# Patient Record
Sex: FEMALE | Race: WHITE | NOT HISPANIC OR LATINO | Employment: UNEMPLOYED | URBAN - METROPOLITAN AREA
[De-identification: names, ages, dates, MRNs, and addresses within clinical notes are randomized per-mention and may not be internally consistent; named-entity substitution may affect disease eponyms.]

---

## 2017-01-19 ENCOUNTER — PATIENT MESSAGE (OUTPATIENT)
Dept: OBSTETRICS AND GYNECOLOGY | Facility: CLINIC | Age: 37
End: 2017-01-19

## 2017-01-19 ENCOUNTER — TELEPHONE (OUTPATIENT)
Dept: OBSTETRICS AND GYNECOLOGY | Facility: CLINIC | Age: 37
End: 2017-01-19

## 2017-01-19 ENCOUNTER — OFFICE VISIT (OUTPATIENT)
Dept: OBSTETRICS AND GYNECOLOGY | Facility: CLINIC | Age: 37
End: 2017-01-19
Payer: COMMERCIAL

## 2017-01-19 ENCOUNTER — LAB VISIT (OUTPATIENT)
Dept: LAB | Facility: HOSPITAL | Age: 37
End: 2017-01-19
Attending: OBSTETRICS & GYNECOLOGY
Payer: COMMERCIAL

## 2017-01-19 VITALS
HEART RATE: 85 BPM | WEIGHT: 187.06 LBS | BODY MASS INDEX: 31.94 KG/M2 | SYSTOLIC BLOOD PRESSURE: 130 MMHG | DIASTOLIC BLOOD PRESSURE: 76 MMHG | HEIGHT: 64 IN

## 2017-01-19 DIAGNOSIS — R53.83 FATIGUE, UNSPECIFIED TYPE: ICD-10-CM

## 2017-01-19 DIAGNOSIS — R53.83 FATIGUE, UNSPECIFIED TYPE: Primary | ICD-10-CM

## 2017-01-19 DIAGNOSIS — L29.9 PRURITUS OF SKIN: ICD-10-CM

## 2017-01-19 DIAGNOSIS — L65.9 HAIR LOSS: ICD-10-CM

## 2017-01-19 LAB
BASOPHILS # BLD AUTO: 0 K/UL
BASOPHILS NFR BLD: 0.4 %
DIFFERENTIAL METHOD: ABNORMAL
EOSINOPHIL # BLD AUTO: 0.1 K/UL
EOSINOPHIL NFR BLD: 1.2 %
ERYTHROCYTE [DISTWIDTH] IN BLOOD BY AUTOMATED COUNT: 19.1 %
FERRITIN SERPL-MCNC: 8 NG/ML
HCT VFR BLD AUTO: 36.4 %
HGB BLD-MCNC: 11.7 G/DL
IRON SERPL-MCNC: 30 UG/DL
LYMPHOCYTES # BLD AUTO: 2 K/UL
LYMPHOCYTES NFR BLD: 28.1 %
MCH RBC QN AUTO: 23.8 PG
MCHC RBC AUTO-ENTMCNC: 32.2 %
MCV RBC AUTO: 74 FL
MONOCYTES # BLD AUTO: 0.4 K/UL
MONOCYTES NFR BLD: 6.1 %
NEUTROPHILS # BLD AUTO: 4.5 K/UL
NEUTROPHILS NFR BLD: 64.2 %
PLATELET # BLD AUTO: 306 K/UL
PMV BLD AUTO: 8.3 FL
RBC # BLD AUTO: 4.93 M/UL
SATURATED IRON: 6 %
TOTAL IRON BINDING CAPACITY: 466 UG/DL
TRANSFERRIN SERPL-MCNC: 315 MG/DL
TSH SERPL DL<=0.005 MIU/L-ACNC: 0.6 UIU/ML
WBC # BLD AUTO: 7 K/UL

## 2017-01-19 PROCEDURE — 99213 OFFICE O/P EST LOW 20 MIN: CPT | Mod: S$GLB,,, | Performed by: OBSTETRICS & GYNECOLOGY

## 2017-01-19 PROCEDURE — 99999 PR PBB SHADOW E&M-EST. PATIENT-LVL II: CPT | Mod: PBBFAC,,, | Performed by: OBSTETRICS & GYNECOLOGY

## 2017-01-19 PROCEDURE — 83540 ASSAY OF IRON: CPT

## 2017-01-19 PROCEDURE — 84443 ASSAY THYROID STIM HORMONE: CPT

## 2017-01-19 PROCEDURE — 82728 ASSAY OF FERRITIN: CPT

## 2017-01-19 PROCEDURE — 1159F MED LIST DOCD IN RCRD: CPT | Mod: S$GLB,,, | Performed by: OBSTETRICS & GYNECOLOGY

## 2017-01-19 PROCEDURE — 85025 COMPLETE CBC W/AUTO DIFF WBC: CPT

## 2017-01-19 PROCEDURE — 36415 COLL VENOUS BLD VENIPUNCTURE: CPT

## 2017-01-19 NOTE — TELEPHONE ENCOUNTER
----- Message from Bianca Alegre sent at 2017  7:21 AM CST -----  Contact: self  Patient [phone] is calling to speak with Nurse today/patient had twins in 2016 and feels she is still having some complication from the /had a wound infection following the /please call patient

## 2017-01-19 NOTE — PROGRESS NOTES
Subjective:       Patient ID: Sheridan Todd is a 36 y.o. female.    Chief Complaint:  Wound Check (incision not feeling right, odor, itching )      History of Present Illness  HPI  Pt complains of reddening of the right side of incision and itching.  She also complains of hair falling out and extreme fatigue    GYN & OB History  Patient's last menstrual period was 2017 (approximate).   Date of Last Pap: 2016    OB History    Para Term  AB SAB TAB Ectopic Multiple Living   4 3 0 3 1 1 0 0 1 4      # Outcome Date GA Lbr Baljinder/2nd Weight Sex Delivery Anes PTL Lv   4A   33w0d    CS-LTranv  Y Y   4B   33w0d    CS-LTranv  Y Y   3   36w0d  3.317 kg (7 lb 5 oz)  Vag-Spont  Y Y      Complications: Pre-eclampsia,Gestational diabetes   2   34w0d  2.24 kg (4 lb 15 oz) M Vag-Spont   Y      Complications: Premature rupture of membranes   1 SAB  5w0d             Birth Comments: SAB          Review of Systems  Review of Systems   Constitutional: Negative.    Respiratory: Negative.    Cardiovascular: Negative.    Gastrointestinal: Negative.    Genitourinary: Negative.    Musculoskeletal: Negative.    Skin:  Negative.   Neurological: Negative.    Psychiatric/Behavioral: Negative.            Objective:    Physical Exam:   Constitutional: She is oriented to person, place, and time. She appears well-developed and well-nourished.    HENT:   Head: Normocephalic and atraumatic.    Eyes: EOM are normal.    Neck: Normal range of motion.    Cardiovascular: Normal rate.     Pulmonary/Chest: Effort normal.        Abdominal: She exhibits abdominal incision (thinning of the right side and pink but no induration, granulation tissue removed).             Musculoskeletal: Normal range of motion and moves all extremeties.       Neurological: She is alert and oriented to person, place, and time.    Skin: Skin is warm and dry.    Psychiatric: She has a normal mood and affect.  Her behavior is normal. Judgment and thought content normal.          Assessment:        1. Fatigue, unspecified type    2. Hair loss    3. Pruritus of skin                Plan:      Labs ordered  Routine cleaning of incision

## 2017-01-20 ENCOUNTER — PATIENT MESSAGE (OUTPATIENT)
Dept: OBSTETRICS AND GYNECOLOGY | Facility: CLINIC | Age: 37
End: 2017-01-20

## 2017-01-24 ENCOUNTER — TELEPHONE (OUTPATIENT)
Dept: OBSTETRICS AND GYNECOLOGY | Facility: CLINIC | Age: 37
End: 2017-01-24

## 2019-04-29 ENCOUNTER — OFFICE VISIT (OUTPATIENT)
Dept: SURGERY | Facility: CLINIC | Age: 39
End: 2019-04-29
Payer: MEDICAID

## 2019-04-29 VITALS
BODY MASS INDEX: 29.02 KG/M2 | HEART RATE: 65 BPM | SYSTOLIC BLOOD PRESSURE: 161 MMHG | WEIGHT: 170 LBS | HEIGHT: 64 IN | TEMPERATURE: 99 F | DIASTOLIC BLOOD PRESSURE: 93 MMHG

## 2019-04-29 DIAGNOSIS — N63.0 BREAST MASS IN FEMALE: Primary | ICD-10-CM

## 2019-04-29 PROCEDURE — 99204 PR OFFICE/OUTPT VISIT, NEW, LEVL IV, 45-59 MIN: ICD-10-PCS | Mod: ,,, | Performed by: SURGERY

## 2019-04-29 PROCEDURE — 99204 OFFICE O/P NEW MOD 45 MIN: CPT | Mod: ,,, | Performed by: SURGERY

## 2019-04-29 NOTE — PROGRESS NOTES
Subjective:       Patient ID: Sheridan Todd is a 38 y.o. female.    Chief Complaint: Consult ([facility] referred bilateral breast bx )      HPI:  Patient presents for evaluation of left breast mass. Initial presentation is that of left breast pain which accelerated over last 4 months. Patient developed inverted nipple. She is not quite sure she can feel a mass. She gives history of a blocked no duct in that breast about 2 years ago when she was nursing. Patient feels that her breasts has given her minor problems ever since. No complaints on the other side.    Patient had workup including mammography, ultrasound, MRI. A 2.5 cm left breast mass was diagnosed with changes consistent with multifocal disease on the left. Minor findings on the right side suggestive of 2 inflammatory lymph nodes.    No previous breast pathology. No family history of breast cancer. Patient is not currently on hormones. Patient is premenopausal.    Past Medical History:   Diagnosis Date    Abnormal Pap smear of cervix     Fibromyalgia     Gestational diabetes     Hypertension     AFTER PREGNANCY- NO MED NOW    Overweight and obesity(278.0)     Sarcoidosis      Past Surgical History:   Procedure Laterality Date     SECTION  08/15/2016    Twins     CRYOTHERAPY      CYSTOSCOPY WITH BIOPSY OF BLADDER      x 2    CYSTOSCOPY WITH HYDRODISTENSION N/A 2014    Performed by [facility] Ferr[facility] at [facility] OR    PAROTID TUMOR      LEFT    VAGINAL DELIVERY       x2 PROM. Preclampsia/ gestational diabetes    WISDOM TOOTH EXTRACTION       Review of patient's allergies indicates:   Allergen Reactions    Codeine      Other reaction(s): Nausea    Shellfish containing products      Other reaction(s): Vomiting  Only crabmeat     Medication List with Changes/Refills   Discontinued Medications    NORETHINDRONE (MICRONOR) 0.35 MG TABLET        NORGESTREL-ETHINYL ESTRADIOL (LO/OVRAL) 0.3-30 MG-MCG PER TABLET    Take 1 tablet by mouth  once daily.     Family History   Problem Relation Age of Onset    Hypertension Mother     Hyperlipidemia Mother     Cancer Father         liver    Cancer Maternal Grandfather         throat    Kidney disease Maternal Grandfather     Ovarian cancer Other     Lung cancer Paternal Aunt     Breast cancer Neg Hx      Social History     Socioeconomic History    Marital status:      Spouse name: Not on file    Number of children: Not on file    Years of education: Not on file    Highest education level: Not on file   Occupational History     Employer: [employer]   Social Needs    Financial resource strain: Not on file    Food insecurity:     Worry: Not on file     Inability: Not on file    Transportation needs:     Medical: Not on file     Non-medical: Not on file   Tobacco Use    Smoking status: Former Smoker     Packs/day: 1.00     Years: 6.50     Pack years: 6.50     Types: Cigarettes     Start date: 5/12/2012    Smokeless tobacco: Never Used    Tobacco comment: quit 2 years ago   Substance and Sexual Activity    Alcohol use: Yes     Comment: rare    Drug use: No    Sexual activity: Yes     Partners: Male   Lifestyle    Physical activity:     Days per week: Not on file     Minutes per session: Not on file    Stress: Not on file   Relationships    Social connections:     Talks on phone: Not on file     Gets together: Not on file     Attends [employer] service: Not on file     Active member of club or organization: Not on file     Attends meetings of clubs or organizations: Not on file     Relationship status: Not on file   Other Topics Concern    Not on file   Social History Narrative    Not on file         Review of Systems   Constitutional: Negative for appetite change, chills, fever and unexpected weight change.   HENT: Negative for hearing loss, rhinorrhea, sore throat and voice change.    Eyes: Negative for photophobia and visual disturbance.   Respiratory: Negative for cough, choking  and shortness of breath.    Cardiovascular: Negative for chest pain, palpitations and leg swelling.   Gastrointestinal: Negative for abdominal pain, blood in stool, constipation, diarrhea, nausea and vomiting.   Endocrine: Negative for cold intolerance, heat intolerance, polydipsia and polyuria.   Musculoskeletal: Negative for arthralgias, back pain, joint swelling and neck stiffness.   Skin: Negative for color change, pallor and rash.   Neurological: Negative for dizziness, seizures, syncope and headaches.   Hematological: Negative for adenopathy. Does not bruise/bleed easily.   Psychiatric/Behavioral: Negative for agitation, behavioral problems and confusion.       Objective:      Physical Exam   Constitutional: She appears well-developed and well-nourished.  Non-toxic appearance. No distress.   HENT:   Head: Normocephalic and atraumatic. Head is without abrasion and without laceration.   Right Ear: External ear normal.   Left Ear: External ear normal.   Nose: Nose normal.   Mouth/Throat: Oropharynx is clear and moist.   Eyes: Pupils are equal, round, and reactive to light. EOM are normal.   Neck: Trachea normal. No tracheal deviation and normal range of motion present. No thyroid mass and no thyromegaly present.   Cardiovascular: Normal rate and regular rhythm.   Pulmonary/Chest: Effort normal. No accessory muscle usage. No tachypnea. No respiratory distress. Right breast exhibits no inverted nipple, no mass and no skin change. Left breast exhibits inverted nipple, mass, skin change and tenderness. Left breast exhibits no nipple discharge. No breast tenderness or discharge. Breasts are asymmetrical.   Vague retroareolar mass on the left. Most of breast parenchyma feels thickened. Left more so than right. Mild skin changes on the left as shown. Inverted nipple on the left. No palpable adenopathy on either side.       Abdominal: Soft. Normal appearance and bowel sounds are normal. She exhibits no distension and no  mass. There is no hepatosplenomegaly. There is no tenderness. There is no tenderness at McBurney's point and negative Oh's sign. No hernia.   Lymphadenopathy:     She has no cervical adenopathy.     She has no axillary adenopathy.        Right: No inguinal adenopathy present.        Left: No inguinal adenopathy present.   Neurological: She is alert. Coordination and gait normal.   Skin: Skin is warm and intact.   Psychiatric: She has a normal mood and affect. Her speech is normal and behavior is normal.       Assessment/Plan:   Breast mass in female      Left side very suspicious for progressive multifocal cancer. Right side not very suspicious for cancer. Patient will have bilateral ultrasound guided core biopsies thereafter tomorrow. Follow-up here next week to discuss results.

## 2019-04-29 NOTE — LETTER
April 29, 2019      Aly Contreras MD  [address]  [place] [address]           [facility] - General Surgery  [address]  [place] [address]  Phone: [phone]  Fax: [phone]          Patient: Sheridan Todd   MR Number: [id number]   YOB: 1980   Date of Visit: 4/29/2019       Dear Dr. Aly Contreras:    Thank you for referring Sheridan Todd to me for evaluation. Attached you will find relevant portions of my assessment and plan of care.    If you have questions, please do not hesitate to call me. I look forward to following Sheridan Todd along with you.    Sincerely,    Salvador HUIZAR MD    Enclosure  CC:  No Recipients    If you would like to receive this communication electronically, please contact [email or web address] or [phone] to request more information on [employer] Link access.    For providers and/or their staff who would like to refer a patient to [facility], please contact us through our one-stop-shop provider referral line, [facility], at [phone].    If you feel you have received this communication in error or would no longer like to receive these types of communications, please e-mail [email or web address]

## 2019-05-03 ENCOUNTER — TELEPHONE (OUTPATIENT)
Dept: SURGERY | Facility: CLINIC | Age: 39
End: 2019-05-03

## 2019-05-07 ENCOUNTER — OFFICE VISIT (OUTPATIENT)
Dept: SURGERY | Facility: CLINIC | Age: 39
End: 2019-05-07
Payer: MEDICAID

## 2019-05-07 VITALS
HEIGHT: 64 IN | SYSTOLIC BLOOD PRESSURE: 161 MMHG | BODY MASS INDEX: 29.02 KG/M2 | WEIGHT: 170 LBS | DIASTOLIC BLOOD PRESSURE: 93 MMHG

## 2019-05-07 DIAGNOSIS — C50.812 MALIGNANT NEOPLASM OF OVERLAPPING SITES OF LEFT FEMALE BREAST, UNSPECIFIED ESTROGEN RECEPTOR STATUS: Primary | ICD-10-CM

## 2019-05-07 DIAGNOSIS — N63.0 BREAST MASS IN FEMALE: ICD-10-CM

## 2019-05-07 PROCEDURE — 99214 OFFICE O/P EST MOD 30 MIN: CPT | Mod: ,,, | Performed by: SURGERY

## 2019-05-07 PROCEDURE — 99214 PR OFFICE/OUTPT VISIT, EST, LEVL IV, 30-39 MIN: ICD-10-PCS | Mod: ,,, | Performed by: SURGERY

## 2019-05-07 NOTE — LETTER
May 7, 2019      Aly Contreras MD  [address]  [place] [address]           [facility] - General Surgery  [address]  [place] [address]  Phone: [phone]  Fax: [phone]          Patient: Sheridan Todd   MR Number: [id number]   YOB: 1980   Date of Visit: 5/7/2019       Dear Dr. Aly Contreras:    Thank you for referring Sheridan Todd to me for evaluation. Attached you will find relevant portions of my assessment and plan of care.    If you have questions, please do not hesitate to call me. I look forward to following Sheridan Todd along with you.    Sincerely,    Salvador HUIZAR MD    Enclosure  CC:  No Recipients    If you would like to receive this communication electronically, please contact [email or web address] or [phone] to request more information on [employer] Link access.    For providers and/or their staff who would like to refer a patient to [facility], please contact us through our one-stop-shop provider referral line, [facility], at [phone].    If you feel you have received this communication in error or would no longer like to receive these types of communications, please e-mail [email or web address]

## 2019-05-07 NOTE — PROGRESS NOTES
Patient comes in to discuss results of her ultrasound-guided core biopsy. Biopsy on the left sided mass came back positive for breast cancer. Biopsy on the right sided mass came back positive for fibroadenoma.    Patient's prebiopsy workup is highly suggestive of multifocal disease in the left breast accompanied by nipple retraction.    Patient's diagnosis along with treatment and staging options have been discussed with patient. I do not feel that breast conservation on the left side would be appropriate treatment due to multifocality of the disease and nipple involvement. Patient agrees. After discussion with patient and her mother with tentatively decided to proceed with bilateral mastectomy and plans for delayed reconstruction. We will obtain a preoperative oncology consultation to rule out need for neoadjuvant therapy and also give patient another perspective on the subject.    Patient will return in 2 weeks for further discussion and possible preop.

## 2019-05-13 ENCOUNTER — TELEPHONE (OUTPATIENT)
Dept: HEMATOLOGY/ONCOLOGY | Facility: CLINIC | Age: 39
End: 2019-05-13

## 2019-05-13 NOTE — TELEPHONE ENCOUNTER
Sent to anu     ----- Message from Emmy Godinez sent at 5/13/2019  8:51 AM CDT -----  The patient was returning Jannie's call about a new patient appointment. Please call her back at [phone].

## 2019-05-15 NOTE — PROGRESS NOTES
[facility] Hematolgy/Oncology  History & Physical    Subjective:      Patient ID:   NAME: Sheridan Todd : 1980     38 y.o. female    Referring Doc: Krissy  Other Physicians: Aly Contreras (GYN); Jose (former PCP)        Chief Complaint: left breast cancer      HPI:  38 y.o. female with diagnosis of left breast cancer who has been referred by Dr Salvador Rey with Gen Surgery for evaluation by medical oncology. She had presented with left breast pain which became progressive over a 4 month period. Radiology studies found a 2.5cm mass on the left breast along with two inflamed LN's on the right. She had left breast biopsy on 2019 which showed invasive ductal carcinoma grade 3. The right breast biopsy was negative for cancer. She has always had fibrous breasts. She had twin pregnancy and delivery about 3 years ago and was nursing and thought she had a bout blocked mammary duct which seemed to resolve after hot compresses etc. She nursed them for about 5 1/2 months and discontinued. She has left nipple retraction for about 4-6 weeks. She first noticed the pain and hardness this past January. She is ER and MI positive. She is also Her2Nu +3. We discussed the latest data on Her2Nu positive breast cancers and the recommendation for neoadjuvant chemotherapy with a herceptin and perjeta. Possible breast nodule in paternal grandmother but not sure if it was cancer. She denies any current CP, SOB, HA's or N/V. She is here by herself. She sees Dr Rey again in near future. She will need PET, Echo, and portacath.               ROS:   GEN: normal without any fever, night sweats or weight loss  HEENT: normal with no HA's, sore throat, stiff neck, changes in vision  CV: normal with no CP, SOB, PND, MENDIETA or orthopnea  PULM: normal with no SOB, cough, hemoptysis, sputum or pleuritic pain  GI: normal with no abdominal pain, nausea, vomiting, constipation, diarrhea, melanotic stools, BRBPR, or hematemesis  : normal with  no hematuria, dysuria  BREAST:  left breast with large fibrous component at about the 1 o'clock position about 2.5 to 3 cm in size; biopsy site on left lower outer area; no appreciable LAD in axilla; nipple inversion on left without any appreciable skin changes visually  SKIN: normal with no rash, erythema, bruising, or swelling       Past Medical/Surgical History:  Past Medical History:   Diagnosis Date    Abnormal Pap smear of cervix     Fibromyalgia     Gestational diabetes     HER2-positive carcinoma of left breast 2019    Hypertension     AFTER PREGNANCY- NO MED NOW    Malignant neoplasm of overlapping sites of left breast in female, estrogen receptor positive 2019    Malignant neoplasm of overlapping sites of left female breast 2019    Overweight and obesity(278.0)     Sarcoidosis      Past Surgical History:   Procedure Laterality Date     SECTION  08/15/2016    Twins     CRYOTHERAPY      CYSTOSCOPY WITH BIOPSY OF BLADDER      x 2    CYSTOSCOPY WITH HYDRODISTENSION N/A 2014    Performed by [facility]    PAROTID TUMOR      LEFT    VAGINAL DELIVERY       x2 PROM. Preclampsia/ gestational diabetes    WISDOM TOOTH EXTRACTION           Allergies:  Review of patient's allergies indicates:   Allergen Reactions    Codeine      Other reaction(s): Nausea    Shellfish containing products      Other reaction(s): Vomiting  Only crabmeat       Social/Family History:  Social History     Socioeconomic History    Marital status:      Spouse name: Not on file    Number of children: Not on file    Years of education: Not on file    Highest education level: Not on file   Occupational History     Employer: [employer]   Social Needs    Financial resource strain: Not on file    Food insecurity:     Worry: Not on file     Inability: Not on file    Transportation needs:     Medical: Not on file     Non-medical: Not on file   Tobacco Use    Smoking  "status: Former Smoker     Packs/day: 1.00     Years: 6.50     Pack years: 6.50     Types: Cigarettes     Start date: 5/12/2012    Smokeless tobacco: Never Used    Tobacco comment: quit 2 years ago   Substance and Sexual Activity    Alcohol use: Yes     Comment: rare    Drug use: No    Sexual activity: Yes     Partners: Male   Lifestyle    Physical activity:     Days per week: Not on file     Minutes per session: Not on file    Stress: Not on file   Relationships    Social connections:     Talks on phone: Not on file     Gets together: Not on file     Attends Advent service: Not on file     Active member of club or organization: Not on file     Attends meetings of clubs or organizations: Not on file     Relationship status: Not on file   Other Topics Concern    Not on file   Social History Narrative    Not on file     Family History   Problem Relation Age of Onset    Hypertension Mother     Hyperlipidemia Mother     Cancer Father         liver    Cancer Maternal Grandfather         throat    Kidney disease Maternal Grandfather     Ovarian cancer Other     Lung cancer Paternal Aunt     Breast cancer Neg Hx          Medications:  No current outpatient medications on file.      Pathology:  Cancer Staging      Breast biopsies: 5/1/2019:  Left breast: with invasive ductal carcinoma grade 3; ER positive at 95% and MS positive at 90%; Her2Nu was positive at +3; Her2?CEP 17 ratio was >10.6  - Ki67 was unfavorable at 69%  Right breast: negative for cancer      Objective:   Vitals:  Blood pressure 130/80, pulse 61, temperature 99.4 °F (37.4 °C), temperature source Oral, resp. rate 20, height 5' 3" (1.6 m), weight 79 kg (174 lb 3.2 oz), currently breastfeeding.    Physical Examination:   GEN: no apparent distress, comfortable; AAOx3  HEAD: atraumatic and normocephalic  EYES: no pallor, no icterus, PERRLA  ENT: OMM, no pharyngeal erythema, external ears WNL; no nasal discharge; no thrush  NECK: no masses, " thyroid normal, trachea midline, no LAD/LN's, supple  CV: RRR with no murmur; normal pulse; normal S1 and S2; no pedal edema  CHEST: Normal respiratory effort; CTAB; normal breath sounds; no wheeze or crackles  ABDOM: nontender and nondistended; soft; normal bowel sounds; no rebound/guarding  MUSC/Skeletal: ROM normal; no crepitus; joints normal; no deformities or arthropathy  EXTREM: no clubbing, cyanosis, inflammation or swelling  SKIN: no rashes, lesions, ulcers, petechiae or subcutaneous nodules  : no lopez  NEURO: grossly intact; motor/sensory WNL; AAOx3; no tremors  PSYCH: normal mood, affect and behavior  LYMPH: normal cervical, supraclavicular, axillary and groin LN's  Breast: left breast with large fibrous component at about the 1 o'clock position about 2.5 to 3 cm in size; biopsy site on left lower outer area; no appreciable LAD in axilla; nipple inversion on left without any appreciable skin changes visually    Labs:       Radiology/Diagnostic Studies:      mammo  4/23/2019  - mass left breast 1 o'clock potiion 10cm from nipple - about 2.8 cm  - multiple foci in the left breast radiographically    All lab results and imaging results have been reviewed and discussed with the patient    Assessment:   (1) 38 y.o. female with diagnosis of left breast cancer who has been referred by Dr Salvador Rey with Gen Surgery for evaluation by medical oncology.     - She had presented with left breast pain which became progressive over a 4 month period.   - Radiology studies found a 2.5cm mass on the left breast along with two inflamed LN's on the right.   - She had left breast biopsy on 5/1/2019 which showed invasive ductal carcinoma grade 3. The right breast biopsy was negative for cancer.   - She is ER and LA positive. She is also Her2Nu +3.   - We discussed the latest data on Her2Nu positive breast cancers and the recommendation for neoadjuvant chemotherapy with a herceptin and perjeta.  - she will need echo,  portacath, and PET scan  - will plan for herceptin, perjeta, carboplatin and taxotere regimen neoadjuvantly with 6 cycles  - set up chemotherapy school; discuss side-effect profile, provide literature on the regimen; obtain consents    (2) Left parotid tumor - removed in 2011    (3) Sarcoidosis    (4) Interstitial cystitis    (5) Diet controlled gestational DM    (6) Fibromyalgia    (7) Hx/of cervical dysplasia as teenager s/p cryoablation      VISIT DIAGNOSES:              Malignant neoplasm of overlapping sites of left breast in female, estrogen receptor positive  -     Transthoracic Echo (TTE) Complete 2D; Future  -     NM PET CT Routine Skull to Mid Thigh  -     Ambulatory referral to Chemo School    HER2-positive carcinoma of left breast            Plan:     PLAN:  1. Set up echo, PET and will need protacath  2. Check up to date labs  3. Set up chemotherapy school for neoadjuvant therapy with herceptin, perjeta, carboplatin and taxotere; discuss side-effect profile and provide literature  4. Provide literature on the drugs  5. F/u with PCP, Gen Surg, Gyn  RTC in  1-2 weeks   Fax note to Ben Rey    Pathology Discussion:    I reviewed and discussed the pathology report(s) and radiograph reports (if available) in as simple to understand and/or laymen's terms to the best of my ability. I had an indepth conversation with the patient and went over the patient's individual diagnosis based on the information that was currently available. I discussed the TNM staging process with regard to the patient's particular cancer type, and the calculated stage based on the currently available TNM data and literature. I discussed the available prognostic data with regard to the current staging information and how it relates to the prognosis of their particular neoplastic process.      NCCN Guidelines:    I discussed the available treatment option(s) in accordance with the latest literature from the NCCN Clinical Practice  "Guidelines for the patient's particular type of cancer disorder. The NCCN Guidelines provide a "document evidence-based (and) consensus-driven management" of the care of oncology patients. The treatment recommendations were made not only in accordance to the NCCN guidelines, but also factored in to account the patient's overall age, condition, performance status and their medical co-morbidities. I went over the risks and benefits of the the treatment options (if any could be made) with regard to their particular cancer type, their cancer stage, their age, and their co-morbidities.       Chemotherapy Discussion:      I discussed the available treatment option(s) in accordance with the latest NCCN Guidelines, their overall age and their co-morbidities. I went over the risks and benefits of the chemotherapy with regard to their particular cancer type, their cancer stage, their age, and their co-morbidities. I provided literature on the chemotherapy regimen and discussed the chemotherapy side-effect profiles of the drug(s). I discussed the importance of compliance with obtaining and monitoring weekly lab work, and went over the potential hematopathology issues and risks with anemia, leucopenia and thrombocytopenia that can occur with chemotherapy. I discussed the potential risks of liver and kidney damage, which could be permanent and could necessitate dialysis long-term if kidney failure developed. I discussed the emetic and/or diarrheal potential of the regimen and the potential need for use of antiemetic and anti-diarrheal medications. I discussed the risk for development of anaphylactic shock, bronchospasm, dysrhythmia, and respiratory/cardiovascular arrest and/or failure. I discussed the potential risks for development of alopecia, cold sensory issues, ringing in ears, vertigo and neuropathy, all of which could end up being chronic and life-long. I discussed the risks of hand-foot syndrome and rashes, and " development of other autoimmune mediated processes such as pneumonitis and colitis which could be life threatening. I discussed the risks of the potential development of leukemia and/or lymphoma from use of certain chemotherapy agents. I discussed the need for neutropenic precautions, basic hygiene/sanitation behaviors and dietary restrictions.    The patient's consent has been obtained to proceed with the chemotherapy.The patient will be referred to Chemotherapy School [facility] for training and education on chemotherapy, use of antiemetics and/or anti-diarrheals, use of NSAID's, potential chemotherapy side-effects, and any specific recommendations and precautions with the particular chemotherapy agents.      I answered all of the patient's (and family's, if applicable) questions to the best of my ability and to their complete satisfaction. The patient acknowledged full understanding of the risks, recommendations and plan(s).       I have explained and the patient understands all of  the current recommendation(s). I have answered all of their questions to the best of my ability and to their complete satisfaction.             Thank you for allowing me to participate in this patient's care. Please call with any questions or concerns.    Electronically signed Abel Chambers MD

## 2019-05-16 ENCOUNTER — OFFICE VISIT (OUTPATIENT)
Dept: HEMATOLOGY/ONCOLOGY | Facility: CLINIC | Age: 39
End: 2019-05-16
Payer: MEDICAID

## 2019-05-16 ENCOUNTER — TELEPHONE (OUTPATIENT)
Dept: HEMATOLOGY/ONCOLOGY | Facility: CLINIC | Age: 39
End: 2019-05-16

## 2019-05-16 VITALS
TEMPERATURE: 99 F | RESPIRATION RATE: 20 BRPM | WEIGHT: 174.19 LBS | DIASTOLIC BLOOD PRESSURE: 80 MMHG | HEIGHT: 63 IN | BODY MASS INDEX: 30.86 KG/M2 | SYSTOLIC BLOOD PRESSURE: 130 MMHG | HEART RATE: 61 BPM

## 2019-05-16 DIAGNOSIS — Z17.0 MALIGNANT NEOPLASM OF OVERLAPPING SITES OF LEFT BREAST IN FEMALE, ESTROGEN RECEPTOR POSITIVE: ICD-10-CM

## 2019-05-16 DIAGNOSIS — C50.812 MALIGNANT NEOPLASM OF OVERLAPPING SITES OF LEFT BREAST IN FEMALE, ESTROGEN RECEPTOR POSITIVE: ICD-10-CM

## 2019-05-16 DIAGNOSIS — C50.912 HER2-POSITIVE CARCINOMA OF LEFT BREAST: ICD-10-CM

## 2019-05-16 PROCEDURE — 99204 PR OFFICE/OUTPT VISIT, NEW, LEVL IV, 45-59 MIN: ICD-10-PCS | Mod: ,,, | Performed by: INTERNAL MEDICINE

## 2019-05-16 PROCEDURE — 99204 OFFICE O/P NEW MOD 45 MIN: CPT | Mod: ,,, | Performed by: INTERNAL MEDICINE

## 2019-05-16 NOTE — PATIENT INSTRUCTIONS
What Is Breast Cancer?  Having breast cancer means that some cells in your breast have changed and are growing out of control. Learning about the different types and stages of breast cancer can help you take an active role in your treatment.  Changes in your breast  Your entire body is made of living tissue. This tissue is made up of tiny cells. You can't see these cells with the naked eye. Normal cells reproduce (divide) in a controlled way. They grow when your body needs them, and die when your body does not need them any longer. When you have cancer, some cells become abnormal. These cells may divide quickly, don't die when they should, and spread into other parts of the body.      Normal breast tissue is made of healthy cells. They reproduce new cells that look and work the same. Noninvasive breast cancer(carcinoma in situ) happens when cancer cells are only in the ducts.       Invasive breast cancer happens when cancer cells move out of the ducts or lobules into the surrounding breast tissue. Metastasis happens when cancer cells move into the lymph nodes or bloodstream and travel to another part of the body.      Stages of breast cancer  Several tests are used to measure the size of a tumor and learn how far it has spread. This is called staging. The stage of your cancer will help determine your treatment. Based on National Cancer [place] guidelines, the stages of breast cancer are:  · Stage 0. The cancer is noninvasive. Cancer cells are found only in the ducts (ductal carcinoma in situ).  · Stage I. The tumor is 2 cm (about 3/4 of an inch) or less in diameter. It has invaded the surrounding breast tissue, and tiny amounts of cancer cells may be found in the underarm lymph nodes.  · Stage II. The tumor is larger than 2 cm and has not spread to lymph nodes, or the cancer is less than 5 cm across and has spread to the lymph nodes under the arm.  · Stage III. The tumor is less than 5 cm (2 inches) across, and  there's a lot of cancer in your underarm lymph nodes, or it has spread to other lymph nodes. Or the tumor is larger than 5 cm and has spread to lymph nodes. Or the tumor is any size and has spread to the skin, chest wall, and maybe to nearby lymph nodes.  · Stage IV. The tumor has spread beyond the breast to the bones, lungs, liver, brain, or lymph nodes far away from the breast.  Recurrent breast cancer. When the cancer returns after treatment.   Date Last Reviewed: 9/21/2015 © 2000-2017 [employer]. [address] [place] [address]. All rights reserved. This information is not intended as a substitute for professional medical care. Always follow your healthcare professional's instructions.

## 2019-05-16 NOTE — LETTER
May 16, 2019      Salvador HUIZAR MD  [address]  [address]  [place] [address]           [facility] - Hematology Oncology  [address]  [address]  [place] [address]  Phone: [phone]  Fax: [phone]          Patient: Sheridan Todd   MR Number: [id number]   YOB: 1980   Date of Visit: 5/16/2019       Dear Dr. Salvador Rey V:    Thank you for referring Sheridan Todd to me for evaluation. Attached you will find relevant portions of my assessment and plan of care.    If you have questions, please do not hesitate to call me. I look forward to following Sheridan Todd along with you.    Sincerely,    Abel Chambers MD    Enclosure  CC:  No Recipients    If you would like to receive this communication electronically, please contact [email or web address] or [phone] to request more information on [facility] access.    For providers and/or their staff who would like to refer a patient to [facility], please contact us through our one-stop-shop provider referral line, [facility], at [phone].    If you feel you have received this communication in error or would no longer like to receive these types of communications, please e-mail [email or web address]

## 2019-05-17 ENCOUNTER — CLINICAL SUPPORT (OUTPATIENT)
Dept: HEMATOLOGY/ONCOLOGY | Facility: CLINIC | Age: 39
End: 2019-05-17
Payer: MEDICAID

## 2019-05-17 DIAGNOSIS — T45.1X5A CHEMOTHERAPY-INDUCED NAUSEA: Primary | ICD-10-CM

## 2019-05-17 DIAGNOSIS — R11.0 CHEMOTHERAPY-INDUCED NAUSEA: Primary | ICD-10-CM

## 2019-05-17 RX ORDER — PROMETHAZINE HYDROCHLORIDE 25 MG/1
25 TABLET ORAL EVERY 6 HOURS PRN
Qty: 30 TABLET | Refills: 2 | Status: ON HOLD | OUTPATIENT
Start: 2019-05-17 | End: 2020-03-05 | Stop reason: HOSPADM

## 2019-05-17 RX ORDER — ONDANSETRON HYDROCHLORIDE 8 MG/1
8 TABLET, FILM COATED ORAL EVERY 8 HOURS PRN
Qty: 30 TABLET | Refills: 2 | Status: ON HOLD | OUTPATIENT
Start: 2019-05-17 | End: 2020-03-05 | Stop reason: HOSPADM

## 2019-05-17 NOTE — PROGRESS NOTES
Sheridan Todd  [id number]    [facility]   Cancer Center    TITLE: PLAN OF CARE FOR THE CHEMOTHERAPY PATIENT / TEACHING PROTOCOL    PURPOSE: To involve the patient / significant other in the plan of care and to provide teaching to the significant other & patient receiving chemotherapy.    LEVEL: Independent.    CONTENT: The Plan of Care for the chemotherapy patient is individualized and appropriate to the patients needs, strengths, limitations, & goals.  Education includes information regarding chemotherapy side effects, the treatment itself, and self-care  Activities.    GOAL / OUTCOME STANDARDS    PHYSIOLOGIC: The client will remain free or experience minimal side effects or toxicities throughout the chemotherapy treatment period.     PSYCHOLOGIC: The client/significant others will demonstrate positive coping mechanisms in relation to chemotherapy and its side effects.      COGINITIVE: The client/significant others will verbalize understanding of self-care measure to avoid/minimize side effects of the chemotherapy regime.    EVALUATION / COMMENT KEY:    V = Audiovisual/Video  S = Successfully meets outcome  N = Needs further instruction  NA = Not applicable to the patient  P = Previous knowledge  U = Unable to comprehend  * = See progress notes          PLAN OF CARE  INFORMATION TO BE DELIVERED / NURSING INTERVENTIONS DATE EVALUATION   1. Assessment of client/caregiver,         knowledge of cancer diagnosis,         and chemotherapy as a treatment. 1a. Evaluate patient/caregiver learning ability    b. Plan teaching sessions with patient/caregiver according to needs and present anxiety level/ability to learn.    c. Provide Chemotherapy Education Packet,        Mouth Care Protocol,         Specific Patient Education Sheets. 05/17/2019 S   2. Individual chemotherapy treatment         plan. 2a. Review of Chemotherapy Education handout from [employer]            05/17/2019   S   3. Knowledge  Deficit & Self-Management of general side effects common to all chemotherapy:  a. Nausea/Vomiting  b.   Diarrhea  c. Mouth Care  d. Dental care  e. Constipation  f. Hair Loss  g. Potential for infection  h. Fatigue   3a. Reinforce that the majority of side effects from chemotherapy are reversible and are  controlled both in the hospital and at home        (blood counts recover, hair grows back).   b.  Refer to the following for reinforcement of         information post-treatment:  1. Mouth Care Protocol.  2. Bowel Protocol for constipation or diarrhea.  3.  Drug Specific Chemotherapy Information Sheets for each medication patient receiving.    05/17/2019     S     PLAN OF CARE  INFORMATION TO BE DELIVERED / NURSING INTERVENTIONS DATE EVALUATION   h. Potential for bleeding         i. Potential anemia/fatigue         j. Potential sunburn         k. Birth control measures  l. Safety measures post treatment 4.  Chemotherapy Home Care Instruction  and Safety Information Sheet.  A. patient/caregivers to thoroughly cook shellfish (shrimp, crab, etc) to decrease the chance of infection.    B.  Use sunscreen and protective clothing while in the sun.   05/17/2019      4. Knowledge deficit & Self Management of Drug Specific  Side Effects.    a. BLADDER EFFECTS        (Hemorrhagic Cystitis)                  Preventable with adequate hydration; occurs 2-3 days or more post treatment.   1.  Instruct patient to:  a.   Void at least every 2 hours; increase intake.  b.   DO NOT hold urine; go when urge is felt.  c.    Empty bladder at bedtime and on         awakening.  d.   Observe for color changes (red to tea           colored), amount and frequency changes.  e.   Notify oncologist of any abnormalities           in urine or voiding or if you cannot               drink adequate fluids.   05/17/2019   S   b.   CHANGES IN URINE        COLOR:      1.   Instruct patient:  a.   Most evident in first 2-3 voidings after            administration.  b. Lasts less than 24 hours.  c. If urine is discolored 2 or more days post- treatment, notify oncologist.      05/17/2019 S   c.    KIDNEY EFFECTS           (Nephrotoxicity)   1.  Instruct patient to:  a.   Drink 8-16 glasses of fluid/day the day   pre-treatment and 3-4 days post-treatment to maintain hydration; the best way to minimize kidney problems.  b.   Notify oncologist immediately if unable to drink fluids or if changes are noted in urinary elimination.     05/17/2019   S   a. PULMONARY TOXICITY    1. Instruct patient to report symptoms such as shortness of breath, chest pain, shallow breathing, or chest wall discomfort to physician.  2. Reinforce preventative measures used by the health care team.  a. Baseline and periodic PFT and chest x-ray.   05/17/2019   S     PLAN OF CARE INFORMATION TO BE DELIVERED / NURSING INTERVENTIONS DATE EVALUATION   b. NERVE & MUSCLE EFFECTS (neurotoxocity; neuropathy, possible visual/hearing changes)        3. Instruct patient to:    a. Report numbness or tingling of the hands/feet, loss of fine motor movement (buttoning shirt, tying shoelaces), or gait changes to your oncologist.  b. If numbness/tingling are present:  1. protect feet with shoes at all times.  2. Use gloves for washing dishes/gardening & potholders in kitchen.       05/17/2019   S   c. CARDIOTOXICITY  Decreased effectiveness of             cardiac function. Effective are                  cumulative and irreversible.                                    CARDIAC ARRYTHMIAS              4   Instruct:  a. Heart function may be tested before treatment and perdiocally during treatment.  b. Notify oncologist of irregular pulse, palpitations, shortness of breath, or swelling in lower extremities/feet.          Taxol and Taxotere can cause arrhythmias on infusion that resolve once infusion discontinued. Instruct nurse if any irregularity felt.    05/17/2019   S   d. EXTRAVASATION  Occurs when vesicants  leak outside of vein and cause damage to the skin and underlying tissues.   1. Reinforce preventive measures used to avoid complications.  a. Fresh IV site or central line monitored continuously with vesicant IVP.  b. Continuous infusion via central line site and blood return monitored periodically around the clock.  2. Instruct to:  a. Notify nurse of any discomfort, burning, stinging, etc. at IV site during chemotherapy administration.  b. Notify oncologist of any redness, pain, or swelling at IV site after discharge from hospital.   05/17/2019   S   e. HYPERSENSITIVITY can happen with any medication.   1. Instruct patient:  a. Nurse is with them during the initial part of treatment and will be close by to monitor.  b. Pre-medication ordered by the oncologist must be taken on time. If doses are missed, treatment will need to be re-scheduled.  c. Skin redness, itching, or hives appearing after discharge should be reported to oncologist. 05/17/2019   S       PLAN OF CARE INFORMATION TO BE DELIVERED / NURSING INTERVENTIONS DATE EVALUATION   f. FLU-LIKE SYNDROME      1. Instruct patient symptoms are hard to prevent and may include fever, shaking chills, muscle and body aches.  a. Taking prescribed medications from physician if needed.  b. Adequate fluids are important.    2. Reinforce the need to call if temperature is         elevated to 100.4 or more  05/17/2019   S   g. HAND-FOOT SYNDROME  causes painful, symmetric swelling and redness of palms and soles                  5. Instruct patient to report any numbness or tingling in the hands or feet.  6. Explain prevention techniques, such as     a. Use heavy moisturizers to lessen skin dryness and itching, but to avoid if skin is cracked or broken  b. Bathe in tepid water, use non-perfumed soap, and wash gently. Baths with oatmeal or diluted baking soda may be soothing.  c. Avoid tight fitting shoes and repetitive actions, such as rubbing hands or applying pressure to  hands/feet.  7. Review measures to take should syndrome occur:  a. Cold compresses and elevation for          edema  b. Pain medications and other measures as ordered by oncologist.   4.   Syndrome resolves few weeks after therapy. 05/17/2019   S   5. DISCHARGE PLANNING /        EDUCATION 1.    Explain importance of compliance with follow- up  tests (CBC, CMP).  2.    Verify patient/caregiver know:  a.    Oncologists office phone number.  b.    Dates of follow-up appointments.  c.    Prescriptions given for nausea  3.   Review side effects to monitor and notify          oncologist about.  4.   Reinforce the need for patient and caregivers to:  a.    Review information given.  b.    Call oncologists office with questions          or symptoms  5.   Provide [facility] [place] [facility]ure make referrals if needed for financial or .   05/17/2019   S     PROGRESS NOTES: I met with the patient today for chemotherapy education. she will be starting treatment with Carboplatin / Taxotere / Herceptin / Perjeta. We discussed the mechanism of action, potential side effects of this treatment as well as ways she can manage them at home. Some of these side effects include but or not limited to fever, nausea, vomiting, decreased appetite, fatigue, weakness, cytopenias, myalgia/arthralgia, constipation, diarrhea, bleeding, headache, shortness of breath, nail changes, taste change, hair thinning/loss, peripheral neuropathy, or edema. We spoke about the risk of permanent hair loss. We also spoke about using protection during intimacy to protect against pregnancy, the possibility of menopause with chemotherapy, and the risk of infertility or fertility issues. We discussed dietary modifications she should make although this will be discussed in more detail with the dietician. she was provided with anti-emetic medication, a copy of all of the information we discussed today as well as our contact information. she will be  provided a schedule on his first day of treatment. We will obtain labs on a weekly basis and the patient will follow-up with the physician for toxicity monitoring throughout treatment. All questions were answered and an informed consent was obtained. A copy of the [facility] document was given and explained to the pt. She verbalized understanding. she was reminded to certainly contact us sooner if needed.

## 2019-05-17 NOTE — PROGRESS NOTES
I met with patient to complete new patient orientation and to complete her distress screening; she indicated a distress rating of 1.  She was provided a chemo beanie.  She is unsure at this time if she will want a wig at this time, she will let us know if she wants to get one.  She denied needing any psychosocial support at this time.  She has my contact information in the event she needs assistance in the future.

## 2019-05-20 ENCOUNTER — OFFICE VISIT (OUTPATIENT)
Dept: SURGERY | Facility: CLINIC | Age: 39
End: 2019-05-20
Payer: MEDICAID

## 2019-05-20 VITALS
SYSTOLIC BLOOD PRESSURE: 122 MMHG | BODY MASS INDEX: 30.83 KG/M2 | TEMPERATURE: 99 F | DIASTOLIC BLOOD PRESSURE: 77 MMHG | WEIGHT: 174 LBS | HEIGHT: 63 IN

## 2019-05-20 DIAGNOSIS — C50.812 MALIGNANT NEOPLASM OF OVERLAPPING SITES OF LEFT FEMALE BREAST, UNSPECIFIED ESTROGEN RECEPTOR STATUS: Primary | ICD-10-CM

## 2019-05-20 LAB
B-HCG UR QL: NEGATIVE
BASOPHILS NFR BLD: 0.1 K/UL (ref 0–0.2)
BASOPHILS NFR BLD: 0.7 %
BUN SERPL-MCNC: 13 MG/DL (ref 8–20)
CALCIUM SERPL-MCNC: 8.7 MG/DL (ref 7.7–10.4)
CHLORIDE: 106 MMOL/L (ref 98–110)
CO2 SERPL-SCNC: 26.9 MMOL/L (ref 22.8–31.6)
CREATININE: 0.73 MG/DL (ref 0.6–1.4)
EOSINOPHIL NFR BLD: 0.1 K/UL (ref 0–0.7)
EOSINOPHIL NFR BLD: 1.1 %
ERYTHROCYTE [DISTWIDTH] IN BLOOD BY AUTOMATED COUNT: 13.1 % (ref 11.7–14.9)
GLUCOSE: 85 MG/DL (ref 70–99)
GRAN #: 4.4 K/UL (ref 1.4–6.5)
GRAN%: 62 %
HCT VFR BLD AUTO: 41.2 % (ref 36–48)
HGB BLD-MCNC: 13.4 G/DL (ref 12–15)
IMMATURE GRANS (ABS): 0 K/UL (ref 0–1)
IMMATURE GRANULOCYTES: 0.3 %
LYMPH #: 2.1 K/UL (ref 1.2–3.4)
LYMPH%: 29 %
MCH RBC QN AUTO: 29.6 PG (ref 25–35)
MCHC RBC AUTO-ENTMCNC: 32.5 G/DL (ref 31–36)
MCV RBC AUTO: 90.9 FL (ref 79–98)
MONO #: 0.5 K/UL (ref 0.1–0.6)
MONO%: 6.9 %
MRSA SCREEN BY PCR: NORMAL
NUCLEATED RBCS: 0 %
PLATELET # BLD AUTO: 236 K/UL (ref 140–440)
PMV BLD AUTO: 10.4 FL (ref 8.8–12.7)
POTASSIUM SERPL-SCNC: 4 MMOL/L (ref 3.5–5)
RBC # BLD AUTO: 4.53 M/UL (ref 3.5–5.5)
SODIUM: 139 MMOL/L (ref 134–144)
WBC # BLD AUTO: 7.1 K/UL (ref 5–10)

## 2019-05-20 PROCEDURE — 99213 OFFICE O/P EST LOW 20 MIN: CPT | Mod: ,,, | Performed by: SURGERY

## 2019-05-20 PROCEDURE — 99213 PR OFFICE/OUTPT VISIT, EST, LEVL III, 20-29 MIN: ICD-10-PCS | Mod: ,,, | Performed by: SURGERY

## 2019-05-20 NOTE — LETTER
May 20, 2019      Aly Contreras MD  [address]  [place] [address]           [facility] - General Surgery  [address]  [place] [address]  Phone: [phone]  Fax: [phone]          Patient: Sheridan Todd   MR Number: [id number]   YOB: 1980   Date of Visit: 5/20/2019       Dear Dr. Aly Contreras:    Thank you for referring Sheridan Todd to me for evaluation. Attached you will find relevant portions of my assessment and plan of care.    If you have questions, please do not hesitate to call me. I look forward to following Sheridan Todd along with you.    Sincerely,    Salvador HUIZAR MD    Enclosure  CC:  No Recipients    If you would like to receive this communication electronically, please contact [email or web address] or [phone] to request more information on [employer] access.    For providers and/or their staff who would like to refer a patient to [facility], please contact us through our one-stop-shop provider referral line, [facility], at [phone].    If you feel you have received this communication in error or would no longer like to receive these types of communications, please e-mail [email or web address]

## 2019-05-20 NOTE — PROGRESS NOTES
Subjective:       Patient ID: Sheridan Todd is a 38 y.o. female.    Chief Complaint: Other (FU BR CA Discussion)      HPI:  Patient with biopsy-proven left-sided breast cancer is requiring neoadjuvant chemotherapy. She now presents for port placement for that purpose.    Past Medical History:   Diagnosis Date    Abnormal Pap smear of cervix     Fibromyalgia     Gestational diabetes     HER2-positive carcinoma of left breast 2019    Hypertension     AFTER PREGNANCY- NO MED NOW    Malignant neoplasm of overlapping sites of left breast in female, estrogen receptor positive 2019    Malignant neoplasm of overlapping sites of left female breast 2019    Overweight and obesity(278.0)     Sarcoidosis      Past Surgical History:   Procedure Laterality Date     SECTION  08/15/2016    Twins     CRYOTHERAPY      CYSTOSCOPY WITH BIOPSY OF BLADDER      x 2    CYSTOSCOPY WITH HYDRODISTENSION N/A 2014    Performed by [facility] OR    PAROTID TUMOR      LEFT    VAGINAL DELIVERY       x2 PROM. Preclampsia/ gestational diabetes    WISDOM TOOTH EXTRACTION       Review of patient's allergies indicates:   Allergen Reactions    Codeine      Other reaction(s): Nausea    Shellfish containing products      Other reaction(s): Vomiting  Only crabmeat     Medication List with Changes/Refills   Current Medications    ONDANSETRON (ZOFRAN) 8 MG TABLET    Take 1 tablet (8 mg total) by mouth every 8 (eight) hours as needed for Nausea.    PROMETHAZINE (PHENERGAN) 25 MG TABLET    Take 1 tablet (25 mg total) by mouth every 6 (six) hours as needed for Nausea.     Family History   Problem Relation Age of Onset    Hypertension Mother     Hyperlipidemia Mother     Cancer Father         liver    Cancer Maternal Grandfather         throat    Kidney disease Maternal Grandfather     Ovarian cancer Other     Lung cancer Paternal Aunt     Breast cancer Neg Hx      Social History      Socioeconomic History    Marital status:      Spouse name: Not on file    Number of children: Not on file    Years of education: Not on file    Highest education level: Not on file   Occupational History     Employer: [employer]   Social Needs    Financial resource strain: Not on file    Food insecurity:     Worry: Not on file     Inability: Not on file    Transportation needs:     Medical: Not on file     Non-medical: Not on file   Tobacco Use    Smoking status: Former Smoker     Packs/day: 1.00     Years: 6.50     Pack years: 6.50     Types: Cigarettes     Start date: 5/12/2012    Smokeless tobacco: Never Used    Tobacco comment: quit 2 years ago   Substance and Sexual Activity    Alcohol use: Yes     Comment: rare    Drug use: No    Sexual activity: Yes     Partners: Male   Lifestyle    Physical activity:     Days per week: Not on file     Minutes per session: Not on file    Stress: Not on file   Relationships    Social connections:     Talks on phone: Not on file     Gets together: Not on file     Attends Jainism service: Not on file     Active member of club or organization: Not on file     Attends meetings of clubs or organizations: Not on file     Relationship status: Not on file   Other Topics Concern    Not on file   Social History Narrative    Not on file         Review of Systems    Objective:      Physical Exam   Constitutional: She appears well-developed and well-nourished.  Non-toxic appearance. No distress.   HENT:   Head: Normocephalic and atraumatic. Head is without abrasion and without laceration.   Right Ear: External ear normal.   Left Ear: External ear normal.   Nose: Nose normal.   Mouth/Throat: Oropharynx is clear and moist.   Eyes: Pupils are equal, round, and reactive to light. EOM are normal.   Neck: Trachea normal. No tracheal deviation and normal range of motion present. No thyroid mass and no thyromegaly present.   Cardiovascular: Normal rate and regular  rhythm.   Pulmonary/Chest: Effort normal. No accessory muscle usage. No tachypnea. No respiratory distress. Right breast exhibits no inverted nipple, no mass and no skin change. Left breast exhibits inverted nipple, mass, skin change and tenderness. Left breast exhibits no nipple discharge. No breast tenderness or discharge. Breasts are asymmetrical.   Vague retroareolar mass on the left. Most of breast parenchyma feels thickened. Left more so than right. Mild skin changes on the left as shown. Inverted nipple on the left. No palpable adenopathy on either side.       Abdominal: Soft. Normal appearance and bowel sounds are normal. She exhibits no distension and no mass. There is no hepatosplenomegaly. There is no tenderness. There is no tenderness at McBurney's point and negative Oh's sign. No hernia.   Genitourinary: No breast tenderness or discharge.   Lymphadenopathy:     She has no cervical adenopathy.     She has no axillary adenopathy.        Right: No inguinal adenopathy present.        Left: No inguinal adenopathy present.   Neurological: She is alert. Coordination and gait normal.   Skin: Skin is warm and intact.   Psychiatric: She has a normal mood and affect. Her speech is normal and behavior is normal.       Assessment/Plan:   Malignant neoplasm of overlapping sites of left female breast, unspecified estrogen receptor status  -     Ambulatory Referral to External Surgery  -     EKG 12-lead; Future        Planned procedure: Port placement on the right    Ancef 2 gm IV on call to OR unless allergic, if allergic use Vancomycin per pharmacy dosing    NPO past midnight    Amauri cloth scrub per protocol    SCDs Bilateral Lower Extremities    I discussed the proposed procedures the the patient including risks, benefits, indications, alternatives and special concerns.  The patient appears to understand and agrees to go ahead with surgery.  I have made no promises, warranties or verbal agreements beyond what  was discussed above.    No follow-ups on file.

## 2019-05-21 PROCEDURE — 77001 CHG FLUOROGUIDE CNTRL VEN ACCESS,PLACE,REPLACE,REMOVE: ICD-10-PCS | Mod: 26,,, | Performed by: SURGERY

## 2019-05-21 PROCEDURE — 36561 INSERT TUNNELED CV CATH: CPT | Mod: RT,,, | Performed by: SURGERY

## 2019-05-21 PROCEDURE — 77001 FLUOROGUIDE FOR VEIN DEVICE: CPT | Mod: 26,,, | Performed by: SURGERY

## 2019-05-21 PROCEDURE — 36561 PR INSERT TUNNELED CV CATH WITH PORT: ICD-10-PCS | Mod: RT,,, | Performed by: SURGERY

## 2019-05-22 ENCOUNTER — OFFICE VISIT (OUTPATIENT)
Dept: HEMATOLOGY/ONCOLOGY | Facility: CLINIC | Age: 39
End: 2019-05-22
Payer: MEDICAID

## 2019-05-22 VITALS
HEART RATE: 62 BPM | TEMPERATURE: 99 F | SYSTOLIC BLOOD PRESSURE: 115 MMHG | BODY MASS INDEX: 31.16 KG/M2 | DIASTOLIC BLOOD PRESSURE: 75 MMHG | WEIGHT: 175.88 LBS | RESPIRATION RATE: 20 BRPM

## 2019-05-22 DIAGNOSIS — Z17.0 MALIGNANT NEOPLASM OF OVERLAPPING SITES OF LEFT BREAST IN FEMALE, ESTROGEN RECEPTOR POSITIVE: Primary | ICD-10-CM

## 2019-05-22 DIAGNOSIS — C50.812 MALIGNANT NEOPLASM OF OVERLAPPING SITES OF LEFT BREAST IN FEMALE, ESTROGEN RECEPTOR POSITIVE: Primary | ICD-10-CM

## 2019-05-22 DIAGNOSIS — C50.912 HER2-POSITIVE CARCINOMA OF LEFT BREAST: ICD-10-CM

## 2019-05-22 PROCEDURE — 99215 OFFICE O/P EST HI 40 MIN: CPT | Mod: ,,, | Performed by: INTERNAL MEDICINE

## 2019-05-22 PROCEDURE — 99215 PR OFFICE/OUTPT VISIT, EST, LEVL V, 40-54 MIN: ICD-10-PCS | Mod: ,,, | Performed by: INTERNAL MEDICINE

## 2019-05-22 RX ORDER — HYDROCODONE BITARTRATE AND ACETAMINOPHEN 7.5; 325 MG/1; MG/1
TABLET ORAL
Refills: 0 | COMMUNITY
Start: 2019-05-21 | End: 2019-07-09

## 2019-05-22 NOTE — PROGRESS NOTES
[facility] Hematology/Oncology  PROGRESS NOTE - 2nd Follow-up Visit      Subjective:       Patient ID:   NAME: Sheridan Todd : 1980     38 y.o. female    Referring Doc: Krissy  Other Physicians: Jose Rodriguez    Chief Complaint: left breast ca f/u     History of Present Illness:     Patient returns today for a 2nd regularly scheduled follow-up visit.  The patient is here today to go over the results of the recently ordered labs, tests and studies. She had PET scan on 2019. She is to get neoadjuvant chemotherapy with herceptin and perjeta based regimen next week. She had her portacath placed yesterday. She is doing ok with no CP, SOB, HA's or N/V.             ROS:   GEN: normal without any fever, night sweats or weight loss  HEENT: normal with no HA's, sore throat, stiff neck, changes in vision  CV: normal with no CP, SOB, PND, MENDIETA or orthopnea  PULM: normal with no SOB, cough, hemoptysis, sputum or pleuritic pain  GI: normal with no abdominal pain, nausea, vomiting, constipation, diarrhea, melanotic stools, BRBPR, or hematemesis  : normal with no hematuria, dysuria  BREAST: normal with no mass, discharge, pain  SKIN: normal with no rash, erythema, bruising, or swelling    Allergies:  Review of patient's allergies indicates:   Allergen Reactions    Codeine      Other reaction(s): Nausea    Shellfish containing products      Other reaction(s): Vomiting  Only crabmeat       Medications:    Current Outpatient Medications:     HYDROcodone-acetaminophen (NORCO) 7.5-325 mg per tablet, TK 1 T PO Q 4 H PRN, Disp: , Rfl: 0    ondansetron (ZOFRAN) 8 MG tablet, Take 1 tablet (8 mg total) by mouth every 8 (eight) hours as needed for Nausea., Disp: 30 tablet, Rfl: 2    promethazine (PHENERGAN) 25 MG tablet, Take 1 tablet (25 mg total) by mouth every 6 (six) hours as needed for Nausea., Disp: 30 tablet, Rfl: 2    PMHx/PSHx Updates:  See patient's last visit with me on 2019.  See H&P on  5/16/2019        Pathology:  Cancer Staging      Breast biopsies: 5/1/2019:  Left breast: with invasive ductal carcinoma grade 3; ER positive at 95% and MD positive at 90%; Her2Nu was positive at +3; Her2/CEP 17 ratio was >10.6  - Ki67 was unfavorable at 69%  Right breast: negative for cancer       Objective:     Vitals:  Blood pressure 115/75, pulse 62, temperature 99.3 °F (37.4 °C), temperature source Oral, resp. rate 20, weight 79.8 kg (175 lb 14.4 oz), not currently breastfeeding. (not breast feeding)    Physical Examination:   GEN: no apparent distress, comfortable; AAOx3  HEAD: atraumatic and normocephalic  EYES: no pallor, no icterus, PERRLA  ENT: OMM, no pharyngeal erythema, external ears WNL; no nasal discharge; no thrush  NECK: no masses, thyroid normal, trachea midline, no LAD/LN's, supple  CV: RRR with no murmur; normal pulse; normal S1 and S2; no pedal edema  CHEST: Normal respiratory effort; CTAB; normal breath sounds; no wheeze or crackles; portacath on right chest wall  ABDOM: nontender and nondistended; soft; normal bowel sounds; no rebound/guarding  MUSC/Skeletal: ROM normal; no crepitus; joints normal; no deformities or arthropathy  EXTREM: no clubbing, cyanosis, inflammation or swelling  SKIN: no rashes, lesions, ulcers, petechiae or subcutaneous nodules  : no lopez  NEURO: grossly intact; motor/sensory WNL; AAOx3; no tremors  PSYCH: normal mood, affect and behavior  LYMPH: normal cervical, supraclavicular, axillary and groin LN's  Breast: left breast with large fibrous component at about the 1 o'clock position about 2.5 to 3 cm in size; biopsy site on left lower outer area; no appreciable LAD in axilla; nipple inversion on left without any appreciable skin changes visually          Labs:   5/20/2019  Lab Results   Component Value Date    WBC 7.1 05/20/2019    HGB 13.4 05/20/2019    HCT 41.2 05/20/2019    MCV 90.9 05/20/2019     05/20/2019     BMP  Lab Results   Component Value Date      05/20/2019    K 4.0 05/20/2019     05/20/2019    CO2 26.9 05/20/2019    BUN 13 05/20/2019    CREATININE 0.73 05/20/2019    CALCIUM 8.7 05/20/2019    ANIONGAP 10 01/29/2016    ESTGFRAFRICA >60 01/29/2016    EGFRNONAA >60 01/29/2016             Radiology/Diagnostic Studies:    Nm Pet Ct Routine Skull To Mid Thigh    Result Date: 5/21/2019  INTEGRATED PET CT WITH IMAGE FUSION HISTORY:  Left breast cancer initial treatment evaluation TECHNIQUE: Following the injection of 12.7 mCi of F-18 labeled FDG into a right antecubital vein, PET CT was performed from the vertex of the skull through the proximal thighs with an integrated full ring PET CT scanner with image fusion. The patient's serum glucose at the time of the exam was 82 mg/dL. FINDINGS: There is diffuse FDG activity in the subareolar left breast with skin thickening and multiple hypermetabolic masses. There is a 19 mm spiculated hypermetabolic mass in the inferior lateral left breast with a max SUV of 10.4. This was previously biopsied. There is a 2 cm hypermetabolic area in the lateral superior left breast with max SUV of 9.9. There is an 11 mm nodule within the inner right breast which was biopsied and shown to represent fibroadenoma. This shows no FDG activity. There is no axillary, mediastinal or hilar adenopathy. There are no pulmonary nodules, infiltrates or pleural effusions. CT of the head and neck show no intra-axial lesions or cervical adenopathy. CT of the abdomen and pelvis demonstrates physiologic activity in the GI tract and urinary system. The liver and adrenal glands are normal. There are mildly prominent lymph nodes within the right lower quadrant the largest measures 13 mm with a max SUV of 3.1. Findings are suspicious for mesenteric adenitis. There is no retroperitoneal adenopathy. There are no lytic or blastic lesions.     IMPRESSION: Diffuse FDG activity within the subareolar left breast with skin thickening and multiple  hypermetabolic left breast masses consistent with patient's history of left breast cancer. No evidence of metastatic disease 11 mm nodule in the medial right breast which was shown to represent fibroadenoma Mildly prominent lymph nodes in the right lower quadrant with mild FDG activity suggestive of mesenteric adenitis. Follow-up CT scan in 3-6 months is recommended.     Read and electronically signed by: Adriane Jacome MD on 5/21/2019 1:13 PM CDT ADRIANE JACOME MD    [facility] Unknown Rad Eap    Result Date: 5/21/2019  Chest single view CLINICAL DATA: Port-A-Cath placement FINDINGS: AP view shows the heart to be within normal size limits. The mediastinum is unremarkable. Right subclavian Port-A-Cath tip is at superior vena cava. No pneumothorax or other placement related complication is identified. No infiltrates or effusions are demonstrated. No acute osseous abnormalities are demonstrated. IMPRESSION: 1. Right sided Port-A-Cath appears appropriately positioned, with no pneumothorax or other placement related complication. No acute radiographic abnormalities. Read and electronically signed by: Teofilo Patterson MD on 5/21/2019 2:52 PM CDT TEOFILO PATTERSON MD      I have reviewed all available lab results and radiology reports.    Assessment/Plan:   (1) 38 y.o. female  with diagnosis of left breast cancer who has been referred by Dr Salvador Rey with [facility] for evaluation by medical oncology.      - She had presented with left breast pain which became progressive over a 4 month period.   - Radiology studies found a 2.5cm mass on the left breast along with two inflamed LN's on the right.   - She had left breast biopsy on 5/1/2019 which showed invasive ductal carcinoma grade 3. The right breast biopsy was negative for cancer.   - She is ER and VT positive. She is also Her2Nu +3.   - We discussed the latest data on Her2Nu positive breast cancers and the recommendation for neoadjuvant chemotherapy with a  herceptin and perjeta.  - she will need echo, portacath, and PET scan  - will plan for herceptin, perjeta, carboplatin and taxotere regimen neoadjuvantly with 6 cycles  -  S/p set up chemotherapy school; discussed side-effect profile, provided literature on the regimen; obtained consents  - she is set up for May 27th to start  - PET scan and echo are on chart  - portacath placed on 5/21     (2) Left parotid tumor - removed in 2011     (3) Sarcoidosis     (4) Interstitial cystitis     (5) Diet controlled gestational DM     (6) Fibromyalgia     (7) Hx/of cervical dysplasia as teenager s/p cryoablation         VISIT DIAGNOSES:      Malignant neoplasm of overlapping sites of left breast in female, estrogen receptor positive    HER2-positive carcinoma of left breast          PLAN:  1. Proceed with chemotherapy neoadjuvant regimen on 5/27/2019; she had chemotherapy school  2. Echo and PET on chart  3. Check MRI of breast from 5/3  4. RTC in 2 weeks  Fax note to [facility] Rey    Discussion:       I spent over 25 mins of time with the patient. Reviewed results of the recently ordered labs, tests and studies; made directives with regards to the results. Over half of this time was spent couseling and coordinating care.    I have explained all of the above in detail and the patient understands all of the current recommendation(s). I have answered all of their questions to the best of my ability and to their complete satisfaction.   The patient is to continue with the current management plan.            Electronically signed by Abel Chambers MD

## 2019-05-24 DIAGNOSIS — T45.1X5A CHEMOTHERAPY INDUCED NEUTROPENIA: Primary | ICD-10-CM

## 2019-05-24 DIAGNOSIS — D70.1 CHEMOTHERAPY INDUCED NEUTROPENIA: Primary | ICD-10-CM

## 2019-05-24 RX ORDER — SODIUM CHLORIDE 0.9 % (FLUSH) 0.9 %
10 SYRINGE (ML) INJECTION
Status: CANCELLED | OUTPATIENT
Start: 2019-05-27

## 2019-05-24 RX ORDER — HEPARIN 100 UNIT/ML
500 SYRINGE INTRAVENOUS
Status: CANCELLED | OUTPATIENT
Start: 2019-05-27

## 2019-05-27 ENCOUNTER — TELEPHONE (OUTPATIENT)
Dept: HEMATOLOGY/ONCOLOGY | Facility: CLINIC | Age: 39
End: 2019-05-27

## 2019-05-27 ENCOUNTER — DOCUMENTATION ONLY (OUTPATIENT)
Dept: RADIATION ONCOLOGY | Facility: CLINIC | Age: 39
End: 2019-05-27

## 2019-05-27 NOTE — TELEPHONE ENCOUNTER
Patient is scheduled     ----- Message from Preston Pritchett sent at 5/24/2019 12:10 PM CDT -----  Authorization received and has been approved. The patient is ready to be scheduled. Chemo

## 2019-05-27 NOTE — PROGRESS NOTES
Sheridan is a 38 year old female with breast cancer getting Carbo/Taxotere Perjeta and Herceptin.  She is eating well and denies having any eating issues.  Weight: 174#    Plan: Discussed importance for nutrition and hydration.  Advised she aim for 85 ounces of fluid daily.  2. Educated on the diarrhea diet and gave eating tips for diarrhea.  3. Educated on the fiber One and Senokot-S protocol for diarrhea.  4. Gave eating tips for nausea and taste changes.  5. Discouraged use of vitamin and herbal supplements and discussed food safety.  6. RD contact information given.

## 2019-05-30 ENCOUNTER — TELEPHONE (OUTPATIENT)
Dept: HEMATOLOGY/ONCOLOGY | Facility: CLINIC | Age: 39
End: 2019-05-30

## 2019-05-30 ENCOUNTER — OFFICE VISIT (OUTPATIENT)
Dept: HEMATOLOGY/ONCOLOGY | Facility: CLINIC | Age: 39
End: 2019-05-30
Payer: MEDICAID

## 2019-05-30 VITALS
DIASTOLIC BLOOD PRESSURE: 83 MMHG | WEIGHT: 174.81 LBS | HEART RATE: 79 BPM | BODY MASS INDEX: 30.96 KG/M2 | SYSTOLIC BLOOD PRESSURE: 118 MMHG | RESPIRATION RATE: 20 BRPM | TEMPERATURE: 99 F

## 2019-05-30 DIAGNOSIS — G43.109 MIGRAINE EQUIVALENT: Primary | ICD-10-CM

## 2019-05-30 DIAGNOSIS — Z17.0 MALIGNANT NEOPLASM OF OVERLAPPING SITES OF LEFT BREAST IN FEMALE, ESTROGEN RECEPTOR POSITIVE: ICD-10-CM

## 2019-05-30 DIAGNOSIS — C50.812 MALIGNANT NEOPLASM OF OVERLAPPING SITES OF LEFT BREAST IN FEMALE, ESTROGEN RECEPTOR POSITIVE: Primary | ICD-10-CM

## 2019-05-30 DIAGNOSIS — D86.9 SARCOIDOSIS: ICD-10-CM

## 2019-05-30 DIAGNOSIS — Z17.0 MALIGNANT NEOPLASM OF OVERLAPPING SITES OF LEFT BREAST IN FEMALE, ESTROGEN RECEPTOR POSITIVE: Primary | ICD-10-CM

## 2019-05-30 DIAGNOSIS — C50.812 MALIGNANT NEOPLASM OF OVERLAPPING SITES OF LEFT BREAST IN FEMALE, ESTROGEN RECEPTOR POSITIVE: ICD-10-CM

## 2019-05-30 LAB
ALBUMIN SERPL-MCNC: 3.6 G/DL (ref 3.1–4.7)
ALP SERPL-CCNC: 69 IU/L (ref 40–104)
ALT (SGPT): 21 IU/L (ref 3–33)
AST SERPL-CCNC: 24 IU/L (ref 10–40)
BASOPHILS NFR BLD: 0 K/UL (ref 0–0.2)
BASOPHILS NFR BLD: 0.1 %
BILIRUB SERPL-MCNC: 1 MG/DL (ref 0.3–1)
BUN SERPL-MCNC: 14 MG/DL (ref 8–20)
CALCIUM SERPL-MCNC: 8.8 MG/DL (ref 7.7–10.4)
CHLORIDE: 105 MMOL/L (ref 98–110)
CO2 SERPL-SCNC: 28.4 MMOL/L (ref 22.8–31.6)
CREATININE: 0.95 MG/DL (ref 0.6–1.4)
EOSINOPHIL NFR BLD: 0.1 K/UL (ref 0–0.7)
EOSINOPHIL NFR BLD: 0.3 %
ERYTHROCYTE [DISTWIDTH] IN BLOOD BY AUTOMATED COUNT: 13.2 % (ref 12.5–14.5)
GLUCOSE: 98 MG/DL (ref 70–99)
GRAN #: 13.3 K/UL (ref 1.4–6.5)
GRAN%: 75.9 %
HCT VFR BLD AUTO: 40.9 % (ref 36–48)
HGB BLD-MCNC: 13.5 G/DL (ref 12–15)
IMMATURE GRANS (ABS): 2.8 K/UL (ref 0–1)
IMMATURE GRANULOCYTES: 15.9 %
LYMPH #: 1.3 K/UL (ref 1.2–3.4)
LYMPH%: 7.2 %
MCH RBC QN AUTO: 29.9 PG (ref 25–35)
MCHC RBC AUTO-ENTMCNC: 33 G/DL (ref 31–36)
MCV RBC AUTO: 90.5 FL (ref 79–98)
MONO #: 0.1 K/UL (ref 0.1–0.6)
MONO%: 0.6 %
NUCLEATED RBCS: 0 %
NUCLEATED RED BLOOD CELLS: 0 /100 WBC
PERFORMED BY:: ABNORMAL
PLATELET # BLD AUTO: 176 K/UL (ref 140–440)
PMV BLD AUTO: 10.2 FL (ref 8.8–12.7)
POTASSIUM SERPL-SCNC: 4.5 MMOL/L (ref 3.5–5)
PROT SERPL-MCNC: 6.8 G/DL (ref 6–8.2)
RBC # BLD AUTO: 4.52 M/UL (ref 3.5–5.5)
SODIUM: 138 MMOL/L (ref 134–144)
WBC # BLD: 17.6 K/UL (ref 5–10)

## 2019-05-30 PROCEDURE — 99213 PR OFFICE/OUTPT VISIT, EST, LEVL III, 20-29 MIN: ICD-10-PCS | Mod: ,,, | Performed by: NURSE PRACTITIONER

## 2019-05-30 PROCEDURE — 99213 OFFICE O/P EST LOW 20 MIN: CPT | Mod: ,,, | Performed by: NURSE PRACTITIONER

## 2019-05-30 NOTE — PROGRESS NOTES
Subjective:       Patient ID: Sheridan Todd is a 38 y.o. female.    Chief Complaint: Migraine    HPI    Patient presents today for migraine which she states started today and she has not gotten any relief for. Reports associated photophobia and minimal sore throat. Does report her children are congested with rhinorrhea at home. Denies fever or neck pain. Reports history of migraines in the past. Reports trying Tylenol at home with no relief. She did not try her pain medication at home because she was worried she was going to get nauseated and has not been having nausea or vomiting since her chemotherapy administration 3 days ago on 5/27/19. She has been taking Zofran during the day to help reduce nausea but reports usually she is nauseated and vomiting with her migraines. She also reports she has not drank enough fluids today but reports eating well. When asked what has helped in the past with her migraines, she reports Toradol shots work well for her. When asked if she has tried oral medications in the past for her migraines, she has trouble remembering which ones worked well for her. She got Neulasta on 5/28/19.  Otherwise denies any diarrhea, constipation, CP, SOB, or N/V.     All medications and past medical and surgical history have been reviewed.    Review of patient's allergies indicates:   Allergen Reactions    Codeine      Other reaction(s): Nausea    Shellfish containing products      Other reaction(s): Vomiting  Only crabmeat       Previous FAMHX and SOCHX information reviewed and remains unchanged    Review of Systems   Constitutional: Negative for activity change, appetite change, chills, fatigue, fever and unexpected weight change.   HENT: Negative for congestion, ear pain, facial swelling, mouth sores, nosebleeds, postnasal drip, rhinorrhea, sinus pressure, sinus pain, sore throat and trouble swallowing.         Headache   Eyes: Positive for photophobia. Negative for pain and visual  disturbance.   Respiratory: Negative for apnea, cough, chest tightness, shortness of breath, wheezing and stridor.    Cardiovascular: Negative for chest pain, palpitations and leg swelling.   Gastrointestinal: Negative for abdominal pain, blood in stool, constipation, diarrhea, nausea and vomiting.   Genitourinary: Negative for dysuria, flank pain, frequency and hematuria.   Musculoskeletal: Negative for arthralgias, myalgias, neck pain and neck stiffness.   Skin: Negative for rash.   Neurological: Negative for dizziness, tremors, seizures, syncope, facial asymmetry, light-headedness and headaches.   Psychiatric/Behavioral: Negative for confusion.        Objective:      /83   Pulse 79   Temp 98.8 °F (37.1 °C) (Oral)   Resp 20   Wt 79.3 kg (174 lb 12.8 oz)   BMI 30.96 kg/m²     Physical Exam  GEN: apparently in pain and photophobic, needing lights out in exam room; AAOx3  HEAD: atraumatic and normocephalic  EYES: no pallor, no icterus, PERRLA  ENT: minimal pharyngeal erythema, OMM, external ears WNL; no nasal discharge; no thrush  NECK: no masses, thyroid normal, trachea midline, no LAD/LN's, supple; no nuchal rigidity  CV: RRR with no murmur; normal pulse; normal S1 and S2; no pedal edema  CHEST: Normal respiratory effort; CTAB; normal breath sounds; no wheeze or crackles  ABDOM: nontender and nondistended; soft; normal bowel sounds; no rebound/guarding  MUSC/Skeletal: ROM normal; no crepitus; joints normal; no deformities or arthropathy  EXTREM: no clubbing, cyanosis, inflammation or swelling  SKIN: no rashes, lesions, ulcers, petechiae or subcutaneous nodules  : no lopez  NEURO: grossly intact; motor/sensory WNL; AAOx3; no tremors  PSYCH: normal mood, affect and behavior  LYMPH: normal cervical, supraclavicular, axillary and groin LN's    Recent Results (from the past 336 hour(s))   CBC auto differential    Collection Time: 05/30/19  2:48 PM   Result Value Ref Range    WBC 17.6 (H) 5.0 - 10.0 K/ul     Hemoglobin 13.5 12.0 - 15.0 g/dl    Hematocrit 40.9 36.0 - 48.0 %    Platelets 176 140 - 440 K/ul   CBC auto differential    Collection Time: 05/20/19  2:00 PM   Result Value Ref Range    WBC 7.1 5.0 - 10.0 K/uL    Hemoglobin 13.4 12.0 - 15.0 g/dL    Hematocrit 41.2 36.0 - 48.0 %    Platelets 236 140 - 440 K/uL     CMP  Sodium   Date Value Ref Range Status   05/20/2019 139 134 - 144 mmol/L      Potassium   Date Value Ref Range Status   05/20/2019 4.0 3.5 - 5.0 mmol/L      Chloride   Date Value Ref Range Status   05/20/2019 106 98 - 110 mmol/L      CO2   Date Value Ref Range Status   05/20/2019 26.9 22.8 - 31.6 mmol/L      Glucose   Date Value Ref Range Status   05/20/2019 85 70 - 99 mg/dL      BUN, Bld   Date Value Ref Range Status   05/20/2019 13 8 - 20 mg/dL      Creatinine   Date Value Ref Range Status   05/20/2019 0.73 0.60 - 1.40 mg/dL      Calcium   Date Value Ref Range Status   05/20/2019 8.7 7.7 - 10.4 mg/dL      Total Protein   Date Value Ref Range Status   05/08/2015 7.1 6.0 - 8.4 g/dL Final     Albumin   Date Value Ref Range Status   05/08/2015 3.0 (L) 3.5 - 5.2 g/dL Final     Total Bilirubin   Date Value Ref Range Status   05/08/2015 0.4 0.1 - 1.0 mg/dL Final     Comment:     For infants and newborns, interpretation of results should be based  on gestational age, weight and in agreement with clinical  observations.  Premature Infant recommended reference ranges:  Up to 24 hours.............<8.0 mg/dL  Up to 48 hours............<12.0 mg/dL  3-5 days..................<15.0 mg/dL  6-29 days.................<15.0 mg/dL       Alkaline Phosphatase   Date Value Ref Range Status   05/08/2015 61 55 - 135 U/L Final     AST   Date Value Ref Range Status   05/08/2015 18 10 - 40 U/L Final     ALT   Date Value Ref Range Status   05/08/2015 17 10 - 44 U/L Final     Anion Gap   Date Value Ref Range Status   01/29/2016 10 8 - 16 mmol/L Final     eGFR if    Date Value Ref Range Status   01/29/2016 >60 >60  mL/min/1.73 m^2 Final     eGFR if non    Date Value Ref Range Status   01/29/2016 >60 >60 mL/min/1.73 m^2 Final     Comment:     Calculation used to obtain the estimated glomerular filtration  rate (eGFR) is the CKD-EPI equation. Since race is unknown   in our information system, the eGFR values for   -American and Non--American patients are given   for each creatinine result.         Assessment:       1. Migraine equivalent    2. Malignant neoplasm of overlapping sites of left breast in female, estrogen receptor positive          Plan:       Migraine equivalent  The headache appears to be migraine in nature. Educated her to continue to increase her fluid intake as dehydration can be a precipitant for headaches. She is obviously pain and in need of relief. IM and IV medications for migraines are unavailable in this office, so requested she go to the ER for pain relief and also scans to ensure no abnormalities due to breast cancer diagnosis and recent chemotherapy administration. Reports she will go to [facility] ER and the charge nurse was called to inform her of this.     Malignant neoplasm of overlapping sites of left breast in female, estrogen receptor positive  Her next appointment with Dr. Chambers is on 6/5/19 and instructed her to keep this appointment as scheduled.      Follow up in 6 days (on 6/5/2019) for follow up with Dr. Chambers as scheduled.    The plan was discussed with the patient and all questions/concerns have been answered to the patient's satisfaction.

## 2019-06-04 NOTE — PROGRESS NOTES
[facility] Hematology/Oncology  PROGRESS NOTE -  Follow-up Visit      Subjective:       Patient ID:   NAME: Sheridan Todd : 1980     38 y.o. female    Referring Doc: Krissy  Other Physicians: Jose Rodriguez    Chief Complaint: left breast ca f/u     History of Present Illness:     Patient returns today for a 2nd regularly scheduled follow-up visit.  The patient is here today to go over the results of the recently ordered labs, tests and studies. She had PET scan on 2019. She has started neoadjuvant chemotherapy with herceptin and perjeta based regimen .  She is doing ok with no CP, SOB, HA's or N/V. She has a mild facial rash around bridge of nose and some mucosal ulcerations in nares. She had a migraine after the chemo and went to ER but it has since resolved; breast lesion is softer and she no longer has breast pain              ROS:   GEN: normal without any fever, night sweats or weight loss  HEENT: normal with no HA's, sore throat, stiff neck, changes in vision  CV: normal with no CP, SOB, PND, MENDIETA or orthopnea  PULM: normal with no SOB, cough, hemoptysis, sputum or pleuritic pain  GI: normal with no abdominal pain, nausea, vomiting, constipation, diarrhea, melanotic stools, BRBPR, or hematemesis  : normal with no hematuria, dysuria  BREAST: normal with no mass, discharge, pain  SKIN: normal with no rash, erythema, bruising, or swelling    Allergies:  Review of patient's allergies indicates:   Allergen Reactions    Codeine      Other reaction(s): Nausea    Shellfish containing products      Other reaction(s): Vomiting  Only crabmeat       Medications:    Current Outpatient Medications:     HYDROcodone-acetaminophen (NORCO) 7.5-325 mg per tablet, TK 1 T PO Q 4 H PRN, Disp: , Rfl: 0    ondansetron (ZOFRAN) 8 MG tablet, Take 1 tablet (8 mg total) by mouth every 8 (eight) hours as needed for Nausea., Disp: 30 tablet, Rfl: 2    promethazine (PHENERGAN) 25 MG tablet, Take 1 tablet (25 mg  total) by mouth every 6 (six) hours as needed for Nausea., Disp: 30 tablet, Rfl: 2    PMHx/PSHx Updates:  See patient's last visit with me on 5/22/2019.  See H&P on 5/16/2019        Pathology:  Cancer Staging      Breast biopsies: 5/1/2019:  Left breast: with invasive ductal carcinoma grade 3; ER positive at 95% and GA positive at 90%; Her2Nu was positive at +3; Her2/CEP 17 ratio was >10.6  - Ki67 was unfavorable at 69%  Right breast: negative for cancer       Objective:     Vitals:  Blood pressure 125/87, pulse 72, temperature 98.4 °F (36.9 °C), resp. rate 20, weight 78 kg (172 lb).     Physical Examination:   GEN: no apparent distress, comfortable; AAOx3  HEAD: atraumatic and normocephalic  EYES: no pallor, no icterus, PERRLA  ENT: OMM, no pharyngeal erythema, external ears WNL; no nasal discharge; no thrush  NECK: no masses, thyroid normal, trachea midline, no LAD/LN's, supple  CV: RRR with no murmur; normal pulse; normal S1 and S2; no pedal edema  CHEST: Normal respiratory effort; CTAB; normal breath sounds; no wheeze or crackles; portacath on right chest wall  ABDOM: nontender and nondistended; soft; normal bowel sounds; no rebound/guarding  MUSC/Skeletal: ROM normal; no crepitus; joints normal; no deformities or arthropathy  EXTREM: no clubbing, cyanosis, inflammation or swelling  SKIN: no rashes, lesions, ulcers, petechiae or subcutaneous nodules  : no lopez  NEURO: grossly intact; motor/sensory WNL; AAOx3; no tremors  PSYCH: normal mood, affect and behavior  LYMPH: normal cervical, supraclavicular, axillary and groin LN's  Breast: left breast with large fibrous component at about the 1 o'clock position about 2.5 to 3 cm in size; biopsy site on left lower outer area; no appreciable LAD in axilla; nipple inversion on left without any appreciable skin changes visually - lesion is softer and she no longer has breast pain          Labs:   5/30/2019  Lab Results   Component Value Date    WBC 17.6 (H) 05/30/2019     HGB 13.5 05/30/2019    HCT 40.9 05/30/2019    MCV 90.9 05/20/2019     05/30/2019     BMP  Lab Results   Component Value Date     05/30/2019    K 4.5 05/30/2019     05/30/2019    CO2 28.4 05/30/2019    BUN 14 05/30/2019    CREATININE 0.95 05/30/2019    CALCIUM 8.8 05/30/2019    ANIONGAP 10 01/29/2016    ESTGFRAFRICA >60 01/29/2016    EGFRNONAA >60 01/29/2016             Radiology/Diagnostic Studies:    Nm Pet Ct Routine Skull To Mid Thigh    Result Date: 5/21/2019  INTEGRATED PET CT WITH IMAGE FUSION HISTORY:  Left breast cancer initial treatment evaluation TECHNIQUE: Following the injection of 12.7 mCi of F-18 labeled FDG into a right antecubital vein, PET CT was performed from the vertex of the skull through the proximal thighs with an integrated full ring PET CT scanner with image fusion. The patient's serum glucose at the time of the exam was 82 mg/dL. FINDINGS: There is diffuse FDG activity in the subareolar left breast with skin thickening and multiple hypermetabolic masses. There is a 19 mm spiculated hypermetabolic mass in the inferior lateral left breast with a max SUV of 10.4. This was previously biopsied. There is a 2 cm hypermetabolic area in the lateral superior left breast with max SUV of 9.9. There is an 11 mm nodule within the inner right breast which was biopsied and shown to represent fibroadenoma. This shows no FDG activity. There is no axillary, mediastinal or hilar adenopathy. There are no pulmonary nodules, infiltrates or pleural effusions. CT of the head and neck show no intra-axial lesions or cervical adenopathy. CT of the abdomen and pelvis demonstrates physiologic activity in the GI tract and urinary system. The liver and adrenal glands are normal. There are mildly prominent lymph nodes within the right lower quadrant the largest measures 13 mm with a max SUV of 3.1. Findings are suspicious for mesenteric adenitis. There is no retroperitoneal adenopathy. There are no  lytic or blastic lesions.     IMPRESSION: Diffuse FDG activity within the subareolar left breast with skin thickening and multiple hypermetabolic left breast masses consistent with patient's history of left breast cancer. No evidence of metastatic disease 11 mm nodule in the medial right breast which was shown to represent fibroadenoma Mildly prominent lymph nodes in the right lower quadrant with mild FDG activity suggestive of mesenteric adenitis. Follow-up CT scan in 3-6 months is recommended.     Read and electronically signed by: Adriane Jacome MD on 5/21/2019 1:13 PM CDT ADRIANE JACOME MD    [facility] Unknown Rad Eap    Result Date: 5/21/2019  Chest single view CLINICAL DATA: Port-A-Cath placement FINDINGS: AP view shows the heart to be within normal size limits. The mediastinum is unremarkable. Right subclavian Port-A-Cath tip is at superior vena cava. No pneumothorax or other placement related complication is identified. No infiltrates or effusions are demonstrated. No acute osseous abnormalities are demonstrated. IMPRESSION: 1. Right sided Port-A-Cath appears appropriately positioned, with no pneumothorax or other placement related complication. No acute radiographic abnormalities. Read and electronically signed by: Teofilo Patterson MD on 5/21/2019 2:52 PM CDT TEOFILO PATTERSON MD      I have reviewed all available lab results and radiology reports.    Assessment/Plan:   (1) 38 y.o. female  with diagnosis of left breast cancer who has been referred by Dr Salvador Rey with Gen Surgery for evaluation by medical oncology.      - She had presented with left breast pain which became progressive over a 4 month period.   - Radiology studies found a 2.5cm mass on the left breast along with two inflamed LN's on the right.   - She had left breast biopsy on 5/1/2019 which showed invasive ductal carcinoma grade 3. The right breast biopsy was negative for cancer.   - She is ER and IN positive. She is also Her2Nu  +3.   - We discussed the latest data on Her2Nu positive breast cancers and the recommendation for neoadjuvant chemotherapy with a herceptin and perjeta.  - she will need echo, portacath, and PET scan  - will plan for herceptin, perjeta, carboplatin and taxotere regimen neoadjuvantly with 6 cycles  -  S/p set up chemotherapy school; discussed side-effect profile, provided literature on the regimen; obtained consents  - she is set up for May 27th to start  - PET scan and echo are on chart  - portacath placed on 5/21  - started chemotherapy with 1st cycle on 5/27/2019     (2) Left parotid tumor - removed in 2011     (3) Sarcoidosis     (4) Interstitial cystitis     (5) Diet controlled gestational DM     (6) Fibromyalgia     (7) Hx/of cervical dysplasia as teenager s/p cryoablation    (8) Migraine - now on meds prn         VISIT DIAGNOSES:      Malignant neoplasm of overlapping sites of left breast in female, estrogen receptor positive    HER2-positive carcinoma of left breast    Chemotherapy induced neutropenia          PLAN:  1. continue with the chemotherapy neoadjuvant regimen    2. Echo and PET on chart  3. Check labs weekly  4. RTC in 2-3 weeks  Fax note to Ben Rey    Discussion:       I spent over 25 mins of time with the patient. Reviewed results of the recently ordered labs, tests and studies; made directives with regards to the results. Over half of this time was spent couseling and coordinating care.    I have explained all of the above in detail and the patient understands all of the current recommendation(s). I have answered all of their questions to the best of my ability and to their complete satisfaction.   The patient is to continue with the current management plan.            Electronically signed by Abel Chambers MD

## 2019-06-05 ENCOUNTER — TELEPHONE (OUTPATIENT)
Dept: HEMATOLOGY/ONCOLOGY | Facility: CLINIC | Age: 39
End: 2019-06-05

## 2019-06-05 ENCOUNTER — OFFICE VISIT (OUTPATIENT)
Dept: HEMATOLOGY/ONCOLOGY | Facility: CLINIC | Age: 39
End: 2019-06-05
Payer: MEDICAID

## 2019-06-05 VITALS
SYSTOLIC BLOOD PRESSURE: 125 MMHG | TEMPERATURE: 98 F | BODY MASS INDEX: 30.47 KG/M2 | WEIGHT: 172 LBS | DIASTOLIC BLOOD PRESSURE: 87 MMHG | RESPIRATION RATE: 20 BRPM | HEART RATE: 72 BPM

## 2019-06-05 DIAGNOSIS — Z17.0 MALIGNANT NEOPLASM OF OVERLAPPING SITES OF LEFT BREAST IN FEMALE, ESTROGEN RECEPTOR POSITIVE: Primary | ICD-10-CM

## 2019-06-05 DIAGNOSIS — T45.1X5A CHEMOTHERAPY INDUCED NEUTROPENIA: ICD-10-CM

## 2019-06-05 DIAGNOSIS — C50.812 MALIGNANT NEOPLASM OF OVERLAPPING SITES OF LEFT BREAST IN FEMALE, ESTROGEN RECEPTOR POSITIVE: Primary | ICD-10-CM

## 2019-06-05 DIAGNOSIS — C50.912 HER2-POSITIVE CARCINOMA OF LEFT BREAST: ICD-10-CM

## 2019-06-05 DIAGNOSIS — D70.1 CHEMOTHERAPY INDUCED NEUTROPENIA: ICD-10-CM

## 2019-06-05 LAB
ALBUMIN SERPL-MCNC: 3.7 G/DL (ref 3.1–4.7)
ALP SERPL-CCNC: 98 IU/L (ref 40–104)
ALT (SGPT): 35 IU/L (ref 3–33)
AST SERPL-CCNC: 28 IU/L (ref 10–40)
BASOPHILS NFR BLD: 0.1 K/UL (ref 0–0.2)
BASOPHILS NFR BLD: 0.5 %
BILIRUB SERPL-MCNC: 0.7 MG/DL (ref 0.3–1)
BUN SERPL-MCNC: 18 MG/DL (ref 8–20)
CALCIUM SERPL-MCNC: 8.8 MG/DL (ref 7.7–10.4)
CHLORIDE: 105 MMOL/L (ref 98–110)
CO2 SERPL-SCNC: 27.7 MMOL/L (ref 22.8–31.6)
CREATININE: 0.91 MG/DL (ref 0.6–1.4)
EOSINOPHIL NFR BLD: 0 %
EOSINOPHIL NFR BLD: 0 K/UL (ref 0–0.7)
ERYTHROCYTE [DISTWIDTH] IN BLOOD BY AUTOMATED COUNT: 12.8 % (ref 12.5–14.5)
GLUCOSE: 87 MG/DL (ref 70–99)
GRAN #: 16 K/UL (ref 1.4–6.5)
GRAN%: 69.1 %
HCT VFR BLD AUTO: 39.1 % (ref 36–48)
HGB BLD-MCNC: 12.9 G/DL (ref 12–15)
IMMATURE GRANS (ABS): 3.2 K/UL (ref 0–1)
IMMATURE GRANULOCYTES: 13.7 %
LYMPH #: 2.5 K/UL (ref 1.2–3.4)
LYMPH%: 10.8 %
MCH RBC QN AUTO: 29.7 PG (ref 25–35)
MCHC RBC AUTO-ENTMCNC: 33 G/DL (ref 31–36)
MCV RBC AUTO: 89.9 FL (ref 79–98)
MONO #: 1.4 K/UL (ref 0.1–0.6)
MONO%: 5.9 %
NUCLEATED RBCS: 0 %
NUCLEATED RED BLOOD CELLS: 0 /100 WBC
PERFORMED BY:: ABNORMAL
PLATELET # BLD AUTO: 173 K/UL (ref 140–440)
PMV BLD AUTO: 10.2 FL (ref 8.8–12.7)
POTASSIUM SERPL-SCNC: 4 MMOL/L (ref 3.5–5)
PROT SERPL-MCNC: 7.2 G/DL (ref 6–8.2)
RBC # BLD AUTO: 4.35 M/UL (ref 3.5–5.5)
SODIUM: 139 MMOL/L (ref 134–144)
WBC # BLD: 23.2 K/UL (ref 5–10)

## 2019-06-05 PROCEDURE — 99214 OFFICE O/P EST MOD 30 MIN: CPT | Mod: ,,, | Performed by: INTERNAL MEDICINE

## 2019-06-05 PROCEDURE — 99214 PR OFFICE/OUTPT VISIT, EST, LEVL IV, 30-39 MIN: ICD-10-PCS | Mod: ,,, | Performed by: INTERNAL MEDICINE

## 2019-06-12 RX ORDER — HEPARIN 100 UNIT/ML
500 SYRINGE INTRAVENOUS
Status: CANCELLED | OUTPATIENT
Start: 2019-06-17

## 2019-06-12 RX ORDER — SODIUM CHLORIDE 0.9 % (FLUSH) 0.9 %
10 SYRINGE (ML) INJECTION
Status: CANCELLED | OUTPATIENT
Start: 2019-06-17

## 2019-06-17 ENCOUNTER — OFFICE VISIT (OUTPATIENT)
Dept: HEMATOLOGY/ONCOLOGY | Facility: CLINIC | Age: 39
End: 2019-06-17
Payer: MEDICAID

## 2019-06-17 ENCOUNTER — TELEPHONE (OUTPATIENT)
Dept: HEMATOLOGY/ONCOLOGY | Facility: CLINIC | Age: 39
End: 2019-06-17

## 2019-06-17 VITALS
WEIGHT: 175.25 LBS | RESPIRATION RATE: 20 BRPM | TEMPERATURE: 98 F | SYSTOLIC BLOOD PRESSURE: 129 MMHG | BODY MASS INDEX: 31.05 KG/M2 | DIASTOLIC BLOOD PRESSURE: 84 MMHG | HEART RATE: 73 BPM

## 2019-06-17 DIAGNOSIS — C50.812 MALIGNANT NEOPLASM OF OVERLAPPING SITES OF LEFT BREAST IN FEMALE, ESTROGEN RECEPTOR POSITIVE: Primary | ICD-10-CM

## 2019-06-17 DIAGNOSIS — D86.9 SARCOIDOSIS: ICD-10-CM

## 2019-06-17 DIAGNOSIS — Z17.0 MALIGNANT NEOPLASM OF OVERLAPPING SITES OF LEFT BREAST IN FEMALE, ESTROGEN RECEPTOR POSITIVE: Primary | ICD-10-CM

## 2019-06-17 DIAGNOSIS — D70.1 CHEMOTHERAPY INDUCED NEUTROPENIA: ICD-10-CM

## 2019-06-17 DIAGNOSIS — T45.1X5A CHEMOTHERAPY INDUCED NEUTROPENIA: ICD-10-CM

## 2019-06-17 DIAGNOSIS — C50.912 HER2-POSITIVE CARCINOMA OF LEFT BREAST: ICD-10-CM

## 2019-06-17 PROCEDURE — 99214 PR OFFICE/OUTPT VISIT, EST, LEVL IV, 30-39 MIN: ICD-10-PCS | Mod: ,,, | Performed by: INTERNAL MEDICINE

## 2019-06-17 PROCEDURE — 99214 OFFICE O/P EST MOD 30 MIN: CPT | Mod: ,,, | Performed by: INTERNAL MEDICINE

## 2019-06-17 NOTE — PROGRESS NOTES
[facility] Hematology/Oncology  PROGRESS NOTE -  Follow-up Visit      Subjective:       Patient ID:   NAME: Sheridan Todd : 1980     38 y.o. female    Referring Doc: Krissy  Other Physicians: Jose Rodriguez    Chief Complaint: left breast ca f/u     History of Present Illness:     Patient returns today for a regularly scheduled follow-up visit.  The patient is here today to go over the results of the recently ordered labs, tests and studies.  She has started the neoadjuvant chemotherapy with herceptin and perjeta based regimen .  She is doing ok with no CP, SOB, HA's or N/V. She is getting cycle #2 today. The breast lesion is softer and she no longer has breast pain              ROS:   GEN: normal without any fever, night sweats or weight loss  HEENT: normal with no HA's, sore throat, stiff neck, changes in vision  CV: normal with no CP, SOB, PND, MENDIETA or orthopnea  PULM: normal with no SOB, cough, hemoptysis, sputum or pleuritic pain  GI: normal with no abdominal pain, nausea, vomiting, constipation, diarrhea, melanotic stools, BRBPR, or hematemesis  : normal with no hematuria, dysuria  BREAST: mass is softer and no pain  SKIN: normal with no rash, erythema, bruising, or swelling    Allergies:  Review of patient's allergies indicates:   Allergen Reactions    Codeine      Other reaction(s): Nausea    Shellfish containing products      Other reaction(s): Vomiting  Only crabmeat       Medications:    Current Outpatient Medications:     HYDROcodone-acetaminophen (NORCO) 7.5-325 mg per tablet, TK 1 T PO Q 4 H PRN, Disp: , Rfl: 0    ondansetron (ZOFRAN) 8 MG tablet, Take 1 tablet (8 mg total) by mouth every 8 (eight) hours as needed for Nausea., Disp: 30 tablet, Rfl: 2    promethazine (PHENERGAN) 25 MG tablet, Take 1 tablet (25 mg total) by mouth every 6 (six) hours as needed for Nausea., Disp: 30 tablet, Rfl: 2    PMHx/PSHx Updates:  See patient's last visit with me on 2019.  See H&P on  5/16/2019        Pathology:  Cancer Staging      Breast biopsies: 5/1/2019:  Left breast: with invasive ductal carcinoma grade 3; ER positive at 95% and OK positive at 90%; Her2Nu was positive at +3; Her2/CEP 17 ratio was >10.6  - Ki67 was unfavorable at 69%  Right breast: negative for cancer       Objective:     Vitals:  Blood pressure 129/84, pulse 73, temperature 98.3 °F (36.8 °C), resp. rate 20, weight 79.5 kg (175 lb 4.3 oz).     Physical Examination:   GEN: no apparent distress, comfortable; AAOx3  HEAD: atraumatic and normocephalic  EYES: no pallor, no icterus, PERRLA  ENT: OMM, no pharyngeal erythema, external ears WNL; no nasal discharge; no thrush  NECK: no masses, thyroid normal, trachea midline, no LAD/LN's, supple  CV: RRR with no murmur; normal pulse; normal S1 and S2; no pedal edema  CHEST: Normal respiratory effort; CTAB; normal breath sounds; no wheeze or crackles; portacath on right chest wall with a residual stitch  ABDOM: nontender and nondistended; soft; normal bowel sounds; no rebound/guarding  MUSC/Skeletal: ROM normal; no crepitus; joints normal; no deformities or arthropathy  EXTREM: no clubbing, cyanosis, inflammation or swelling  SKIN: no rashes, lesions, ulcers, petechiae or subcutaneous nodules  : no lopez  NEURO: grossly intact; motor/sensory WNL; AAOx3; no tremors  PSYCH: normal mood, affect and behavior  LYMPH: normal cervical, supraclavicular, axillary and groin LN's  Breast: left breast with large fibrous component at about the 1 o'clock position about 2.5 to 3 cm in size; biopsy site on left lower outer area; no appreciable LAD in axilla; nipple inversion on left without any appreciable skin changes visually - lesion is softer and she no longer has breast pain          Labs:   6/13/2019  Lab Results   Component Value Date    WBC 7.4 06/13/2019    HGB 12.1 06/13/2019    HCT 37.1 06/13/2019    MCV 90.3 06/13/2019     (L) 06/13/2019     BMP  Lab Results   Component Value Date      06/13/2019    K 3.9 06/13/2019     06/13/2019    CO2 28.7 06/13/2019    BUN 17 06/13/2019    CREATININE 0.81 06/13/2019    CALCIUM 9.0 06/13/2019    ANIONGAP 10 01/29/2016    ESTGFRAFRICA >60 01/29/2016    EGFRNONAA >60 01/29/2016             Radiology/Diagnostic Studies:    Nm Pet Ct Routine Skull To Mid Thigh    Result Date: 5/21/2019  INTEGRATED PET CT WITH IMAGE FUSION HISTORY:  Left breast cancer initial treatment evaluation TECHNIQUE: Following the injection of 12.7 mCi of F-18 labeled FDG into a right antecubital vein, PET CT was performed from the vertex of the skull through the proximal thighs with an integrated full ring PET CT scanner with image fusion. The patient's serum glucose at the time of the exam was 82 mg/dL. FINDINGS: There is diffuse FDG activity in the subareolar left breast with skin thickening and multiple hypermetabolic masses. There is a 19 mm spiculated hypermetabolic mass in the inferior lateral left breast with a max SUV of 10.4. This was previously biopsied. There is a 2 cm hypermetabolic area in the lateral superior left breast with max SUV of 9.9. There is an 11 mm nodule within the inner right breast which was biopsied and shown to represent fibroadenoma. This shows no FDG activity. There is no axillary, mediastinal or hilar adenopathy. There are no pulmonary nodules, infiltrates or pleural effusions. CT of the head and neck show no intra-axial lesions or cervical adenopathy. CT of the abdomen and pelvis demonstrates physiologic activity in the GI tract and urinary system. The liver and adrenal glands are normal. There are mildly prominent lymph nodes within the right lower quadrant the largest measures 13 mm with a max SUV of 3.1. Findings are suspicious for mesenteric adenitis. There is no retroperitoneal adenopathy. There are no lytic or blastic lesions.     IMPRESSION: Diffuse FDG activity within the subareolar left breast with skin thickening and multiple  hypermetabolic left breast masses consistent with patient's history of left breast cancer. No evidence of metastatic disease 11 mm nodule in the medial right breast which was shown to represent fibroadenoma Mildly prominent lymph nodes in the right lower quadrant with mild FDG activity suggestive of mesenteric adenitis. Follow-up CT scan in 3-6 months is recommended.     Read and electronically signed by: Adriane Jacome MD on 5/21/2019 1:13 PM CDT ADRIANE JACOME MD    [facility] Unknown Rad Eap    Result Date: 5/21/2019  Chest single view CLINICAL DATA: Port-A-Cath placement FINDINGS: AP view shows the heart to be within normal size limits. The mediastinum is unremarkable. Right subclavian Port-A-Cath tip is at superior vena cava. No pneumothorax or other placement related complication is identified. No infiltrates or effusions are demonstrated. No acute osseous abnormalities are demonstrated. IMPRESSION: 1. Right sided Port-A-Cath appears appropriately positioned, with no pneumothorax or other placement related complication. No acute radiographic abnormalities. Read and electronically signed by: Teofilo Patterson MD on 5/21/2019 2:52 PM CDT TEOFILO PATTERSON MD      I have reviewed all available lab results and radiology reports.    Assessment/Plan:   (1) 38 y.o. female  with diagnosis of left breast cancer who has been referred by Dr Salvador Rey with Gen Surgery for evaluation by medical oncology.      - She had presented with left breast pain which became progressive over a 4 month period.   - Radiology studies found a 2.5cm mass on the left breast along with two inflamed LN's on the right.   - She had left breast biopsy on 5/1/2019 which showed invasive ductal carcinoma grade 3. The right breast biopsy was negative for cancer.   - She is ER and [place] positive. She is also Her2Nu +3.   - We discussed the latest data on Her2Nu positive breast cancers and the recommendation for neoadjuvant chemotherapy with a  herceptin and perjeta.  - she will need echo, portacath, and PET scan  - will plan for herceptin, perjeta, carboplatin and taxotere regimen neoadjuvantly with 6 cycles  -  S/p set up chemotherapy school; discussed side-effect profile, provided literature on the regimen; obtained consents  - she is set up for May 27th to start  - PET scan and echo are on chart  - portacath placed on 5/21  - started chemotherapy with 1st cycle on 5/27/2019 and getting 2nd cycle today on 6/17/2019     (2) Left parotid tumor - removed in 2011     (3) Sarcoidosis     (4) Interstitial cystitis     (5) Diet controlled gestational DM     (6) Fibromyalgia     (7) Hx/of cervical dysplasia as teenager s/p cryoablation    (8) Migraine - now on meds prn         VISIT DIAGNOSES:      Malignant neoplasm of overlapping sites of left breast in female, estrogen receptor positive    Sarcoidosis    HER2-positive carcinoma of left breast    Chemotherapy induced neutropenia          PLAN:  1. continue with the chemotherapy neoadjuvant regimen    2. Echo and PET on chart  3. Check labs weekly  4. RTC in 2-3 weeks  5. Refill pyridium and eventually will need to f/u with   Fax note to Ben Rey Baez    Discussion:       I spent over 25 mins of time with the patient. Reviewed results of the recently ordered labs, tests and studies; made directives with regards to the results. Over half of this time was spent couseling and coordinating care.    I have explained all of the above in detail and the patient understands all of the current recommendation(s). I have answered all of their questions to the best of my ability and to their complete satisfaction.   The patient is to continue with the current management plan.            Electronically signed by Abel Chambers MD

## 2019-06-25 ENCOUNTER — TELEPHONE (OUTPATIENT)
Dept: HEMATOLOGY/ONCOLOGY | Facility: CLINIC | Age: 39
End: 2019-06-25

## 2019-06-25 NOTE — TELEPHONE ENCOUNTER
Called patient asked her to come by the office to do a BRCA test she is going to come by Thursday or Friday

## 2019-06-26 RX ORDER — SODIUM CHLORIDE 0.9 % (FLUSH) 0.9 %
10 SYRINGE (ML) INJECTION
Status: CANCELLED | OUTPATIENT
Start: 2019-07-08

## 2019-06-26 RX ORDER — HEPARIN 100 UNIT/ML
500 SYRINGE INTRAVENOUS
Status: CANCELLED | OUTPATIENT
Start: 2019-07-08

## 2019-06-27 LAB
ALBUMIN SERPL-MCNC: 3.7 G/DL (ref 3.1–4.7)
ALP SERPL-CCNC: 113 IU/L (ref 40–104)
ALT (SGPT): 33 IU/L (ref 3–33)
AST SERPL-CCNC: 24 IU/L (ref 10–40)
BASOPHILS NFR BLD: 0.1 K/UL (ref 0–0.2)
BASOPHILS NFR BLD: 0.3 %
BILIRUB SERPL-MCNC: 0.5 MG/DL (ref 0.3–1)
BUN SERPL-MCNC: 12 MG/DL (ref 8–20)
CALCIUM SERPL-MCNC: 8.9 MG/DL (ref 7.7–10.4)
CHLORIDE: 107 MMOL/L (ref 98–110)
CO2 SERPL-SCNC: 30.6 MMOL/L (ref 22.8–31.6)
CREATININE: 0.92 MG/DL (ref 0.6–1.4)
EOSINOPHIL NFR BLD: 0 %
EOSINOPHIL NFR BLD: 0 K/UL (ref 0–0.7)
ERYTHROCYTE [DISTWIDTH] IN BLOOD BY AUTOMATED COUNT: 14.3 % (ref 12.5–14.5)
GLUCOSE: 95 MG/DL (ref 70–99)
GRAN #: 13.5 K/UL (ref 1.4–6.5)
GRAN%: 75.7 %
HCT VFR BLD AUTO: 34.2 % (ref 36–48)
HGB BLD-MCNC: 11.3 G/DL (ref 12–15)
IMMATURE GRANS (ABS): 1.3 K/UL (ref 0–1)
IMMATURE GRANULOCYTES: 7.5 %
LYMPH #: 2.1 K/UL (ref 1.2–3.4)
LYMPH%: 11.7 %
MCH RBC QN AUTO: 30.4 PG (ref 25–35)
MCHC RBC AUTO-ENTMCNC: 33 G/DL (ref 31–36)
MCV RBC AUTO: 91.9 FL (ref 79–98)
MONO #: 0.9 K/UL (ref 0.1–0.6)
MONO%: 4.8 %
NUCLEATED RBCS: 0 %
NUCLEATED RED BLOOD CELLS: 0 /100 WBC
PERFORMED BY:: ABNORMAL
PLATELET # BLD AUTO: 133 K/UL (ref 140–440)
PMV BLD AUTO: 10.1 FL (ref 8.8–12.7)
POTASSIUM SERPL-SCNC: 3.8 MMOL/L (ref 3.5–5)
PROT SERPL-MCNC: 6.8 G/DL (ref 6–8.2)
RBC # BLD AUTO: 3.72 M/UL (ref 3.5–5.5)
SODIUM: 144 MMOL/L (ref 134–144)
WBC # BLD: 17.8 K/UL (ref 5–10)

## 2019-07-03 ENCOUNTER — TELEPHONE (OUTPATIENT)
Dept: SURGERY | Facility: CLINIC | Age: 39
End: 2019-07-03

## 2019-07-03 NOTE — TELEPHONE ENCOUNTER
Pt called regarding pain @ port site.. She stopped by the office last week..  was available to examine pt.. Pt states  told her to have the port flushed.. If she experienced discomfort, to call us & we could order a Port Study..      not avail to sign order today..  signed paper order..  Will sched pt for fri 7/5 @1

## 2019-07-05 DIAGNOSIS — E86.0 DEHYDRATION: ICD-10-CM

## 2019-07-08 ENCOUNTER — DOCUMENTATION ONLY (OUTPATIENT)
Dept: HEMATOLOGY/ONCOLOGY | Facility: CLINIC | Age: 39
End: 2019-07-08

## 2019-07-08 ENCOUNTER — OFFICE VISIT (OUTPATIENT)
Dept: HEMATOLOGY/ONCOLOGY | Facility: CLINIC | Age: 39
End: 2019-07-08
Payer: MEDICAID

## 2019-07-08 ENCOUNTER — TELEPHONE (OUTPATIENT)
Dept: HEMATOLOGY/ONCOLOGY | Facility: CLINIC | Age: 39
End: 2019-07-08

## 2019-07-08 VITALS
DIASTOLIC BLOOD PRESSURE: 87 MMHG | WEIGHT: 175.94 LBS | HEART RATE: 78 BPM | SYSTOLIC BLOOD PRESSURE: 133 MMHG | BODY MASS INDEX: 31.16 KG/M2 | TEMPERATURE: 98 F | RESPIRATION RATE: 18 BRPM

## 2019-07-08 DIAGNOSIS — G43.109 MIGRAINE EQUIVALENT: ICD-10-CM

## 2019-07-08 DIAGNOSIS — T45.1X5A CHEMOTHERAPY INDUCED NEUTROPENIA: ICD-10-CM

## 2019-07-08 DIAGNOSIS — D70.1 CHEMOTHERAPY INDUCED NEUTROPENIA: ICD-10-CM

## 2019-07-08 DIAGNOSIS — C50.912 HER2-POSITIVE CARCINOMA OF LEFT BREAST: Primary | ICD-10-CM

## 2019-07-08 PROCEDURE — 99213 OFFICE O/P EST LOW 20 MIN: CPT | Mod: ,,, | Performed by: INTERNAL MEDICINE

## 2019-07-08 PROCEDURE — 99213 PR OFFICE/OUTPT VISIT, EST, LEVL III, 20-29 MIN: ICD-10-PCS | Mod: ,,, | Performed by: INTERNAL MEDICINE

## 2019-07-08 NOTE — PROGRESS NOTES
I called and discussed the xray report with Dr. Rey.  The port is sheared and it appears the distal tip is in the pulmonary artery.  She is scheduled to see him in the office tomorrow for further recommendations.

## 2019-07-09 ENCOUNTER — OFFICE VISIT (OUTPATIENT)
Dept: SURGERY | Facility: CLINIC | Age: 39
End: 2019-07-09
Payer: MEDICAID

## 2019-07-09 VITALS
HEART RATE: 82 BPM | TEMPERATURE: 99 F | SYSTOLIC BLOOD PRESSURE: 129 MMHG | DIASTOLIC BLOOD PRESSURE: 81 MMHG | HEIGHT: 63 IN | BODY MASS INDEX: 31.01 KG/M2 | WEIGHT: 175 LBS

## 2019-07-09 DIAGNOSIS — Z45.2 ENCOUNTER FOR CARE RELATED TO PORT-A-CATH: ICD-10-CM

## 2019-07-09 DIAGNOSIS — C50.812 MALIGNANT NEOPLASM OF OVERLAPPING SITES OF LEFT BREAST IN FEMALE, ESTROGEN RECEPTOR POSITIVE: Primary | ICD-10-CM

## 2019-07-09 DIAGNOSIS — Z17.0 MALIGNANT NEOPLASM OF OVERLAPPING SITES OF LEFT BREAST IN FEMALE, ESTROGEN RECEPTOR POSITIVE: Primary | ICD-10-CM

## 2019-07-09 PROCEDURE — 99024 PR POST-OP FOLLOW-UP VISIT: ICD-10-PCS | Mod: ,,, | Performed by: SURGERY

## 2019-07-09 PROCEDURE — 99024 POSTOP FOLLOW-UP VISIT: CPT | Mod: ,,, | Performed by: SURGERY

## 2019-07-09 RX ORDER — MUPIROCIN 20 MG/G
OINTMENT TOPICAL
Refills: 2 | Status: ON HOLD | COMMUNITY
Start: 2019-06-05 | End: 2020-03-05 | Stop reason: HOSPADM

## 2019-07-09 RX ORDER — PHENAZOPYRIDINE HYDROCHLORIDE 200 MG/1
200 TABLET, FILM COATED ORAL EVERY 6 HOURS PRN
Refills: 1 | Status: ON HOLD | COMMUNITY
Start: 2019-06-17 | End: 2020-03-05 | Stop reason: HOSPADM

## 2019-07-09 RX ORDER — BUTALBITAL, ACETAMINOPHEN AND CAFFEINE 300; 40; 50 MG/1; MG/1; MG/1
1 CAPSULE ORAL EVERY 4 HOURS PRN
Refills: 0 | Status: ON HOLD | COMMUNITY
Start: 2019-05-31 | End: 2020-03-05 | Stop reason: HOSPADM

## 2019-07-09 NOTE — PROGRESS NOTES
Last week, she experienced pain at port when the nurse flushed the port.  She had port check under flouroscopy.  The port is not functional, part of catheter is sheared off, and in pulmonary vessel.    She is seeing Dr. Rey for removal or repair of port.  I have a call out to him to see if he can remove the catheter,or will vascular MD be called in.?    Pt ate Kinyarwanda subs last pm, with hot peppers.  She developed palpitations, chest pain, sx resolved after belching.  After talking with her, I believe sx were GI, and not related to the catheter or port.    Will continue with chemo today, #3, per peripheral vein, to try to keep on izzy adjuvant Rx.  Coverage for Dr. Chambers.

## 2019-07-09 NOTE — LETTER
July 9, 2019      Abel Chambers MD  [address]  [address]  [place] [address]           [facility] - General Surgery  [address]  [place] [address]  Phone: [phone]  Fax: [phone]          Patient: Sheridan Todd   MR Number: [id number]   YOB: 1980   Date of Visit: 7/9/2019       Dear Dr. Abel Chambers:    Thank you for referring Sheridan Todd to me for evaluation. Attached you will find relevant portions of my assessment and plan of care.    If you have questions, please do not hesitate to call me. I look forward to following Sheridan Todd along with you.    Sincerely,    Salvador HUIZAR MD    Enclosure  CC:  No Recipients    If you would like to receive this communication electronically, please contact [email or web address] or [phone] to request more information on [employer] access.    For providers and/or their staff who would like to refer a patient to [facility], please contact us through our one-stop-shop provider referral line, [facility], at [phone].    If you feel you have received this communication in error or would no longer like to receive these types of communications, please e-mail [email or web address]

## 2019-07-09 NOTE — PROGRESS NOTES
Patient developed problems with her port and an injection study was performed. Prior to the injection the fluoroscopy showed the port is sheared off. The distal and may be in the pulmonary artery. Patient is asymptomatic although she had one episode of palpitations about a week ago which was relieved by burping.    Patient's studies were reviewed and affect consulted with variety of physicians. We'll ask the patient to see a vascular surgeon who may attempt percutaneous extraction. Will get a CT scan of the chest to delineate the exact position of the catheter. For now we'll leave the other and the port in place and decide what to do with that after the vascular portion of this issue has been resolved.

## 2019-07-15 ENCOUNTER — TELEPHONE (OUTPATIENT)
Dept: HEMATOLOGY/ONCOLOGY | Facility: CLINIC | Age: 39
End: 2019-07-15

## 2019-07-15 DIAGNOSIS — C50.812 MALIGNANT NEOPLASM OF OVERLAPPING SITES OF LEFT BREAST IN FEMALE, ESTROGEN RECEPTOR POSITIVE: Primary | ICD-10-CM

## 2019-07-15 DIAGNOSIS — Z17.0 MALIGNANT NEOPLASM OF OVERLAPPING SITES OF LEFT BREAST IN FEMALE, ESTROGEN RECEPTOR POSITIVE: Primary | ICD-10-CM

## 2019-07-15 PROBLEM — E86.0 DEHYDRATION: Status: ACTIVE | Noted: 2019-07-15

## 2019-07-15 RX ORDER — HEPARIN 100 UNIT/ML
500 SYRINGE INTRAVENOUS
Status: CANCELLED | OUTPATIENT
Start: 2019-07-15

## 2019-07-15 RX ORDER — SODIUM CHLORIDE 0.9 % (FLUSH) 0.9 %
10 SYRINGE (ML) INJECTION
Status: CANCELLED | OUTPATIENT
Start: 2019-07-15

## 2019-07-15 NOTE — TELEPHONE ENCOUNTER
She is seeing surgeon     ----- Message from Abel Chambers MD sent at 7/15/2019  1:41 PM CDT -----  Has her portacath issues been addressed?      ----- Message -----  From: Duong Rios MD  Sent: 7/8/2019  11:17 AM  To: Abel Chambers MD, MD Carloz Yi,  Have you seen this report??  Looks like the port is sheared.  Are you seeing her soon??? Let me know.  Abel is out on vacation.      marisa

## 2019-07-16 ENCOUNTER — TELEPHONE (OUTPATIENT)
Dept: SURGERY | Facility: CLINIC | Age: 39
End: 2019-07-16

## 2019-07-16 NOTE — TELEPHONE ENCOUNTER
Dr. Rey ref pt to  (vascular surg)... 's office called, states they do not accept pts insurance..     Will call pts ins to find provider..    Called Dr.Philip Kunz's Office, spoke to neeta.. They accept pts insurance.. Req fax medical records & demographics.. They will contact pt to sched..    's Office # [phone]                         Fax   # [phone]

## 2019-07-17 ENCOUNTER — TELEPHONE (OUTPATIENT)
Dept: HEMATOLOGY/ONCOLOGY | Facility: CLINIC | Age: 39
End: 2019-07-17

## 2019-07-17 NOTE — TELEPHONE ENCOUNTER
Called talked to patient she is doing better since prescriptions for Carafate and imodium  Were called in and she is meeting with  Tomorrow to get game plan to remove port

## 2019-07-18 ENCOUNTER — TELEPHONE (OUTPATIENT)
Dept: VASCULAR SURGERY | Facility: CLINIC | Age: 39
End: 2019-07-18

## 2019-07-18 ENCOUNTER — OFFICE VISIT (OUTPATIENT)
Dept: SURGERY | Facility: CLINIC | Age: 39
End: 2019-07-18
Payer: MEDICAID

## 2019-07-18 ENCOUNTER — TELEPHONE (OUTPATIENT)
Dept: HEMATOLOGY/ONCOLOGY | Facility: CLINIC | Age: 39
End: 2019-07-18

## 2019-07-18 VITALS
BODY MASS INDEX: 31.01 KG/M2 | SYSTOLIC BLOOD PRESSURE: 121 MMHG | HEART RATE: 81 BPM | DIASTOLIC BLOOD PRESSURE: 79 MMHG | WEIGHT: 175 LBS | HEIGHT: 63 IN | TEMPERATURE: 98 F

## 2019-07-18 DIAGNOSIS — Z17.0 MALIGNANT NEOPLASM OF OVERLAPPING SITES OF LEFT BREAST IN FEMALE, ESTROGEN RECEPTOR POSITIVE: ICD-10-CM

## 2019-07-18 DIAGNOSIS — C50.812 MALIGNANT NEOPLASM OF OVERLAPPING SITES OF LEFT BREAST IN FEMALE, ESTROGEN RECEPTOR POSITIVE: ICD-10-CM

## 2019-07-18 DIAGNOSIS — T82.9XXS: Primary | ICD-10-CM

## 2019-07-18 PROCEDURE — 99024 POSTOP FOLLOW-UP VISIT: CPT | Mod: ,,, | Performed by: SURGERY

## 2019-07-18 PROCEDURE — 99024 PR POST-OP FOLLOW-UP VISIT: ICD-10-PCS | Mod: ,,, | Performed by: SURGERY

## 2019-07-18 RX ORDER — SUCRALFATE 1 G/10ML
SUSPENSION ORAL
Refills: 3 | Status: ON HOLD | COMMUNITY
Start: 2019-07-15 | End: 2020-03-05 | Stop reason: HOSPADM

## 2019-07-18 RX ORDER — DIPHENOXYLATE HYDROCHLORIDE AND ATROPINE SULFATE 2.5; .025 MG/1; MG/1
1 TABLET ORAL 4 TIMES DAILY PRN
Refills: 0 | Status: ON HOLD | COMMUNITY
Start: 2019-07-15 | End: 2020-03-05 | Stop reason: HOSPADM

## 2019-07-18 NOTE — TELEPHONE ENCOUNTER
Called patient back she is going to see a vascular surgery next week maybe     ----- Message from Emmy Godinez sent at 7/18/2019  1:55 PM CDT -----  The patient called and said she saw Dr Rey today. She said you told her to let you know how her visit went. She wants you to call her back at [phone].

## 2019-07-18 NOTE — TELEPHONE ENCOUNTER
Pt saw  today.. Pt asked if we could refer her to   (instead of )..     - Called 's office, they accept pts ins & physician is familiar w/ procedure needed... However  will be out of town until the end of August.    -However, Nadine @ 's office, offered to sched pt w/ Dr. Santos for a consultation..  spoke w/ Dr. Santos & he is not familiar w/ this procedure...  recommended Dr. Nolan/ [facility]     - Called cardiology office  is familiar w/ the procedure, however he is out of town until august 1st...  is not familiar w/ the procedure...

## 2019-07-18 NOTE — TELEPHONE ENCOUNTER
Requesting consultation with Dr Gonzalez for broken medi-port, informed Dr Gonzalez is out until next month. Dr Rey requesting to speak to Dr Santos, pager number given.

## 2019-07-18 NOTE — LETTER
July 18, 2019      Aly Contreras MD  [address]  [place] [address]           [facility] - General Surgery  [address]  [place] [address]  Phone: [phone]  Fax: [phone]          Patient: Sheridan Todd   MR Number: [id number]   YOB: 1980   Date of Visit: 7/18/2019       Dear Dr. Aly Contreras:    Thank you for referring Sheridan Todd to me for evaluation. Attached you will find relevant portions of my assessment and plan of care.    If you have questions, please do not hesitate to call me. I look forward to following Sheridan Todd along with you.    Sincerely,    Salvador HUIZAR MD    Enclosure  CC:  No Recipients    If you would like to receive this communication electronically, please contact [email or web address] or [phone] to request more information on [employer] access.    For providers and/or their staff who would like to refer a patient to [facility], please contact us through our one-stop-shop provider referral line, [facility], at [phone].    If you feel you have received this communication in error or would no longer like to receive these types of communications, please e-mail [email or web address]

## 2019-07-18 NOTE — TELEPHONE ENCOUNTER
----- Message from Diamond Higuera sent at 7/18/2019 10:56 AM CDT -----  Contact: Rosa with Dr. Rey   Calling in regards to they need to refer pt to you and pt has broken port and piece of it in her vein and if you do endo vascular surgery and please advise [phone] and fax # [phone]

## 2019-07-18 NOTE — TELEPHONE ENCOUNTER
Per , call pt.. Relay info.. Let her know  spoke to  & although he isn't familiar w/ the procedure.. He is willing to see pt.    Per , in the meantime, make sure pt takes Eliquis as instructed.

## 2019-07-18 NOTE — PROGRESS NOTES
Patient presents to discuss her situation once more. She was unable to see the vascular surgeon I referred her to for insurance reasons. CT scan showed portion of the catheter and pulmonary artery but no clot around it. I've discussed the situation with patient at length. I also spoke with her oncologist.    Patient needs her port replaced. I thing the best place to do it is placed the port in the same place through a right jugular access. I thing the definitive plan for managing the displaced segment of the catheter should be made prior to the surgery. I have spoken to to other physicians that I could think of that would accept patients insurance. Unfortunately neither one of them had experienced a retrieving vascular devices from pulmonary artery. I will explain that to the patient suggested that she try one of the teaching institutions in [place]. In the meanwhile she was started on anticoagulation. I'll continue to follow the situation closely.

## 2019-07-19 NOTE — TELEPHONE ENCOUNTER
Pt notified... Verbalized understanding..She does not feel that she needs to consult w/ Dr. Santos.. She would like to wait until  is avail..    Will notify  of pts decision.. She will see  for definitive plan for managing the displaced segment of the catheter prior to sched surg.

## 2019-07-22 ENCOUNTER — TELEPHONE (OUTPATIENT)
Dept: HEMATOLOGY/ONCOLOGY | Facility: CLINIC | Age: 39
End: 2019-07-22

## 2019-07-22 NOTE — TELEPHONE ENCOUNTER
Per patient - Dr Rey wants her to see a specialist for port removal. Appt with specialist scheduled for beginning of Aug.  Dr Chambers ok with chemo via peripheral IV.  Pt with question of whether she can go out of town to son's wrestling competition in [place]. Dr Chambers gave ok.   Pt educated to seek medical attention to ER is she feels SOB while traveling.  Pt verbalized understanding.    GAYE Schaeffer RN      ----- Message from Emmy Godinez sent at 7/22/2019  9:19 AM CDT -----  The patient said she wanted to talk to you about her port. She said her port is broken and she wanted to let you know what is going on with getting it replaced. Please call her back at [phone].

## 2019-07-23 ENCOUNTER — TELEPHONE (OUTPATIENT)
Dept: HEMATOLOGY/ONCOLOGY | Facility: CLINIC | Age: 39
End: 2019-07-23

## 2019-07-23 ENCOUNTER — TELEPHONE (OUTPATIENT)
Dept: PULMONOLOGY | Facility: CLINIC | Age: 39
End: 2019-07-23

## 2019-07-23 ENCOUNTER — DOCUMENTATION ONLY (OUTPATIENT)
Dept: PULMONOLOGY | Facility: CLINIC | Age: 39
End: 2019-07-23

## 2019-07-23 NOTE — TELEPHONE ENCOUNTER
Patient called went over what is happening with her port     ----- Message from Emmy Godinez sent at 7/22/2019  9:19 AM CDT -----  The patient said she wanted to talk to you about her port. She said her port is broken and she wanted to let you know what is going on with getting it replaced. Please call her back at [phone].

## 2019-07-23 NOTE — PROGRESS NOTES
On behalf of Dr. Rey, I spoke with Dr. Lester Dick at \[facility]\"" about having her catheter tip removed. He will call her today with an appointment.

## 2019-07-23 NOTE — TELEPHONE ENCOUNTER
Call returned to Tee.  Informed letter completed.  She states she will  at the [facility] location as she is able.  Letter at our front reception desk.    Susanna going to see patient today     ----- Message from Emmy Godinez sent at 5/30/2019 10:18 AM CDT -----  The patient called and said she had chemo on Monday and she has a severe migraine today. She wants to know if Dr Chambers or Susanna can see her today. She has to come to get blood work done sometime today. [phone]

## 2019-07-24 ENCOUNTER — TELEPHONE (OUTPATIENT)
Dept: SURGERY | Facility: CLINIC | Age: 39
End: 2019-07-24

## 2019-07-25 NOTE — TELEPHONE ENCOUNTER
7/22/19..  notified .. She is getting pt set up w/ Dr. Willingham @\[facility]\"".. Pt will call us once this is complete...     Will hold off on apt w/ for now.

## 2019-07-26 RX ORDER — SODIUM CHLORIDE 0.9 % (FLUSH) 0.9 %
10 SYRINGE (ML) INJECTION
Status: CANCELLED | OUTPATIENT
Start: 2019-07-29

## 2019-07-26 RX ORDER — HEPARIN 100 UNIT/ML
500 SYRINGE INTRAVENOUS
Status: CANCELLED | OUTPATIENT
Start: 2019-07-29

## 2019-07-28 ENCOUNTER — LAB VISIT (OUTPATIENT)
Dept: LAB | Facility: HOSPITAL | Age: 39
End: 2019-07-28
Attending: INTERNAL MEDICINE
Payer: MEDICAID

## 2019-07-28 DIAGNOSIS — D70.1 CHEMOTHERAPY INDUCED NEUTROPENIA: ICD-10-CM

## 2019-07-28 DIAGNOSIS — T45.1X5A CHEMOTHERAPY INDUCED NEUTROPENIA: ICD-10-CM

## 2019-07-28 DIAGNOSIS — C50.912 HER2-POSITIVE CARCINOMA OF LEFT BREAST: ICD-10-CM

## 2019-07-28 DIAGNOSIS — Z17.0 MALIGNANT NEOPLASM OF OVERLAPPING SITES OF LEFT BREAST IN FEMALE, ESTROGEN RECEPTOR POSITIVE: ICD-10-CM

## 2019-07-28 DIAGNOSIS — C50.812 MALIGNANT NEOPLASM OF OVERLAPPING SITES OF LEFT BREAST IN FEMALE, ESTROGEN RECEPTOR POSITIVE: ICD-10-CM

## 2019-07-28 LAB
ALBUMIN SERPL BCP-MCNC: 3.9 G/DL (ref 3.5–5.2)
ALP SERPL-CCNC: 69 U/L (ref 55–135)
ALT SERPL W/O P-5'-P-CCNC: 27 U/L (ref 10–44)
ANION GAP SERPL CALC-SCNC: 5 MMOL/L (ref 8–16)
AST SERPL-CCNC: 23 U/L (ref 10–40)
BASOPHILS # BLD AUTO: 0.03 K/UL (ref 0–0.2)
BASOPHILS NFR BLD: 0.6 % (ref 0–1.9)
BILIRUB SERPL-MCNC: 0.5 MG/DL (ref 0.1–1)
BUN SERPL-MCNC: 16 MG/DL (ref 6–20)
CALCIUM SERPL-MCNC: 9.2 MG/DL (ref 8.7–10.5)
CHLORIDE SERPL-SCNC: 111 MMOL/L (ref 95–110)
CO2 SERPL-SCNC: 28 MMOL/L (ref 23–29)
CREAT SERPL-MCNC: 0.9 MG/DL (ref 0.5–1.4)
DIFFERENTIAL METHOD: ABNORMAL
EOSINOPHIL # BLD AUTO: 0 K/UL (ref 0–0.5)
EOSINOPHIL NFR BLD: 0 % (ref 0–8)
ERYTHROCYTE [DISTWIDTH] IN BLOOD BY AUTOMATED COUNT: 17.9 % (ref 11.5–14.5)
EST. GFR  (AFRICAN AMERICAN): >60 ML/MIN/1.73 M^2
EST. GFR  (NON AFRICAN AMERICAN): >60 ML/MIN/1.73 M^2
GLUCOSE SERPL-MCNC: 87 MG/DL (ref 70–110)
HCT VFR BLD AUTO: 35.2 % (ref 37–48.5)
HGB BLD-MCNC: 11.4 G/DL (ref 12–16)
IMM GRANULOCYTES # BLD AUTO: 0.01 K/UL (ref 0–0.04)
IMM GRANULOCYTES NFR BLD AUTO: 0.2 % (ref 0–0.5)
LYMPHOCYTES # BLD AUTO: 1.2 K/UL (ref 1–4.8)
LYMPHOCYTES NFR BLD: 25.9 % (ref 18–48)
MCH RBC QN AUTO: 31.2 PG (ref 27–31)
MCHC RBC AUTO-ENTMCNC: 32.4 G/DL (ref 32–36)
MCV RBC AUTO: 96 FL (ref 82–98)
MONOCYTES # BLD AUTO: 0.4 K/UL (ref 0.3–1)
MONOCYTES NFR BLD: 8.5 % (ref 4–15)
NEUTROPHILS # BLD AUTO: 3 K/UL (ref 1.8–7.7)
NEUTROPHILS NFR BLD: 64.8 % (ref 38–73)
NRBC BLD-RTO: 0 /100 WBC
PLATELET # BLD AUTO: 169 K/UL (ref 150–350)
PMV BLD AUTO: 10.2 FL (ref 9.2–12.9)
POTASSIUM SERPL-SCNC: 4.3 MMOL/L (ref 3.5–5.1)
PROT SERPL-MCNC: 7.2 G/DL (ref 6–8.4)
RBC # BLD AUTO: 3.65 M/UL (ref 4–5.4)
SODIUM SERPL-SCNC: 144 MMOL/L (ref 136–145)
WBC # BLD AUTO: 4.68 K/UL (ref 3.9–12.7)

## 2019-07-28 PROCEDURE — 80053 COMPREHEN METABOLIC PANEL: CPT

## 2019-07-28 PROCEDURE — 85025 COMPLETE CBC W/AUTO DIFF WBC: CPT

## 2019-07-29 ENCOUNTER — OFFICE VISIT (OUTPATIENT)
Dept: HEMATOLOGY/ONCOLOGY | Facility: CLINIC | Age: 39
End: 2019-07-29
Payer: MEDICAID

## 2019-07-29 ENCOUNTER — INFUSION (OUTPATIENT)
Dept: INFUSION THERAPY | Facility: HOSPITAL | Age: 39
End: 2019-07-29
Attending: INTERNAL MEDICINE
Payer: MEDICAID

## 2019-07-29 VITALS
RESPIRATION RATE: 20 BRPM | WEIGHT: 177.69 LBS | DIASTOLIC BLOOD PRESSURE: 82 MMHG | HEART RATE: 86 BPM | BODY MASS INDEX: 30.5 KG/M2 | SYSTOLIC BLOOD PRESSURE: 142 MMHG | TEMPERATURE: 99 F

## 2019-07-29 VITALS
BODY MASS INDEX: 30.26 KG/M2 | DIASTOLIC BLOOD PRESSURE: 78 MMHG | HEIGHT: 64 IN | WEIGHT: 177.25 LBS | HEART RATE: 86 BPM | RESPIRATION RATE: 18 BRPM | SYSTOLIC BLOOD PRESSURE: 124 MMHG | TEMPERATURE: 98 F

## 2019-07-29 DIAGNOSIS — C50.812 MALIGNANT NEOPLASM OF OVERLAPPING SITES OF LEFT BREAST IN FEMALE, ESTROGEN RECEPTOR POSITIVE: ICD-10-CM

## 2019-07-29 DIAGNOSIS — E86.0 DEHYDRATION: Primary | ICD-10-CM

## 2019-07-29 DIAGNOSIS — Z17.0 MALIGNANT NEOPLASM OF OVERLAPPING SITES OF LEFT BREAST IN FEMALE, ESTROGEN RECEPTOR POSITIVE: ICD-10-CM

## 2019-07-29 DIAGNOSIS — D70.1 CHEMOTHERAPY INDUCED NEUTROPENIA: ICD-10-CM

## 2019-07-29 DIAGNOSIS — T45.1X5A CHEMOTHERAPY INDUCED NEUTROPENIA: ICD-10-CM

## 2019-07-29 DIAGNOSIS — C50.912 HER2-POSITIVE CARCINOMA OF LEFT BREAST: ICD-10-CM

## 2019-07-29 DIAGNOSIS — C50.912 HER2-POSITIVE CARCINOMA OF LEFT BREAST: Primary | ICD-10-CM

## 2019-07-29 PROCEDURE — 96417 CHEMO IV INFUS EACH ADDL SEQ: CPT

## 2019-07-29 PROCEDURE — 63600175 PHARM REV CODE 636 W HCPCS: Mod: PO | Performed by: INTERNAL MEDICINE

## 2019-07-29 PROCEDURE — 96367 TX/PROPH/DG ADDL SEQ IV INF: CPT

## 2019-07-29 PROCEDURE — 99214 OFFICE O/P EST MOD 30 MIN: CPT | Mod: S$GLB,,, | Performed by: INTERNAL MEDICINE

## 2019-07-29 PROCEDURE — 99214 PR OFFICE/OUTPT VISIT, EST, LEVL IV, 30-39 MIN: ICD-10-PCS | Mod: S$GLB,,, | Performed by: INTERNAL MEDICINE

## 2019-07-29 PROCEDURE — 96413 CHEMO IV INFUSION 1 HR: CPT | Mod: PO

## 2019-07-29 RX ORDER — SODIUM CHLORIDE 0.9 % (FLUSH) 0.9 %
10 SYRINGE (ML) INJECTION
Status: DISCONTINUED | OUTPATIENT
Start: 2019-07-29 | End: 2019-07-29 | Stop reason: HOSPADM

## 2019-07-29 RX ORDER — HEPARIN 100 UNIT/ML
500 SYRINGE INTRAVENOUS
Status: DISCONTINUED | OUTPATIENT
Start: 2019-07-29 | End: 2019-07-29 | Stop reason: HOSPADM

## 2019-07-29 RX ADMIN — PALONOSETRON HYDROCHLORIDE: 0.25 INJECTION, SOLUTION INTRAVENOUS at 11:07

## 2019-07-29 RX ADMIN — APREPITANT 130 MG: 130 INJECTION, EMULSION INTRAVENOUS at 11:07

## 2019-07-29 RX ADMIN — DOCETAXEL ANHYDROUS 140 MG: 10 INJECTION, SOLUTION INTRAVENOUS at 02:07

## 2019-07-29 RX ADMIN — TRASTUZUMAB 468 MG: 150 INJECTION, POWDER, LYOPHILIZED, FOR SOLUTION INTRAVENOUS at 01:07

## 2019-07-29 RX ADMIN — CARBOPLATIN 770 MG: 10 INJECTION, SOLUTION INTRAVENOUS at 03:07

## 2019-07-29 RX ADMIN — PERTUZUMAB 420 MG: 30 INJECTION, SOLUTION, CONCENTRATE INTRAVENOUS at 12:07

## 2019-07-29 NOTE — PROGRESS NOTES
[facility] Hematology/Oncology  PROGRESS NOTE -  Follow-up Visit      Subjective:       Patient ID:   NAME: Sheridan Todd : 1980     38 y.o. female    Referring Doc: Krissy  Other Physicians: Jose Rodriguez    Chief Complaint: left breast ca f/u     History of Present Illness:     Patient returns today for a regularly scheduled follow-up visit.  The patient is here today to go over the results of the recently ordered labs, tests and studies.  She has started the neoadjuvant chemotherapy with herceptin and perjeta based regimen .  She is doing ok with no CP, SOB, HA's or N/V. She is getting cycle #4 today. The breast lesion is softer, nipple is less retracted and she no longer has breast pain. The portacath tip has recently been found to havesheared off and she has seen Dr Rey. She is not having any problems at this time and is prophylactically on leiquis. She has seen Dr Hilton and Dr Lester Jones with [facility] and they plan to extract the tip in the near future.               ROS:   GEN: normal without any fever, night sweats or weight loss  HEENT: normal with no HA's, sore throat, stiff neck, changes in vision  CV: normal with no CP, SOB, PND, MENDIETA or orthopnea  PULM: normal with no SOB, cough, hemoptysis, sputum or pleuritic pain  GI: normal with no abdominal pain, nausea, vomiting, constipation, diarrhea, melanotic stools, BRBPR, or hematemesis  : normal with no hematuria, dysuria  BREAST: mass is softer and no pain  SKIN: normal with no rash, erythema, bruising, or swelling    Allergies:  Review of patient's allergies indicates:   Allergen Reactions    Codeine      Other reaction(s): Nausea    Shellfish containing products      Other reaction(s): Vomiting  Only crabmeat       Medications:    Current Outpatient Medications:     butalbital-acetaminophen-caff -40 mg Cap, Take 1 capsule by mouth every 4 (four) hours as needed., Disp: , Rfl: 0    CARAFATE 100 mg/mL suspension, TAKE 10 ML BY  MOUTH EVERY 4 TO 6 HOURS, Disp: , Rfl: 3    diphenoxylate-atropine 2.5-0.025 mg (LOMOTIL) 2.5-0.025 mg per tablet, TAKE 1 TABLET BY MOUTH WITH EVERY LOOSE STOOL (NO MORE THAN 8 TABLETS IN 24 HOUR), Disp: , Rfl: 0    mupirocin (BACTROBAN) 2 % ointment, APPLY OINTMENT EXTERNALLY TO AFFECTED AREA THREE TIMES DAILY, Disp: , Rfl: 2    ondansetron (ZOFRAN) 8 MG tablet, Take 1 tablet (8 mg total) by mouth every 8 (eight) hours as needed for Nausea., Disp: 30 tablet, Rfl: 2    phenazopyridine (PYRIDIUM) 200 MG tablet, Take 200 mg by mouth every 6 (six) hours as needed., Disp: , Rfl: 1    promethazine (PHENERGAN) 25 MG tablet, Take 1 tablet (25 mg total) by mouth every 6 (six) hours as needed for Nausea., Disp: 30 tablet, Rfl: 2  No current facility-administered medications for this visit.     Facility-Administered Medications Ordered in Other Visits:     alteplase injection 2 mg, 2 mg, Intra-Catheter, PRN, Abel Chambers MD    aprepitant (CINVANTI) 130 mg in sodium chloride 0.9% 148 mL infusion, 130 mg, Intravenous, 1 time in Clinic/HOD, Abel Chambers MD    CARBOplatin (PARAPLATIN) 770 mg in sodium chloride 0.9% 500 mL chemo infusion, 770 mg, Intravenous, 1 time in Clinic/HOD, Abel Chambers MD    DOCEtaxel (TAXOTERE) 75 mg/m2 = 140 mg in sodium chloride 0.9% 250 mL chemo infusion, 75 mg/m2 (Treatment Plan Recorded), Intravenous, 1 time in Clinic/HOD, Abel Chambers MD    heparin, porcine (PF) 100 unit/mL injection flush 500 Units, 500 Units, Intravenous, PRN, Abel Chambers MD    palonosetron (ALOXI) 0.25 mg, dexamethasone (DECADRON) 20 mg in sodium chloride 0.9% 50 mL, , Intravenous, 1 time in Clinic/HOD, Abel Chambers MD    pertuzumab (PERJETA) 420 mg in sodium chloride 0.9% 250 mL infusion, 420 mg, Intravenous, 1 time in Clinic/HOD, Abel Chambers MD    sodium chloride 0.9% 250 mL flush bag, , Intravenous, 1 time in Clinic/HOD, Abel Chambers MD    sodium chloride  0.9% flush 10 mL, 10 mL, Intravenous, PRN, Abel Chambers MD    trastuzumab 468 mg in sodium chloride 0.9% 250 mL chemo infusion, 6 mg/kg (Treatment Plan Recorded), Intravenous, 1 time in Clinic/HOD, Abel Chambers MD    PMHx/PSHx Updates:  See patient's last visit with me on 6/5/2019.  See H&P on 5/16/2019        Pathology:  Cancer Staging      Breast biopsies: 5/1/2019:  Left breast: with invasive ductal carcinoma grade 3; ER positive at 95% and NE positive at 90%; Her2Nu was positive at +3; Her2/CEP 17 ratio was >10.6  - Ki67 was unfavorable at 69%  Right breast: negative for cancer       Objective:     Vitals:  Blood pressure (!) 142/82, pulse 86, temperature 98.9 °F (37.2 °C), resp. rate 20, weight 80.6 kg (177 lb 11.1 oz).     Physical Examination:   GEN: no apparent distress, comfortable; AAOx3  HEAD: atraumatic and normocephalic  EYES: no pallor, no icterus, PERRLA  ENT: OMM, no pharyngeal erythema, external ears WNL; no nasal discharge; no thrush  NECK: no masses, thyroid normal, trachea midline, no LAD/LN's, supple  CV: RRR with no murmur; normal pulse; normal S1 and S2; no pedal edema  CHEST: Normal respiratory effort; CTAB; normal breath sounds; no wheeze or crackles; portacath on right chest wall with a residual stitch  ABDOM: nontender and nondistended; soft; normal bowel sounds; no rebound/guarding  MUSC/Skeletal: ROM normal; no crepitus; joints normal; no deformities or arthropathy  EXTREM: no clubbing, cyanosis, inflammation or swelling  SKIN: no rashes, lesions, ulcers, petechiae or subcutaneous nodules  : no lopez  NEURO: grossly intact; motor/sensory WNL; AAOx3; no tremors  PSYCH: normal mood, affect and behavior  LYMPH: normal cervical, supraclavicular, axillary and groin LN's  Breast: left breast with large fibrous component at about the 1 o'clock position decreased in size; softer,no appreciable LAD in axilla;less  nipple inversion on left without any appreciable skin changes  visually      Labs:   6/13/2019  Lab Results   Component Value Date    WBC 4.68 07/28/2019    HGB 11.4 (L) 07/28/2019    HCT 35.2 (L) 07/28/2019    MCV 96 07/28/2019     07/28/2019     BMP  Lab Results   Component Value Date     07/28/2019    K 4.3 07/28/2019     (H) 07/28/2019    CO2 28 07/28/2019    BUN 16 07/28/2019    CREATININE 0.9 07/28/2019    CALCIUM 9.2 07/28/2019    ANIONGAP 5 (L) 07/28/2019    ESTGFRAFRICA >60.0 07/28/2019    EGFRNONAA >60.0 07/28/2019             Radiology/Diagnostic Studies:    Nm Pet Ct Routine Skull To Mid Thigh    Result Date: 5/21/2019  INTEGRATED PET CT WITH IMAGE FUSION HISTORY:  Left breast cancer initial treatment evaluation TECHNIQUE: Following the injection of 12.7 mCi of F-18 labeled FDG into a right antecubital vein, PET CT was performed from the vertex of the skull through the proximal thighs with an integrated full ring PET CT scanner with image fusion. The patient's serum glucose at the time of the exam was 82 mg/dL. FINDINGS: There is diffuse FDG activity in the subareolar left breast with skin thickening and multiple hypermetabolic masses. There is a 19 mm spiculated hypermetabolic mass in the inferior lateral left breast with a max SUV of 10.4. This was previously biopsied. There is a 2 cm hypermetabolic area in the lateral superior left breast with max SUV of 9.9. There is an 11 mm nodule within the inner right breast which was biopsied and shown to represent fibroadenoma. This shows no FDG activity. There is no axillary, mediastinal or hilar adenopathy. There are no pulmonary nodules, infiltrates or pleural effusions. CT of the head and neck show no intra-axial lesions or cervical adenopathy. CT of the abdomen and pelvis demonstrates physiologic activity in the GI tract and urinary system. The liver and adrenal glands are normal. There are mildly prominent lymph nodes within the right lower quadrant the largest measures 13 mm with a max SUV of 3.1.  Findings are suspicious for mesenteric adenitis. There is no retroperitoneal adenopathy. There are no lytic or blastic lesions.     IMPRESSION: Diffuse FDG activity within the subareolar left breast with skin thickening and multiple hypermetabolic left breast masses consistent with patient's history of left breast cancer. No evidence of metastatic disease 11 mm nodule in the medial right breast which was shown to represent fibroadenoma Mildly prominent lymph nodes in the right lower quadrant with mild FDG activity suggestive of mesenteric adenitis. Follow-up CT scan in 3-6 months is recommended.     Read and electronically signed by: Adriane Jacome MD on 5/21/2019 1:13 PM CDT ADRIANE JACOME MD    [facility] Unknown Rad Eap    Result Date: 5/21/2019  Chest single view CLINICAL DATA: Port-A-Cath placement FINDINGS: AP view shows the heart to be within normal size limits. The mediastinum is unremarkable. Right subclavian Port-A-Cath tip is at superior vena cava. No pneumothorax or other placement related complication is identified. No infiltrates or effusions are demonstrated. No acute osseous abnormalities are demonstrated. IMPRESSION: 1. Right sided Port-A-Cath appears appropriately positioned, with no pneumothorax or other placement related complication. No acute radiographic abnormalities. Read and electronically signed by: Teofilo Patterson MD on 5/21/2019 2:52 PM CDT TEOFILO PATTERSON MD      I have reviewed all available lab results and radiology reports.    Assessment/Plan:   (1) 38 y.o. female  with diagnosis of left breast cancer who has been referred by Dr Salvador Rey with [facility] for evaluation by medical oncology.      - She had presented with left breast pain which became progressive over a 4 month period.   - Radiology studies found a 2.5cm mass on the left breast along with two inflamed LN's on the right.   - She had left breast biopsy on 5/1/2019 which showed invasive ductal carcinoma grade  3. The right breast biopsy was negative for cancer.   - She is ER and DE positive. She is also Her2Nu +3.   - We discussed the latest data on Her2Nu positive breast cancers and the recommendation for neoadjuvant chemotherapy with a herceptin and perjeta.  - she will need echo, portacath, and PET scan  - will plan for herceptin, perjeta, carboplatin and taxotere regimen neoadjuvantly with 6 cycles  -  S/p set up chemotherapy school; discussed side-effect profile, provided literature on the regimen; obtained consents  - she is set up for May 27th to start  - PET scan and echo are on chart  - portacath placed on 5/21  - started chemotherapy with 1st cycle on 5/27/2019 and getting 4th cycle today on 7/29/2019     (2) Left parotid tumor - removed in 2011     (3) Sarcoidosis     (4) Interstitial cystitis     (5) Diet controlled gestational DM     (6) Fibromyalgia     (7) Hx/of cervical dysplasia as teenager s/p cryoablation    (8) Migraine - now on meds prn    (9)  Portacath tip has recently been found to have sheared off and she has seen Dr eRy. She is not having any problems at this time and is prophylactically on leiquis. She has seen Dr Hilton and Dr Lester Jones with [facility] and they plan to extract the tip in the near future.          VISIT DIAGNOSES:      HER2-positive carcinoma of left breast    Malignant neoplasm of overlapping sites of left breast in female, estrogen receptor positive    Chemotherapy induced neutropenia          PLAN:  1. continue with the chemotherapy neoadjuvant regimen    2. Echo and PET on chart  3. Check labs weekly  4. Set up repeat breast MRI  5. F/u with Dr Rey as directed by him with plans to proceed with evaluation and procedure with Dr Jones and Dr Hilton in the near future  6. RTC 2-3 weeks  Fax note to Ben Rey Baez, [place], [place]    Discussion:       I spent over 25 mins of time with the patient. Reviewed results of the recently ordered labs, tests  and studies; made directives with regards to the results. Over half of this time was spent couseling and coordinating care.    I have explained all of the above in detail and the patient understands all of the current recommendation(s). I have answered all of their questions to the best of my ability and to their complete satisfaction.   The patient is to continue with the current management plan.            Electronically signed by Abel Chambers MD

## 2019-07-30 ENCOUNTER — INFUSION (OUTPATIENT)
Dept: INFUSION THERAPY | Facility: HOSPITAL | Age: 39
End: 2019-07-30
Attending: INTERNAL MEDICINE
Payer: MEDICAID

## 2019-07-30 VITALS
DIASTOLIC BLOOD PRESSURE: 81 MMHG | RESPIRATION RATE: 18 BRPM | TEMPERATURE: 98 F | WEIGHT: 180.31 LBS | BODY MASS INDEX: 30.95 KG/M2 | SYSTOLIC BLOOD PRESSURE: 120 MMHG | HEART RATE: 86 BPM

## 2019-07-30 DIAGNOSIS — Z17.0 MALIGNANT NEOPLASM OF OVERLAPPING SITES OF LEFT BREAST IN FEMALE, ESTROGEN RECEPTOR POSITIVE: ICD-10-CM

## 2019-07-30 DIAGNOSIS — D70.1 CHEMOTHERAPY INDUCED NEUTROPENIA: ICD-10-CM

## 2019-07-30 DIAGNOSIS — C50.812 MALIGNANT NEOPLASM OF OVERLAPPING SITES OF LEFT BREAST IN FEMALE, ESTROGEN RECEPTOR POSITIVE: ICD-10-CM

## 2019-07-30 DIAGNOSIS — E86.0 DEHYDRATION: Primary | ICD-10-CM

## 2019-07-30 DIAGNOSIS — C50.912 HER2-POSITIVE CARCINOMA OF LEFT BREAST: ICD-10-CM

## 2019-07-30 DIAGNOSIS — T45.1X5A CHEMOTHERAPY INDUCED NEUTROPENIA: ICD-10-CM

## 2019-07-30 PROCEDURE — 96372 THER/PROPH/DIAG INJ SC/IM: CPT

## 2019-07-30 PROCEDURE — 63600175 PHARM REV CODE 636 W HCPCS: Mod: JG | Performed by: INTERNAL MEDICINE

## 2019-07-30 RX ORDER — APIXABAN 5 MG/1
5 TABLET, FILM COATED ORAL 2 TIMES DAILY
Refills: 0 | COMMUNITY
Start: 2019-07-19 | End: 2019-09-19

## 2019-07-30 RX ADMIN — PEGFILGRASTIM-CBQV 6 MG: 6 INJECTION, SOLUTION SUBCUTANEOUS at 11:07

## 2019-07-31 ENCOUNTER — TELEPHONE (OUTPATIENT)
Dept: HEMATOLOGY/ONCOLOGY | Facility: CLINIC | Age: 39
End: 2019-07-31

## 2019-07-31 ENCOUNTER — TELEPHONE (OUTPATIENT)
Dept: SURGERY | Facility: CLINIC | Age: 39
End: 2019-07-31

## 2019-07-31 DIAGNOSIS — C50.812 MALIGNANT NEOPLASM OF OVERLAPPING SITES OF LEFT BREAST IN FEMALE, ESTROGEN RECEPTOR POSITIVE: ICD-10-CM

## 2019-07-31 DIAGNOSIS — Z17.0 MALIGNANT NEOPLASM OF OVERLAPPING SITES OF LEFT BREAST IN FEMALE, ESTROGEN RECEPTOR POSITIVE: ICD-10-CM

## 2019-07-31 DIAGNOSIS — G44.001 INTRACTABLE CLUSTER HEADACHE SYNDROME, UNSPECIFIED CHRONICITY PATTERN: Primary | ICD-10-CM

## 2019-07-31 NOTE — TELEPHONE ENCOUNTER
Pt called w/ update regarding displaced segment of catheter... Dr. Willingham referred pt to ..      is willing to attempt to remove the displaced segment of catheter & remove & replace port...     aware.. Agrees w/ this plan

## 2019-08-05 ENCOUNTER — TELEPHONE (OUTPATIENT)
Dept: HEMATOLOGY/ONCOLOGY | Facility: CLINIC | Age: 39
End: 2019-08-05

## 2019-08-05 NOTE — TELEPHONE ENCOUNTER
Called patient let her know prescription has been called in   She let me know surgeon is going to remove port part and install a new port august 13 th     ----- Message from Emmy Godinez sent at 8/5/2019  9:43 AM CDT -----  Patient need a prescription for Eliquis 5mg 1 tablet twice a day. She uses [facility] on [address]. Her call back number is [phone].

## 2019-08-07 ENCOUNTER — HOSPITAL ENCOUNTER (OUTPATIENT)
Dept: RADIOLOGY | Facility: HOSPITAL | Age: 39
Discharge: HOME | End: 2019-08-07
Attending: INTERNAL MEDICINE
Payer: MEDICAID

## 2019-08-07 DIAGNOSIS — Z17.0 MALIGNANT NEOPLASM OF OVERLAPPING SITES OF LEFT BREAST IN FEMALE, ESTROGEN RECEPTOR POSITIVE: ICD-10-CM

## 2019-08-07 DIAGNOSIS — C50.912 HER2-POSITIVE CARCINOMA OF LEFT BREAST: ICD-10-CM

## 2019-08-07 DIAGNOSIS — C50.812 MALIGNANT NEOPLASM OF OVERLAPPING SITES OF LEFT BREAST IN FEMALE, ESTROGEN RECEPTOR POSITIVE: ICD-10-CM

## 2019-08-07 PROCEDURE — 25500020 PHARM REV CODE 255: Performed by: INTERNAL MEDICINE

## 2019-08-07 PROCEDURE — A9585 GADOBUTROL INJECTION: HCPCS | Performed by: INTERNAL MEDICINE

## 2019-08-07 PROCEDURE — 77049 MRI BREAST C-+ W/CAD BI: CPT | Mod: TC

## 2019-08-07 RX ORDER — GADOBUTROL 604.72 MG/ML
8.5 INJECTION INTRAVENOUS
Status: COMPLETED | OUTPATIENT
Start: 2019-08-07 | End: 2019-08-07

## 2019-08-07 RX ADMIN — GADOBUTROL 8.5 ML: 604.72 INJECTION INTRAVENOUS at 01:08

## 2019-08-08 ENCOUNTER — TELEPHONE (OUTPATIENT)
Dept: HEMATOLOGY/ONCOLOGY | Facility: CLINIC | Age: 39
End: 2019-08-08

## 2019-08-08 NOTE — TELEPHONE ENCOUNTER
Called patient went over MRI     ----- Message from Emmy Godinez sent at 8/8/2019  2:49 PM CDT -----  The patient would like you to call her with the results of her MRI that she had done yesterday. Please call her back at [phone].

## 2019-08-13 ENCOUNTER — TELEPHONE (OUTPATIENT)
Dept: HEMATOLOGY/ONCOLOGY | Facility: CLINIC | Age: 39
End: 2019-08-13

## 2019-08-13 RX ORDER — SODIUM CHLORIDE 0.9 % (FLUSH) 0.9 %
10 SYRINGE (ML) INJECTION
Status: CANCELLED | OUTPATIENT
Start: 2019-08-19

## 2019-08-13 RX ORDER — HEPARIN 100 UNIT/ML
500 SYRINGE INTRAVENOUS
Status: CANCELLED | OUTPATIENT
Start: 2019-08-19

## 2019-08-13 NOTE — TELEPHONE ENCOUNTER
Called patient she was just leaving surgeon  The port piece successfully removed from pulmonary vein  And new port was placed at the same time

## 2019-08-16 ENCOUNTER — LAB VISIT (OUTPATIENT)
Dept: LAB | Facility: HOSPITAL | Age: 39
End: 2019-08-16
Attending: INTERNAL MEDICINE
Payer: MEDICAID

## 2019-08-16 DIAGNOSIS — C50.912 HER2-POSITIVE CARCINOMA OF LEFT BREAST: ICD-10-CM

## 2019-08-16 DIAGNOSIS — T45.1X5A CHEMOTHERAPY INDUCED NEUTROPENIA: ICD-10-CM

## 2019-08-16 DIAGNOSIS — D70.1 CHEMOTHERAPY INDUCED NEUTROPENIA: ICD-10-CM

## 2019-08-16 DIAGNOSIS — C50.812 MALIGNANT NEOPLASM OF OVERLAPPING SITES OF LEFT BREAST IN FEMALE, ESTROGEN RECEPTOR POSITIVE: ICD-10-CM

## 2019-08-16 DIAGNOSIS — Z17.0 MALIGNANT NEOPLASM OF OVERLAPPING SITES OF LEFT BREAST IN FEMALE, ESTROGEN RECEPTOR POSITIVE: ICD-10-CM

## 2019-08-16 LAB
ALBUMIN SERPL BCP-MCNC: 3.8 G/DL (ref 3.5–5.2)
ALP SERPL-CCNC: 71 U/L (ref 55–135)
ALT SERPL W/O P-5'-P-CCNC: 43 U/L (ref 10–44)
ANION GAP SERPL CALC-SCNC: 7 MMOL/L (ref 8–16)
AST SERPL-CCNC: 39 U/L (ref 10–40)
BASOPHILS # BLD AUTO: 0.02 K/UL (ref 0–0.2)
BASOPHILS NFR BLD: 0.3 % (ref 0–1.9)
BILIRUB SERPL-MCNC: 0.5 MG/DL (ref 0.1–1)
BUN SERPL-MCNC: 16 MG/DL (ref 6–20)
CALCIUM SERPL-MCNC: 8.8 MG/DL (ref 8.7–10.5)
CHLORIDE SERPL-SCNC: 108 MMOL/L (ref 95–110)
CO2 SERPL-SCNC: 29 MMOL/L (ref 23–29)
CREAT SERPL-MCNC: 0.9 MG/DL (ref 0.5–1.4)
DIFFERENTIAL METHOD: ABNORMAL
EOSINOPHIL # BLD AUTO: 0 K/UL (ref 0–0.5)
EOSINOPHIL NFR BLD: 0 % (ref 0–8)
ERYTHROCYTE [DISTWIDTH] IN BLOOD BY AUTOMATED COUNT: 17.8 % (ref 11.5–14.5)
EST. GFR  (AFRICAN AMERICAN): >60 ML/MIN/1.73 M^2
EST. GFR  (NON AFRICAN AMERICAN): >60 ML/MIN/1.73 M^2
GLUCOSE SERPL-MCNC: 93 MG/DL (ref 70–110)
HCT VFR BLD AUTO: 32.8 % (ref 37–48.5)
HGB BLD-MCNC: 10.9 G/DL (ref 12–16)
IMM GRANULOCYTES # BLD AUTO: 0.03 K/UL (ref 0–0.04)
IMM GRANULOCYTES NFR BLD AUTO: 0.5 % (ref 0–0.5)
LYMPHOCYTES # BLD AUTO: 1.5 K/UL (ref 1–4.8)
LYMPHOCYTES NFR BLD: 23.1 % (ref 18–48)
MCH RBC QN AUTO: 32.3 PG (ref 27–31)
MCHC RBC AUTO-ENTMCNC: 33.2 G/DL (ref 32–36)
MCV RBC AUTO: 97 FL (ref 82–98)
MONOCYTES # BLD AUTO: 0.5 K/UL (ref 0.3–1)
MONOCYTES NFR BLD: 8.2 % (ref 4–15)
NEUTROPHILS # BLD AUTO: 4.3 K/UL (ref 1.8–7.7)
NEUTROPHILS NFR BLD: 67.9 % (ref 38–73)
NRBC BLD-RTO: 0 /100 WBC
PLATELET # BLD AUTO: 152 K/UL (ref 150–350)
PMV BLD AUTO: 9.8 FL (ref 9.2–12.9)
POTASSIUM SERPL-SCNC: 4 MMOL/L (ref 3.5–5.1)
PROT SERPL-MCNC: 7.1 G/DL (ref 6–8.4)
RBC # BLD AUTO: 3.37 M/UL (ref 4–5.4)
SODIUM SERPL-SCNC: 144 MMOL/L (ref 136–145)
WBC # BLD AUTO: 6.33 K/UL (ref 3.9–12.7)

## 2019-08-16 PROCEDURE — 80053 COMPREHEN METABOLIC PANEL: CPT

## 2019-08-16 PROCEDURE — 85025 COMPLETE CBC W/AUTO DIFF WBC: CPT

## 2019-08-19 ENCOUNTER — OFFICE VISIT (OUTPATIENT)
Dept: HEMATOLOGY/ONCOLOGY | Facility: CLINIC | Age: 39
End: 2019-08-19
Payer: MEDICAID

## 2019-08-19 ENCOUNTER — INFUSION (OUTPATIENT)
Dept: INFUSION THERAPY | Facility: HOSPITAL | Age: 39
End: 2019-08-19
Attending: INTERNAL MEDICINE
Payer: MEDICAID

## 2019-08-19 VITALS
RESPIRATION RATE: 18 BRPM | TEMPERATURE: 99 F | HEART RATE: 90 BPM | SYSTOLIC BLOOD PRESSURE: 127 MMHG | DIASTOLIC BLOOD PRESSURE: 76 MMHG | WEIGHT: 184 LBS | BODY MASS INDEX: 31.58 KG/M2

## 2019-08-19 VITALS
HEIGHT: 64 IN | RESPIRATION RATE: 18 BRPM | WEIGHT: 184.5 LBS | HEART RATE: 76 BPM | BODY MASS INDEX: 31.5 KG/M2 | SYSTOLIC BLOOD PRESSURE: 135 MMHG | TEMPERATURE: 99 F | DIASTOLIC BLOOD PRESSURE: 82 MMHG

## 2019-08-19 DIAGNOSIS — C50.812 MALIGNANT NEOPLASM OF OVERLAPPING SITES OF LEFT BREAST IN FEMALE, ESTROGEN RECEPTOR POSITIVE: ICD-10-CM

## 2019-08-19 DIAGNOSIS — Z17.0 MALIGNANT NEOPLASM OF OVERLAPPING SITES OF LEFT BREAST IN FEMALE, ESTROGEN RECEPTOR POSITIVE: Primary | ICD-10-CM

## 2019-08-19 DIAGNOSIS — C50.912 HER2-POSITIVE CARCINOMA OF LEFT BREAST: ICD-10-CM

## 2019-08-19 DIAGNOSIS — E86.0 DEHYDRATION: Primary | ICD-10-CM

## 2019-08-19 DIAGNOSIS — T45.1X5A CHEMOTHERAPY INDUCED NEUTROPENIA: ICD-10-CM

## 2019-08-19 DIAGNOSIS — C50.812 MALIGNANT NEOPLASM OF OVERLAPPING SITES OF LEFT BREAST IN FEMALE, ESTROGEN RECEPTOR POSITIVE: Primary | ICD-10-CM

## 2019-08-19 DIAGNOSIS — D70.1 CHEMOTHERAPY INDUCED NEUTROPENIA: ICD-10-CM

## 2019-08-19 DIAGNOSIS — Z17.0 MALIGNANT NEOPLASM OF OVERLAPPING SITES OF LEFT BREAST IN FEMALE, ESTROGEN RECEPTOR POSITIVE: ICD-10-CM

## 2019-08-19 PROCEDURE — 99214 PR OFFICE/OUTPT VISIT, EST, LEVL IV, 30-39 MIN: ICD-10-PCS | Mod: S$GLB,,, | Performed by: INTERNAL MEDICINE

## 2019-08-19 PROCEDURE — 96367 TX/PROPH/DG ADDL SEQ IV INF: CPT

## 2019-08-19 PROCEDURE — 96417 CHEMO IV INFUS EACH ADDL SEQ: CPT

## 2019-08-19 PROCEDURE — A4216 STERILE WATER/SALINE, 10 ML: HCPCS | Performed by: INTERNAL MEDICINE

## 2019-08-19 PROCEDURE — 96413 CHEMO IV INFUSION 1 HR: CPT | Mod: PO

## 2019-08-19 PROCEDURE — 99214 OFFICE O/P EST MOD 30 MIN: CPT | Mod: S$GLB,,, | Performed by: INTERNAL MEDICINE

## 2019-08-19 PROCEDURE — 25000003 PHARM REV CODE 250: Performed by: INTERNAL MEDICINE

## 2019-08-19 PROCEDURE — 63600175 PHARM REV CODE 636 W HCPCS: Mod: JG,PO | Performed by: INTERNAL MEDICINE

## 2019-08-19 RX ORDER — HEPARIN 100 UNIT/ML
500 SYRINGE INTRAVENOUS
Status: DISCONTINUED | OUTPATIENT
Start: 2019-08-19 | End: 2019-08-19 | Stop reason: HOSPADM

## 2019-08-19 RX ORDER — SODIUM CHLORIDE 0.9 % (FLUSH) 0.9 %
10 SYRINGE (ML) INJECTION
Status: DISCONTINUED | OUTPATIENT
Start: 2019-08-19 | End: 2019-08-19 | Stop reason: HOSPADM

## 2019-08-19 RX ADMIN — PERTUZUMAB 420 MG: 30 INJECTION, SOLUTION, CONCENTRATE INTRAVENOUS at 11:08

## 2019-08-19 RX ADMIN — HEPARIN 500 UNITS: 100 SYRINGE at 05:08

## 2019-08-19 RX ADMIN — APREPITANT 130 MG: 130 INJECTION, EMULSION INTRAVENOUS at 11:08

## 2019-08-19 RX ADMIN — DOCETAXEL ANHYDROUS 140 MG: 10 INJECTION, SOLUTION INTRAVENOUS at 01:08

## 2019-08-19 RX ADMIN — SODIUM CHLORIDE, PRESERVATIVE FREE 10 ML: 5 INJECTION INTRAVENOUS at 05:08

## 2019-08-19 RX ADMIN — CARBOPLATIN 770 MG: 10 INJECTION, SOLUTION INTRAVENOUS at 03:08

## 2019-08-19 RX ADMIN — TRASTUZUMAB 468 MG: 150 INJECTION, POWDER, LYOPHILIZED, FOR SOLUTION INTRAVENOUS at 01:08

## 2019-08-19 RX ADMIN — PALONOSETRON HYDROCHLORIDE: 0.05 INJECTION INTRAVENOUS at 10:08

## 2019-08-19 NOTE — PROGRESS NOTES
[facility] Hematology/Oncology  PROGRESS NOTE -  Follow-up Visit      Subjective:       Patient ID:   NAME: Sheridan Todd : 1980     38 y.o. female    Referring Doc: Krissy  Other Physicians: Jose Rodriguez De Boisblanc, [place]    Chief Complaint: left breast ca f/u     History of Present Illness:     Patient returns today for a regularly scheduled follow-up visit.  The patient is here today to go over the results of the recently ordered labs, tests and studies.  She has started the neoadjuvant chemotherapy with herceptin and perjeta based regimen .  She is doing ok with no CP, SOB, HA's or N/V. She is getting cycle #5 today. She has seen Dr Hilton and Dr Lester Jones with [facility] and they were able to successfully removed the portacath tip and she has also had a new port placed at the same time. She had recent MRI of the breast which is showing a good response to the current therapy.               ROS:   GEN: normal without any fever, night sweats or weight loss  HEENT: normal with no HA's, sore throat, stiff neck, changes in vision  CV: normal with no CP, SOB, PND, MENDIETA or orthopnea  PULM: normal with no SOB, cough, hemoptysis, sputum or pleuritic pain  GI: normal with no abdominal pain, nausea, vomiting, constipation, diarrhea, melanotic stools, BRBPR, or hematemesis  : normal with no hematuria, dysuria  BREAST: mass is softer and no pain  SKIN: normal with no rash, erythema, bruising, or swelling    Allergies:  Review of patient's allergies indicates:   Allergen Reactions    Codeine      Other reaction(s): Nausea    Shellfish containing products      Other reaction(s): Vomiting  Only crabmeat       Medications:    Current Outpatient Medications:     butalbital-acetaminophen-caff -40 mg Cap, Take 1 capsule by mouth every 4 (four) hours as needed., Disp: , Rfl: 0    CARAFATE 100 mg/mL suspension, TAKE 10 ML BY MOUTH EVERY 4 TO 6 HOURS, Disp: , Rfl: 3    diphenoxylate-atropine 2.5-0.025  mg (LOMOTIL) 2.5-0.025 mg per tablet, TAKE 1 TABLET BY MOUTH WITH EVERY LOOSE STOOL (NO MORE THAN 8 TABLETS IN 24 HOUR), Disp: , Rfl: 0    ELIQUIS 5 mg Tab, Take 5 mg by mouth 2 (two) times daily., Disp: , Rfl: 0    mupirocin (BACTROBAN) 2 % ointment, APPLY OINTMENT EXTERNALLY TO AFFECTED AREA THREE TIMES DAILY, Disp: , Rfl: 2    ondansetron (ZOFRAN) 8 MG tablet, Take 1 tablet (8 mg total) by mouth every 8 (eight) hours as needed for Nausea., Disp: 30 tablet, Rfl: 2    phenazopyridine (PYRIDIUM) 200 MG tablet, Take 200 mg by mouth every 6 (six) hours as needed., Disp: , Rfl: 1    promethazine (PHENERGAN) 25 MG tablet, Take 1 tablet (25 mg total) by mouth every 6 (six) hours as needed for Nausea., Disp: 30 tablet, Rfl: 2  No current facility-administered medications for this visit.     Facility-Administered Medications Ordered in Other Visits:     alteplase injection 2 mg, 2 mg, Intra-Catheter, PRN, Abel Chambers MD    CARBOplatin (PARAPLATIN) 770 mg in sodium chloride 0.9% 577 mL chemo infusion, 770 mg, Intravenous, 1 time in [facility], Abel Chambers MD    DOCEtaxel (TAXOTERE) 75 mg/m2 = 140 mg in sodium chloride 0.9% 264 mL chemo infusion, 75 mg/m2 (Treatment Plan Recorded), Intravenous, 1 time in [facility], Abel Chambers MD    heparin, porcine (PF) 100 unit/mL injection flush 500 Units, 500 Units, Intravenous, PRN, Abel Chambers MD    sodium chloride 0.9% 250 mL flush bag, , Intravenous, 1 time in [facility], Abel Chambers MD    sodium chloride 0.9% flush 10 mL, 10 mL, Intravenous, PRN, Abel Chambers MD    trastuzumab 468 mg in sodium chloride 0.9% 250 mL chemo infusion, 6 mg/kg (Treatment Plan Recorded), Intravenous, 1 time in [facility], Abel Chambers MD    PMHx/PSHx Updates:  See patient's last visit with me on 729/2019.  See H&P on 5/16/2019        Pathology:  Cancer Staging      Breast biopsies: 5/1/2019:  Left breast: with invasive ductal carcinoma  grade 3; ER positive at 95% and NC positive at 90%; Her2Nu was positive at +3; Her2/CEP 17 ratio was >10.6  - Ki67 was unfavorable at 69%  Right breast: negative for cancer       Objective:     Vitals:  Blood pressure 127/76, pulse 90, temperature 98.5 °F (36.9 °C), resp. rate 18, weight 83.5 kg (184 lb).     Physical Examination:   GEN: no apparent distress, comfortable; AAOx3  HEAD: atraumatic and normocephalic  EYES: no pallor, no icterus, PERRLA  ENT: OMM, no pharyngeal erythema, external ears WNL; no nasal discharge; no thrush  NECK: no masses, thyroid normal, trachea midline, no LAD/LN's, supple  CV: RRR with no murmur; normal pulse; normal S1 and S2; no pedal edema  CHEST: Normal respiratory effort; CTAB; normal breath sounds; no wheeze or crackles; portacath on right chest wall with a residual stitch  ABDOM: nontender and nondistended; soft; normal bowel sounds; no rebound/guarding  MUSC/Skeletal: ROM normal; no crepitus; joints normal; no deformities or arthropathy  EXTREM: no clubbing, cyanosis, inflammation or swelling  SKIN: no rashes, lesions, ulcers, petechiae or subcutaneous nodules  : no lopez  NEURO: grossly intact; motor/sensory WNL; AAOx3; no tremors  PSYCH: normal mood, affect and behavior  LYMPH: normal cervical, supraclavicular, axillary and groin LN's  Breast: left breast with large fibrous component with god reduction in the tumor that had been appreciable    Labs:   8/18/2019  Lab Results   Component Value Date    WBC 6.33 08/16/2019    HGB 10.9 (L) 08/16/2019    HCT 32.8 (L) 08/16/2019    MCV 97 08/16/2019     08/16/2019     BMP  Lab Results   Component Value Date     08/16/2019    K 4.0 08/16/2019     08/16/2019    CO2 29 08/16/2019    BUN 16 08/16/2019    CREATININE 0.9 08/16/2019    CALCIUM 8.8 08/16/2019    ANIONGAP 7 (L) 08/16/2019    ESTGFRAFRICA >60.0 08/16/2019    EGFRNONAA >60.0 08/16/2019             Radiology/Diagnostic Studies:    Nm Pet Ct Routine Skull To  Mid Thigh    Result Date: 5/21/2019  INTEGRATED PET CT WITH IMAGE FUSION HISTORY:  Left breast cancer initial treatment evaluation TECHNIQUE: Following the injection of 12.7 mCi of F-18 labeled FDG into a right antecubital vein, PET CT was performed from the vertex of the skull through the proximal thighs with an integrated full ring PET CT scanner with image fusion. The patient's serum glucose at the time of the exam was 82 mg/dL. FINDINGS: There is diffuse FDG activity in the subareolar left breast with skin thickening and multiple hypermetabolic masses. There is a 19 mm spiculated hypermetabolic mass in the inferior lateral left breast with a max SUV of 10.4. This was previously biopsied. There is a 2 cm hypermetabolic area in the lateral superior left breast with max SUV of 9.9. There is an 11 mm nodule within the inner right breast which was biopsied and shown to represent fibroadenoma. This shows no FDG activity. There is no axillary, mediastinal or hilar adenopathy. There are no pulmonary nodules, infiltrates or pleural effusions. CT of the head and neck show no intra-axial lesions or cervical adenopathy. CT of the abdomen and pelvis demonstrates physiologic activity in the GI tract and urinary system. The liver and adrenal glands are normal. There are mildly prominent lymph nodes within the right lower quadrant the largest measures 13 mm with a max SUV of 3.1. Findings are suspicious for mesenteric adenitis. There is no retroperitoneal adenopathy. There are no lytic or blastic lesions.     IMPRESSION: Diffuse FDG activity within the subareolar left breast with skin thickening and multiple hypermetabolic left breast masses consistent with patient's history of left breast cancer. No evidence of metastatic disease 11 mm nodule in the medial right breast which was shown to represent fibroadenoma Mildly prominent lymph nodes in the right lower quadrant with mild FDG activity suggestive of mesenteric adenitis.  Follow-up CT scan in 3-6 months is recommended.     Read and electronically signed by: Adriane Jacome MD on 5/21/2019 1:13 PM CDT ADRIANE JACOME MD    [facility] Unknown Rad Eap    Result Date: 5/21/2019  Chest single view CLINICAL DATA: Port-A-Cath placement FINDINGS: AP view shows the heart to be within normal size limits. The mediastinum is unremarkable. Right subclavian Port-A-Cath tip is at superior vena cava. No pneumothorax or other placement related complication is identified. No infiltrates or effusions are demonstrated. No acute osseous abnormalities are demonstrated. IMPRESSION: 1. Right sided Port-A-Cath appears appropriately positioned, with no pneumothorax or other placement related complication. No acute radiographic abnormalities. Read and electronically signed by: Teofilo Patterson MD on 5/21/2019 2:52 PM CDT TEOFILO PATTERSON MD      I have reviewed all available lab results and radiology reports.    Assessment/Plan:   (1) 38 y.o. female  with diagnosis of left breast cancer who has been referred by Dr Salvador Rey with [facility] for evaluation by medical oncology.      - She had presented with left breast pain which became progressive over a 4 month period.   - Radiology studies found a 2.5cm mass on the left breast along with two inflamed LN's on the right.   - She had left breast biopsy on 5/1/2019 which showed invasive ductal carcinoma grade 3. The right breast biopsy was negative for cancer.   - She is ER and TX positive. She is also Her2Nu +3.   - We discussed the latest data on Her2Nu positive breast cancers and the recommendation for neoadjuvant chemotherapy with a herceptin and perjeta.  - she will need echo, portacath, and PET scan  - will plan for herceptin, perjeta, carboplatin and taxotere regimen neoadjuvantly with 6 cycles  -  S/p set up chemotherapy school; discussed side-effect profile, provided literature on the regimen; obtained consents  - she is set up for May 27th to  start  - PET scan and echo are on chart  - portacath placed on 5/21  - started chemotherapy with 1st cycle on 5/27/2019 and she is getting cycle #5 today.   -She had recent MRI of the breast which is showing a good response to the current therapy.        (2) Left parotid tumor - removed in 2011     (3) Sarcoidosis     (4) Interstitial cystitis     (5) Diet controlled gestational DM     (6) Fibromyalgia     (7) Hx/of cervical dysplasia as teenager s/p cryoablation    (8) Migraine - now on meds prn    (9)  Portacath tip has recently been found to have sheared off and she has seen Dr Rey. She is not having any problems at this time and is prophylactically on leiquis.   -  She has seen Dr Hilton and Dr Lester Jones with [facility] and they were able to successfully removed the portacath tip and she has also had a new port placed at the same time.          VISIT DIAGNOSES:      Malignant neoplasm of overlapping sites of left breast in female, estrogen receptor positive    HER2-positive carcinoma of left breast    Chemotherapy induced neutropenia          PLAN:  1. continue with the chemotherapy neoadjuvant regimen    2. Echo and PET on chart  3. Check labs weekly  4. Set up repeat breast MRI  5. F/u with Dr Rey as directed by him (after cycle #6)   6. RTC 2-3 weeks  Fax note to Ben Rey Baez, De BoisBlanc, [place]    Discussion:       I spent over 25 mins of time with the patient. Reviewed results of the recently ordered labs, tests and studies; made directives with regards to the results. Over half of this time was spent couseling and coordinating care.    I have explained all of the above in detail and the patient understands all of the current recommendation(s). I have answered all of their questions to the best of my ability and to their complete satisfaction.   The patient is to continue with the current management plan.            Electronically signed by Abel Chambers MD

## 2019-08-20 ENCOUNTER — INFUSION (OUTPATIENT)
Dept: INFUSION THERAPY | Facility: HOSPITAL | Age: 39
End: 2019-08-20
Attending: INTERNAL MEDICINE
Payer: MEDICAID

## 2019-08-20 VITALS
HEART RATE: 78 BPM | WEIGHT: 183.56 LBS | RESPIRATION RATE: 20 BRPM | HEIGHT: 64 IN | TEMPERATURE: 98 F | SYSTOLIC BLOOD PRESSURE: 141 MMHG | DIASTOLIC BLOOD PRESSURE: 82 MMHG | BODY MASS INDEX: 31.34 KG/M2

## 2019-08-20 DIAGNOSIS — D70.1 CHEMOTHERAPY INDUCED NEUTROPENIA: ICD-10-CM

## 2019-08-20 DIAGNOSIS — C50.812 MALIGNANT NEOPLASM OF OVERLAPPING SITES OF LEFT BREAST IN FEMALE, ESTROGEN RECEPTOR POSITIVE: ICD-10-CM

## 2019-08-20 DIAGNOSIS — C50.912 HER2-POSITIVE CARCINOMA OF LEFT BREAST: ICD-10-CM

## 2019-08-20 DIAGNOSIS — E86.0 DEHYDRATION: Primary | ICD-10-CM

## 2019-08-20 DIAGNOSIS — T45.1X5A CHEMOTHERAPY INDUCED NEUTROPENIA: ICD-10-CM

## 2019-08-20 DIAGNOSIS — Z17.0 MALIGNANT NEOPLASM OF OVERLAPPING SITES OF LEFT BREAST IN FEMALE, ESTROGEN RECEPTOR POSITIVE: ICD-10-CM

## 2019-08-20 PROCEDURE — 96372 THER/PROPH/DIAG INJ SC/IM: CPT | Mod: PO

## 2019-08-20 PROCEDURE — 63600175 PHARM REV CODE 636 W HCPCS: Mod: JG,PO | Performed by: INTERNAL MEDICINE

## 2019-08-20 RX ADMIN — PEGFILGRASTIM-CBQV 6 MG: 6 INJECTION, SOLUTION SUBCUTANEOUS at 11:08

## 2019-08-26 ENCOUNTER — TELEPHONE (OUTPATIENT)
Dept: HEMATOLOGY/ONCOLOGY | Facility: CLINIC | Age: 39
End: 2019-08-26

## 2019-08-26 DIAGNOSIS — Z17.0 MALIGNANT NEOPLASM OF OVERLAPPING SITES OF LEFT BREAST IN FEMALE, ESTROGEN RECEPTOR POSITIVE: ICD-10-CM

## 2019-08-26 DIAGNOSIS — C50.812 MALIGNANT NEOPLASM OF OVERLAPPING SITES OF LEFT BREAST IN FEMALE, ESTROGEN RECEPTOR POSITIVE: ICD-10-CM

## 2019-08-26 DIAGNOSIS — R10.9 GASTRIC PAIN: Primary | ICD-10-CM

## 2019-08-30 ENCOUNTER — OFFICE VISIT (OUTPATIENT)
Dept: PLASTIC SURGERY | Facility: CLINIC | Age: 39
End: 2019-08-30
Payer: MEDICAID

## 2019-08-30 VITALS
DIASTOLIC BLOOD PRESSURE: 72 MMHG | SYSTOLIC BLOOD PRESSURE: 125 MMHG | HEART RATE: 80 BPM | WEIGHT: 185.44 LBS | BODY MASS INDEX: 31.83 KG/M2

## 2019-08-30 DIAGNOSIS — Z85.3 PERSONAL HISTORY OF BREAST CANCER: Primary | ICD-10-CM

## 2019-08-30 PROCEDURE — 99212 OFFICE O/P EST SF 10 MIN: CPT | Mod: PBBFAC,PO | Performed by: SURGERY

## 2019-08-30 PROCEDURE — 99999 PR PBB SHADOW E&M-EST. PATIENT-LVL II: ICD-10-PCS | Mod: PBBFAC,,, | Performed by: SURGERY

## 2019-08-30 PROCEDURE — 99203 OFFICE O/P NEW LOW 30 MIN: CPT | Mod: S$PBB,,, | Performed by: SURGERY

## 2019-08-30 PROCEDURE — 99203 PR OFFICE/OUTPT VISIT, NEW, LEVL III, 30-44 MIN: ICD-10-PCS | Mod: S$PBB,,, | Performed by: SURGERY

## 2019-08-30 PROCEDURE — 99999 PR PBB SHADOW E&M-EST. PATIENT-LVL II: CPT | Mod: PBBFAC,,, | Performed by: SURGERY

## 2019-08-30 NOTE — PROGRESS NOTES
Ms. Todd is a referral from Dr. Rey.  The patient was noted to have pain   in her left breast in April.  She then had some skin retraction.  She went to   see Dr. Rey.  She has had a biopsy-proven invasive ductal carcinoma.  She had   two MRIs, both of which showed negative lymph node involvement.  She is in the   middle of her chemotherapy treatment.  There has been no mention of radiation   treatment.    PAST MEDICAL HISTORY:  Not significant for diabetes or heart disease.  She does   have fibromyalgia and she has sarcoidosis, but is not taking any medication for   this at all.  She presents today for a discussion for breast reconstruction.    She would like to have a bilateral mastectomy.  I asked her if she was leaning   one way or another, she said she would like to use her own tissue if possible.    I went over in detail ROSI flap breast reconstruction with the patient.  I   discussed with her if she would like to have her surgery done in [place].  She   has several options.    1.  She can have a bilateral mastectomy with no reconstruction and a delayed   reconstruction with our microsurgeons in [place].    2.  She can have a mastectomy with a bridge tissue expander in [place] followed   by ROSI flaps in [place] in about 6 months.  3.  She can move all of her surgery to the [place] and have all of the   surgery done at one time.  At the current time, there has been absolutely no   mention of any postoperative radiation treatment at all.  I also went over a   breast reconstruction using implants with the patient.  Since she is in the   middle of her chemotherapy, I think the best thing to do would be to get some   consultations from our physicians in [place].  We will go ahead and refer   her over to Dr. Orta and also to Dr. Cheatham and get their opinions on this as   well and then we can move on from there.      JAKIB/IN  dd: 08/30/2019 10:50:16 (CDT)  td: 08/31/2019 04:49:47 (CDT)  Doc  ID   #[id number]  Job ID #[id number]    CC:

## 2019-09-04 ENCOUNTER — LAB VISIT (OUTPATIENT)
Dept: LAB | Facility: HOSPITAL | Age: 39
End: 2019-09-04
Attending: INTERNAL MEDICINE
Payer: MEDICAID

## 2019-09-04 DIAGNOSIS — C50.912 HER2-POSITIVE CARCINOMA OF LEFT BREAST: ICD-10-CM

## 2019-09-04 DIAGNOSIS — D70.1 CHEMOTHERAPY INDUCED NEUTROPENIA: ICD-10-CM

## 2019-09-04 DIAGNOSIS — C50.812 MALIGNANT NEOPLASM OF OVERLAPPING SITES OF LEFT BREAST IN FEMALE, ESTROGEN RECEPTOR POSITIVE: ICD-10-CM

## 2019-09-04 DIAGNOSIS — T45.1X5A CHEMOTHERAPY INDUCED NEUTROPENIA: ICD-10-CM

## 2019-09-04 DIAGNOSIS — Z17.0 MALIGNANT NEOPLASM OF OVERLAPPING SITES OF LEFT BREAST IN FEMALE, ESTROGEN RECEPTOR POSITIVE: ICD-10-CM

## 2019-09-04 LAB
ALBUMIN SERPL BCP-MCNC: 3.7 G/DL (ref 3.5–5.2)
ALP SERPL-CCNC: 74 U/L (ref 55–135)
ALT SERPL W/O P-5'-P-CCNC: 28 U/L (ref 10–44)
ANION GAP SERPL CALC-SCNC: 7 MMOL/L (ref 8–16)
AST SERPL-CCNC: 28 U/L (ref 10–40)
BASOPHILS # BLD AUTO: 0.01 K/UL (ref 0–0.2)
BASOPHILS NFR BLD: 0.2 % (ref 0–1.9)
BILIRUB SERPL-MCNC: 0.6 MG/DL (ref 0.1–1)
BUN SERPL-MCNC: 17 MG/DL (ref 6–20)
CALCIUM SERPL-MCNC: 8.9 MG/DL (ref 8.7–10.5)
CHLORIDE SERPL-SCNC: 107 MMOL/L (ref 95–110)
CO2 SERPL-SCNC: 28 MMOL/L (ref 23–29)
CREAT SERPL-MCNC: 0.8 MG/DL (ref 0.5–1.4)
DIFFERENTIAL METHOD: ABNORMAL
EOSINOPHIL # BLD AUTO: 0 K/UL (ref 0–0.5)
EOSINOPHIL NFR BLD: 0 % (ref 0–8)
ERYTHROCYTE [DISTWIDTH] IN BLOOD BY AUTOMATED COUNT: 16.4 % (ref 11.5–14.5)
EST. GFR  (AFRICAN AMERICAN): >60 ML/MIN/1.73 M^2
EST. GFR  (NON AFRICAN AMERICAN): >60 ML/MIN/1.73 M^2
GLUCOSE SERPL-MCNC: 84 MG/DL (ref 70–110)
HCT VFR BLD AUTO: 31.9 % (ref 37–48.5)
HGB BLD-MCNC: 10.8 G/DL (ref 12–16)
IMM GRANULOCYTES # BLD AUTO: 0.03 K/UL (ref 0–0.04)
IMM GRANULOCYTES NFR BLD AUTO: 0.6 % (ref 0–0.5)
LYMPHOCYTES # BLD AUTO: 1.4 K/UL (ref 1–4.8)
LYMPHOCYTES NFR BLD: 25.2 % (ref 18–48)
MCH RBC QN AUTO: 33.3 PG (ref 27–31)
MCHC RBC AUTO-ENTMCNC: 33.9 G/DL (ref 32–36)
MCV RBC AUTO: 99 FL (ref 82–98)
MONOCYTES # BLD AUTO: 0.3 K/UL (ref 0.3–1)
MONOCYTES NFR BLD: 6.3 % (ref 4–15)
NEUTROPHILS # BLD AUTO: 3.7 K/UL (ref 1.8–7.7)
NEUTROPHILS NFR BLD: 67.7 % (ref 38–73)
NRBC BLD-RTO: 0 /100 WBC
PLATELET # BLD AUTO: 92 K/UL (ref 150–350)
PMV BLD AUTO: 10.2 FL (ref 9.2–12.9)
POTASSIUM SERPL-SCNC: 4 MMOL/L (ref 3.5–5.1)
PROT SERPL-MCNC: 7 G/DL (ref 6–8.4)
RBC # BLD AUTO: 3.24 M/UL (ref 4–5.4)
SODIUM SERPL-SCNC: 142 MMOL/L (ref 136–145)
WBC # BLD AUTO: 5.43 K/UL (ref 3.9–12.7)

## 2019-09-04 PROCEDURE — 80053 COMPREHEN METABOLIC PANEL: CPT

## 2019-09-04 PROCEDURE — 85025 COMPLETE CBC W/AUTO DIFF WBC: CPT

## 2019-09-06 DIAGNOSIS — C50.912 HER2-POSITIVE CARCINOMA OF LEFT BREAST: Primary | ICD-10-CM

## 2019-09-06 RX ORDER — SODIUM CHLORIDE 0.9 % (FLUSH) 0.9 %
10 SYRINGE (ML) INJECTION
Status: CANCELLED | OUTPATIENT
Start: 2019-09-09

## 2019-09-06 RX ORDER — HEPARIN 100 UNIT/ML
500 SYRINGE INTRAVENOUS
Status: CANCELLED | OUTPATIENT
Start: 2019-09-09

## 2019-09-06 NOTE — PROGRESS NOTES
[facility] Hematology/Oncology  PROGRESS NOTE -  Follow-up Visit      Subjective:       Patient ID:   NAME: Sheridan Todd : 1980     38 y.o. female    Referring Doc: Krissy  Other Physicians: Jose Rodriguez De Boisblanc, Marshall; Howard Cheatham ([facility])    Chief Complaint: left breast ca f/u     History of Present Illness:     Patient returns today for a regularly scheduled follow-up visit.  She is on cycle #6 today. She is seeing Dr Howard Cheatham with plastics at [facility] for eventual mastectomy. She is doing ok with no new issues. She will require herceptin maintenance regimen post-surgery recovery. She will most likely also require a hysterectomy/oopherectomy and plans to see her GYN Dr Aly Contreras in the near future. She denies any CP, SOB, HA's or N/V. She is here by herself.               ROS:   GEN: normal without any fever, night sweats or weight loss  HEENT: normal with no HA's, sore throat, stiff neck, changes in vision  CV: normal with no CP, SOB, PND, MENDIETA or orthopnea  PULM: normal with no SOB, cough, hemoptysis, sputum or pleuritic pain  GI: normal with no abdominal pain, nausea, vomiting, constipation, diarrhea, melanotic stools, BRBPR, or hematemesis  : normal with no hematuria, dysuria  BREAST: no identifiable abnormality at this time  SKIN: normal with no rash, erythema, bruising, or swelling    Allergies:  Review of patient's allergies indicates:   Allergen Reactions    Codeine      Other reaction(s): Nausea    Shellfish containing products      Other reaction(s): Vomiting  Only crabmeat       Medications:    Current Outpatient Medications:     butalbital-acetaminophen-caff -40 mg Cap, Take 1 capsule by mouth every 4 (four) hours as needed., Disp: , Rfl: 0    CARAFATE 100 mg/mL suspension, TAKE 10 ML BY MOUTH EVERY 4 TO 6 HOURS, Disp: , Rfl: 3    diphenoxylate-atropine 2.5-0.025 mg (LOMOTIL) 2.5-0.025 mg per tablet, TAKE 1 TABLET BY MOUTH WITH EVERY LOOSE  STOOL (NO MORE THAN 8 TABLETS IN 24 HOUR), Disp: , Rfl: 0    ELIQUIS 5 mg Tab, Take 5 mg by mouth 2 (two) times daily., Disp: , Rfl: 0    mupirocin (BACTROBAN) 2 % ointment, APPLY OINTMENT EXTERNALLY TO AFFECTED AREA THREE TIMES DAILY, Disp: , Rfl: 2    ondansetron (ZOFRAN) 8 MG tablet, Take 1 tablet (8 mg total) by mouth every 8 (eight) hours as needed for Nausea., Disp: 30 tablet, Rfl: 2    phenazopyridine (PYRIDIUM) 200 MG tablet, Take 200 mg by mouth every 6 (six) hours as needed., Disp: , Rfl: 1    promethazine (PHENERGAN) 25 MG tablet, Take 1 tablet (25 mg total) by mouth every 6 (six) hours as needed for Nausea., Disp: 30 tablet, Rfl: 2  No current facility-administered medications for this visit.     Facility-Administered Medications Ordered in Other Visits:     alteplase injection 2 mg, 2 mg, Intra-Catheter, PRN, Abel Chambers MD    CARBOplatin (PARAPLATIN) 770 mg in sodium chloride 0.9% 577 mL chemo infusion, 770 mg, Intravenous, [facility] Abel Chambers MD    DOCEtaxel (TAXOTERE) 75 mg/m2 = 140 mg in sodium chloride 0.9% 264 mL chemo infusion, 75 mg/m2 (Treatment Plan Recorded), Intravenous, [facility], Abel Chambers MD    heparin, porcine (PF) 100 unit/mL injection flush 500 Units, 500 Units, Intravenous, PRN, Abel Chambers MD    pertuzumab (PERJETA) 420 mg in sodium chloride 0.9% 250 mL infusion, 420 mg, Intravenous, [facility], Abel Chambers MD, Last Rate: 500 mL/hr at 09/09/19 1144, 420 mg at 09/09/19 1144    sodium chloride 0.9% flush 10 mL, 10 mL, Intravenous, PRN, Abel Chambers MD    trastuzumab 468 mg in sodium chloride 0.9% 250 mL chemo infusion, 6 mg/kg (Treatment Plan Recorded), Intravenous, [facility], Abel Chambers MD    PMHx/PSHx Updates:  See patient's last visit with me on 8/19/2019.  See H&P on 5/16/2019        Pathology:  Cancer Staging      Breast biopsies: 5/1/2019:  Left breast: with  "invasive ductal carcinoma grade 3; ER positive at 95% and KS positive at 90%; Her2Nu was positive at +3; Her2/CEP 17 ratio was >10.6  - Ki67 was unfavorable at 69%  Right breast: negative for cancer       Objective:     Vitals:  Blood pressure 122/78, pulse 91, temperature 98.2 °F (36.8 °C), resp. rate 18, height 5' 4" (1.626 m), weight 83 kg (183 lb).     Physical Examination:   GEN: no apparent distress, comfortable; AAOx3  HEAD: atraumatic and normocephalic  EYES: no pallor, no icterus, PERRLA  ENT: OMM, no pharyngeal erythema, external ears WNL; no nasal discharge; no thrush  NECK: no masses, thyroid normal, trachea midline, no LAD/LN's, supple  CV: RRR with no murmur; normal pulse; normal S1 and S2; no pedal edema  CHEST: Normal respiratory effort; CTAB; normal breath sounds; no wheeze or crackles; portacath on right chest wall with a residual stitch  ABDOM: nontender and nondistended; soft; normal bowel sounds; no rebound/guarding  MUSC/Skeletal: ROM normal; no crepitus; joints normal; no deformities or arthropathy  EXTREM: no clubbing, cyanosis, inflammation or swelling  SKIN: no rashes, lesions, ulcers, petechiae or subcutaneous nodules  : no lopez  NEURO: grossly intact; motor/sensory WNL; AAOx3; no tremors  PSYCH: normal mood, affect and behavior  LYMPH: normal cervical, supraclavicular, axillary and groin LN's  Breast: left breast with good reduction in the tumor which is no longer palpable    Labs:   8/18/2019  Lab Results   Component Value Date    WBC 5.43 09/04/2019    HGB 10.8 (L) 09/04/2019    HCT 31.9 (L) 09/04/2019    MCV 99 (H) 09/04/2019    PLT 92 (L) 09/04/2019     BMP  Lab Results   Component Value Date     09/04/2019    K 4.0 09/04/2019     09/04/2019    CO2 28 09/04/2019    BUN 17 09/04/2019    CREATININE 0.8 09/04/2019    CALCIUM 8.9 09/04/2019    ANIONGAP 7 (L) 09/04/2019    ESTGFRAFRICA >60.0 09/04/2019    EGFRNONAA >60.0 09/04/2019             Radiology/Diagnostic " Studies:    Nm Pet Ct Routine Skull To Mid Thigh    Result Date: 5/21/2019  INTEGRATED PET CT WITH IMAGE FUSION HISTORY:  Left breast cancer initial treatment evaluation TECHNIQUE: Following the injection of 12.7 mCi of F-18 labeled FDG into a right antecubital vein, PET CT was performed from the vertex of the skull through the proximal thighs with an integrated full ring PET CT scanner with image fusion. The patient's serum glucose at the time of the exam was 82 mg/dL. FINDINGS: There is diffuse FDG activity in the subareolar left breast with skin thickening and multiple hypermetabolic masses. There is a 19 mm spiculated hypermetabolic mass in the inferior lateral left breast with a max SUV of 10.4. This was previously biopsied. There is a 2 cm hypermetabolic area in the lateral superior left breast with max SUV of 9.9. There is an 11 mm nodule within the inner right breast which was biopsied and shown to represent fibroadenoma. This shows no FDG activity. There is no axillary, mediastinal or hilar adenopathy. There are no pulmonary nodules, infiltrates or pleural effusions. CT of the head and neck show no intra-axial lesions or cervical adenopathy. CT of the abdomen and pelvis demonstrates physiologic activity in the GI tract and urinary system. The liver and adrenal glands are normal. There are mildly prominent lymph nodes within the right lower quadrant the largest measures 13 mm with a max SUV of 3.1. Findings are suspicious for mesenteric adenitis. There is no retroperitoneal adenopathy. There are no lytic or blastic lesions.     IMPRESSION: Diffuse FDG activity within the subareolar left breast with skin thickening and multiple hypermetabolic left breast masses consistent with patient's history of left breast cancer. No evidence of metastatic disease 11 mm nodule in the medial right breast which was shown to represent fibroadenoma Mildly prominent lymph nodes in the right lower quadrant with mild FDG activity  suggestive of mesenteric adenitis. Follow-up CT scan in 3-6 months is recommended.     Read and electronically signed by: Adriane Jacome MD on 5/21/2019 1:13 PM CDT ADRIANE JACOME MD    [facility] Unknown Rad Eap    Result Date: 5/21/2019  Chest single view CLINICAL DATA: Port-A-Cath placement FINDINGS: AP view shows the heart to be within normal size limits. The mediastinum is unremarkable. Right subclavian Port-A-Cath tip is at superior vena cava. No pneumothorax or other placement related complication is identified. No infiltrates or effusions are demonstrated. No acute osseous abnormalities are demonstrated. IMPRESSION: 1. Right sided Port-A-Cath appears appropriately positioned, with no pneumothorax or other placement related complication. No acute radiographic abnormalities. Read and electronically signed by: Teofilo Patterson MD on 5/21/2019 2:52 PM CDT TEOFILO PATTERSON MD      I have reviewed all available lab results and radiology reports.    Assessment/Plan:   (1) 38 y.o. female  with diagnosis of left breast cancer who has been referred by Dr Salvador Rey with [facility] for evaluation by medical oncology.      - She had presented with left breast pain which became progressive over a 4 month period.   - Radiology studies found a 2.5cm mass on the left breast along with two inflamed LN's on the right.   - She had left breast biopsy on 5/1/2019 which showed invasive ductal carcinoma grade 3. The right breast biopsy was negative for cancer.   - She is ER and CA positive. She is also Her2Nu +3.   - We discussed the latest data on Her2Nu positive breast cancers and the recommendation for neoadjuvant chemotherapy with a herceptin and perjeta.  - she will need echo, portacath, and PET scan  - will plan for herceptin, perjeta, carboplatin and taxotere regimen neoadjuvantly with 6 cycles  -  S/p set up chemotherapy school; discussed side-effect profile, provided literature on the regimen; obtained  consents  - she is set up for May 27th to start  - PET scan and echo are on chart  - portacath placed on 5/21  - started chemotherapy with 1st cycle on 5/27/2019 and she is getting cycle #6 today.   -She had had a recent MRI of the breast which is showing a good response to the current therapy.      - She is seeing Dr Howard Cheatham with plastics at [facility] for eventual mastectomy.   - She will require herceptin maintenance regimen post-surgery recovery. - She will most likely also require a hysterectomy/oopherectomy and plans to see her GYN Dr Aly Contreras in the near future    (2) Left parotid tumor - removed in 2011     (3) Sarcoidosis     (4) Interstitial cystitis     (5) Diet controlled gestational DM     (6) Fibromyalgia     (7) Hx/of cervical dysplasia as teenager s/p cryoablation    (8) Migraine - now on meds prn    (9)  Portacath tip has recently been found to have sheared off and she has seen Dr Rey. She is not having any problems at this time and is prophylactically on leiquis.   -  She has seen Dr Hilton and Dr Lester Jones with [facility] and they were able to successfully removed the portacath tip and she has also had a new port placed at the same time.          VISIT DIAGNOSES:      Malignant neoplasm of overlapping sites of left breast in female, estrogen receptor positive    HER2-positive carcinoma of left breast    Chemotherapy induced neutropenia          PLAN:  1. Proceed with f/u with plastic surgery  2.  F/u with GYn to see what the hysterectomy options are  3. Check labs weekly for at least the next 4 weeks  4.  F/u with Dr Rey as directed by him    5. RTC 2-3 weeks    Fax note to Ben Rey Baez, De BoisBlanc, Marshall, Katira    Discussion:       I spent over 25 mins of time with the patient. Reviewed results of the recently ordered labs, tests and studies; made directives with regards to the results. Over half of this time was spent couseling and coordinating care.    I have  explained all of the above in detail and the patient understands all of the current recommendation(s). I have answered all of their questions to the best of my ability and to their complete satisfaction.   The patient is to continue with the current management plan.            Electronically signed by Abel Chambers MD

## 2019-09-09 ENCOUNTER — INFUSION (OUTPATIENT)
Dept: INFUSION THERAPY | Facility: HOSPITAL | Age: 39
End: 2019-09-09
Attending: INTERNAL MEDICINE
Payer: MEDICAID

## 2019-09-09 ENCOUNTER — OFFICE VISIT (OUTPATIENT)
Dept: HEMATOLOGY/ONCOLOGY | Facility: CLINIC | Age: 39
End: 2019-09-09
Payer: MEDICAID

## 2019-09-09 VITALS
SYSTOLIC BLOOD PRESSURE: 124 MMHG | RESPIRATION RATE: 18 BRPM | HEART RATE: 89 BPM | WEIGHT: 183.19 LBS | DIASTOLIC BLOOD PRESSURE: 76 MMHG | HEIGHT: 64 IN | BODY MASS INDEX: 31.27 KG/M2 | TEMPERATURE: 98 F

## 2019-09-09 VITALS
TEMPERATURE: 98 F | BODY MASS INDEX: 31.24 KG/M2 | HEART RATE: 91 BPM | SYSTOLIC BLOOD PRESSURE: 122 MMHG | DIASTOLIC BLOOD PRESSURE: 78 MMHG | RESPIRATION RATE: 18 BRPM | HEIGHT: 64 IN | WEIGHT: 183 LBS

## 2019-09-09 DIAGNOSIS — T45.1X5A CHEMOTHERAPY INDUCED NEUTROPENIA: ICD-10-CM

## 2019-09-09 DIAGNOSIS — C50.912 HER2-POSITIVE CARCINOMA OF LEFT BREAST: ICD-10-CM

## 2019-09-09 DIAGNOSIS — E86.0 DEHYDRATION: Primary | ICD-10-CM

## 2019-09-09 DIAGNOSIS — C50.812 MALIGNANT NEOPLASM OF OVERLAPPING SITES OF LEFT BREAST IN FEMALE, ESTROGEN RECEPTOR POSITIVE: Primary | ICD-10-CM

## 2019-09-09 DIAGNOSIS — D70.1 CHEMOTHERAPY INDUCED NEUTROPENIA: ICD-10-CM

## 2019-09-09 DIAGNOSIS — Z17.0 MALIGNANT NEOPLASM OF OVERLAPPING SITES OF LEFT BREAST IN FEMALE, ESTROGEN RECEPTOR POSITIVE: Primary | ICD-10-CM

## 2019-09-09 DIAGNOSIS — Z17.0 MALIGNANT NEOPLASM OF OVERLAPPING SITES OF LEFT BREAST IN FEMALE, ESTROGEN RECEPTOR POSITIVE: ICD-10-CM

## 2019-09-09 DIAGNOSIS — C50.812 MALIGNANT NEOPLASM OF OVERLAPPING SITES OF LEFT BREAST IN FEMALE, ESTROGEN RECEPTOR POSITIVE: ICD-10-CM

## 2019-09-09 PROCEDURE — A4216 STERILE WATER/SALINE, 10 ML: HCPCS | Performed by: INTERNAL MEDICINE

## 2019-09-09 PROCEDURE — 99214 OFFICE O/P EST MOD 30 MIN: CPT | Mod: S$GLB,,, | Performed by: INTERNAL MEDICINE

## 2019-09-09 PROCEDURE — 63600175 PHARM REV CODE 636 W HCPCS: Mod: TB | Performed by: INTERNAL MEDICINE

## 2019-09-09 PROCEDURE — 99214 PR OFFICE/OUTPT VISIT, EST, LEVL IV, 30-39 MIN: ICD-10-PCS | Mod: S$GLB,,, | Performed by: INTERNAL MEDICINE

## 2019-09-09 PROCEDURE — 25000003 PHARM REV CODE 250: Performed by: INTERNAL MEDICINE

## 2019-09-09 PROCEDURE — 96415 CHEMO IV INFUSION ADDL HR: CPT

## 2019-09-09 PROCEDURE — 96413 CHEMO IV INFUSION 1 HR: CPT

## 2019-09-09 PROCEDURE — 96367 TX/PROPH/DG ADDL SEQ IV INF: CPT

## 2019-09-09 PROCEDURE — 96417 CHEMO IV INFUS EACH ADDL SEQ: CPT

## 2019-09-09 RX ORDER — HEPARIN 100 UNIT/ML
500 SYRINGE INTRAVENOUS
Status: DISCONTINUED | OUTPATIENT
Start: 2019-09-09 | End: 2019-09-09 | Stop reason: HOSPADM

## 2019-09-09 RX ORDER — SODIUM CHLORIDE 0.9 % (FLUSH) 0.9 %
10 SYRINGE (ML) INJECTION
Status: DISCONTINUED | OUTPATIENT
Start: 2019-09-09 | End: 2019-09-09 | Stop reason: HOSPADM

## 2019-09-09 RX ADMIN — PERTUZUMAB 420 MG: 30 INJECTION, SOLUTION, CONCENTRATE INTRAVENOUS at 11:09

## 2019-09-09 RX ADMIN — SODIUM CHLORIDE, PRESERVATIVE FREE 10 ML: 5 INJECTION INTRAVENOUS at 04:09

## 2019-09-09 RX ADMIN — DOCETAXEL ANHYDROUS 140 MG: 10 INJECTION, SOLUTION INTRAVENOUS at 01:09

## 2019-09-09 RX ADMIN — HEPARIN 500 UNITS: 100 SYRINGE at 04:09

## 2019-09-09 RX ADMIN — CARBOPLATIN 770 MG: 10 INJECTION, SOLUTION INTRAVENOUS at 02:09

## 2019-09-09 RX ADMIN — PALONOSETRON HYDROCHLORIDE: 0.25 INJECTION INTRAVENOUS at 10:09

## 2019-09-09 RX ADMIN — APREPITANT 130 MG: 130 INJECTION, EMULSION INTRAVENOUS at 11:09

## 2019-09-09 RX ADMIN — TRASTUZUMAB 468 MG: 150 INJECTION, POWDER, LYOPHILIZED, FOR SOLUTION INTRAVENOUS at 12:09

## 2019-09-09 RX ADMIN — SODIUM CHLORIDE: 0.9 INJECTION, SOLUTION INTRAVENOUS at 10:09

## 2019-09-10 ENCOUNTER — INFUSION (OUTPATIENT)
Dept: INFUSION THERAPY | Facility: HOSPITAL | Age: 39
End: 2019-09-10
Attending: INTERNAL MEDICINE
Payer: MEDICAID

## 2019-09-10 VITALS
TEMPERATURE: 98 F | BODY MASS INDEX: 31.3 KG/M2 | HEART RATE: 89 BPM | HEIGHT: 64 IN | SYSTOLIC BLOOD PRESSURE: 118 MMHG | RESPIRATION RATE: 18 BRPM | DIASTOLIC BLOOD PRESSURE: 76 MMHG | WEIGHT: 183.31 LBS

## 2019-09-10 DIAGNOSIS — C50.912 HER2-POSITIVE CARCINOMA OF LEFT BREAST: ICD-10-CM

## 2019-09-10 DIAGNOSIS — Z17.0 MALIGNANT NEOPLASM OF OVERLAPPING SITES OF LEFT BREAST IN FEMALE, ESTROGEN RECEPTOR POSITIVE: ICD-10-CM

## 2019-09-10 DIAGNOSIS — T45.1X5A CHEMOTHERAPY INDUCED NEUTROPENIA: ICD-10-CM

## 2019-09-10 DIAGNOSIS — D70.1 CHEMOTHERAPY INDUCED NEUTROPENIA: ICD-10-CM

## 2019-09-10 DIAGNOSIS — C50.812 MALIGNANT NEOPLASM OF OVERLAPPING SITES OF LEFT BREAST IN FEMALE, ESTROGEN RECEPTOR POSITIVE: ICD-10-CM

## 2019-09-10 DIAGNOSIS — E86.0 DEHYDRATION: Primary | ICD-10-CM

## 2019-09-10 PROCEDURE — 96372 THER/PROPH/DIAG INJ SC/IM: CPT

## 2019-09-10 PROCEDURE — 63600175 PHARM REV CODE 636 W HCPCS: Mod: JG | Performed by: INTERNAL MEDICINE

## 2019-09-10 RX ADMIN — PEGFILGRASTIM-CBQV 6 MG: 6 INJECTION, SOLUTION SUBCUTANEOUS at 01:09

## 2019-09-17 ENCOUNTER — TELEPHONE (OUTPATIENT)
Dept: PLASTIC SURGERY | Facility: CLINIC | Age: 39
End: 2019-09-17

## 2019-09-17 NOTE — TELEPHONE ENCOUNTER
spoke with pt and she stated that someone called her a few days ago stating that Dr Cheatham wanted her to meet with a breast surgeon on the same day as her consult. I told pt that I didnt see anything in her chart regarding who may have spoke with her. I told pt I would f/u on this and get back to her. She said that was fine. Pt verbalized understanding.                 ----- Message from Zaida Farnsworth MA sent at 9/17/2019  2:48 PM CDT -----  Contact: Self/ [phone]  Pt states she is calling because she is not sure if she missed a call are not but its in regards to her appt and wants to know where she stands and what's going on. Pt states she spoke with the Doctor but still confused.

## 2019-09-19 ENCOUNTER — TELEPHONE (OUTPATIENT)
Dept: HEMATOLOGY/ONCOLOGY | Facility: CLINIC | Age: 39
End: 2019-09-19

## 2019-09-19 ENCOUNTER — TELEPHONE (OUTPATIENT)
Dept: PLASTIC SURGERY | Facility: CLINIC | Age: 39
End: 2019-09-19

## 2019-09-19 ENCOUNTER — OFFICE VISIT (OUTPATIENT)
Dept: SURGERY | Facility: CLINIC | Age: 39
End: 2019-09-19
Payer: MEDICAID

## 2019-09-19 ENCOUNTER — CLINICAL SUPPORT (OUTPATIENT)
Dept: CARDIOLOGY | Facility: HOSPITAL | Age: 39
End: 2019-09-19
Attending: INTERNAL MEDICINE
Payer: MEDICAID

## 2019-09-19 VITALS
WEIGHT: 183 LBS | HEIGHT: 64 IN | HEART RATE: 101 BPM | TEMPERATURE: 99 F | BODY MASS INDEX: 31.24 KG/M2 | DIASTOLIC BLOOD PRESSURE: 78 MMHG | SYSTOLIC BLOOD PRESSURE: 122 MMHG

## 2019-09-19 VITALS — WEIGHT: 183 LBS | BODY MASS INDEX: 31.24 KG/M2 | HEIGHT: 64 IN

## 2019-09-19 DIAGNOSIS — C50.812 MALIGNANT NEOPLASM OF OVERLAPPING SITES OF LEFT BREAST IN FEMALE, ESTROGEN RECEPTOR POSITIVE: Primary | ICD-10-CM

## 2019-09-19 DIAGNOSIS — Z17.0 MALIGNANT NEOPLASM OF OVERLAPPING SITES OF LEFT BREAST IN FEMALE, ESTROGEN RECEPTOR POSITIVE: Primary | ICD-10-CM

## 2019-09-19 DIAGNOSIS — C50.912 HER2-POSITIVE CARCINOMA OF LEFT BREAST: ICD-10-CM

## 2019-09-19 PROCEDURE — 99213 PR OFFICE/OUTPT VISIT, EST, LEVL III, 20-29 MIN: ICD-10-PCS | Mod: S$PBB,,, | Performed by: SURGERY

## 2019-09-19 PROCEDURE — 99213 OFFICE O/P EST LOW 20 MIN: CPT | Mod: S$PBB,,, | Performed by: SURGERY

## 2019-09-19 PROCEDURE — 99999 PR PBB SHADOW E&M-EST. PATIENT-LVL III: CPT | Mod: PBBFAC,,, | Performed by: SURGERY

## 2019-09-19 PROCEDURE — 99213 OFFICE O/P EST LOW 20 MIN: CPT | Mod: PBBFAC,25 | Performed by: SURGERY

## 2019-09-19 PROCEDURE — 99999 PR PBB SHADOW E&M-EST. PATIENT-LVL III: ICD-10-PCS | Mod: PBBFAC,,, | Performed by: SURGERY

## 2019-09-19 PROCEDURE — 93306 TTE W/DOPPLER COMPLETE: CPT

## 2019-09-19 NOTE — TELEPHONE ENCOUNTER
Called pt back after speaking with the nurse navigator, and I just wanted to get some clarification from the pt now that we are on the same page. I told pt that the decision was up to her on who she would like to go with to do her Mastectomy, I also confirmed with the patient that she that if she did decide to be seen by one of our breast surgeons that we could not confirm a surgery date and that we would have to first receive all of her outside reports. Pt verbalized understanding and asked me to have nurse navigator call her so that she could be scheduled with someone in breast for a possible second opinion.

## 2019-09-20 ENCOUNTER — TELEPHONE (OUTPATIENT)
Dept: HEMATOLOGY/ONCOLOGY | Facility: CLINIC | Age: 39
End: 2019-09-20

## 2019-09-20 LAB
AORTIC ROOT ANNULUS: 2.77 CM
AORTIC VALVE CUSP SEPERATION: 2.38 CM
AV INDEX (PROSTH): 0.74
AV MEAN GRADIENT: 5 MMHG
AV PEAK GRADIENT: 8 MMHG
AV VALVE AREA: 2.35 CM2
AV VELOCITY RATIO: 67.27
BSA FOR ECHO PROCEDURE: 1.94 M2
CV ECHO LV RWT: 0.43 CM
DOP CALC AO PEAK VEL: 1.39 M/S
DOP CALC AO VTI: 28.19 CM
DOP CALC LVOT AREA: 3.2 CM2
DOP CALC LVOT DIAMETER: 2.01 CM
DOP CALC LVOT PEAK VEL: 93.5 M/S
DOP CALC LVOT STROKE VOLUME: 66.35 CM3
DOP CALCLVOT PEAK VEL VTI: 20.92 CM
E WAVE DECELERATION TIME: 193.18 MSEC
E/A RATIO: 1.23
E/E' RATIO: 7.25 M/S
ECHO LV POSTERIOR WALL: 0.95 CM (ref 0.6–1.1)
FRACTIONAL SHORTENING: 36 % (ref 28–44)
INTERVENTRICULAR SEPTUM: 1.08 CM (ref 0.6–1.1)
LEFT ATRIUM SIZE: 3.63 CM
LEFT INTERNAL DIMENSION IN SYSTOLE: 2.83 CM (ref 2.1–4)
LEFT VENTRICLE DIASTOLIC VOLUME INDEX: 29.31 ML/M2
LEFT VENTRICLE DIASTOLIC VOLUME: 55.2 ML
LEFT VENTRICLE MASS INDEX: 80 G/M2
LEFT VENTRICLE SYSTOLIC VOLUME INDEX: 8.8 ML/M2
LEFT VENTRICLE SYSTOLIC VOLUME: 16.6 ML
LEFT VENTRICULAR INTERNAL DIMENSION IN DIASTOLE: 4.4 CM (ref 3.5–6)
LEFT VENTRICULAR MASS: 150.91 G
LV LATERAL E/E' RATIO: 6.21 M/S
LV SEPTAL E/E' RATIO: 8.7 M/S
MV PEAK A VEL: 0.71 M/S
MV PEAK E VEL: 0.87 M/S
PISA TR MAX VEL: 2.84 M/S
PULM VEIN S/D RATIO: 0.92
PV PEAK D VEL: 51.63 M/S
PV PEAK S VEL: 47.32 M/S
PV PEAK VELOCITY: 100.22 CM/S
RIGHT VENTRICULAR END-DIASTOLIC DIMENSION: 354 CM
TDI LATERAL: 0.14 M/S
TDI SEPTAL: 0.1 M/S
TDI: 0.12 M/S
TR MAX PG: 32 MMHG

## 2019-09-20 NOTE — TELEPHONE ENCOUNTER
Spoke to pt letting her know her potassium is a little low.  Not quite low enough for potassium pills but pt encouraged to eat bananas and foods high in potassium.

## 2019-09-23 ENCOUNTER — OFFICE VISIT (OUTPATIENT)
Dept: SURGERY | Facility: CLINIC | Age: 39
End: 2019-09-23
Payer: MEDICAID

## 2019-09-23 ENCOUNTER — TELEPHONE (OUTPATIENT)
Dept: SURGERY | Facility: CLINIC | Age: 39
End: 2019-09-23

## 2019-09-23 VITALS
HEART RATE: 75 BPM | HEIGHT: 64 IN | BODY MASS INDEX: 31.49 KG/M2 | DIASTOLIC BLOOD PRESSURE: 80 MMHG | SYSTOLIC BLOOD PRESSURE: 123 MMHG | WEIGHT: 184.44 LBS

## 2019-09-23 DIAGNOSIS — Z17.0 MALIGNANT NEOPLASM OF OVERLAPPING SITES OF LEFT BREAST IN FEMALE, ESTROGEN RECEPTOR POSITIVE: Primary | ICD-10-CM

## 2019-09-23 DIAGNOSIS — C50.812 MALIGNANT NEOPLASM OF OVERLAPPING SITES OF LEFT BREAST IN FEMALE, ESTROGEN RECEPTOR POSITIVE: Primary | ICD-10-CM

## 2019-09-23 PROCEDURE — 99205 PR OFFICE/OUTPT VISIT, NEW, LEVL V, 60-74 MIN: ICD-10-PCS | Mod: S$PBB,,, | Performed by: SURGERY

## 2019-09-23 PROCEDURE — 99999 PR PBB SHADOW E&M-EST. PATIENT-LVL IV: CPT | Mod: PBBFAC,,, | Performed by: SURGERY

## 2019-09-23 PROCEDURE — 99214 OFFICE O/P EST MOD 30 MIN: CPT | Mod: PBBFAC | Performed by: SURGERY

## 2019-09-23 PROCEDURE — 99205 OFFICE O/P NEW HI 60 MIN: CPT | Mod: S$PBB,,, | Performed by: SURGERY

## 2019-09-23 PROCEDURE — 99999 PR PBB SHADOW E&M-EST. PATIENT-LVL IV: ICD-10-PCS | Mod: PBBFAC,,, | Performed by: SURGERY

## 2019-09-23 NOTE — LETTER
October 3, 2019      Howard Cheatham MD  [address]  [facility] [address]           [facility]  [address], [address]  [facility] [address]  Phone: [phone]  Fax: [phone]          Patient: Sheridan Todd   MR Number: [id number]   YOB: 1980   Date of Visit: 9/23/2019       Dear Dr. Howard Cheatham:    Thank you for referring Sheridan Todd to me for evaluation. Attached you will find relevant portions of my assessment and plan of care.    If you have questions, please do not hesitate to call me. I look forward to following Sheridan Todd along with you.    Sincerely,    Africa Parker  CC:  No Recipients    If you would like to receive this communication electronically, please contact [email or web address] or [phone] to request more information on [email or web address] Link access.    For providers and/or their staff who would like to refer a patient to [facility], please contact us through our one-stop-shop provider referral line, [facility], at [phone].    If you feel you have received this communication in error or would no longer like to receive these types of communications, please e-mail [email or web address]

## 2019-09-23 NOTE — TELEPHONE ENCOUNTER
Spoke with regarding surgery date, surgery date scheduled and confirmed for 10/8/19, patient to schedule pre-op appt with [facility] and call office to schedule any additional testing needs

## 2019-09-26 ENCOUNTER — LAB VISIT (OUTPATIENT)
Dept: LAB | Facility: HOSPITAL | Age: 39
End: 2019-09-26
Attending: INTERNAL MEDICINE
Payer: MEDICAID

## 2019-09-26 ENCOUNTER — TELEPHONE (OUTPATIENT)
Dept: HEMATOLOGY/ONCOLOGY | Facility: CLINIC | Age: 39
End: 2019-09-26

## 2019-09-26 DIAGNOSIS — C50.812 MALIGNANT NEOPLASM OF OVERLAPPING SITES OF LEFT BREAST IN FEMALE, ESTROGEN RECEPTOR POSITIVE: ICD-10-CM

## 2019-09-26 DIAGNOSIS — Z17.0 MALIGNANT NEOPLASM OF OVERLAPPING SITES OF LEFT BREAST IN FEMALE, ESTROGEN RECEPTOR POSITIVE: ICD-10-CM

## 2019-09-26 LAB
ALBUMIN SERPL BCP-MCNC: 3.6 G/DL (ref 3.5–5.2)
ALP SERPL-CCNC: 73 U/L (ref 55–135)
ALT SERPL W/O P-5'-P-CCNC: 22 U/L (ref 10–44)
ANION GAP SERPL CALC-SCNC: 5 MMOL/L (ref 8–16)
AST SERPL-CCNC: 21 U/L (ref 10–40)
BASOPHILS # BLD AUTO: 0.01 K/UL (ref 0–0.2)
BASOPHILS NFR BLD: 0.1 % (ref 0–1.9)
BILIRUB SERPL-MCNC: 0.5 MG/DL (ref 0.1–1)
BUN SERPL-MCNC: 15 MG/DL (ref 6–20)
CALCIUM SERPL-MCNC: 8.9 MG/DL (ref 8.7–10.5)
CHLORIDE SERPL-SCNC: 108 MMOL/L (ref 95–110)
CO2 SERPL-SCNC: 27 MMOL/L (ref 23–29)
CREAT SERPL-MCNC: 0.7 MG/DL (ref 0.5–1.4)
DIFFERENTIAL METHOD: ABNORMAL
EOSINOPHIL # BLD AUTO: 0 K/UL (ref 0–0.5)
EOSINOPHIL NFR BLD: 0 % (ref 0–8)
ERYTHROCYTE [DISTWIDTH] IN BLOOD BY AUTOMATED COUNT: 15.6 % (ref 11.5–14.5)
EST. GFR  (AFRICAN AMERICAN): >60 ML/MIN/1.73 M^2
EST. GFR  (NON AFRICAN AMERICAN): >60 ML/MIN/1.73 M^2
GLUCOSE SERPL-MCNC: 90 MG/DL (ref 70–110)
HCT VFR BLD AUTO: 30.3 % (ref 37–48.5)
HGB BLD-MCNC: 10.1 G/DL (ref 12–16)
IMM GRANULOCYTES # BLD AUTO: 0.03 K/UL (ref 0–0.04)
IMM GRANULOCYTES NFR BLD AUTO: 0.4 % (ref 0–0.5)
LYMPHOCYTES # BLD AUTO: 1.4 K/UL (ref 1–4.8)
LYMPHOCYTES NFR BLD: 19.8 % (ref 18–48)
MCH RBC QN AUTO: 34 PG (ref 27–31)
MCHC RBC AUTO-ENTMCNC: 33.3 G/DL (ref 32–36)
MCV RBC AUTO: 102 FL (ref 82–98)
MONOCYTES # BLD AUTO: 0.5 K/UL (ref 0.3–1)
MONOCYTES NFR BLD: 6.6 % (ref 4–15)
NEUTROPHILS # BLD AUTO: 5.3 K/UL (ref 1.8–7.7)
NEUTROPHILS NFR BLD: 73.1 % (ref 38–73)
NRBC BLD-RTO: 0 /100 WBC
PLATELET # BLD AUTO: 90 K/UL (ref 150–350)
PMV BLD AUTO: 9.8 FL (ref 9.2–12.9)
POTASSIUM SERPL-SCNC: 4 MMOL/L (ref 3.5–5.1)
PROT SERPL-MCNC: 6.7 G/DL (ref 6–8.4)
RBC # BLD AUTO: 2.97 M/UL (ref 4–5.4)
SODIUM SERPL-SCNC: 140 MMOL/L (ref 136–145)
WBC # BLD AUTO: 7.28 K/UL (ref 3.9–12.7)

## 2019-09-26 PROCEDURE — 85025 COMPLETE CBC W/AUTO DIFF WBC: CPT

## 2019-09-26 PROCEDURE — 80053 COMPREHEN METABOLIC PANEL: CPT

## 2019-09-26 NOTE — TELEPHONE ENCOUNTER
----- Message from Jane Peña sent at 9/25/2019  3:32 PM CDT -----  Pt has a head cold, itchy eyes, runny nose, cough...wants to know if she can take sudafed.    CB# [phone]        Called patient. Ok to take sudafed. Recommended zyrtec. She said she has been taking claritin for about 5 days but feels she needs something more. I let her know she can take any over the counter medication she wants to try.

## 2019-09-30 ENCOUNTER — HOSPITAL ENCOUNTER (OUTPATIENT)
Dept: RADIOLOGY | Facility: HOSPITAL | Age: 39
Discharge: HOME | End: 2019-09-30
Attending: SURGERY
Payer: MEDICAID

## 2019-09-30 ENCOUNTER — OFFICE VISIT (OUTPATIENT)
Dept: HEMATOLOGY/ONCOLOGY | Facility: CLINIC | Age: 39
End: 2019-09-30
Payer: MEDICAID

## 2019-09-30 VITALS
TEMPERATURE: 100 F | DIASTOLIC BLOOD PRESSURE: 85 MMHG | WEIGHT: 183 LBS | HEART RATE: 83 BPM | BODY MASS INDEX: 31.41 KG/M2 | RESPIRATION RATE: 20 BRPM | SYSTOLIC BLOOD PRESSURE: 122 MMHG

## 2019-09-30 DIAGNOSIS — C50.912 HER2-POSITIVE CARCINOMA OF LEFT BREAST: Primary | ICD-10-CM

## 2019-09-30 DIAGNOSIS — T45.1X5A CHEMOTHERAPY INDUCED NEUTROPENIA: ICD-10-CM

## 2019-09-30 DIAGNOSIS — D70.1 CHEMOTHERAPY INDUCED NEUTROPENIA: ICD-10-CM

## 2019-09-30 DIAGNOSIS — C50.812 MALIGNANT NEOPLASM OF OVERLAPPING SITES OF LEFT BREAST IN FEMALE, ESTROGEN RECEPTOR POSITIVE: ICD-10-CM

## 2019-09-30 DIAGNOSIS — D64.9 FATIGUE ASSOCIATED WITH ANEMIA: ICD-10-CM

## 2019-09-30 DIAGNOSIS — Z17.0 MALIGNANT NEOPLASM OF OVERLAPPING SITES OF LEFT BREAST IN FEMALE, ESTROGEN RECEPTOR POSITIVE: ICD-10-CM

## 2019-09-30 PROCEDURE — 99214 OFFICE O/P EST MOD 30 MIN: CPT | Mod: S$GLB,,, | Performed by: INTERNAL MEDICINE

## 2019-09-30 PROCEDURE — 99214 PR OFFICE/OUTPT VISIT, EST, LEVL IV, 30-39 MIN: ICD-10-PCS | Mod: S$GLB,,, | Performed by: INTERNAL MEDICINE

## 2019-09-30 RX ORDER — CYANOCOBALAMIN 1000 UG/ML
1000 INJECTION, SOLUTION INTRAMUSCULAR; SUBCUTANEOUS ONCE
Status: COMPLETED | OUTPATIENT
Start: 2019-09-30 | End: 2019-09-30

## 2019-09-30 RX ADMIN — CYANOCOBALAMIN 1000 MCG: 1000 INJECTION, SOLUTION INTRAMUSCULAR; SUBCUTANEOUS at 01:09

## 2019-09-30 NOTE — PROGRESS NOTES
[facility] Hematology/Oncology  PROGRESS NOTE -  Follow-up Visit      Subjective:       Patient ID:   NAME: Sheridan Todd : 1980     38 y.o. female    Referring Doc: Krissy  Other Physicians: Jose Rodriguez De Boisblanc, Marshall; Howard Cheatham ([facility]astic)    Chief Complaint: left breast ca f/u     History of Present Illness:     Patient returns today for a regularly scheduled follow-up visit.  She has since completed the neoadjuvant chemotherapy on  and is having surgery next at [facility] with Dr Garcia.  She denies any CP, SOB, HA's or N/V. She is here by herself. She has some fatigue. She decided to hold off on plastic reconstruction at this time.               ROS:   GEN: normal without any fever, night sweats or weight loss  HEENT: normal with no HA's, sore throat, stiff neck, changes in vision  CV: normal with no CP, SOB, PND, MENDIETA or orthopnea  PULM: normal with no SOB, cough, hemoptysis, sputum or pleuritic pain  GI: normal with no abdominal pain, nausea, vomiting, constipation, diarrhea, melanotic stools, BRBPR, or hematemesis  : normal with no hematuria, dysuria  BREAST: no identifiable abnormality at this time  SKIN: normal with no rash, erythema, bruising, or swelling    Allergies:  Review of patient's allergies indicates:   Allergen Reactions    Shellfish containing products Anaphylaxis     Other reaction(s): Vomiting  Only crabmeat  Other reaction(s): Vomiting  Only crabmeat    Codeine Nausea And Vomiting     Other reaction(s): Nausea  Other reaction(s): Nausea       Medications:    Current Outpatient Medications:     butalbital-acetaminophen-caff -40 mg Cap, Take 1 capsule by mouth every 4 (four) hours as needed., Disp: , Rfl: 0    CARAFATE 100 mg/mL suspension, TAKE 10 ML BY MOUTH EVERY 4 TO 6 HOURS, Disp: , Rfl: 3    diphenoxylate-atropine 2.5-0.025 mg (LOMOTIL) 2.5-0.025 mg per tablet, TAKE 1 TABLET BY MOUTH WITH EVERY LOOSE STOOL (NO MORE THAN 8 TABLETS IN  24 HOUR), Disp: , Rfl: 0    mupirocin (BACTROBAN) 2 % ointment, APPLY OINTMENT EXTERNALLY TO AFFECTED AREA THREE TIMES DAILY, Disp: , Rfl: 2    ondansetron (ZOFRAN) 8 MG tablet, Take 1 tablet (8 mg total) by mouth every 8 (eight) hours as needed for Nausea., Disp: 30 tablet, Rfl: 2    phenazopyridine (PYRIDIUM) 200 MG tablet, Take 200 mg by mouth every 6 (six) hours as needed., Disp: , Rfl: 1    promethazine (PHENERGAN) 25 MG tablet, Take 1 tablet (25 mg total) by mouth every 6 (six) hours as needed for Nausea., Disp: 30 tablet, Rfl: 2    PMHx/PSHx Updates:  See patient's last visit with me on 9/9/2019.  See H&P on 5/16/2019        Pathology:  Cancer Staging      Breast biopsies: 5/1/2019:  Left breast: with invasive ductal carcinoma grade 3; ER positive at 95% and MI positive at 90%; Her2Nu was positive at +3; Her2/CEP 17 ratio was >10.6  - Ki67 was unfavorable at 69%  Right breast: negative for cancer       Objective:     Vitals:  Blood pressure 122/85, pulse 83, temperature 99.5 °F (37.5 °C), temperature source Oral, resp. rate 20, weight 83 kg (183 lb).     Physical Examination:   GEN: no apparent distress, comfortable; AAOx3  HEAD: atraumatic and normocephalic  EYES: no pallor, no icterus, PERRLA  ENT: OMM, no pharyngeal erythema, external ears WNL; no nasal discharge; no thrush  NECK: no masses, thyroid normal, trachea midline, no LAD/LN's, supple  CV: RRR with no murmur; normal pulse; normal S1 and S2; no pedal edema  CHEST: Normal respiratory effort; CTAB; normal breath sounds; no wheeze or crackles; portacath on right chest wall with a residual stitch  ABDOM: nontender and nondistended; soft; normal bowel sounds; no rebound/guarding  MUSC/Skeletal: ROM normal; no crepitus; joints normal; no deformities or arthropathy  EXTREM: no clubbing, cyanosis, inflammation or swelling  SKIN: no rashes, lesions, ulcers, petechiae or subcutaneous nodules  : no lopez  NEURO: grossly intact; motor/sensory WNL; AAOx3;  no tremors  PSYCH: normal mood, affect and behavior  LYMPH: normal cervical, supraclavicular, axillary and groin LN's  Breast: left breast with good reduction in the tumor which is no longer palpable    Labs:   9/26/2019  Lab Results   Component Value Date    WBC 7.28 09/26/2019    HGB 10.1 (L) 09/26/2019    HCT 30.3 (L) 09/26/2019     (H) 09/26/2019    PLT 90 (L) 09/26/2019     BMP  Lab Results   Component Value Date     09/26/2019    K 4.0 09/26/2019     09/26/2019    CO2 27 09/26/2019    BUN 15 09/26/2019    CREATININE 0.7 09/26/2019    CALCIUM 8.9 09/26/2019    ANIONGAP 5 (L) 09/26/2019    ESTGFRAFRICA >60.0 09/26/2019    EGFRNONAA >60.0 09/26/2019             Radiology/Diagnostic Studies:    MRI breast  8/7/2019:    1. Significant interval improvement of enhancing spiculated mass in left breast near 4:00 position, as well as more diffuse non mass like enhancement throughout the superior left breast since 04/26/2019, consistent with a favorable response to interval neoadjuvant treatment.  2. No significant change of lobular lower inner quadrant right breast mass consistent with biopsy-proven fibroadenoma.  3. Unchanged 7 mm right breast mass near 12:00 position felt to represent intramammary lymph node.      Nm Pet Ct Routine Skull To Mid Thigh    Result Date: 5/21/2019  INTEGRATED PET CT WITH IMAGE FUSION HISTORY:  Left breast cancer initial treatment evaluation TECHNIQUE: Following the injection of 12.7 mCi of F-18 labeled FDG into a right antecubital vein, PET CT was performed from the vertex of the skull through the proximal thighs with an integrated full ring PET CT scanner with image fusion. The patient's serum glucose at the time of the exam was 82 mg/dL. FINDINGS: There is diffuse FDG activity in the subareolar left breast with skin thickening and multiple hypermetabolic masses. There is a 19 mm spiculated hypermetabolic mass in the inferior lateral left breast with a max SUV of 10.4.  This was previously biopsied. There is a 2 cm hypermetabolic area in the lateral superior left breast with max SUV of 9.9. There is an 11 mm nodule within the inner right breast which was biopsied and shown to represent fibroadenoma. This shows no FDG activity. There is no axillary, mediastinal or hilar adenopathy. There are no pulmonary nodules, infiltrates or pleural effusions. CT of the head and neck show no intra-axial lesions or cervical adenopathy. CT of the abdomen and pelvis demonstrates physiologic activity in the GI tract and urinary system. The liver and adrenal glands are normal. There are mildly prominent lymph nodes within the right lower quadrant the largest measures 13 mm with a max SUV of 3.1. Findings are suspicious for mesenteric adenitis. There is no retroperitoneal adenopathy. There are no lytic or blastic lesions.     IMPRESSION: Diffuse FDG activity within the subareolar left breast with skin thickening and multiple hypermetabolic left breast masses consistent with patient's history of left breast cancer. No evidence of metastatic disease 11 mm nodule in the medial right breast which was shown to represent fibroadenoma Mildly prominent lymph nodes in the right lower quadrant with mild FDG activity suggestive of mesenteric adenitis. Follow-up CT scan in 3-6 months is recommended.     Read and electronically signed by: Radha Garnett MD on 5/21/2019 1:13 PM CDT RADHA GARNETT MD    [facility] Unknown Rad Eap    Result Date: 5/21/2019  Chest single view CLINICAL DATA: Port-A-Cath placement FINDINGS: AP view shows the heart to be within normal size limits. The mediastinum is unremarkable. Right subclavian Port-A-Cath tip is at superior vena cava. No pneumothorax or other placement related complication is identified. No infiltrates or effusions are demonstrated. No acute osseous abnormalities are demonstrated. IMPRESSION: 1. Right sided Port-A-Cath appears appropriately positioned, with no  pneumothorax or other placement related complication. No acute radiographic abnormalities. Read and electronically signed by: Teofilo Patterson MD on 5/21/2019 2:52 PM CDT TEOFILO PATTERSON MD      I have reviewed all available lab results and radiology reports.    Assessment/Plan:   (1) 38 y.o. female  with diagnosis of left breast cancer who has been referred by Dr Salvador Rey with Gen Surgery for evaluation by medical oncology.      - She had presented with left breast pain which became progressive over a 4 month period.   - Radiology studies found a 2.5cm mass on the left breast along with two inflamed LN's on the right.   - She had left breast biopsy on 5/1/2019 which showed invasive ductal carcinoma grade 3. The right breast biopsy was negative for cancer.   - She is ER and DE positive. She is also Her2Nu +3.   - We discussed the latest data on Her2Nu positive breast cancers and the recommendation for neoadjuvant chemotherapy with a herceptin and perjeta.  - she will need echo, portacath, and PET scan  - will plan for herceptin, perjeta, carboplatin and taxotere regimen neoadjuvantly with 6 cycles  -  S/p set up chemotherapy school; discussed side-effect profile, provided literature on the regimen; obtained consents  - she is set up for May 27th to start  - PET scan and echo are on chart  - portacath placed on 5/21  - started chemotherapy with 1st cycle on 5/27/2019   - She had had a recent MRI of the breast again on 8/7  - she completed the neoadjuvant round and is having surgery next week with Dr Garcia at [facility] in [place]      - She is seeing Dr Howard Cheatham with plastics at [facility] for eventual mastectomy.   - She will require herceptin maintenance regimen post-surgery recovery. - She will most likely also require a hysterectomy/oopherectomy and plans to see her GYN Dr Aly Contreras in the near future    (2) Left parotid tumor - removed in 2011     (3) Sarcoidosis     (4)  Interstitial cystitis     (5) Diet controlled gestational DM     (6) Fibromyalgia     (7) Hx/of cervical dysplasia as teenager s/p cryoablation    (8) Migraine - now on meds prn    (9)  Portacath tip has recently been found to have sheared off and she has seen Dr Rey. She is not having any problems at this time and is prophylactically on leiquis.   -  She has seen Dr Hilton and Dr Lester Jones with [facility] and they were able to successfully removed the portacath tip and she has also had a new port placed at the same time.          VISIT DIAGNOSES:      HER2-positive carcinoma of left breast    Malignant neoplasm of overlapping sites of left breast in female, estrogen receptor positive    Chemotherapy induced neutropenia          PLAN:  1. Proceed with f/u with general surgery and plastic surgery with planned surgery next week  2.  F/u with GYn about eventual hysterectomy/bilateral oopherectomy options are  3. Check labs weekly for at least the next 4 weeks  4.  F/u with Dr Rey as directed by him    5. RTC  3-4 weeks    Fax note to Ben Rey Baez, De BoisBlanc, Chaparrita Hilton Mackey    Discussion:       I spent over 25 mins of time with the patient. Reviewed results of the recently ordered labs, tests and studies; made directives with regards to the results. Over half of this time was spent couseling and coordinating care.    I have explained all of the above in detail and the patient understands all of the current recommendation(s). I have answered all of their questions to the best of my ability and to their complete satisfaction.   The patient is to continue with the current management plan.            Electronically signed by Abel Chambers MD

## 2019-10-01 ENCOUNTER — TUMOR BOARD CONFERENCE (OUTPATIENT)
Dept: SURGERY | Facility: CLINIC | Age: 39
End: 2019-10-01

## 2019-10-01 ENCOUNTER — OFFICE VISIT (OUTPATIENT)
Dept: PLASTIC SURGERY | Facility: CLINIC | Age: 39
End: 2019-10-01
Payer: MEDICAID

## 2019-10-01 ENCOUNTER — ANESTHESIA EVENT (OUTPATIENT)
Dept: SURGERY | Facility: OTHER | Age: 39
DRG: 581 | End: 2019-10-01
Payer: MEDICAID

## 2019-10-01 ENCOUNTER — HOSPITAL ENCOUNTER (OUTPATIENT)
Dept: PREADMISSION TESTING | Facility: OTHER | Age: 39
Discharge: HOME | End: 2019-10-01
Attending: SURGERY
Payer: MEDICAID

## 2019-10-01 VITALS
SYSTOLIC BLOOD PRESSURE: 145 MMHG | DIASTOLIC BLOOD PRESSURE: 63 MMHG | HEIGHT: 64 IN | RESPIRATION RATE: 16 BRPM | OXYGEN SATURATION: 99 % | BODY MASS INDEX: 31.58 KG/M2 | TEMPERATURE: 98 F | HEART RATE: 75 BPM | WEIGHT: 185 LBS

## 2019-10-01 VITALS
WEIGHT: 183.19 LBS | DIASTOLIC BLOOD PRESSURE: 88 MMHG | BODY MASS INDEX: 31.45 KG/M2 | HEART RATE: 75 BPM | SYSTOLIC BLOOD PRESSURE: 129 MMHG

## 2019-10-01 DIAGNOSIS — Z85.3 HISTORY OF LEFT BREAST CANCER: Primary | ICD-10-CM

## 2019-10-01 DIAGNOSIS — Z01.818 PRE-OP TESTING: Primary | ICD-10-CM

## 2019-10-01 LAB
ABO + RH BLD: NORMAL
BLD GP AB SCN CELLS X3 SERPL QL: NORMAL

## 2019-10-01 PROCEDURE — 99024 POSTOP FOLLOW-UP VISIT: CPT | Mod: S$GLB,,, | Performed by: SURGERY

## 2019-10-01 PROCEDURE — 36415 COLL VENOUS BLD VENIPUNCTURE: CPT

## 2019-10-01 PROCEDURE — 99024 PR POST-OP FOLLOW-UP VISIT: ICD-10-PCS | Mod: S$GLB,,, | Performed by: SURGERY

## 2019-10-01 PROCEDURE — 86901 BLOOD TYPING SEROLOGIC RH(D): CPT

## 2019-10-01 RX ORDER — FAMOTIDINE 20 MG/1
20 TABLET, FILM COATED ORAL
Status: CANCELLED | OUTPATIENT
Start: 2019-10-01 | End: 2019-10-01

## 2019-10-01 RX ORDER — PREGABALIN 75 MG/1
75 CAPSULE ORAL ONCE
Status: CANCELLED | OUTPATIENT
Start: 2019-10-01 | End: 2019-10-01

## 2019-10-01 RX ORDER — ACETAMINOPHEN 500 MG
1000 TABLET ORAL
Status: CANCELLED | OUTPATIENT
Start: 2019-10-01 | End: 2019-10-01

## 2019-10-01 RX ORDER — SODIUM CHLORIDE, SODIUM LACTATE, POTASSIUM CHLORIDE, CALCIUM CHLORIDE 600; 310; 30; 20 MG/100ML; MG/100ML; MG/100ML; MG/100ML
INJECTION, SOLUTION INTRAVENOUS CONTINUOUS
Status: CANCELLED | OUTPATIENT
Start: 2019-10-01

## 2019-10-01 RX ORDER — SUMATRIPTAN SUCCINATE 25 MG/1
50 TABLET ORAL
Status: ON HOLD | COMMUNITY
End: 2020-03-05 | Stop reason: HOSPADM

## 2019-10-01 RX ORDER — LIDOCAINE HYDROCHLORIDE 10 MG/ML
0.5 INJECTION, SOLUTION EPIDURAL; INFILTRATION; INTRACAUDAL; PERINEURAL ONCE
Status: CANCELLED | OUTPATIENT
Start: 2019-10-01 | End: 2019-10-01

## 2019-10-01 NOTE — DISCHARGE INSTRUCTIONS
PRE-ADMIT TESTING -  [phone]    [address]  [place] [facility]          Your surgery has been scheduled at [facility]. We are pleased to have the opportunity to serve you. For Further Information please call [phone].    On the day of surgery please report to the Information Desk on the 1st floor.    · CONTACT YOUR PHYSICIAN'S OFFICE THE DAY PRIOR TO YOUR SURGERY TO OBTAIN YOUR ARRIVAL TIME.     · The evening before surgery do not eat anything after 9 p.m. ( this includes hard candy, chewing gum and mints).  You may only have GATORADE, POWERADE AND WATER  from 9 p.m. until you leave your home.   DO NOT DRINK ANY LIQUIDS ON THE WAY TO THE HOSPITAL.      SPECIAL MEDICATION INSTRUCTIONS: TAKE medications checked off by the Anesthesiologist on your Medication List.    Angiogram Patients: Take medications as instructed by your physician, including aspirin.     Surgery Patients:    If you take ASPIRIN - Your PHYSICIAN/SURGEON will need to inform you IF/OR when you need to stop taking aspirin prior to your surgery.     Do Not take any medications containing IBUPROFEN.  Do Not Wear any make-up or dark nail polish   (especially eye make-up) to surgery. If you come to surgery with makeup on you will be required to remove the makeup or nail polish.    Do not shave your surgical area at least 5 days prior to your surgery. The surgical prep will be performed at the hospital according to Infection Control regulations.    Leave all valuables at home.   Do Not wear any jewelry or watches, including any metal in body piercings. Jewelry must be removed prior to coming to the hospital.  There is a possibility that rings that are unable to be removed may be cut off if they are on the surgical extremity.    Contact Lens must be removed before surgery. Either do not wear the contact lens or bring a case and solution for storage.  Please bring a container for eyeglasses or dentures as required.  Bring  any paperwork your physician has provided, such as consent forms,  history and physicals, doctor's orders, etc.   Bring comfortable clothes that are loose fitting to wear upon discharge. Take into consideration the type of surgery being performed.  Maintain your diet as advised per your physician the day prior to surgery.      Adequate rest the night before surgery is advised.   Park in the Parking lot behind the hospital or in the [facility] across the street from the parking lot. Parking is complimentary.  If you will be discharged the same day as your procedure, please arrange for a responsible adult to drive you home or to accompany you if traveling by taxi.   YOU WILL NOT BE PERMITTED TO DRIVE OR TO LEAVE THE HOSPITAL ALONE AFTER SURGERY.   It is strongly recommended that you arrange for someone to remain with you for the first 24 hrs following your surgery.    The Surgeon will speak to your family/visitor after your surgery regarding the outcome of your surgery and post op care.  The Surgeon may speak to you after your surgery, but there is a possibility you may not remember the details.  Please check with your family members regarding the conversation with the Surgeon.    We strongly recommend whoever is bringing you home be present for discharge instructions.  This will ensure a thorough understanding for your post op home care.      Thank you for your cooperation.  The Staff of [facility].                Bathing Instructions with Hibiclens     Shower the evening before and morning of your procedure with Hibiclens:   Wash your face with water and your regular face wash/soap   Apply Hibiclens directly on your skin or on a wet washcloth and wash gently. When showering: Move away from the shower stream when applying Hibiclens to avoid rinsing off too soon.   Rinse thoroughly with warm water   Do not dilute Hibiclens         Dry off as usual, do not use any deodorant,  powder, body lotions, perfume, after shave or cologne.

## 2019-10-01 NOTE — ANESTHESIA PREPROCEDURE EVALUATION
10/01/2019  Sheridan Todd is a 38 y.o., female.    Anesthesia Evaluation    I have reviewed the Patient Summary Reports.    I have reviewed the Nursing Notes.   I have reviewed the Medications.     Review of Systems  Anesthesia Hx:  Denies Family Hx of Anesthesia complications.  Personal Hx of Anesthesia complications Slow To Awaken/Delayed Emergence   Social:  Non-Smoker    Hematology/Oncology:         -- Anemia (hct 30): Current/Recent Cancer. Breast left chemotherapy Oncology Comments: Finished chemo 3 weeks ago     EENT/Dental:EENT/Dental Normal   Cardiovascular:   Hypertension Echo WNL   Pulmonary:   Recent URI (current cough, mildly productive. no fever. chemo induced neutropenia)    Renal/:  Renal/ Normal  Interstitial cystitis, symptomatic on chemo   Hepatic/GI:  Hepatic/GI Normal    Musculoskeletal:  Musculoskeletal Normal Sarcoidosis, prev joint pains, now in remision   Neurological:  Neurology Normal Fibromyalgia   Endocrine:  Endocrine Normal    Dermatological:  Skin Normal    Psych:  Psychiatric Normal           Physical Exam  General:  Well nourished    Airway/Jaw/Neck:  Airway Findings: Mouth Opening: Normal Tongue: Normal  General Airway Assessment: Adult  Mallampati: II  TM Distance: Normal, at least 6 cm  Jaw/Neck Findings:  Neck ROM: Normal ROM  Neck Findings:  Girth Increased      Dental:  Dental Findings: In tact        Mental Status:  Mental Status Findings:  Cooperative, Alert and Oriented         Anesthesia Plan  Type of Anesthesia, risks & benefits discussed:  Anesthesia Type:  general  Patient's Preference:   Intra-op Monitoring Plan: standard ASA monitors  Intra-op Monitoring Plan Comments:   Post Op Pain Control Plan: multimodal analgesia and per primary service following discharge from PACU  Post Op Pain Control Plan Comments:   Induction:   IV  Beta Blocker:          Informed Consent: Patient understands risks and agrees with Anesthesia plan.  Questions answered. Anesthesia consent signed with patient.  ASA Score: 2     Day of Surgery Review of History & Physical:    H&P update referred to the surgeon.     Anesthesia Plan Notes: Mastectomies now, followed by hyst later. Reconstruction after recovery    Labs by oncology weekly    Pt to contact oncologist regarding cough for rec tx.        Ready For Surgery From Anesthesia Perspective.

## 2019-10-01 NOTE — PROGRESS NOTES
Malignant neoplasm of overlapping sites of left breast in female, estrogen receptor positive    5/16/2019 Initial Diagnosis     Malignant neoplasm of overlapping sites of left breast in female, estrogen receptor positive      5/27/2019 -  Chemotherapy     Treatment Summary   Plan Name: OP BREAST TRASTUZUMAB PERTUZUMAB DOCETAXEL CARBOPLATIN Q3W  Treatment Goal: Curative  Status: Active  Start Date: 5/27/2019  End Date: 4/27/2020 (Planned)  Provider: Abel Chambers MD  Chemotherapy: CARBOplatin (PARAPLATIN) 900 mg in sodium chloride 0.9% 500 mL chemo infusion, 900 mg (100 % of original dose 900 mg), Intravenous, Clinic/HOD 1 time, 6 of 6 cycles  Dose modification:   (original dose 900 mg, Cycle 1)  Administration: 770 mg (7/29/2019), 770 mg (8/19/2019), 770 mg (9/9/2019)  DOCEtaxel (TAXOTERE) 75 mg/m2 = 140 mg in sodium chloride 0.9% 250 mL chemo infusion, 75 mg/m2 = 140 mg, Intravenous, Clinic/HOD 1 time, 6 of 6 cycles  Administration: 140 mg (7/29/2019), 140 mg (8/19/2019), 140 mg (9/9/2019)  trastuzumab 632 mg in sodium chloride 0.9% 250 mL chemo infusion, 8 mg/kg = 632 mg, Intravenous, Clinic/HOD 1 time, 6 of 17 cycles  Administration: 468 mg (7/29/2019), 468 mg (8/19/2019), 468 mg (9/9/2019)  pertuzumab (PERJETA) 840 mg in sodium chloride 0.9% 250 mL infusion, 840 mg, Intravenous, Clinic/HOD 1 time, 6 of 17 cycles  Administration: 420 mg (7/29/2019), 420 mg (8/19/2019), 420 mg (9/9/2019)        HER2-positive carcinoma of left breast    4/23/2019 Imaging Significant Findings     Mammogram and US  Left breast asymmetry and architectural distortion       5/1/2019 Biopsy     Invasive Ductal carcinoma      5/1/2019 Initial Diagnosis     HER2-positive  Invasive ductal carcinoma of left breast      5/1/2019 Breast Tumor Markers     Estrogen Receptor: Positive >90%  Progesterone Receptor: Positive >90%  HER2: Positive  Ki67: 10-30%      5/21/2019 Imaging Significant Findings     PET scan  IMPRESSION: Diffuse FDG  activity within the subareolar left breast with skin  thickening and multiple hypermetabolic left breast masses consistent with  patient's history of left breast cancer.    No evidence of metastatic disease    11 mm nodule in the medial right breast which was shown to represent  fibroadenoma    Mildly prominent lymph nodes in the right lower quadrant with mild FDG activity  suggestive of mesenteric adenitis. Follow-up CT scan in 3-6 months is  recommended.      5/27/2019 -  Chemotherapy     Treatment Summary   Plan Name: OP BREAST TRASTUZUMAB PERTUZUMAB DOCETAXEL CARBOPLATIN Q3W  Treatment Goal: Curative  Status: Active  Start Date: 5/27/2019  End Date: 4/27/2020 (Planned)  Provider: Abel Chambers MD  Chemotherapy: CARBOplatin (PARAPLATIN) 900 mg in sodium chloride 0.9% 500 mL chemo infusion, 900 mg (100 % of original dose 900 mg), Intravenous, Clinic/HOD 1 time, 6 of 6 cycles  Dose modification:   (original dose 900 mg, Cycle 1)  Administration: 770 mg (7/29/2019), 770 mg (8/19/2019), 770 mg (9/9/2019)  DOCEtaxel (TAXOTERE) 75 mg/m2 = 140 mg in sodium chloride 0.9% 250 mL chemo infusion, 75 mg/m2 = 140 mg, Intravenous, Clinic/HOD 1 time, 6 of 6 cycles  Administration: 140 mg (7/29/2019), 140 mg (8/19/2019), 140 mg (9/9/2019)  trastuzumab 632 mg in sodium chloride 0.9% 250 mL chemo infusion, 8 mg/kg = 632 mg, Intravenous, Clinic/HOD 1 time, 6 of 17 cycles  Administration: 468 mg (7/29/2019), 468 mg (8/19/2019), 468 mg (9/9/2019)  pertuzumab (PERJETA) 840 mg in sodium chloride 0.9% 250 mL infusion, 840 mg, Intravenous, Clinic/HOD 1 time, 6 of 17 cycles  Administration: 420 mg (7/29/2019), 420 mg (8/19/2019), 420 mg (9/9/2019)      8/7/2019 Imaging Significant Findings     Breast MRI  Impression:     1. Significant interval improvement of enhancing spiculated mass in left breast near 4:00 position, as well as more diffuse non mass like enhancement throughout the superior left breast since 04/26/2019, consistent  with a favorable response to interval neoadjuvant treatment.  2. No significant change of lobular lower inner quadrant right breast mass consistent with biopsy-proven fibroadenoma.  3. Unchanged 7 mm right breast mass near 12:00 position felt to represent intramammary lymph node.  BI-RADS CATEGORY 6: KNOWN MALIGNANCY      10/1/2019 Tumor Conference        Will need radiation therapy/ endocrine therapy

## 2019-10-01 NOTE — LETTER
[facility] [address] [id number]  [address] [facility] [address]  [facility] [address]  Phone: [phone]  Fax: [phone] October 6, 2019      Anoop Hernadez MD  [address]  [facility] [address]    Patient: Sheridan Todd   MR Number: [id number]   YOB: 1980   Date of Visit: 10/1/2019     Dear Dr. Anoop Hernadez:    Thank you for referring Sheridan Todd to me for evaluation. Attached you will find relevant portions of my assessment and plan of care.    Ms. Todd was referred by Dr. Hernadez to discuss ROSI flap reconstructive options.  She has a history of carcinoma of the left breast and completed neoadjuvant chemotherapy approximately three weeks ago.  She wishes to have a tissue reconstruction in as few steps as possible, preferably immediately.  I was present for discussion of her case at tumor board, and it was determined that there is a strong possibility that she will receive radiation post operatively, based on the size of her tumor on mammography.  Dr. Garcia has discussed bilateral mastectomy with her.  She was previously treated by oncology staff, Dr. Chambers and breast surgery staff, Dr. Rey of the [facility].       She was previously on eliquis for a catheter, which had become dislodged into one of her pulmonary arteries.  Per Dr. Garcia and the patient, her Mediport was removed at [place] last week.       Dr. Contreras on the [facility] may be offering hysterectomy and oopherectomy in the future.       On examination, she is well developed and in no acute distress. She has grade 3 ptosis of bilateral breasts with a low breast footprint.  She has intrabdominal adiposity in addition to thick skin pinch in her abdomen.  She has no obvious hernias and appears to have an umbilical hernia.  She has adequate donor sites for a ROSI flap.       I discussed reconstructive options with her, including immediate tissue reconstruction with a free  tissue transfer, expander/implant reconstruction with her, and delayed reconstructive options with her.  Risks, benefits, and alternatives of each were discussed in great detail.  Given the uncertainty surrounding her radiation therapy plan at the time of mastectomy and her preference for a tissue reconstruction, I explained that my preference would be to delay her reconstruction.  Placement of a bridging tissue expander would in my opinion provide little aesthetic advantage and would require that she follow up for expansions and drain management before initiation of radiation therapy.  I did discuss that post mastectomy radiation therapy of an expander or implant construct would result in a higher than usual risk of expander infection or reconstructive failure.       I explained that given the large amount of breast tissue that will likely remain after mastectomy, I could offer a skin reduction procedure at the time of mastectomy.  In the setting of post mastectomy radiation, I would delay ROSI flap reconstruction for 3-6 months.  If she ultimately does not require post mastectomy radiation, I would proceed with ROSI flap transfer sooner.  My preference would be to perform the ROSI flap after she has risk reducing GYN procedures, to avoid fascial incisions after abdominal flap harvest.     Medical history is otherwise as per medical intake forms.      CPT 77827, skin reduction at time of mastectomy  Added on to Dr. Garcia's OR schedule for next week.    We will obtain consent on the day of surgery.       60 minutes was spend with patient, more than 50% was spent explaining the reconstructive options, nature of procedures, or answering questions, coordinating care related to her upcoming reconstructive surgeries.     If you have questions, please do not hesitate to call me. I look forward to following Sheridan Todd along with you.    Sincerely,       Plastic & Reconstructive Surgery  [facility]  c/o Howard Cheatham M.D.  [facility]  Second [address]  [address] [place] [address]     Work [phone]  Toll free [phone]  If no answer [phone]

## 2019-10-02 ENCOUNTER — TELEPHONE (OUTPATIENT)
Dept: HEMATOLOGY/ONCOLOGY | Facility: CLINIC | Age: 39
End: 2019-10-02

## 2019-10-02 ENCOUNTER — CLINICAL SUPPORT (OUTPATIENT)
Dept: REHABILITATION | Facility: HOSPITAL | Age: 39
End: 2019-10-02
Payer: MEDICAID

## 2019-10-02 DIAGNOSIS — Z17.0 MALIGNANT NEOPLASM OF OVERLAPPING SITES OF LEFT BREAST IN FEMALE, ESTROGEN RECEPTOR POSITIVE: ICD-10-CM

## 2019-10-02 DIAGNOSIS — C50.812 MALIGNANT NEOPLASM OF OVERLAPPING SITES OF LEFT BREAST IN FEMALE, ESTROGEN RECEPTOR POSITIVE: ICD-10-CM

## 2019-10-02 PROCEDURE — 97161 PT EVAL LOW COMPLEX 20 MIN: CPT

## 2019-10-02 NOTE — TELEPHONE ENCOUNTER
----- Message from Zuri Lopez MA sent at 10/2/2019  8:59 AM CDT -----  Contact: Sheridan Goldberg is scheduled for her surgery for the double mastectomy next week and is sick w/ mucus and green snot. Pt would like to have something called in if possible so that she can be clear for surgery.     Pt doesn't have a PCP    Pharm.[facility]    Pt # [phone].

## 2019-10-02 NOTE — TELEPHONE ENCOUNTER
Patient denies fever states she is coughing up yellow sputum. She is scheduled to have surgery Tuesday and wants to make she is better. Amoxicillin 875 mg po BID #14 called to [facility] with Diflucan 150mg  #2 one tab on day 3 and one tab at the completion of abx. She verbalized understanding.

## 2019-10-02 NOTE — PLAN OF CARE
OUTPATIENT PHYSICAL THERAPY   EVALUATION    Name: Sheridan Todd  Clinic Number: [id number]    Therapy Diagnosis: No diagnosis found.  Physician: Rebecca Garcia MD    Physician Orders: PT Eval and Treat   Medical Diagnosis: Z85.3 (ICD-10-CM) - History of left breast cancer  Evaluation Date: 10/2/2019  Authorization period Expiration: TBD  Plan of Care Certification Period: 10/2-11/22/19  Insurance: Medicaid/[employer]    Visit #: 1/ Visits authorized: TBD  Time In:11:00 AM  Time Out: 11:45 AM  Total Billable Time: 45 minutes    Precautions: cancer    History   History of Present Illness: Sheridan is a 38 y.o. female that presents to  [facility] secondary to dx of left breast cancer.    Dx: Invasive ductal carcinoma grade 3  Surgery date: 10/8/2019      Pt presents today to perform baseline measurements pre-surgery to be able to detect lymphedema post surgery, UE muscle testing, postural and ROM assessment along with education of risk of lymphedema and surgical precautions post surgery. Circumferential measurements will also be taken pre-surgery of BL UEs for early detection of lymphedema post surgery. Pt will also be instructed in exercises to perform pre-surgery to insure best outcomes post surgery.       Past Medical History:   Past Medical History:   Diagnosis Date    Abnormal Pap smear of cervix     Chemotherapy induced neutropenia 5/24/2019    Fibromyalgia     Gestational diabetes     HER2-positive carcinoma of left breast 5/16/2019    lt    Hypertension     AFTER PREGNANCY- NO MED NOW    IC (interstitial cystitis)     Malignant neoplasm of overlapping sites of left breast in female, estrogen receptor positive 5/16/2019    Malignant neoplasm of overlapping sites of left female breast 5/16/2019    Overweight and obesity(278.0)     Sarcoidosis     Sinusitis        Past Surgical History:   Sheridan Todd  has a past surgical history that  includes Vaginal delivery; PAROTID TUMOR; Rake tooth extraction; Cryotherapy; Cystoscopy with biopsy of bladder;  section (08/15/2016); and Portacath placement (2019).    Medications:  Sheridan has a current medication list which includes the following prescription(s): butalbital-acetaminophen-caff, carafate, diphenoxylate-atropine 2.5-0.025 mg, mupirocin, ondansetron, phenazopyridine, promethazine, and sumatriptan.    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 B12 shot last week helped. Having a double mastectomy 10/ and may need radiation - TBD after surgery.     Pain: 0/10 on VAS.     Objective   Mental status :alert and oriented    Posture/Alignment   Postural examination/scapula alignment: Forward head; mild Dowager's hump  Joint integrity: WFLs  Skin integrity: intact  Edema: none noted    Sensation: Light Touch: Intact           Proprioception: Intact  - appearance: well groomed     ROM:   UPPER EXTREMITY--AROM/PROM  (R) UE: WNLs  (L) UE: WNLs     Shoulder Range of Motion:   ACTIVE ROM LEFT RIGHT   Flexion 170 170   Abduction 180 180   Extension 60 60   IR 75 75   ER 90 90     Strength: manual muscle test grades below  "  Upper Extremity Strength   (L) UE (R) UE   Shoulder flexion: 5/5 5/5   Shoulder Abduction: 4+/5 4+/5   Shoulder IR 4+/5 4+/5   Shoulder ER 4/5 4/5   Elbow flexion: 5/5 5/5   Elbow extension: 5/5 5/5   Lower Trap: 4+/5 4+/5   Middle Trap: 4/5 4+/5    4+/5 4+/5       Baseline Measurements of BL UE's for early detection of Lymphedema:     LANDMARK RIGHT UE LEFT UE DIFFERENCE   E + 8" 35.5 cm 33.8 cm 1.7 cm   E + 6" 33.2 cm 30.2 cm 3.0 cm   E + 4" 30.8 cm 29.1 cm 1.7 cm   E + 2" 28.2 cm 27.8 cm .40 cm   Elbow 26.0 cm 25.0 cm 1.0 cm   W+ 8" 27.0 cm 26.2 cm .80 cm   W +  6" 26.9 cm 25.6 cm 1.3 cm   W + 4" 23.9 cm 22.5 cm 1.4 cm   Wrist 16.3 cm 16.6 cm .30 cm   DPC 20.5 cm 20.1 cm .40 cm   IP Thumb .56 cm .60 cm .04 cm               Coordination:   - fine motor: WFL  - UE coordination: intact     - LE coordination:  Not tested     Functional Mobility (Bed mobility, transfers)  Bed mobility: I =  independent   Roll to left: I  Roll to right: I  Supine to prone: I  Scooting to edge of bed: I  Supine to sit: I  Sit to supine: I  Transfers to bed: I  Transfers to toilet: I  Sit to stand:  I  Stand pivot:  I  Car transfers: I      ADL's:  Feeding: I = independent   Grooming: I  Hygiene: I  UB Dressing: I  LB Dressing: I  Toileting: I  Bathing: I    Gait Assessment:   - AD used: none  - Assistance: independent  - Distance: community distances       Endurance Deficit: none      Patient Education   - role of PT in multi - disciplinary team, goals for PT  - Pt was educated in lymphedema etiology and management plans.    - Pt was provided with written risk reductions and precautions for managing lymphedema.   - Reviewed PHAN drain care instructions.     ROM/lifting Precautions post surgery discussed -  until drains have been removed:  - do not lift affected arm above 90 degrees of shoulder flexion  - do not lift over 5 lbs  - do not pull or push heavy objects  - do not sleep on your stomach or surgery side     Written Home " Exercises Provided and Patient Education: Handouts given   Pt was instructed in and performed therapeutic exercise for postural correction and alignment, stretching and soft tissue mobility, and strengthening.     Exercises included: handout given    - exaggerated deep breathing and relaxation  - scapular retractions  - wrist circles  - elbow flexion/extension      Pt was able to demonstrate and report understanding and performance    Pt has no cultural, educational or language barriers to learning provided.    Functional Limitations Reporting     Quick DASH Score: Patient scored 0% functional impairment    Assessment   This is a 38 y.o. female referred to outpatient physical therapy and presents with a medical diagnosis of left invasive ductal breast cancer and was seen today pre-operatively to assess strength and ROM of BL UEs, to take baseline circumferential measurements of BL UEs to aid in the early detection of lymphedema and provide pt education on exercises/precations post breast surgery. Pt does not exhibit any ROM impairments  Pt educated in lymphedema risks/precautions as well as ROM/lifting precautions post surgery - pt demonstrated/verbalized understanding. No goals established this visit as goals for PT will be established post surgery at follow up.      Anticipated barriers to physical therapy: None     Pt's spiritual, cultural and educational needs considered and pt agreeable to plan of care and goals as stated below:     Medical necessity is demonstrated by the following IMPAIRMENTS/PROMBLEM LIST:  History  Co-morbidities and personal factors that may impact the plan of care Co-morbidities:   history of cancer    Personal Factors:   no deficits     low   Examination  Body Structures and Functions, activity limitations and participation restrictions that may impact the plan of care Body Regions:   upper extremities    Body Systems:    none    Participation Restrictions:   Post-operative    Activity  limitations:   Learning and applying knowledge  no deficits    General Tasks and Commands  no deficits    Communication  no deficits    Mobility  no deficits    Self care  no deficits    Domestic Life  no deficits    Interactions/Relationships  no deficits    Life Areas  no deficits    Community and Social Life  no deficits         low   Clinical Presentation stable and uncomplicated low   Decision Making/ Complexity Score: low           Plan   Schedule patient for follow up with Physical therapy post surgery. Goals for therapy post surgery will be established at that time.     Therapist: Salina Suggs, PT  10/2/2019

## 2019-10-02 NOTE — PROGRESS NOTES
"Ankle Pump    With {RIGHT/LEFT:20294} leg elevated, gently flex and extend ankle. Move through full range of motion. Avoid pain. Perform more frequently if having increased swelling.  Repeat *** times {Blank single:19197::"right","left","each"} side per set. Do *** sets per session. Do *** sessions per {Blank single:33283::"week","day"}.      Ankle Alphabet    Sit with leg straight out in front of you and heel off edge of bed or propped up on towel roll. Using ankle and foot only, trace the letters of the alphabet. Perform A to Z. Do not let the knee move too much side to side.  Repeat *** times {Blank single:19197::"right","left","each"} side per set. Do *** sessions per day.    Towel Scrunches    Place {RIGHT/LEFT/BOTH:21121} foot flat on towel, knee pointed forward. Use forefoot and toes to pull towel backward.  Do not allow heel or knee to move. Repetitions should be slow and controlled.  Repeat *** times {Blank single:19197::"right","left","each"} side per set. Do *** sets per session. Do *** sessions per {Blank single:19197::"week","day"}.      Gastroc, Sitting (Passive)    Sit with leg straight out in front of you and a towel under your heel and around ball of foot. Gently pull toward body. Hold 30 seconds.   Repeat *** times {Blank single:19197::"right","left","each"} side per set. Do *** sets per session. Do *** sessions per {Blank single:48175::"week","day"}.    Toe Lift    Sit with your foot flat on the floor and your finger under your big toe. Without rolling in/out or lifting your heel/toes, push down into your finger with your big toe joint. Maintain the pressure of the ball of your foot on your finger, keep the four little toes relaxed and in contact with the ground, then slowly lift the big toe up and down again. Do slowly and take breaks as needed.  Repeat *** times {Blank single:19197::"right","left","each"} side per set. Do *** sets per session. Do *** sessions per {Blank " "single:30627::"week","day"}.    Resistance Band Ankle Movements, 4 ways    1. Wrap band around foot and attach below the foot. Pull foot up against resistance band. Slowly release for 3-5 seconds.  2. Wrap band around foot and hold band in your hand. Push foot down against resistance band. Slowly release for 3-5 seconds.  3. Wrap band around foot and loop around other foot and hold the end of the band. Pull foot out to side against resistance band. Slowly release for 3-5 seconds.   4. Cross one leg over the other, wrap band around bottom foot, step top of foot on band and hold the end of the band. Pull foot to the inside against resistance band. Slowly release for 3-5 seconds.     Use {DESC; COLOR CATHETER TIP:42968} resistance band.  Repeat *** times {Blank single:17936::"right","left","each"} side per set. Do *** sets per session. Do *** sessions per {Blank single:72628::"week","day"}.      Tandem Stand    Stand with {RIGHT/LEFT:20294} foot in front of the other. Hold *** seconds. Repeat with other leg forward.  Repeat *** times {Blank single:26374::"right","left","each"} side per session. Do *** sessions per {Blank single:99916::"week","day"}.      Single Leg - Eyes Open    Holding support, lift {RIGHT/LEFT:78393} leg while maintaining balance on other leg.  Use counter for support only as needed. Progress by closing eyes or standing on cushion. Hold *** seconds. Repeat on other side.  Repeat *** times per session. Do *** sessions per day.      Calf Raise: Bilateral (Standing)    Stand on both feet and rise on balls of feet. Do not let ankles roll out. Slowly return to start and repeat.  Repeat *** times per set. Do *** sets per session. Do *** sessions per day.      Calf Raises: 2 Up, 1 Down    Raise both heels up, lift one leg off ground, slowly lower the other heel over 5-6 seconds. Hold onto a counter as needed.  Repeat *** times {Blank single:41489::"right","left","each"} side per set. Do *** sets per " "session. Do *** sessions per {Blank single:62873::"week","day"}.    Copyright © [facility]. All rights reserved.     "

## 2019-10-04 ENCOUNTER — LAB VISIT (OUTPATIENT)
Dept: LAB | Facility: HOSPITAL | Age: 39
End: 2019-10-04
Attending: INTERNAL MEDICINE
Payer: MEDICAID

## 2019-10-04 DIAGNOSIS — C50.812 MALIGNANT NEOPLASM OF OVERLAPPING SITES OF LEFT BREAST IN FEMALE, ESTROGEN RECEPTOR POSITIVE: Primary | ICD-10-CM

## 2019-10-04 DIAGNOSIS — Z17.0 MALIGNANT NEOPLASM OF OVERLAPPING SITES OF LEFT BREAST IN FEMALE, ESTROGEN RECEPTOR POSITIVE: ICD-10-CM

## 2019-10-04 DIAGNOSIS — Z17.0 MALIGNANT NEOPLASM OF OVERLAPPING SITES OF LEFT BREAST IN FEMALE, ESTROGEN RECEPTOR POSITIVE: Primary | ICD-10-CM

## 2019-10-04 DIAGNOSIS — C50.812 MALIGNANT NEOPLASM OF OVERLAPPING SITES OF LEFT BREAST IN FEMALE, ESTROGEN RECEPTOR POSITIVE: ICD-10-CM

## 2019-10-04 LAB
ALBUMIN SERPL BCP-MCNC: 3.5 G/DL (ref 3.5–5.2)
ALP SERPL-CCNC: 55 U/L (ref 55–135)
ALT SERPL W/O P-5'-P-CCNC: 17 U/L (ref 10–44)
ANION GAP SERPL CALC-SCNC: 4 MMOL/L (ref 8–16)
AST SERPL-CCNC: 21 U/L (ref 10–40)
BASOPHILS # BLD AUTO: 0.02 K/UL (ref 0–0.2)
BASOPHILS NFR BLD: 0.4 % (ref 0–1.9)
BILIRUB SERPL-MCNC: 0.6 MG/DL (ref 0.1–1)
BUN SERPL-MCNC: 19 MG/DL (ref 6–20)
CALCIUM SERPL-MCNC: 8.5 MG/DL (ref 8.7–10.5)
CHLORIDE SERPL-SCNC: 107 MMOL/L (ref 95–110)
CO2 SERPL-SCNC: 27 MMOL/L (ref 23–29)
CREAT SERPL-MCNC: 0.9 MG/DL (ref 0.5–1.4)
DIFFERENTIAL METHOD: ABNORMAL
EOSINOPHIL # BLD AUTO: 0 K/UL (ref 0–0.5)
EOSINOPHIL NFR BLD: 0.2 % (ref 0–8)
ERYTHROCYTE [DISTWIDTH] IN BLOOD BY AUTOMATED COUNT: 15 % (ref 11.5–14.5)
EST. GFR  (AFRICAN AMERICAN): >60 ML/MIN/1.73 M^2
EST. GFR  (NON AFRICAN AMERICAN): >60 ML/MIN/1.73 M^2
GLUCOSE SERPL-MCNC: 91 MG/DL (ref 70–110)
HCT VFR BLD AUTO: 29.2 % (ref 37–48.5)
HGB BLD-MCNC: 9.8 G/DL (ref 12–16)
IMM GRANULOCYTES # BLD AUTO: 0.02 K/UL (ref 0–0.04)
IMM GRANULOCYTES NFR BLD AUTO: 0.4 % (ref 0–0.5)
LYMPHOCYTES # BLD AUTO: 1.3 K/UL (ref 1–4.8)
LYMPHOCYTES NFR BLD: 24.2 % (ref 18–48)
MCH RBC QN AUTO: 34.3 PG (ref 27–31)
MCHC RBC AUTO-ENTMCNC: 33.6 G/DL (ref 32–36)
MCV RBC AUTO: 102 FL (ref 82–98)
MONOCYTES # BLD AUTO: 0.5 K/UL (ref 0.3–1)
MONOCYTES NFR BLD: 9.1 % (ref 4–15)
NEUTROPHILS # BLD AUTO: 3.5 K/UL (ref 1.8–7.7)
NEUTROPHILS NFR BLD: 65.7 % (ref 38–73)
NRBC BLD-RTO: 0 /100 WBC
PLATELET # BLD AUTO: 162 K/UL (ref 150–350)
PMV BLD AUTO: 8.5 FL (ref 9.2–12.9)
POTASSIUM SERPL-SCNC: 3.9 MMOL/L (ref 3.5–5.1)
PROT SERPL-MCNC: 6.6 G/DL (ref 6–8.4)
RBC # BLD AUTO: 2.86 M/UL (ref 4–5.4)
SODIUM SERPL-SCNC: 138 MMOL/L (ref 136–145)
WBC # BLD AUTO: 5.28 K/UL (ref 3.9–12.7)

## 2019-10-04 PROCEDURE — 85025 COMPLETE CBC W/AUTO DIFF WBC: CPT

## 2019-10-04 PROCEDURE — 88321 TISSUE SPECIMEN TO PATHOLOGY: ICD-10-PCS | Mod: ,,, | Performed by: PATHOLOGY

## 2019-10-04 PROCEDURE — 80053 COMPREHEN METABOLIC PANEL: CPT

## 2019-10-04 PROCEDURE — 88321 CONSLTJ&REPRT SLD PREP ELSWR: CPT | Mod: ,,, | Performed by: PATHOLOGY

## 2019-10-07 ENCOUNTER — TELEPHONE (OUTPATIENT)
Dept: SURGERY | Facility: CLINIC | Age: 39
End: 2019-10-07

## 2019-10-07 NOTE — PROGRESS NOTES
Patient was referred by Dr. Hernadez to discuss ROSI flap reconstructive options.  She has a history of carcinoma of the left breast and completed neoadjuvant chemotherapy approximately 3 weeks ago.  She wishes to have a tissue reconstruction in as few steps as possible, preferably immediately.  I was present for discussion of her case at tumor board, and it was determined that there is a strong possibility that she will receive radiation post operatively, based on the size of her tumor on mammography.  Dr. Garcia has discussed bilateral mastectomy with her.  She was previously treated by oncology staff, Dr. Chambers and breast surgery staff, Dr. Rey of the [facility].      She was previously on eliquis for a catheter, which had become dislodged into one of her pulmonary arteries.  Per Dr. Garcia and the patient, her Mediport was removed at [place] last week.      Dr. Contreras on the [facility] may be offering hysterectomy and oopherectomy in the future.      On examination, she is well developed and in no acute distress. She has grade 3 ptosis of bilateral breasts with a low breast footprint.  She has intrabdominal adiposity in addition to thick skin pinch in her abdomen.  She has no obvious hernias and appears to have an umbilical hernia.  She has adequate donor sites for a ROSI flap.      I discussed reconstructive options with her, including immediate tissue reconstruction with a free tissue transfer, expander/implant reconstruction with her, and delayed reconstructive options with her.  Risks, benefits, and alternatives of each were discussed in great detail.  Given the uncertainty surrounding her radiation therapy plan at the time of mastectomy and her preference for a tissue reconstruction, I explained that my preference would be to delay her reconstruction.  Placement of a bridging tissue expander would in my opinion provide little aesthetic advantage and would require that she follow up for expansions  and drain management before initiation of radiation therapy.  I did discuss that post mastectomy radiation therapy of an expander or implant construct would result in a higher than usual risk of expander infection or reconstructive failure.      I explained that given the large amount of breast tissue that will likely remain after mastectomy, I could offer a skin reduction procedure at the time of mastectomy.  In the setting of post mastectomy radiation, I would delay ROSI flap reconstruction for 3-6 months.  If she ultimately does not require post mastectomy radiation, I would proceed with ROSI flap transfer sooner.  My preference would be to perform the ROSI flap after she has risk reducing GYN procedures, to avoid fascial incisions after abdominal flap harvest.    Medical history is otherwise as per medical intake forms     CPT 85816, skin reduction at time of mastectomy  Added on to Dr. Garcia's Or schedule for next week.    Will obtain consent on the day of surgery.      60 minutes was spend with patient, more than 50% was spent explaining the reconstructive options, nature of procedures, or answering questions, coordinating care related to her upcoming reconstructive surgeries.      Plastic & Reconstructive Surgery  [facility]  c/o Howard Cheatham M.D.  [facility]  [address] Floor [address]  [address] [place] [address]    Work [phone]  Toll free [phone]  If no answer [phone]

## 2019-10-07 NOTE — TELEPHONE ENCOUNTER
Spoke with pt regarding surgery, pt advised to arrive to [facility] at 1100 for 1300 surgery, pt verbalized understanding, pre-op education reinforced, all questions answered at this time, pt given reassurance

## 2019-10-08 ENCOUNTER — TELEPHONE (OUTPATIENT)
Dept: SURGERY | Facility: CLINIC | Age: 39
End: 2019-10-08

## 2019-10-08 ENCOUNTER — HOSPITAL ENCOUNTER (OUTPATIENT)
Dept: RADIOLOGY | Facility: OTHER | Age: 39
Discharge: HOME | DRG: 581 | End: 2019-10-08
Attending: SURGERY | Admitting: SURGERY
Payer: MEDICAID

## 2019-10-08 ENCOUNTER — ANESTHESIA (OUTPATIENT)
Dept: SURGERY | Facility: OTHER | Age: 39
DRG: 581 | End: 2019-10-08
Payer: MEDICAID

## 2019-10-08 ENCOUNTER — HOSPITAL ENCOUNTER (OUTPATIENT)
Facility: OTHER | Age: 39
Discharge: HOME | DRG: 581 | End: 2019-10-09
Attending: SURGERY | Admitting: SURGERY
Payer: MEDICAID

## 2019-10-08 DIAGNOSIS — Z85.3 HISTORY OF LEFT BREAST CANCER: Primary | ICD-10-CM

## 2019-10-08 DIAGNOSIS — C50.812 MALIGNANT NEOPLASM OF OVERLAPPING SITES OF LEFT BREAST IN FEMALE, ESTROGEN RECEPTOR POSITIVE: ICD-10-CM

## 2019-10-08 DIAGNOSIS — Z17.0 MALIGNANT NEOPLASM OF OVERLAPPING SITES OF LEFT BREAST IN FEMALE, ESTROGEN RECEPTOR POSITIVE: ICD-10-CM

## 2019-10-08 LAB
B-HCG UR QL: NEGATIVE
CTP QC/QA: YES

## 2019-10-08 PROCEDURE — 25000003 PHARM REV CODE 250: Performed by: NURSE ANESTHETIST, CERTIFIED REGISTERED

## 2019-10-08 PROCEDURE — 25000003 PHARM REV CODE 250: Performed by: SURGERY

## 2019-10-08 PROCEDURE — 00404 ANES INTEG SYS RAD/MODF BRST: CPT | Performed by: SURGERY

## 2019-10-08 PROCEDURE — 37000009 HC ANESTHESIA EA ADD 15 MINS: Performed by: SURGERY

## 2019-10-08 PROCEDURE — C1729 CATH, DRAINAGE: HCPCS | Performed by: SURGERY

## 2019-10-08 PROCEDURE — 88307 TISSUE EXAM BY PATHOLOGIST: CPT | Mod: 26,,, | Performed by: PATHOLOGY

## 2019-10-08 PROCEDURE — 63600175 PHARM REV CODE 636 W HCPCS: Performed by: ANESTHESIOLOGY

## 2019-10-08 PROCEDURE — 19303 MAST SIMPLE COMPLETE: CPT | Mod: 59,RT,, | Performed by: SURGERY

## 2019-10-08 PROCEDURE — 27000221 HC OXYGEN, UP TO 24 HOURS

## 2019-10-08 PROCEDURE — 37000008 HC ANESTHESIA 1ST 15 MINUTES: Performed by: SURGERY

## 2019-10-08 PROCEDURE — 88342 IMHCHEM/IMCYTCHM 1ST ANTB: CPT | Mod: 59 | Performed by: PATHOLOGY

## 2019-10-08 PROCEDURE — 25000003 PHARM REV CODE 250: Performed by: ANESTHESIOLOGY

## 2019-10-08 PROCEDURE — 19303 PR MASTECTOMY, SIMPLE, COMPLETE: ICD-10-PCS | Mod: 59,RT,, | Performed by: SURGERY

## 2019-10-08 PROCEDURE — 27201423 OPTIME MED/SURG SUP & DEVICES STERILE SUPPLY: Performed by: SURGERY

## 2019-10-08 PROCEDURE — 63600175 PHARM REV CODE 636 W HCPCS: Performed by: SURGERY

## 2019-10-08 PROCEDURE — 94761 N-INVAS EAR/PLS OXIMETRY MLT: CPT

## 2019-10-08 PROCEDURE — 63600175 PHARM REV CODE 636 W HCPCS: Mod: JG | Performed by: SURGERY

## 2019-10-08 PROCEDURE — 88331 PATH CONSLTJ SURG 1 BLK 1SPC: CPT | Mod: 26,,, | Performed by: PATHOLOGY

## 2019-10-08 PROCEDURE — 71000039 HC RECOVERY, EACH ADD'L HOUR: Performed by: SURGERY

## 2019-10-08 PROCEDURE — 81025 URINE PREGNANCY TEST: CPT | Performed by: ANESTHESIOLOGY

## 2019-10-08 PROCEDURE — 63600175 PHARM REV CODE 636 W HCPCS: Performed by: NURSE ANESTHETIST, CERTIFIED REGISTERED

## 2019-10-08 PROCEDURE — 19307 MAST MOD RAD: CPT | Mod: LT,,, | Performed by: SURGERY

## 2019-10-08 PROCEDURE — 88307 TISSUE EXAM BY PATHOLOGIST: CPT | Performed by: PATHOLOGY

## 2019-10-08 PROCEDURE — 19307 PR MASTECTOMY, MODIFIED RADICAL: ICD-10-PCS | Mod: LT,,, | Performed by: SURGERY

## 2019-10-08 PROCEDURE — A9520 TC99 TILMANOCEPT DIAG 0.5MCI: HCPCS

## 2019-10-08 PROCEDURE — 88360 TISSUE SPECIMEN TO PATHOLOGY - SURGERY: ICD-10-PCS | Mod: 26,,, | Performed by: PATHOLOGY

## 2019-10-08 PROCEDURE — 88307 TISSUE SPECIMEN TO PATHOLOGY - SURGERY: ICD-10-PCS | Mod: 26,,, | Performed by: PATHOLOGY

## 2019-10-08 PROCEDURE — 88341 IMHCHEM/IMCYTCHM EA ADD ANTB: CPT | Mod: 26,59,, | Performed by: PATHOLOGY

## 2019-10-08 PROCEDURE — 88342 IMHCHEM/IMCYTCHM 1ST ANTB: CPT | Mod: 26,59,, | Performed by: PATHOLOGY

## 2019-10-08 PROCEDURE — 88342 TISSUE SPECIMEN TO PATHOLOGY - SURGERY: ICD-10-PCS | Mod: 26,59,, | Performed by: PATHOLOGY

## 2019-10-08 PROCEDURE — 88341 PR IHC OR ICC EACH ADD'L SINGLE ANTIBODY  STAINPR: ICD-10-PCS | Mod: 26,59,, | Performed by: PATHOLOGY

## 2019-10-08 PROCEDURE — 71000033 HC RECOVERY, INTIAL HOUR: Performed by: SURGERY

## 2019-10-08 PROCEDURE — 88331 TISSUE SPECIMEN TO PATHOLOGY - SURGERY: ICD-10-PCS | Mod: 26,,, | Performed by: PATHOLOGY

## 2019-10-08 PROCEDURE — 88360 TUMOR IMMUNOHISTOCHEM/MANUAL: CPT | Mod: 26,,, | Performed by: PATHOLOGY

## 2019-10-08 PROCEDURE — 19366 PR BREAST RECONSTRUC W OTHR TECHNIQ: ICD-10-PCS | Mod: 50,,, | Performed by: SURGERY

## 2019-10-08 PROCEDURE — 36000707: Performed by: SURGERY

## 2019-10-08 PROCEDURE — 11000001 HC ACUTE MED/SURG PRIVATE ROOM

## 2019-10-08 PROCEDURE — 36000706: Performed by: SURGERY

## 2019-10-08 PROCEDURE — 19366 PR BREAST RECONSTRUC W OTHR TECHNIQ: CPT | Mod: 50,,, | Performed by: SURGERY

## 2019-10-08 RX ORDER — CEFAZOLIN SODIUM 2 G/50ML
2 SOLUTION INTRAVENOUS
Status: DISCONTINUED | OUTPATIENT
Start: 2019-10-08 | End: 2019-10-09 | Stop reason: HOSPADM

## 2019-10-08 RX ORDER — DIPHENHYDRAMINE HYDROCHLORIDE 50 MG/ML
25 INJECTION INTRAMUSCULAR; INTRAVENOUS EVERY 6 HOURS PRN
Status: DISCONTINUED | OUTPATIENT
Start: 2019-10-08 | End: 2019-10-08 | Stop reason: HOSPADM

## 2019-10-08 RX ORDER — MUPIROCIN 20 MG/G
1 OINTMENT TOPICAL 2 TIMES DAILY
Status: DISCONTINUED | OUTPATIENT
Start: 2019-10-08 | End: 2019-10-09 | Stop reason: HOSPADM

## 2019-10-08 RX ORDER — DIPHENHYDRAMINE HCL 25 MG
25 CAPSULE ORAL EVERY 4 HOURS PRN
Status: DISCONTINUED | OUTPATIENT
Start: 2019-10-08 | End: 2019-10-09 | Stop reason: HOSPADM

## 2019-10-08 RX ORDER — PREGABALIN 75 MG/1
75 CAPSULE ORAL ONCE
Status: COMPLETED | OUTPATIENT
Start: 2019-10-08 | End: 2019-10-08

## 2019-10-08 RX ORDER — LIDOCAINE HYDROCHLORIDE 10 MG/ML
1 INJECTION, SOLUTION EPIDURAL; INFILTRATION; INTRACAUDAL; PERINEURAL ONCE
Status: DISCONTINUED | OUTPATIENT
Start: 2019-10-08 | End: 2019-10-08 | Stop reason: HOSPADM

## 2019-10-08 RX ORDER — ONDANSETRON HYDROCHLORIDE 2 MG/ML
INJECTION, SOLUTION INTRAMUSCULAR; INTRAVENOUS
Status: DISCONTINUED | OUTPATIENT
Start: 2019-10-08 | End: 2019-10-08

## 2019-10-08 RX ORDER — ONDANSETRON 2 MG/ML
4 INJECTION INTRAMUSCULAR; INTRAVENOUS EVERY 6 HOURS PRN
Status: DISCONTINUED | OUTPATIENT
Start: 2019-10-08 | End: 2019-10-09 | Stop reason: HOSPADM

## 2019-10-08 RX ORDER — AMOXICILLIN 250 MG
1 CAPSULE ORAL 2 TIMES DAILY
Status: DISCONTINUED | OUTPATIENT
Start: 2019-10-08 | End: 2019-10-09 | Stop reason: HOSPADM

## 2019-10-08 RX ORDER — POLYETHYLENE GLYCOL 3350 17 G/17G
17 POWDER, FOR SOLUTION ORAL DAILY
Status: DISCONTINUED | OUTPATIENT
Start: 2019-10-09 | End: 2019-10-09 | Stop reason: HOSPADM

## 2019-10-08 RX ORDER — SUMATRIPTAN 50 MG/1
50 TABLET, FILM COATED ORAL EVERY 4 HOURS PRN
Status: DISCONTINUED | OUTPATIENT
Start: 2019-10-08 | End: 2019-10-09 | Stop reason: HOSPADM

## 2019-10-08 RX ORDER — HYDROCODONE BITARTRATE AND ACETAMINOPHEN 10; 325 MG/1; MG/1
1 TABLET ORAL EVERY 4 HOURS PRN
Status: DISCONTINUED | OUTPATIENT
Start: 2019-10-08 | End: 2019-10-09 | Stop reason: HOSPADM

## 2019-10-08 RX ORDER — HYDROCODONE BITARTRATE AND ACETAMINOPHEN 5; 325 MG/1; MG/1
1 TABLET ORAL EVERY 4 HOURS PRN
Status: DISCONTINUED | OUTPATIENT
Start: 2019-10-08 | End: 2019-10-09 | Stop reason: HOSPADM

## 2019-10-08 RX ORDER — ONDANSETRON 8 MG/1
8 TABLET, ORALLY DISINTEGRATING ORAL EVERY 8 HOURS PRN
Status: DISCONTINUED | OUTPATIENT
Start: 2019-10-08 | End: 2019-10-09 | Stop reason: HOSPADM

## 2019-10-08 RX ORDER — DIAZEPAM 5 MG/1
5 TABLET ORAL EVERY 6 HOURS PRN
Status: DISCONTINUED | OUTPATIENT
Start: 2019-10-08 | End: 2019-10-09 | Stop reason: HOSPADM

## 2019-10-08 RX ORDER — PROPOFOL 10 MG/ML
VIAL (ML) INTRAVENOUS
Status: DISCONTINUED | OUTPATIENT
Start: 2019-10-08 | End: 2019-10-08

## 2019-10-08 RX ORDER — GLYCOPYRROLATE 0.2 MG/ML
INJECTION INTRAMUSCULAR; INTRAVENOUS
Status: DISCONTINUED | OUTPATIENT
Start: 2019-10-08 | End: 2019-10-08

## 2019-10-08 RX ORDER — MIDAZOLAM HYDROCHLORIDE 1 MG/ML
INJECTION, SOLUTION INTRAMUSCULAR; INTRAVENOUS
Status: DISCONTINUED | OUTPATIENT
Start: 2019-10-08 | End: 2019-10-08

## 2019-10-08 RX ORDER — LIDOCAINE HCL/PF 100 MG/5ML
SYRINGE (ML) INTRAVENOUS
Status: DISCONTINUED | OUTPATIENT
Start: 2019-10-08 | End: 2019-10-08

## 2019-10-08 RX ORDER — SODIUM CHLORIDE 0.9 % (FLUSH) 0.9 %
3 SYRINGE (ML) INJECTION
Status: DISCONTINUED | OUTPATIENT
Start: 2019-10-08 | End: 2019-10-09 | Stop reason: HOSPADM

## 2019-10-08 RX ORDER — DEXAMETHASONE SODIUM PHOSPHATE 4 MG/ML
INJECTION, SOLUTION INTRA-ARTICULAR; INTRALESIONAL; INTRAMUSCULAR; INTRAVENOUS; SOFT TISSUE
Status: DISCONTINUED | OUTPATIENT
Start: 2019-10-08 | End: 2019-10-08

## 2019-10-08 RX ORDER — NEOSTIGMINE METHYLSULFATE 1 MG/ML
INJECTION, SOLUTION INTRAVENOUS
Status: DISCONTINUED | OUTPATIENT
Start: 2019-10-08 | End: 2019-10-08

## 2019-10-08 RX ORDER — MEPERIDINE HYDROCHLORIDE 25 MG/ML
12.5 INJECTION INTRAMUSCULAR; INTRAVENOUS; SUBCUTANEOUS ONCE AS NEEDED
Status: DISCONTINUED | OUTPATIENT
Start: 2019-10-08 | End: 2019-10-08 | Stop reason: HOSPADM

## 2019-10-08 RX ORDER — LIDOCAINE HYDROCHLORIDE 10 MG/ML
0.5 INJECTION, SOLUTION EPIDURAL; INFILTRATION; INTRACAUDAL; PERINEURAL ONCE
Status: DISCONTINUED | OUTPATIENT
Start: 2019-10-08 | End: 2019-10-08 | Stop reason: HOSPADM

## 2019-10-08 RX ORDER — SODIUM CHLORIDE 0.9 % (FLUSH) 0.9 %
10 SYRINGE (ML) INJECTION
Status: DISCONTINUED | OUTPATIENT
Start: 2019-10-08 | End: 2019-10-08 | Stop reason: HOSPADM

## 2019-10-08 RX ORDER — CEFAZOLIN SODIUM 1 G/3ML
2 INJECTION, POWDER, FOR SOLUTION INTRAMUSCULAR; INTRAVENOUS
Status: DISCONTINUED | OUTPATIENT
Start: 2019-10-08 | End: 2019-10-08 | Stop reason: SDUPTHER

## 2019-10-08 RX ORDER — MUPIROCIN 20 MG/G
OINTMENT TOPICAL
Status: DISCONTINUED | OUTPATIENT
Start: 2019-10-08 | End: 2019-10-08 | Stop reason: HOSPADM

## 2019-10-08 RX ORDER — FENTANYL CITRATE 50 UG/ML
INJECTION, SOLUTION INTRAMUSCULAR; INTRAVENOUS
Status: DISCONTINUED | OUTPATIENT
Start: 2019-10-08 | End: 2019-10-08

## 2019-10-08 RX ORDER — SODIUM CHLORIDE, SODIUM LACTATE, POTASSIUM CHLORIDE, CALCIUM CHLORIDE 600; 310; 30; 20 MG/100ML; MG/100ML; MG/100ML; MG/100ML
INJECTION, SOLUTION INTRAVENOUS CONTINUOUS
Status: DISCONTINUED | OUTPATIENT
Start: 2019-10-08 | End: 2019-10-09 | Stop reason: HOSPADM

## 2019-10-08 RX ORDER — HEPARIN SODIUM 5000 [USP'U]/ML
5000 INJECTION, SOLUTION INTRAVENOUS; SUBCUTANEOUS ONCE
Status: COMPLETED | OUTPATIENT
Start: 2019-10-08 | End: 2019-10-08

## 2019-10-08 RX ORDER — ROCURONIUM BROMIDE 10 MG/ML
INJECTION, SOLUTION INTRAVENOUS
Status: DISCONTINUED | OUTPATIENT
Start: 2019-10-08 | End: 2019-10-08

## 2019-10-08 RX ORDER — OXYCODONE HYDROCHLORIDE 5 MG/1
5 TABLET ORAL
Status: DISCONTINUED | OUTPATIENT
Start: 2019-10-08 | End: 2019-10-08 | Stop reason: HOSPADM

## 2019-10-08 RX ORDER — CEFAZOLIN SODIUM 2 G/50ML
2 SOLUTION INTRAVENOUS
Status: DISCONTINUED | OUTPATIENT
Start: 2019-10-08 | End: 2019-10-08

## 2019-10-08 RX ORDER — FLUCONAZOLE 100 MG/1
100 TABLET ORAL DAILY
Status: DISCONTINUED | OUTPATIENT
Start: 2019-10-09 | End: 2019-10-09 | Stop reason: HOSPADM

## 2019-10-08 RX ORDER — ACETAMINOPHEN 500 MG
1000 TABLET ORAL
Status: COMPLETED | OUTPATIENT
Start: 2019-10-08 | End: 2019-10-08

## 2019-10-08 RX ORDER — ONDANSETRON 2 MG/ML
4 INJECTION INTRAMUSCULAR; INTRAVENOUS DAILY PRN
Status: DISCONTINUED | OUTPATIENT
Start: 2019-10-08 | End: 2019-10-08 | Stop reason: HOSPADM

## 2019-10-08 RX ORDER — HYDROMORPHONE HYDROCHLORIDE 2 MG/ML
0.4 INJECTION, SOLUTION INTRAMUSCULAR; INTRAVENOUS; SUBCUTANEOUS EVERY 5 MIN PRN
Status: DISCONTINUED | OUTPATIENT
Start: 2019-10-08 | End: 2019-10-08 | Stop reason: HOSPADM

## 2019-10-08 RX ORDER — ISOSULFAN BLUE 50 MG/5ML
INJECTION, SOLUTION SUBCUTANEOUS
Status: DISCONTINUED | OUTPATIENT
Start: 2019-10-08 | End: 2019-10-08 | Stop reason: HOSPADM

## 2019-10-08 RX ORDER — FAMOTIDINE 20 MG/1
20 TABLET, FILM COATED ORAL
Status: COMPLETED | OUTPATIENT
Start: 2019-10-08 | End: 2019-10-08

## 2019-10-08 RX ADMIN — PROMETHAZINE HYDROCHLORIDE 6.25 MG: 25 INJECTION INTRAMUSCULAR; INTRAVENOUS at 07:10

## 2019-10-08 RX ADMIN — MIDAZOLAM 2 MG: 1 INJECTION INTRAMUSCULAR; INTRAVENOUS at 01:10

## 2019-10-08 RX ADMIN — FAMOTIDINE 20 MG: 20 TABLET ORAL at 12:10

## 2019-10-08 RX ADMIN — SODIUM CHLORIDE, SODIUM LACTATE, POTASSIUM CHLORIDE, AND CALCIUM CHLORIDE: 600; 310; 30; 20 INJECTION, SOLUTION INTRAVENOUS at 04:10

## 2019-10-08 RX ADMIN — MUPIROCIN 1 G: 20 OINTMENT TOPICAL at 09:10

## 2019-10-08 RX ADMIN — SODIUM CHLORIDE, SODIUM LACTATE, POTASSIUM CHLORIDE, AND CALCIUM CHLORIDE: 600; 310; 30; 20 INJECTION, SOLUTION INTRAVENOUS at 02:10

## 2019-10-08 RX ADMIN — FENTANYL CITRATE 50 MCG: 50 INJECTION, SOLUTION INTRAMUSCULAR; INTRAVENOUS at 01:10

## 2019-10-08 RX ADMIN — ACETAMINOPHEN 1000 MG: 500 TABLET, FILM COATED ORAL at 12:10

## 2019-10-08 RX ADMIN — NEOSTIGMINE METHYLSULFATE 5 MG: 1 INJECTION INTRAVENOUS at 04:10

## 2019-10-08 RX ADMIN — CARBOXYMETHYLCELLULOSE SODIUM 2 DROP: 2.5 SOLUTION/ DROPS OPHTHALMIC at 01:10

## 2019-10-08 RX ADMIN — FENTANYL CITRATE 100 MCG: 50 INJECTION, SOLUTION INTRAMUSCULAR; INTRAVENOUS at 01:10

## 2019-10-08 RX ADMIN — DEXAMETHASONE SODIUM PHOSPHATE 8 MG: 4 INJECTION, SOLUTION INTRAMUSCULAR; INTRAVENOUS at 04:10

## 2019-10-08 RX ADMIN — PROPOFOL 200 MG: 10 INJECTION, EMULSION INTRAVENOUS at 01:10

## 2019-10-08 RX ADMIN — HYDROCODONE BITARTRATE AND ACETAMINOPHEN 1 TABLET: 10; 325 TABLET ORAL at 10:10

## 2019-10-08 RX ADMIN — SENNOSIDES,DOCUSATE SODIUM 1 TABLET: 8.6; 5 TABLET, FILM COATED ORAL at 09:10

## 2019-10-08 RX ADMIN — GLYCOPYRROLATE 0.8 MG: 0.2 INJECTION, SOLUTION INTRAMUSCULAR; INTRAVENOUS at 04:10

## 2019-10-08 RX ADMIN — ROCURONIUM BROMIDE 40 MG: 10 INJECTION, SOLUTION INTRAVENOUS at 01:10

## 2019-10-08 RX ADMIN — LIDOCAINE HYDROCHLORIDE 60 MG: 20 INJECTION, SOLUTION INTRAVENOUS at 01:10

## 2019-10-08 RX ADMIN — CEFAZOLIN SODIUM 2 G: 2 SOLUTION INTRAVENOUS at 09:10

## 2019-10-08 RX ADMIN — PREGABALIN 75 MG: 75 CAPSULE ORAL at 12:10

## 2019-10-08 RX ADMIN — FENTANYL CITRATE 50 MCG: 50 INJECTION, SOLUTION INTRAMUSCULAR; INTRAVENOUS at 04:10

## 2019-10-08 RX ADMIN — CEFAZOLIN 2 G: 330 INJECTION, POWDER, FOR SOLUTION INTRAMUSCULAR; INTRAVENOUS at 01:10

## 2019-10-08 RX ADMIN — SODIUM CHLORIDE, SODIUM LACTATE, POTASSIUM CHLORIDE, AND CALCIUM CHLORIDE: .6; .31; .03; .02 INJECTION, SOLUTION INTRAVENOUS at 07:10

## 2019-10-08 RX ADMIN — HYDROMORPHONE HYDROCHLORIDE 0.4 MG: 2 INJECTION INTRAMUSCULAR; INTRAVENOUS; SUBCUTANEOUS at 05:10

## 2019-10-08 RX ADMIN — SODIUM CHLORIDE, SODIUM LACTATE, POTASSIUM CHLORIDE, AND CALCIUM CHLORIDE: 600; 310; 30; 20 INJECTION, SOLUTION INTRAVENOUS at 12:10

## 2019-10-08 RX ADMIN — HEPARIN SODIUM 5000 UNITS: 5000 INJECTION, SOLUTION INTRAVENOUS; SUBCUTANEOUS at 12:10

## 2019-10-08 RX ADMIN — ONDANSETRON 4 MG: 2 INJECTION, SOLUTION INTRAMUSCULAR; INTRAVENOUS at 04:10

## 2019-10-08 NOTE — INTERVAL H&P NOTE
The patient has been examined and the H&P has been reviewed:    I concur with the findings and no changes have occurred since H&P was written.    Anesthesia/Surgery risks, benefits and alternative options discussed and understood by patient/family.          Active Hospital Problems    Diagnosis  POA    History of left breast cancer [Z85.3]  Not Applicable      Resolved Hospital Problems   No resolved problems to display.

## 2019-10-08 NOTE — OP NOTE
[facility]  Plastic Surgery  Operative Note    SUMMARY     Date of Procedure: 10/8/2019     Procedure: Procedure(s) (LRB):  MASTECTOMY, SIMPLE BILATERAL (CONSENT AM OF) 4.0 hr case (Bilateral)  BIOPSY, LYMPH NODE, SENTINEL LEFT (Left)  RECONSTRUCTION, BREAST skin reduction after mastectomy (Bilateral)     Surgeon(s) and Role:  Panel 1:     * Rebecca Garcia MD - Primary  Panel 2:     * Howard Cheatham MD - Primary    Pre-Operative Diagnosis:   Malignant neoplasm of overlapping sites of left breast in female, estrogen receptor positive [C50.812, Z17.0]  Bilateral breast ptosis    Post-Operative Diagnosis:   Same    Anesthesia:   General    Indications:   38 year old female with left breast cancer who presents for bilateral mastectomy.  I discussed skin reduction with her as a bridge to her definitive reduction, to avoid wrinkling of the skin during any adjuvant treatments.  Her goal was to be smaller and more ptotic in the end and to have a tissue reconstruction.  Risks, benefits, and alternatives were discussed.  All of her questions were answered.  She was marked in the preoperative holding area.  Wise pattern markings were placed.  Appropriate limbs were marked for skin reduction.      Procedure:  She was transferred to the operating room and placed in a supine position.  General anesthesia was induced.  An appropriate time out was performed.  Appropriate antibiotics were dosed.  Subcutaneous heparin was administered in the preoperative area.  She was draped in standard fashion.    Refer to Dr. Garcia's note for details related to the mastectomy.  Skin sparing mastectomies were performed.  Inferiorly pedicled limbs of mastectomy tissue were de-epithelialized within the wise pattern markings.  Autoderm was used to bolster the t point closure of the wise pattern.  Health of  The skin flaps were confirmed clinically as they were of adequate thickness and had bleeding dermal edges.      Hemostasis  was obtained in both mastectomy beds with a combination of warm irrigation, hemoclip ligation of vessels, suture ligation of pectoralis muscle bleeders, surgicell in the axilla, and surgiflo over the chest wall.  Meticulous hemostasis was obtained.  Two 15 Angolan drains were placed in each wound, the lateral drains were tunneled into the lateral aspect of the wound bed.  Normotension was confirmed at the time of closure compared to preoperative baselines.       Beth patterns were approximated bilaterally with buried 3-0 monocryl with good approximation.  The skin was approximated with running 3-0 monocryl suture with good apprxoimation.  Drains were secured with 3-0 silk suture.  They were placed to bulb suction.      The wounds were dressed with prineo.  The axillae were bolstered with abd pads and appropriate sized surgical bra.  All needle and sponge counts were correct at the time of closure.      She awoke without event and was transferred to the floor.  She will be monitored for pain control and to rule out and risk of bleeding event post mastectomy.  Her family was updated at the end of the procedure.           Complications: No    Estimated Blood Loss (EBL): 200 mL           Implants: * No implants in log *    Specimens:   Specimen (12h ago, onward)     Start     Ordered    10/08/19 1600  Specimen to Pathology - Surgery  Once     Comments:  Pre-op Diagnosis: Malignant neoplasm of overlapping sites of left breast in female, estrogen receptor positive [C50.812, Z17.0]Procedure(s):MASTECTOMY, SIMPLE BILATERAL (CONSENT AM OF) 4.0 hr caseBIOPSY, LYMPH NODE, SENTINEL LEFTRECONSTRUCTION, BREAST skin reduction after mastectomy Number of specimens: 3Name of specimens: 1. RIGHT BREAST2. LEFT BREAST.3. AXILLARY NODE CONTENTS      10/08/19 1611    10/08/19 1541  Specimen to Pathology - Surgery  Once     Comments:  Pre-op Diagnosis: Malignant neoplasm of overlapping sites of left breast in female, estrogen receptor  positive [C50.812, Z17.0]Procedure(s):MASTECTOMY, SIMPLE BILATERAL (CONSENT AM OF) 4.0 hr caseBIOPSY, LYMPH NODE, SENTINEL LEFTRECONSTRUCTION, BREAST skin reduction after mastectomy Number of specimens: 1Name of specimens: Left palpable lymph node      10/08/19 1541    10/08/19 1522  Specimen to Pathology - Surgery  Once     Comments:  Pre-op Diagnosis: Malignant neoplasm of overlapping sites of left breast in female, estrogen receptor positive [C50.812, Z17.0]Procedure(s):MASTECTOMY, SIMPLE BILATERAL (CONSENT AM OF) 4.0 hr caseBIOPSY, LYMPH NODE, SENTINEL LEFTRECONSTRUCTION, BREAST skin reduction after mastectomy Number of specimens: 1Name of specimens: 1. RIGHT BREAST      10/08/19 1522                        Condition: Good    Disposition: PACU - hemodynamically stable.    Attestation: I was present and scrubbed for the entire procedure.

## 2019-10-08 NOTE — TRANSFER OF CARE
"Anesthesia Transfer of Care Note    Patient: Sheridan Todd    Procedure(s) Performed: Procedure(s) (LRB):  MASTECTOMY, SIMPLE BILATERAL (CONSENT AM OF) 4.0 hr case (Bilateral)  BIOPSY, LYMPH NODE, SENTINEL LEFT (Left)  RECONSTRUCTION, BREAST skin reduction after mastectomy (Bilateral)    Patient location: PACU    Anesthesia Type: general    Transport from OR: Transported from OR on room air with adequate spontaneous ventilation    Post pain: adequate analgesia    Post assessment: no apparent anesthetic complications and tolerated procedure well    Post vital signs: stable    Level of consciousness: awake, alert and oriented    Nausea/Vomiting: no nausea/vomiting    Complications: none    Transfer of care protocol was followed      Last vitals:   Visit Vitals  /86 (BP Location: Right arm, Patient Position: Sitting)   Pulse 69   Temp 36.8 °C (98.2 °F) (Oral)   Resp 16   Ht 5' 4" (1.626 m)   Wt 83.9 kg (185 lb)   SpO2 99%   Breastfeeding? No   BMI 31.75 kg/m²     "

## 2019-10-08 NOTE — PROGRESS NOTES
New Breast Cancer  History and Physical  [facility]  Department of Surgery    REFERRING PROVIDER: Howard Cheatham MD  [address] [place] [address]    CHIEF COMPLAINT: left breast cancer    Subjective:      Sheridan Todd is a 38 y.o. premenopausal female referred for evaluation of carcinoma of the left breast.  Patient is s/p neoadjuvant chemotherapy for clinical T2-3N0 left breast IDC, ER+SC+Her3+.  Completed TCHP x 6 cycles 2019.      She noted some changes to the left breast with nipple retraction initially when diagnosed in 2019.  Started chemo 2019.    Genetics through inv[facility]ae BRCA1/2 only negative.    Right breast biopsy fibroadenoma.  Left breast triple positive IDC. No abnormal nodes bilaterally on MRI preop.    MRI in August showed significant response after 4 cycles.      FAMILY History:  Ovarian cancer in the family    Past Medical History:   Diagnosis Date    Abnormal Pap smear of cervix     Chemotherapy induced neutropenia 2019    Fibromyalgia     Gestational diabetes     HER2-positive carcinoma of left breast 2019    lt    Hypertension     AFTER PREGNANCY- NO MED NOW    IC (interstitial cystitis)     Malignant neoplasm of overlapping sites of left breast in female, estrogen receptor positive 2019    Malignant neoplasm of overlapping sites of left female breast 2019    Overweight and obesity(278.0)     Sarcoidosis     Sinusitis      Past Surgical History:   Procedure Laterality Date     SECTION  08/15/2016    Twins     CRYOTHERAPY      CYSTOSCOPY WITH BIOPSY OF BLADDER      x 2    PAROTID TUMOR      LEFT    PORTACATH PLACEMENT  2019    , removed bc cathetar severed from port, removed & relpaced with new powerport    PORTACATH PLACEMENT Right 2019    Power port by Dr. Hilton    VAGINAL DELIVERY       x2 PROM. Preclampsia/ gestational diabetes    WISDOM TOOTH EXTRACTION       No  current facility-administered medications on file prior to visit.      Current Outpatient Medications on File Prior to Visit   Medication Sig Dispense Refill    butalbital-acetaminophen-caff -40 mg Cap Take 1 capsule by mouth every 4 (four) hours as needed.  0    CARAFATE 100 mg/mL suspension TAKE 10 ML BY MOUTH EVERY 4 TO 6 HOURS  3    diphenoxylate-atropine 2.5-0.025 mg (LOMOTIL) 2.5-0.025 mg per tablet TAKE 1 TABLET BY MOUTH WITH EVERY LOOSE STOOL (NO MORE THAN 8 TABLETS IN 24 HOUR)  0    mupirocin (BACTROBAN) 2 % ointment APPLY OINTMENT EXTERNALLY TO AFFECTED AREA THREE TIMES DAILY  2    ondansetron (ZOFRAN) 8 MG tablet Take 1 tablet (8 mg total) by mouth every 8 (eight) hours as needed for Nausea. 30 tablet 2    phenazopyridine (PYRIDIUM) 200 MG tablet Take 200 mg by mouth every 6 (six) hours as needed.  1    promethazine (PHENERGAN) 25 MG tablet Take 1 tablet (25 mg total) by mouth every 6 (six) hours as needed for Nausea. 30 tablet 2     Social History     Socioeconomic History    Marital status:      Spouse name: Not on file    Number of children: Not on file    Years of education: Not on file    Highest education level: Not on file   Occupational History     Employer: [employer]   Social Needs    Financial resource strain: Not on file    Food insecurity:     Worry: Not on file     Inability: Not on file    Transportation needs:     Medical: Not on file     Non-medical: Not on file   Tobacco Use    Smoking status: Former Smoker     Packs/day: 1.00     Years: 6.50     Pack years: 6.50     Types: Cigarettes     Start date: 5/12/2012    Smokeless tobacco: Never Used    Tobacco comment: quit 2 years ago   Substance and Sexual Activity    Alcohol use: Yes     Comment: rare    Drug use: No    Sexual activity: Yes     Partners: Male   Lifestyle    Physical activity:     Days per week: Not on file     Minutes per session: Not on file    Stress: Not on file   Relationships     "Social connections:     Talks on phone: Not on file     Gets together: Not on file     Attends Ep[employer]alian service: Not on file     Active member of club or organization: Not on file     Attends meetings of clubs or organizations: Not on file     Relationship status: Not on file   Other Topics Concern    Not on file   Social History Narrative    Not on file     Family History   Problem Relation Age of Onset    Hypertension Mother     Hyperlipidemia Mother     Cancer Father         liver    Cancer Maternal Grandfather         throat    Kidney disease Maternal Grandfather     Ovarian cancer Other     Lung cancer Paternal Aunt     Breast cancer Neg Hx         Review of Systems  Pertinent noted above.     Objective:   PHYSICAL EXAM:  /80 (BP Location: Left arm, Patient Position: Sitting, BP Method: Medium (Automatic))   Pulse 75   Ht 5' 4" (1.626 m)   Wt 83.7 kg (184 lb 6.6 oz)   LMP 07/16/2019 (Approximate)   BMI 31.65 kg/m²     Physical Exam   Pulmonary/Chest: She exhibits no tenderness and no deformity. Right breast exhibits no inverted nipple, no mass, no nipple discharge, no skin change and no tenderness. Left breast exhibits no inverted nipple, no mass, no nipple discharge, no skin change and no tenderness.       Lymphadenopathy:     She has no cervical adenopathy.     She has no axillary adenopathy.        Right: No supraclavicular adenopathy present.        Left: No supraclavicular adenopathy present.         Radiology review: Images personally reviewed by me in the clinic.         Assessment:      Sheridan Todd is a 38 y.o. premenopausal female with recently diagnosed carcinoma of the left breast.      Plan:    Options for management were discussed with the patient and her family. We reviewed the existing data noting the equivalency of breast conserving surgery with radiation therapy and mastectomy. We also reviewed the guidelines of the National Comprehensive Cancer Network for " Stage 2 breast carcinoma. We discussed the need for lumpectomy margins to be negative for carcinoma, the necessity for postoperative radiation therapy after breast conservation in most cases, the possibility of a failed or false negative sentinel lymph node biopsy and the potential need for complete lymphadenectomy for a failed or positive sentinel lymph node biopsy were fully discussed. In the setting of mastectomy, delayed or immediate reconstruction options are available and were discussed.     Discussed at length surgical options.  Recommend mastectomy ont he left given the extent of disease on MRI and MMG with calcs spanning 6.8cm.  She desires bilateral. We discussed pros and cons of bilateral.  No survival benefit.  However for symmetry and future imaging with MRI and MMG she desires bilateral.  Will do SLNB at time of mastectomy.    I think she will require PMRT given extent on preop imaging however will wait for final pathology.      Patient was educated on breast cancer, receptors, wire localization lumpectomy, mastectomy, sentinel lymph node mapping and biopsy, axillary lymph node dissection, reconstruction, breast prosthesis with post-mastectomy bra and radiation therapy. Patient was given patient information binder including [facility] breast cancer treatment brochure.  All her questions were answered.    Total time spent with the patient: 60 minutes.  45 minutes of face to face consultation and 15 minutes of chart review and coordination of care.

## 2019-10-08 NOTE — TELEPHONE ENCOUNTER
----- Message from Francesca Monterroso MA sent at 10/8/2019 10:12 AM CDT -----  Contact: Self      ----- Message -----  From: Jono Gagnon, Patient Care Assistant  Sent: 10/7/2019   6:02 PM CDT  To: , #    Pt is returning a call to the office.    Pt can be reached at [phone]

## 2019-10-08 NOTE — TELEPHONE ENCOUNTER
Left VM with my direct contact information, patient advised to give a return call with any questions or concerns

## 2019-10-08 NOTE — ANESTHESIA POSTPROCEDURE EVALUATION
Anesthesia Post Evaluation    Patient: Sheridan Todd    Procedure(s) Performed: Procedure(s) (LRB):  MASTECTOMY, SIMPLE BILATERAL (CONSENT AM OF) 4.0 hr case (Bilateral)  BIOPSY, LYMPH NODE, SENTINEL LEFT (Left)  RECONSTRUCTION, BREAST skin reduction after mastectomy (Bilateral)    Final Anesthesia Type: general  Patient location during evaluation: PACU  Patient participation: Yes- Able to Participate  Level of consciousness: awake and alert  Post-procedure vital signs: reviewed and stable  Pain management: adequate  Airway patency: patent  PONV status at discharge: No PONV  Anesthetic complications: no      Cardiovascular status: blood pressure returned to baseline and stable  Respiratory status: unassisted, spontaneous ventilation and nasal cannula  Hydration status: euvolemic  Follow-up not needed.          Vitals Value Taken Time   /78 10/8/2019  6:14 PM   Temp 36.8 °C (98.2 °F) 10/8/2019  4:58 PM   Pulse 61 10/8/2019  6:19 PM   Resp 12 10/8/2019  5:00 PM   SpO2 100 % 10/8/2019  6:19 PM   Vitals shown include unvalidated device data.      No case tracking events are documented in the log.      Pain/Wesley Score: Pain Rating Prior to Med Admin: 8 (10/8/2019  5:28 PM)  Wesley Score: 8 (10/8/2019  4:58 PM)

## 2019-10-09 VITALS
RESPIRATION RATE: 16 BRPM | WEIGHT: 185 LBS | HEIGHT: 64 IN | SYSTOLIC BLOOD PRESSURE: 119 MMHG | TEMPERATURE: 99 F | DIASTOLIC BLOOD PRESSURE: 60 MMHG | BODY MASS INDEX: 31.58 KG/M2 | HEART RATE: 90 BPM | OXYGEN SATURATION: 89 %

## 2019-10-09 LAB
ALBUMIN SERPL BCP-MCNC: 2.8 G/DL (ref 3.5–5.2)
ALP SERPL-CCNC: 52 U/L (ref 55–135)
ALT SERPL W/O P-5'-P-CCNC: 13 U/L (ref 10–44)
ANION GAP SERPL CALC-SCNC: 8 MMOL/L (ref 8–16)
AST SERPL-CCNC: 18 U/L (ref 10–40)
BILIRUB SERPL-MCNC: 0.3 MG/DL (ref 0.1–1)
BUN SERPL-MCNC: 13 MG/DL (ref 6–20)
CALCIUM SERPL-MCNC: 8.4 MG/DL (ref 8.7–10.5)
CHLORIDE SERPL-SCNC: 107 MMOL/L (ref 95–110)
CO2 SERPL-SCNC: 24 MMOL/L (ref 23–29)
CREAT SERPL-MCNC: 0.9 MG/DL (ref 0.5–1.4)
ERYTHROCYTE [DISTWIDTH] IN BLOOD BY AUTOMATED COUNT: 14.4 % (ref 11.5–14.5)
EST. GFR  (AFRICAN AMERICAN): >60 ML/MIN/1.73 M^2
EST. GFR  (NON AFRICAN AMERICAN): >60 ML/MIN/1.73 M^2
GLUCOSE SERPL-MCNC: 121 MG/DL (ref 70–110)
HCT VFR BLD AUTO: 26.4 % (ref 37–48.5)
HGB BLD-MCNC: 8.7 G/DL (ref 12–16)
MCH RBC QN AUTO: 34.1 PG (ref 27–31)
MCHC RBC AUTO-ENTMCNC: 33 G/DL (ref 32–36)
MCV RBC AUTO: 104 FL (ref 82–98)
PLATELET # BLD AUTO: 156 K/UL (ref 150–350)
PMV BLD AUTO: 9.5 FL (ref 9.2–12.9)
POTASSIUM SERPL-SCNC: 4.6 MMOL/L (ref 3.5–5.1)
PROT SERPL-MCNC: 5.6 G/DL (ref 6–8.4)
RBC # BLD AUTO: 2.55 M/UL (ref 4–5.4)
SODIUM SERPL-SCNC: 139 MMOL/L (ref 136–145)
WBC # BLD AUTO: 8.22 K/UL (ref 3.9–12.7)

## 2019-10-09 PROCEDURE — 63600175 PHARM REV CODE 636 W HCPCS: Performed by: SURGERY

## 2019-10-09 PROCEDURE — 80053 COMPREHEN METABOLIC PANEL: CPT

## 2019-10-09 PROCEDURE — 85027 COMPLETE CBC AUTOMATED: CPT

## 2019-10-09 PROCEDURE — 36415 COLL VENOUS BLD VENIPUNCTURE: CPT

## 2019-10-09 PROCEDURE — 25000003 PHARM REV CODE 250: Performed by: SURGERY

## 2019-10-09 RX ORDER — OXYCODONE AND ACETAMINOPHEN 5; 325 MG/1; MG/1
1 TABLET ORAL EVERY 4 HOURS PRN
Qty: 30 TABLET | Refills: 0 | Status: SHIPPED | OUTPATIENT
Start: 2019-10-09 | End: 2019-10-21 | Stop reason: SDUPTHER

## 2019-10-09 RX ORDER — ENOXAPARIN SODIUM 100 MG/ML
40 INJECTION SUBCUTANEOUS DAILY
Qty: 5.6 ML | Refills: 0 | Status: SHIPPED | OUTPATIENT
Start: 2019-10-09 | End: 2019-10-23

## 2019-10-09 RX ORDER — ENOXAPARIN SODIUM 100 MG/ML
40 INJECTION SUBCUTANEOUS EVERY 24 HOURS
Status: DISCONTINUED | OUTPATIENT
Start: 2019-10-09 | End: 2019-10-09 | Stop reason: HOSPADM

## 2019-10-09 RX ADMIN — MUPIROCIN 1 G: 20 OINTMENT TOPICAL at 08:10

## 2019-10-09 RX ADMIN — ENOXAPARIN SODIUM 40 MG: 100 INJECTION SUBCUTANEOUS at 04:10

## 2019-10-09 RX ADMIN — SODIUM CHLORIDE, SODIUM LACTATE, POTASSIUM CHLORIDE, AND CALCIUM CHLORIDE: .6; .31; .03; .02 INJECTION, SOLUTION INTRAVENOUS at 03:10

## 2019-10-09 RX ADMIN — HYDROCODONE BITARTRATE AND ACETAMINOPHEN 1 TABLET: 10; 325 TABLET ORAL at 12:10

## 2019-10-09 RX ADMIN — CEFAZOLIN SODIUM 2 G: 2 SOLUTION INTRAVENOUS at 08:10

## 2019-10-09 RX ADMIN — FLUCONAZOLE 100 MG: 100 TABLET ORAL at 08:10

## 2019-10-09 RX ADMIN — HYDROCODONE BITARTRATE AND ACETAMINOPHEN 1 TABLET: 5; 325 TABLET ORAL at 07:10

## 2019-10-09 RX ADMIN — SODIUM CHLORIDE, SODIUM LACTATE, POTASSIUM CHLORIDE, AND CALCIUM CHLORIDE: .6; .31; .03; .02 INJECTION, SOLUTION INTRAVENOUS at 12:10

## 2019-10-09 RX ADMIN — SENNOSIDES,DOCUSATE SODIUM 1 TABLET: 8.6; 5 TABLET, FILM COATED ORAL at 08:10

## 2019-10-09 RX ADMIN — CEFAZOLIN SODIUM 2 G: 2 SOLUTION INTRAVENOUS at 03:10

## 2019-10-09 RX ADMIN — HYDROCODONE BITARTRATE AND ACETAMINOPHEN 1 TABLET: 5; 325 TABLET ORAL at 04:10

## 2019-10-09 RX ADMIN — POLYETHYLENE GLYCOL 3350 17 G: 17 POWDER, FOR SOLUTION ORAL at 08:10

## 2019-10-09 NOTE — PROGRESS NOTES
AFTER VISIT INSTRUCTIONS    Name: Sheridan Todd  Medical Record Number: [id number]  Allergies:   Review of patient's allergies indicates:   Allergen Reactions    Shellfish containing products Anaphylaxis     Other reaction(s): Vomiting  Only crabmeat  Other reaction(s): Vomiting  Only crabmeat    Codeine Nausea And Vomiting     Pt thinks it was tylenol #3 with codeine  Can take hydrocodone & oxycodone  Other reaction(s): Nausea    Tegaderm ag mesh [silver] Blisters     Redness, itching and tears skin          FOLLOW-UP:  Please call the office to confirm a follow up time. I will see you on 10/21/19, on the same day as Dr. Jose Cheatham will see you 2 weeks after surgery.  His office will be contacting you for that follow up time.      CONTACT NUMBERS:    [facility]  [address] [place] [address] [phone]    Plastic & Reconstructive Surgery  Microsurgery  [facility]  c/o Howard Cheatham M.D.  [facility]  Second Floor [address]  [address] [place] [address]]  Work [phone]  Toll free [phone]    To schedule appointment:  For all life-threatening emergencies, please call [phone]  For all other concerns regarding your plastic surgery, you may call the office at: [phone], toll free [phone].      BATHING  No tub soaking, lotions of creams to surgical sites until cleared by your doctor.  Ok to shower post op day 1.  No scrubbing or soaking of incisions.  Pat wounds dry.  Do not remove the tapes over your surgical sites.  If the edges become , trim the loose ends.  The office staff can help you remove the tapes at your follow up visit.      WOUND CARE  Wear the surgical bra for comfort, but you should not wear a bra with underwires. Record the drain outputs as instructed.Use your drain log to record the output from your drain, which you can use to keep track of each of your drains.   There are further  instructions for drain care at the bottom of this page.  You may start scar massage at 2 weeks, once cleared by Dr. Cheatham, if you are healing appropriately.    SUTURES  Do not remove any sutures.  These will be removed in the office.    ACTIVITY  Encourage po intake  You may resume light activity (walking, usual activities around the home).  Avoid heavy lifting, running, swimming, strenuous activity for at least 3 weeks.    Avoid long-distance travel for at least 3 weeks.  If you have long car rides, hydrate yourself well.  You can wear compressive stockings to avoid the blood in your legs from sitting still.  Perform ankle circles while awake to prevent stasis in your legs.      THINGS TO WATCH FOR:  If you notice new pain, redness, abnormal drainage, abnormal fluid collection, fever, or wound healing issues please notify your provider immediately.  If there are issues with your surgical sites, we would rather know about them early, so that an appropriate plan of action can be followed.  If notified in a timely manner, this kind of post op care should be coordinated by Dr. Cheatham rather than a surgeon or emergency person you are not familiar with.    If you are short of breath or have new leg pain and/or having difficulty breathing, please go to your nearest emergency room.      MEDICATIONS  qian pain medication as needed:  Tylenol (acetominophen) 650 mg every 6 hours is recommended for mild pain.  If you are taking a narcotic mixed with acetaminophen, wait at least 4 HOURS after taking other acetaminophen-containing preparations (ie. Tylenol)  DO NOT exceed 4 grams of Tylenol in a 24 h period    SCAR MANAGEMENT  Scars may take over 1 year to mature.  Some scars will remain pink, dark purple, and possibly raised for 6-9 months after surgery.  After one year, scars often become flatter, smoother, and may change color.  After removal of the tegaderm/tape, suture removal, or when glue was used, apply a thin layer of  Aquaphor (available at any drugstore) or antibiotic ointment to the scars for another 2 weeks.    Begin silicone when scars smooth, generally starting about 6 weeks after surgery.  Please discuss this with Dr. Cheatham before proceeding.  Medical grade silicone gel is available on companies' web sites or on amazon.com  Brands recommended:  - Scarfade (scarfade.com)  - NeuGel ( )  - Kelocote (kelocote.com)    Drainage Tube  Your doctor discharges you with a Earle-Terrazas drainage tube. These tubes are in place to help prevent fluid from collecting around your prosthesis.  It is important that fluid does not stay in the cavity, because it can cause healing difficulties or implant infection.  It helps drain and collect blood and body fluid after surgery. This can prevent swelling and reduces the risk for infection. The tube is held in place by a few stitches.     Drain Care  · Dont sleep on the same side as the tube.  · Secure the tube and bag inside your clothing with a safety pin. This helps keep the tube from being pulled out.  · Empty your drain at least three times a day.  Regularly strip the tubing from your drain stitch to the bulb to prevent clots from accumulating.  Empty it when you notice it is half full with fluid.  When it gets beyond half way full, the suction mechanism does not work as well and the fluid collections in your wounds.  Wash and dry your hands before emptying the drain.  How to use the PHAN bulb:  ? Lift the opening on the drain.  ? Drain the fluid into a measuring cup.  ? Record the amount of fluid each time you empty the drain. Include the date and time it was emptied. Share this information with your doctor on your next visit.  ? Squeeze the bulb with your hands until you hear air coming out of the bulb if your doctor has instructed you to do so (sometimes the bulb is used as a reservoir without suction). Check with your doctor about specific drain instructions.  ? Close the  opening.  · Change the dressing around the tube every day.  ? Wash your hands.  ? Remove the old bandage.  ? Wash your hands again.  ? Wet a cotton swab and clean the skin around the incision and tube site. Use normal saline solution (salt and water). Or, you can use warm, soapy water.  ? Put a new bandage on the incision and tube site. Make the bandage large enough to cover the whole incision area.  ? Tape the bandage in place.  · Keep the bandage and tube site dry when you shower. Ask your healthcare provider about the best way to do this.  ? Stripping the tube helps keep blood clots from blocking the tube.   ? Hold the tubing where it leaves the skin, with one hand. This keeps it from pulling on the skin.  ? Pinch the tubing with the thumb and first finger of your other hand.  ? Slowly and firmly pull your thumb and first finger down the tubing. You may find it helpful to hold an alcohol swab between your fingers and the tube to lubricate the tubing.  ? If the pulling hurts or feels like its coming out of the skin, stop. Begin again more gently.    Follow-up care  Make a follow-up appointment as directed by our staff.     When to seek medical care  Call your healthcare provider right away if you have any of the following:  · New or increased pain around the tube  · Redness, swelling, or warmth around the incision or tube  · Drainage that is foul-smelling  · Vomiting  · Fever of 100.4°F (38°C)  · Fluid leaking around the tube  · Incision seems not to be healing  · Stitches become loose  · Tube falls out or breaks  · Drainage that changes from light pink to dark red  · Blood clots in the drainage bulb  · A sudden increase or decrease in the amount of drainage (over 30 mL)     Sheridan's Drain Record  Right breast Medial   Date Emptied Time Emptied Amount in [place]                                                                                                                   Sheridan's Drain Record  Right  Breast Lateral   Date Emptied Time Emptied Amount in [place]                                                                                                                   Left Breast Medial     Date Emptied Time Emptied Amount in [place]                                                                                                                   Sheridan's Drain Record  Left Breast Lateral   Date Emptied Time Emptied Amount in [place]                                                                                                                     Antibiotic Prophylaxis  Please notify us if you have dental or oral surgery procedures, GYN procedures, or bowel procedures planned.  In most cases, we recommend prophylactic antibiotics to be taken around the time of these procedures.  This is done to take precautions against implant infections after these procedures.

## 2019-10-09 NOTE — PLAN OF CARE
No significant events overnight. Remains free from fall, injury, and skin breakdown. Voiding without difficulty via bedpan. VSS on 2L O2 throughout the night. Positions self independently. Pain well controlled with PO meds; denies pain. Bilateral breast incisions/dressings CDI. Surgical bra on. PHAN drains x4 draining bloody, dark red output. TEDs/SCDs in place. Plan of care reviewed with patient and all questions answered. Bed low, locked. Call light within reach. Purposeful rounding performed. Resting comfortably in bed, no other complaints at this time.

## 2019-10-09 NOTE — DISCHARGE SUMMARY
[facility]  Discharge Summary  General Surgery      Admit Date: 10/8/2019    Discharge Date and Time:  10/09/2019 2:53 PM    Attending Physician: Howard Cheatham MD     Discharge Provider: Rebecca Garcia    Reason for Admission: surgery    Procedures Performed: Procedure(s) (LRB):  MASTECTOMY, SIMPLE BILATERAL (CONSENT AM OF) 4.0 hr case (Bilateral)  BIOPSY, LYMPH NODE, SENTINEL LEFT (Left)  RECONSTRUCTION, BREAST skin reduction after mastectomy (Bilateral)    Final Diagnoses:   Principal Problem: left breast cancer       Discharged Condition: stable    Disposition: Home or Self Care    Follow Up/Patient Instructions: 7-14 days    Medications:  Reconciled Home Medications:      Medication List      START taking these medications    enoxaparin 40 mg/0.4 mL Syrg  Commonly known as:  LOVENOX  Inject 0.4 mLs (40 mg total) into the skin once daily for 14 days     oxyCODONE-acetaminophen 5-325 mg per tablet  Commonly known as:  PERCOCET  Take 1 tablet by mouth every 4 (four) hours as needed for Pain.        CONTINUE taking these medications    AMOXIL ORAL  Take by mouth.     butalbital-acetaminophen-caff -40 mg Cap  Take 1 capsule by mouth every 4 (four) hours as needed.     CARAFATE 100 mg/mL suspension  Generic drug:  sucralfate  TAKE 10 ML BY MOUTH EVERY 4 TO 6 HOURS     DIFLUCAN ORAL  Take by mouth.     diphenoxylate-atropine 2.5-0.025 mg 2.5-0.025 mg per tablet  Commonly known as:  LOMOTIL  TAKE 1 TABLET BY MOUTH WITH EVERY LOOSE STOOL (NO MORE THAN 8 TABLETS IN 24 HOUR)     mupirocin 2 % ointment  Commonly known as:  BACTROBAN  APPLY OINTMENT EXTERNALLY TO AFFECTED AREA THREE TIMES DAILY     ondansetron 8 MG tablet  Commonly known as:  ZOFRAN  Take 1 tablet (8 mg total) by mouth every 8 (eight) hours as needed for Nausea.     phenazopyridine 200 MG tablet  Commonly known as:  PYRIDIUM  Take 200 mg by mouth every 6 (six) hours as needed.     promethazine 25 MG tablet  Commonly known as:   PHENERGAN  Take 1 tablet (25 mg total) by mouth every 6 (six) hours as needed for Nausea.     SUDAFED ORAL  Take by mouth as needed.     sumatriptan 25 MG Tab  Commonly known as:  IMITREX  Take 50 mg by mouth every 2 (two) hours as needed.          Discharge Procedure Orders   Diet general     Lifting restrictions     Call MD for:  temperature >100.4     Call MD for:  persistent nausea and vomiting     Call MD for:  severe uncontrolled pain     Call MD for:  difficulty breathing, headache or visual disturbances     Call MD for:  redness, tenderness, or signs of infection (pain, swelling, redness, odor or green/yellow discharge around incision site)     Call MD for:  hives     Call MD for:  persistent dizziness or light-headedness     Call MD for:  extreme fatigue     No dressing needed         Diet: regular    Activity: as tolerated

## 2019-10-09 NOTE — PLAN OF CARE
No DC needs from CM perspective.     10/09/19 1456   Final Note   Assessment Type Final Discharge Note   Anticipated Discharge Disposition Home   What phone number can be called within the next 1-3 days to see how you are doing after discharge? [phone]   Hospital Follow Up  Appt(s) scheduled? Yes   Discharge plans and expectations educations in teach back method with documentation complete? Yes   Right Care Referral Info   Post Acute Recommendation No Care

## 2019-10-09 NOTE — PLAN OF CARE
Discharge Planning:  Patient admitted on 10-8-19  LOS-day 1  Chart reviewed, Care plan discussed with Mrs Todd  Discussed care plan with treatment team,  attending Dr Cheatham  Consults following are: case mgt.  PCP updated in [facility]: yes  Pharmacy, updated in [facility]: [facility] in [place]  Insurance: medicaid  DME at home: wound supplies  Current dispo:  Home soon, today or tomorrow  Transportation: has reliable  Case management to follow       10/09/19 1221   Discharge Assessment   Assessment Type Discharge Planning Assessment   Confirmed/corrected address and phone number on facesheet? Yes   Assessment information obtained from? Patient;Caregiver;Medical Record   Communicated expected length of stay with patient/caregiver yes   Prior to hospitilization cognitive status: Alert/Oriented   Prior to hospitalization functional status: Independent   Current cognitive status: Alert/Oriented   Current Functional Status: Independent   Lives With spouse;child(francia), dependent   Able to Return to Prior Arrangements yes   Is patient able to care for self after discharge? Yes   Patient currently being followed by outpatient case management? No   Patient currently receives any other outside agency services? No   Equipment Currently Used at Home wound care supplies   Do you have any problems affording any of your prescribed medications? No   Is the patient taking medications as prescribed? yes   Does the patient have transportation home? Yes   Transportation Anticipated family or friend will provide   Discharge Plan A Home with family   DME Needed Upon Discharge  wound care supplies;other (see comments)  (lovenox kit)   Patient/Family in Agreement with Plan yes

## 2019-10-09 NOTE — DISCHARGE INSTRUCTIONS
POSTOPERATIVE INSTRUCTIONS FOLLOWING   MASTECTOMY AND/OR AXILLARY LYMPH NODE DISSECTION    The following are post-operative instructions that will help you to recover from your surgery.  Please read over these instructions carefully and contact us if we can answer any of your questions or concerns.    Dressing/breast binder (surgi-bra)  A surgical bra may be placed around your chest after your surgery.  If you are given the bra, please wear it as close to 24 hours a day as possible until your post-operative clinic appointment.  If the elastic around the bra irritates your skin, you may wear a soft t-shirt underneath the bra.    You may go without wearing the bra long enough to bath, to launder and dry the bra. If you have fluffy filler placed inside the bra, the filler should be removed whenever the bra is taken off. Please reinsert the fluffy filler, or insert the new soft filler, under the bra when you put the bra back on.  If the bra is extremely uncomfortable, you may wear a supportive sports bra instead after 2 days.    You may shower AFTER the drains are removed.  Please sponge bathe until then. Do not take a tub bath and do not soak the surgical site. Please do not remove the white strips of tape (steri-strips) that cover your incision.  They will be removed at your clinic visit.    Activity    You will be able to do much of your own personal care, such as bathing, dressing, preparing simple meals, etc.   A short walk each day will help with your recovery   You may find that you need to take rest breaks between activities, but you should not need to stay in bed for prolonged periods of time during the day   You may resume light household activities such as simple meal preparation, folding laundry, using your computer, and completing paperwork as you feel ready   Please avoid activities that require moderate to heavy lifting (grocery shopping) or pushing/pulling (vacuuming) and repetitive motions (such as  washing windows or long hours on the computer). Do not lift anything heavier than a gallon of milk.   A good rule during this time is to listen to your body, do what is comfortable, and stop and rest when your feel tired.  If it hurts, dont do it.   Following a lymph node dissection, dont avoid using your arm, but dont exercise your arm until after your first post-operative visit.  At your first post-op visit, you will be given arm exercises to regain movement and flexibility.  You may be referred to physical therapy.   You may restart driving when you are no longer on narcotics, your drain has been removed, and you feel safe turning the wheel and stopping quickly.   You will need to be out of work approximately 2-6 weeks depending on your particular surgery and how well you are recovering.  We will evaluate how you are doing at the first post-op appointment.  This is a good time to ask when you may return to work and what activities you may do.    Medication for pain   You will be given a prescription for pain medication. You should not drive or operate machinery while taking these.  Please take narcotics with food.  Narcotics can cause, or worse, constipation.  You will need to increase your fluid intake, eat high fiber foods (such as fruits and bran) and make sure that you are up and walking. You may need to take an over the counter stool softener for constipation.   An icepack may be helpful to decrease discomfort and swelling, particularly to the armpit after a lymph node dissection.   A small pillow positioned in the armpit may also decrease discomfort after a lymph node dissection.   If you are given a prescription for antibiotics, please continue to take them until the drains have been removed.    How to care for your Drain(s)  1. Wash hands--STRIP or milk the drainage tube as it comes out of your body toward the bulb.   a. Beginning where the drain comes out of your body, hold drainage tubing  with one hand and with the other, stretch and release tubing an inch at time while moving downward with both hands toward the bulb.  b. Do this 2-3 times before emptying the bulb.  2. Remove the stopper from the bulbs port  (drainage port)  3. Pour the drainage in the measuring cup provided by the nurse  4. Flatten/squeeze the bulb to create a vacuum and replace the stopper before letting go the of the bulb.  5. Record the date, time and amount of drainage in ccs (not ounces) each time bulb is emptied. If you have more than one drain, record each separately.  6. Discard the drainage into the toilet after measuring and then wash hands.  7. Empty bulbs 3 times/day or as needed if it fills up before 8 hours.  8. Remember to bring the output record with you to your doctors appointment.    Please report the following:   Temperature greater than 101 degrees   Discharge or bad odor from the wound   Excessive bleeding, such as bloody dressing or extreme bruising   Redness at incision and/or drain sites   Swelling or buildup of fluid around incision  If blue dye was used to locate your sentinel lymph nodes, your urine and stool may be blue-green in color for 1 or 2 days.    Additional information  I will see you approximately 2 weeks following your surgery.  If this follow-up appointment has not been made, please call the office.    If you have any questions or problems, please call my office or my nurse.    Dr. Rebecca Hurt, RN  [phone]    After hours and on weekends, you may call the main [facility] line at [phone] and ask to have the general surgery resident paged or have me paged      Lymphedema Risk Reduction    Lymphedema is a swelling of a part of the body, caused by an insufficient lymphatic system and an accumulation of fluid in the bodys tissues.  Lymphedema may occur when normal drainage of fluid is disrupted, such as an infection, injury, cancer, scar tissue, or removal of lymph  nodes.    If you had a full axillary node dissection procedure, you may be at great risk for lymphedema.     For those patients having a sentinel lymph node biopsy, these risks may be smaller and the recommendations are provided for your review and consideration.    The following list contains recommendations for reducing your risk of developing lymphedema.    I. Skin Care--avoid trauma/injury to reduce infection risk   Keep the hand and arm on the side of surgery clean and dry   Pay attention to nail care and do not cut cuticles   Avoid punctures, such as injections and blood draws from you on the side of your surgery   Wear gloves while doing activities that may cause skin injury (washing dishes, gardening, etc.)   If scratches or punctures occur, wash area with soap and water, and observe for signs of infections (redness, drainage, swelling)   If a rash, itching, redness, pain, increased skin temperatures, fever, or flu-like symptoms occur, contact your physician immediately for early treatment of a possible infection  II. Activity/Lifestyle   Gradually build up the duration and intensity of any activity or exercise   Take frequent rest periods during activity to allow for arm recovery   Monitor your arm and upper body during and after activity for any change in size, shape, tissue, texture, soreness, heaviness, or firmness  III. Avoid constriction of your arm on the side of your surgery   Avoid having blood pressure take on the arm on the side of your surgery   Wear loose fitting jewelry and clothing   When you return to wearing a bra, make sure that it is well fitted and not tight                    PLEASE RECORD ALL AMOUNTS IN CCS AND BRIND THIS RECORD   WITH YOU TO YOUR RETURN APPOINTMENT  Date Time Drain #1 Drain #2 comment                                                                                                                                                                                                                          Date Time Drain #1 Drain #2 comment

## 2019-10-09 NOTE — NURSING
Pt AAOx4, VSS on RA and afebrile. Discharge paperwork given, pt verbalizes understanding.  IV removed w/ cath tip intact, WNL. Ambulating independently without difficulty. Voiding spontaneously without difficulty. Surgical site CDI. Drain care performed. PHAN drain x 4 draining sanguineous drainage. Pt provided with lovenox instruction kit, abd pads, and drain care materials. To be DCd home w/ family-- will be escorted downstairs via  transport team once dressed, ready & ride arrives. Free from falls, injury, or skin breakdown this hospital admission.

## 2019-10-09 NOTE — NURSING
Pt arrived to floor. VSS. TEDs/SCDs on. IV fluids started. Call light in reach. Bed low, locked. Side rails up x2. No complaints at this time. Will continue to monitor.

## 2019-10-10 NOTE — OP NOTE
Operative Note     10/8/2019    PRE-OP DIAGNOSIS: Malignant neoplasm of overlapping sites of left breast in female, estrogen receptor positive [C50.812, Z17.0]      POST-OP DIAGNOSIS: Post-Op Diagnosis Codes:     * Malignant neoplasm of overlapping sites of left breast in female, estrogen receptor positive [C50.812, Z17.0]    Procedure(s):  Left modified radical mastectomy  RIGHT simple mastectomy  BIOPSY, LYMPH NODE, SENTINEL LEFT attempted      RECONSTRUCTION, BREAST skin reduction after mastectomy     SURGEON: Surgeon(s) and Role:  Panel 1:     * Rebecca Garcia MD - Primary  Panel 2:     * Howard Cheatham MD - Primary    ANESTHESIA: General     OPERATIVE FINDINGS: dual dye did not map.  So axillary dissection performed.  One LN sent for frozen because it felt abnormal.  Was negative on frozen section.    INDICATION FOR PROCEDURE: This patient presents with a history of cancer of the left breast    PROCEDURE IN DETAIL:  Sheridan Todd is a 38 y.o. female brought to the operating room for definitive surgery of cancer of the left breast.  The patient has elected to undergo bilateral simple mastectomy with sentinel lymph node biopsy for david assessment. The patient was informed of the possible risks and complications of the procedure, including but not limited to anesthetic risks, bleeding, infection, and need for additional surgery.  The patient concurred with the proposed plan, and has given informed consent.  The site of surgery was properly noted/marked in the preoperative holding area.    Prior to arriving in the operating room, the patient's left breast was injected with technetium to facilitate sentinel lymph node identification. The patient was then brought to the operating room and placed in the supine position with both upper extremities extended.  general anesthesia was administered. Perioperative antibiotics were administered consisting of Ancef and a time out was performed confirming the  patient, site, and procedure.  The bilateral chest and axilla was then prepped and draped in the usual sterile fashion.    We first turned our attention to the right prophylactic breast where an wise type incision was extended  to incorporate the nipple areolar complex.  The incision was made with a plasmablade and deepened through the subcutaneous tissues with plasmablade.  Skin flaps were raised to the clavicle superiorly, to the lateral border of the sternum medially, to the inframammary fold inferiorly, and to the anterior border of the latissimus dorsi muscle laterally. The breast tissue was sharply excised off the chest wall taking care to incorporate the pectoralis fascia while leaving the serratus fascia behind.  The resulting mastectomy specimen was marked using a short stitch superiorly and a long stitch laterally.  The breast was sent to pathology for permanent evaluation.   The operative field was irrigated with normal saline and all bleeding points were secured with Bovie electrocautery.   The incision was closed by the plastic surgery service. The inferior skin flap was de-epitheliazed by the plastic surgeon.    We then turned our attention to the left breast where an wise-type incision was fashioned to incorporate the nipple areolar complex.  The incision was made with a plasmablade and extended through the subcutaneous tissues with plasmablade.  Skin flaps were raised to the clavicle superiorly.  We then  turned our attention to the left axilla.  Blue dye was injected prior to the incision in the usual subareolar fashion. The gamma probe was used to try to identify an area of increased radioactivity within the lower axilla. No radioactive or blue dye was identified, so an axillary dissection was performed at the end  We then proceeded to raise the remainder of the flaps to the lateral border of the sternum medially, to the inframammary fold inferiorly, and to the anterior border of the latissimus  dorsi muscle laterally. The breast tissue was sharply excised off the chest wall taking care to incorporate the pectoralis fascia while leaving the serratus fascia behind.  The resulting mastectomy specimen was marked using a short stitch superiorly and long stitch laterally.  The breast was sent to pathology for permanent evaluation.      One lymph node was palpable was sent for frozen and was negative, however due to non-mapping, an axillary dissection was still performed.   An axillary dissection was performed with removal of the associated lymph nodes and surrounding adipose tissue. This included levels I and II. This was accomplished by exposing the axillary vein superiorly. Small venous tributaries, lymphatics, and vessels were clipped and ligated or cauterized and divided. The subscapularis muscle was skeletonized. The long thoracic and thoracodorsal neurovascular bundles were identified and preserved. The tissue between the long thoracic and thoracodorsal bundle was removed.  Of note, at the end of the dissection, level 3 nodes were palpated and no abnormal nodes were noted.      The specimen was submitted to pathology. The wound was irrigated.     Therefore, the operative field was irrigated with normal saline and all bleeding points were secured with Bovie electrocautery. The incision was closed by the plastic surgery service.  The inferior flap was de-epitheliazed by plastics.      ESTIMATED BLOOD LOSS: less than 50 mL    COMPLICATIONS: none    DISPOSITION: PACU - hemodynamically stable.    ATTESTATION:   I performed the procedure.

## 2019-10-11 ENCOUNTER — HOSPITAL ENCOUNTER (OUTPATIENT)
Dept: RADIOLOGY | Facility: OTHER | Age: 39
Discharge: HOME | End: 2019-10-11
Attending: PATHOLOGY
Payer: MEDICAID

## 2019-10-11 DIAGNOSIS — R92.8 ABNORMAL MAMMOGRAM: ICD-10-CM

## 2019-10-11 PROCEDURE — 76098 X-RAY EXAM SURGICAL SPECIMEN: CPT | Mod: TC

## 2019-10-11 PROCEDURE — 76098 MAMMO BREAST SPECIMEN: ICD-10-PCS | Mod: 26,,, | Performed by: RADIOLOGY

## 2019-10-11 PROCEDURE — 76098 X-RAY EXAM SURGICAL SPECIMEN: CPT | Mod: 26,,, | Performed by: RADIOLOGY

## 2019-10-16 ENCOUNTER — TELEPHONE (OUTPATIENT)
Dept: SURGERY | Facility: CLINIC | Age: 39
End: 2019-10-16

## 2019-10-16 ENCOUNTER — PATIENT MESSAGE (OUTPATIENT)
Dept: SURGERY | Facility: CLINIC | Age: 39
End: 2019-10-16

## 2019-10-16 NOTE — TELEPHONE ENCOUNTER
Returned call to patient, to c/o moderate redness and irritation to left chest wall PHAN drain site, pt states area has increased pain and redness, PHAN drains draining serosanguinous fluid, patient afebrile, PHAN drain education provided, patient continues to wear post op bra, patient to take photos of area of concern and upload to portal, will notify MD

## 2019-10-16 NOTE — TELEPHONE ENCOUNTER
----- Message from Hang Pretty sent at 10/16/2019  9:17 AM CDT -----  Contact: self  Pt states that she is having some post op issues and ask for a call.      Contact info  [phone]

## 2019-10-20 NOTE — PROGRESS NOTES
[facility] [place]       Post-Op        REFERRING PHYSICIAN:  No referring provider defined for this encounter.       Abel Chambers MD      DIAGNOSIS:    This is a 38 y.o. female with a stage ympT1 pN0 Mx grade 2 ER + [place] + (1-2%) HER2 (FISH pending) IDC of the left breast.    TREATMENT SUMMARY:  The patient is status post bilateral simple mastectomy and sentinel node biopsy on 10/8/2019.  Left axillary dissection.  Final pathology showed invasive ductal carcinoma of the left breast.  1.9cm in size.  Negative margins and no lymph node involvement.    INTERVAL HISTORY:   Sheridan Todd comes in for a post-op check.  She denies fever, chills, chest pain or shortness of breath.  Her pain is well controlled.      MEDICATIONS:  Current Outpatient Medications   Medication Sig Dispense Refill    amoxicillin (AMOXIL ORAL) Take by mouth.      butalbital-acetaminophen-caff -40 mg Cap Take 1 capsule by mouth every 4 (four) hours as needed.  0    CARAFATE 100 mg/mL suspension TAKE 10 ML BY MOUTH EVERY 4 TO 6 HOURS  3    diphenoxylate-atropine 2.5-0.025 mg (LOMOTIL) 2.5-0.025 mg per tablet TAKE 1 TABLET BY MOUTH WITH EVERY LOOSE STOOL (NO MORE THAN 8 TABLETS IN 24 HOUR)  0    enoxaparin (LOVENOX) 40 mg/0.4 mL Syrg Inject 0.4 mLs (40 mg total) into the skin once daily for 14 days 5.6 mL 0    fluconazole (DIFLUCAN ORAL) Take by mouth.      mupirocin (BACTROBAN) 2 % ointment APPLY OINTMENT EXTERNALLY TO AFFECTED AREA THREE TIMES DAILY  2    ondansetron (ZOFRAN) 8 MG tablet Take 1 tablet (8 mg total) by mouth every 8 (eight) hours as needed for Nausea. 30 tablet 2    oxyCODONE-acetaminophen (PERCOCET) 5-325 mg per tablet Take 1 tablet by mouth every 4 (four) hours as needed for Pain. 30 tablet 0    phenazopyridine (PYRIDIUM) 200 MG tablet Take 200 mg by mouth every 6 (six) hours as needed.  1    promethazine (PHENERGAN) 25 MG tablet Take 1 tablet (25 mg total) by mouth every 6 (six) hours as  needed for Nausea. 30 tablet 2    pseudoephedrine HCl (SUDAFED ORAL) Take by mouth as needed.      sumatriptan (IMITREX) 25 MG Tab Take 50 mg by mouth every 2 (two) hours as needed.       No current facility-administered medications for this visit.        ALLERGIES:   Review of patient's allergies indicates:   Allergen Reactions    Shellfish containing products Anaphylaxis     Other reaction(s): Vomiting  Only crabmeat  Other reaction(s): Vomiting  Only crabmeat    Codeine Nausea And Vomiting     Pt thinks it was tylenol #3 with codeine  Can take hydrocodone & oxycodone  Other reaction(s): Nausea    Tegaderm ag mesh [silver] Blisters     Redness, itching and tears skin        PHYSICAL EXAMINATION:   General:  This is a well appearing female with appropriate speech, affect and gait.     Breast:  Incision clean, dry, and intact    IMPRESSION:   The patient has had an uneventful postoperative course.    PLAN:   1. return in 6 months for a follow up office visit and breast exam  2. The patient is advised in continued exam of the breast chest wall and to report to this office sooner should she note any areas of abnormality or concern.   3.  She has been instructed to meet with med onc and rad onc for discussion of adjuvant treatment recommendations  4. Refer to PT

## 2019-10-21 ENCOUNTER — OFFICE VISIT (OUTPATIENT)
Dept: PLASTIC SURGERY | Facility: CLINIC | Age: 39
End: 2019-10-21
Payer: MEDICAID

## 2019-10-21 ENCOUNTER — OFFICE VISIT (OUTPATIENT)
Dept: SURGERY | Facility: CLINIC | Age: 39
End: 2019-10-21
Payer: MEDICAID

## 2019-10-21 VITALS
WEIGHT: 184.94 LBS | DIASTOLIC BLOOD PRESSURE: 82 MMHG | SYSTOLIC BLOOD PRESSURE: 132 MMHG | HEART RATE: 82 BPM | BODY MASS INDEX: 31.57 KG/M2 | HEIGHT: 64 IN | BODY MASS INDEX: 31.57 KG/M2 | WEIGHT: 184.94 LBS | HEIGHT: 64 IN

## 2019-10-21 DIAGNOSIS — C50.812 MALIGNANT NEOPLASM OF OVERLAPPING SITES OF LEFT BREAST IN FEMALE, ESTROGEN RECEPTOR POSITIVE: Primary | ICD-10-CM

## 2019-10-21 DIAGNOSIS — C50.912 HER2-POSITIVE CARCINOMA OF LEFT BREAST: Primary | ICD-10-CM

## 2019-10-21 DIAGNOSIS — Z17.0 MALIGNANT NEOPLASM OF OVERLAPPING SITES OF LEFT BREAST IN FEMALE, ESTROGEN RECEPTOR POSITIVE: Primary | ICD-10-CM

## 2019-10-21 PROCEDURE — 99024 POSTOP FOLLOW-UP VISIT: CPT | Mod: ,,, | Performed by: SURGERY

## 2019-10-21 PROCEDURE — 99999 PR PBB SHADOW E&M-EST. PATIENT-LVL III: CPT | Mod: PBBFAC,,, | Performed by: SURGERY

## 2019-10-21 PROCEDURE — 99999 PR PBB SHADOW E&M-EST. PATIENT-LVL III: ICD-10-PCS | Mod: PBBFAC,,, | Performed by: SURGERY

## 2019-10-21 PROCEDURE — 99024 PR POST-OP FOLLOW-UP VISIT: ICD-10-PCS | Mod: ,,, | Performed by: SURGERY

## 2019-10-21 PROCEDURE — 99213 OFFICE O/P EST LOW 20 MIN: CPT | Mod: PBBFAC | Performed by: SURGERY

## 2019-10-21 PROCEDURE — 99213 OFFICE O/P EST LOW 20 MIN: CPT | Mod: PBBFAC,27 | Performed by: SURGERY

## 2019-10-21 RX ORDER — HYDROCODONE BITARTRATE AND ACETAMINOPHEN 5; 325 MG/1; MG/1
1 TABLET ORAL
Qty: 10 TABLET | Refills: 0 | Status: SHIPPED | OUTPATIENT
Start: 2019-10-21 | End: 2019-10-21

## 2019-10-21 RX ORDER — OXYCODONE AND ACETAMINOPHEN 5; 325 MG/1; MG/1
1 TABLET ORAL
Qty: 10 TABLET | Refills: 0 | Status: ON HOLD | OUTPATIENT
Start: 2019-10-21 | End: 2020-03-05 | Stop reason: HOSPADM

## 2019-10-23 ENCOUNTER — TELEPHONE (OUTPATIENT)
Dept: SURGERY | Facility: CLINIC | Age: 39
End: 2019-10-23

## 2019-10-23 NOTE — TELEPHONE ENCOUNTER
Returned call to patient, patient states she is sick today and unable to make appointment, appt cancelled, pt will call tomorrow to reschedule, PHAN drains putting out 10 mL in 24 hr period, pt denies any current surgery pain

## 2019-10-24 ENCOUNTER — TELEPHONE (OUTPATIENT)
Dept: SURGERY | Facility: CLINIC | Age: 39
End: 2019-10-24

## 2019-10-24 ENCOUNTER — OFFICE VISIT (OUTPATIENT)
Dept: SURGERY | Facility: CLINIC | Age: 39
End: 2019-10-24
Attending: SURGERY
Payer: MEDICAID

## 2019-10-24 VITALS
DIASTOLIC BLOOD PRESSURE: 68 MMHG | WEIGHT: 184.94 LBS | HEIGHT: 64 IN | HEART RATE: 76 BPM | BODY MASS INDEX: 31.57 KG/M2 | SYSTOLIC BLOOD PRESSURE: 121 MMHG

## 2019-10-24 DIAGNOSIS — C50.812 MALIGNANT NEOPLASM OF OVERLAPPING SITES OF LEFT BREAST IN FEMALE, ESTROGEN RECEPTOR POSITIVE: Primary | ICD-10-CM

## 2019-10-24 DIAGNOSIS — Z17.0 MALIGNANT NEOPLASM OF OVERLAPPING SITES OF LEFT BREAST IN FEMALE, ESTROGEN RECEPTOR POSITIVE: Primary | ICD-10-CM

## 2019-10-24 PROCEDURE — 99024 PR POST-OP FOLLOW-UP VISIT: ICD-10-PCS | Mod: S$GLB,,, | Performed by: SURGERY

## 2019-10-24 PROCEDURE — 99024 POSTOP FOLLOW-UP VISIT: CPT | Mod: S$GLB,,, | Performed by: SURGERY

## 2019-10-24 RX ORDER — HYDROCODONE BITARTRATE AND ACETAMINOPHEN 5; 325 MG/1; MG/1
1 TABLET ORAL EVERY 4 HOURS PRN
Refills: 0 | Status: ON HOLD | COMMUNITY
Start: 2019-10-21 | End: 2020-03-05 | Stop reason: HOSPADM

## 2019-10-24 NOTE — TELEPHONE ENCOUNTER
Patient called stating she is feeling better from her stomach issues, pt c/o 8/10 pain to PHAN drain insertion sites, pt states less then 5 mL output within 24 hr period, pt states severe irritation to skin surrounding PHAN drain insertion sites, appt scheduled for today for patient to have PHAN drains to be removed

## 2019-10-24 NOTE — PROGRESS NOTES
Here for PHAN drain removal.    Both removed today.    F/u with PT  Tumor board to discuss radiation.  If needs radiation, will see Dr. Long in [place]

## 2019-10-28 ENCOUNTER — OFFICE VISIT (OUTPATIENT)
Dept: HEMATOLOGY/ONCOLOGY | Facility: CLINIC | Age: 39
End: 2019-10-28
Payer: MEDICAID

## 2019-10-28 ENCOUNTER — TELEPHONE (OUTPATIENT)
Dept: HEMATOLOGY/ONCOLOGY | Facility: CLINIC | Age: 39
End: 2019-10-28

## 2019-10-28 ENCOUNTER — LAB VISIT (OUTPATIENT)
Dept: LAB | Facility: HOSPITAL | Age: 39
End: 2019-10-28
Attending: INTERNAL MEDICINE
Payer: MEDICAID

## 2019-10-28 VITALS
RESPIRATION RATE: 18 BRPM | TEMPERATURE: 99 F | SYSTOLIC BLOOD PRESSURE: 109 MMHG | BODY MASS INDEX: 31.17 KG/M2 | DIASTOLIC BLOOD PRESSURE: 73 MMHG | WEIGHT: 181.63 LBS | HEART RATE: 73 BPM

## 2019-10-28 DIAGNOSIS — Z17.0 MALIGNANT NEOPLASM OF OVERLAPPING SITES OF LEFT BREAST IN FEMALE, ESTROGEN RECEPTOR POSITIVE: ICD-10-CM

## 2019-10-28 DIAGNOSIS — Z85.3 HISTORY OF LEFT BREAST CANCER: ICD-10-CM

## 2019-10-28 DIAGNOSIS — D70.1 CHEMOTHERAPY INDUCED NEUTROPENIA: ICD-10-CM

## 2019-10-28 DIAGNOSIS — Z17.0 MALIGNANT NEOPLASM OF OVERLAPPING SITES OF LEFT BREAST IN FEMALE, ESTROGEN RECEPTOR POSITIVE: Primary | ICD-10-CM

## 2019-10-28 DIAGNOSIS — C50.812 MALIGNANT NEOPLASM OF OVERLAPPING SITES OF LEFT BREAST IN FEMALE, ESTROGEN RECEPTOR POSITIVE: Primary | ICD-10-CM

## 2019-10-28 DIAGNOSIS — T45.1X5A CHEMOTHERAPY INDUCED NEUTROPENIA: ICD-10-CM

## 2019-10-28 DIAGNOSIS — C50.912 HER2-POSITIVE CARCINOMA OF LEFT BREAST: ICD-10-CM

## 2019-10-28 DIAGNOSIS — C50.812 MALIGNANT NEOPLASM OF OVERLAPPING SITES OF LEFT BREAST IN FEMALE, ESTROGEN RECEPTOR POSITIVE: ICD-10-CM

## 2019-10-28 LAB
ALBUMIN SERPL BCP-MCNC: 3.7 G/DL (ref 3.5–5.2)
ALP SERPL-CCNC: 69 U/L (ref 55–135)
ALT SERPL W/O P-5'-P-CCNC: 19 U/L (ref 10–44)
ANION GAP SERPL CALC-SCNC: 8 MMOL/L (ref 8–16)
AST SERPL-CCNC: 19 U/L (ref 10–40)
BASOPHILS # BLD AUTO: 0.03 K/UL (ref 0–0.2)
BASOPHILS NFR BLD: 0.7 % (ref 0–1.9)
BILIRUB SERPL-MCNC: 0.3 MG/DL (ref 0.1–1)
BUN SERPL-MCNC: 18 MG/DL (ref 6–20)
CALCIUM SERPL-MCNC: 8.8 MG/DL (ref 8.7–10.5)
CHLORIDE SERPL-SCNC: 106 MMOL/L (ref 95–110)
CO2 SERPL-SCNC: 28 MMOL/L (ref 23–29)
CREAT SERPL-MCNC: 1 MG/DL (ref 0.5–1.4)
DIFFERENTIAL METHOD: ABNORMAL
EOSINOPHIL # BLD AUTO: 0.1 K/UL (ref 0–0.5)
EOSINOPHIL NFR BLD: 1.4 % (ref 0–8)
ERYTHROCYTE [DISTWIDTH] IN BLOOD BY AUTOMATED COUNT: 12 % (ref 11.5–14.5)
EST. GFR  (AFRICAN AMERICAN): >60 ML/MIN/1.73 M^2
EST. GFR  (NON AFRICAN AMERICAN): >60 ML/MIN/1.73 M^2
GLUCOSE SERPL-MCNC: 91 MG/DL (ref 70–110)
HCT VFR BLD AUTO: 31.2 % (ref 37–48.5)
HGB BLD-MCNC: 10.3 G/DL (ref 12–16)
IMM GRANULOCYTES # BLD AUTO: 0.01 K/UL (ref 0–0.04)
IMM GRANULOCYTES NFR BLD AUTO: 0.2 % (ref 0–0.5)
LYMPHOCYTES # BLD AUTO: 1.7 K/UL (ref 1–4.8)
LYMPHOCYTES NFR BLD: 38.7 % (ref 18–48)
MCH RBC QN AUTO: 33.6 PG (ref 27–31)
MCHC RBC AUTO-ENTMCNC: 33 G/DL (ref 32–36)
MCV RBC AUTO: 102 FL (ref 82–98)
MONOCYTES # BLD AUTO: 0.3 K/UL (ref 0.3–1)
MONOCYTES NFR BLD: 7.4 % (ref 4–15)
NEUTROPHILS # BLD AUTO: 2.2 K/UL (ref 1.8–7.7)
NEUTROPHILS NFR BLD: 51.6 % (ref 38–73)
NRBC BLD-RTO: 0 /100 WBC
PLATELET # BLD AUTO: 190 K/UL (ref 150–350)
PMV BLD AUTO: 9.2 FL (ref 9.2–12.9)
POTASSIUM SERPL-SCNC: 3.8 MMOL/L (ref 3.5–5.1)
PROT SERPL-MCNC: 6.8 G/DL (ref 6–8.4)
RBC # BLD AUTO: 3.07 M/UL (ref 4–5.4)
SODIUM SERPL-SCNC: 142 MMOL/L (ref 136–145)
WBC # BLD AUTO: 4.32 K/UL (ref 3.9–12.7)

## 2019-10-28 PROCEDURE — 85025 COMPLETE CBC W/AUTO DIFF WBC: CPT

## 2019-10-28 PROCEDURE — 99214 OFFICE O/P EST MOD 30 MIN: CPT | Mod: S$GLB,,, | Performed by: INTERNAL MEDICINE

## 2019-10-28 PROCEDURE — 80053 COMPREHEN METABOLIC PANEL: CPT

## 2019-10-28 PROCEDURE — 99214 PR OFFICE/OUTPT VISIT, EST, LEVL IV, 30-39 MIN: ICD-10-PCS | Mod: S$GLB,,, | Performed by: INTERNAL MEDICINE

## 2019-10-28 NOTE — PROGRESS NOTES
Plastic Update  Patient presents for wound check drain removal after skin reduction at time of mastectomy.  She reports that she is still having moderate pain and is requesting a few additional narcotic pain pills.  On examination her drains are serosanguinous and there is no evidence of fluid collection or infection.  Incision lines are in tact and there is no skin breakdown at t junction.  I advised no underwire, gently compressive bra.  One drain was removed on each side after reviewing that output was less than 30 cc per day.  Additional drains can be removed when output is less than 30 cc per day for three consecutive days.  I provided a short course of additional pain rx.  Pain referral sent. Can follow up with me after final pathology is determined.  If she is going to have radiation, I will defer autologous reconstruction.  If she does not require adjuvant radiation, we can discuss appropriate timing for abdominal free tissue transfer.  We discussed healthy liftestyle choices.  She is going to follow up with her GYN to discuss prophylactic surgery.  I would not offer ROSI before any planned pelvic surgery.  She can call the office to coordinate further follow up.  All of her questions were answered.    Plastic & Reconstructive Surgery  [facility]  c/o Howard Cheatham M.D.  [facility]  [address] Floor [address]  [address] [place] [address]    Work [phone]  Toll free [phone]  If no answer [phone]

## 2019-10-28 NOTE — PROGRESS NOTES
[facility] Hematology/Oncology  PROGRESS NOTE -  Follow-up Visit      Subjective:       Patient ID:   NAME: Sheridan Todd : 1980     38 y.o. female    Referring Doc: Krissy  Other Physicians: Jose Rodriguez De Boisblanc, [place]; Howard Cheatham ([facility])    Chief Complaint: left breast ca f/u     History of Present Illness:     Patient returns today for a regularly scheduled follow-up visit.  She has since completed the neoadjuvant chemotherapy on  and has subsequently surgery next at [facility] with Dr Garcia.  She denies any CP, SOB, HA's or N/V. She is here by herself. She has some fatigue. She decided to hold off on plastic reconstruction at this time. She will need to resume herceptin weekly therapy. Dr Garcia still awaiting tumor board evaluation at [facility] but she may require XRT as well.     She had echo about two weeks before surgery.             ROS:   GEN: normal without any fever, night sweats or weight loss  HEENT: normal with no HA's, sore throat, stiff neck, changes in vision  CV: normal with no CP, SOB, PND, MENDIETA or orthopnea  PULM: normal with no SOB, cough, hemoptysis, sputum or pleuritic pain  GI: normal with no abdominal pain, nausea, vomiting, constipation, diarrhea, melanotic stools, BRBPR, or hematemesis  : normal with no hematuria, dysuria  BREAST: no identifiable abnormality at this time  SKIN: normal with no rash, erythema, bruising, or swelling    Allergies:  Review of patient's allergies indicates:   Allergen Reactions    Shellfish containing products Anaphylaxis     Other reaction(s): Vomiting  Only crabmeat  Other reaction(s): Vomiting  Only crabmeat    Codeine Nausea And Vomiting     Pt thinks it was tylenol #3 with codeine  Can take hydrocodone & oxycodone  Other reaction(s): Nausea    Tegaderm ag mesh [silver] Blisters     Redness, itching and tears skin        Medications:    Current Outpatient Medications:     amoxicillin (AMOXIL  ORAL), Take by mouth., Disp: , Rfl:     butalbital-acetaminophen-caff -40 mg Cap, Take 1 capsule by mouth every 4 (four) hours as needed., Disp: , Rfl: 0    CARAFATE 100 mg/mL suspension, TAKE 10 ML BY MOUTH EVERY 4 TO 6 HOURS, Disp: , Rfl: 3    diphenoxylate-atropine 2.5-0.025 mg (LOMOTIL) 2.5-0.025 mg per tablet, TAKE 1 TABLET BY MOUTH WITH EVERY LOOSE STOOL (NO MORE THAN 8 TABLETS IN 24 HOUR), Disp: , Rfl: 0    fluconazole (DIFLUCAN ORAL), Take by mouth., Disp: , Rfl:     HYDROcodone-acetaminophen (NORCO) 5-325 mg per tablet, TAKE 1 TABLET BY MOUTH EVERY 24 HOURS AS NEEDED FOR SEVERE PAIN, Disp: , Rfl: 0    mupirocin (BACTROBAN) 2 % ointment, APPLY OINTMENT EXTERNALLY TO AFFECTED AREA THREE TIMES DAILY, Disp: , Rfl: 2    ondansetron (ZOFRAN) 8 MG tablet, Take 1 tablet (8 mg total) by mouth every 8 (eight) hours as needed for Nausea. (Patient not taking: Reported on 10/24/2019), Disp: 30 tablet, Rfl: 2    oxyCODONE-acetaminophen (PERCOCET) 5-325 mg per tablet, Take 1 tablet by mouth every 24 hours as needed for Pain (severe pain). (Patient not taking: Reported on 10/24/2019), Disp: 10 tablet, Rfl: 0    phenazopyridine (PYRIDIUM) 200 MG tablet, Take 200 mg by mouth every 6 (six) hours as needed., Disp: , Rfl: 1    promethazine (PHENERGAN) 25 MG tablet, Take 1 tablet (25 mg total) by mouth every 6 (six) hours as needed for Nausea. (Patient not taking: Reported on 10/24/2019), Disp: 30 tablet, Rfl: 2    pseudoephedrine HCl (SUDAFED ORAL), Take by mouth as needed., Disp: , Rfl:     sumatriptan (IMITREX) 25 MG Tab, Take 50 mg by mouth every 2 (two) hours as needed., Disp: , Rfl:     PMHx/PSHx Updates:  See patient's last visit with me on 9/9/2019.  See H&P on 5/16/2019        Pathology:  Cancer Staging      Breast biopsies: 5/1/2019:  Left breast: with invasive ductal carcinoma grade 3; ER positive at 95% and ID positive at 90%; Her2Nu was positive at +3; Her2/CEP 17 ratio was >10.6  - Ki67 was  unfavorable at 69%  Right breast: negative for cancer      Bilateral Mastectomies 10/8/2019:    FINAL PATHOLOGIC DIAGNOSIS  1. BREAST (RIGHT MASTECTOMY): NO EVIDENCE OF MALIGNANCY IN SECTIONS OF SKIN, NIPPLE, AND  BREAST TISSUE.  2. BREAST (LEFT MASTECTOMY): INVASIVE DUCTAL CARCINOMA (2 SEPARATE RESIDUAL FOCI);  ASSOCIATED HIGH-GRADE DUCTAL CARCINOMA IN SITU; SECTIONS OF SKIN, NIPPLE, AND BREAST  MARGINS FREE OF MALIGNANCY.  Note: Two foci of residual ductal carcinoma are identified with differing histologies . The larger lesion consists of  small foci of invasive ductal carcinoma in a 19mm fibrotic nodule with suggestion of presurgical therapeutic  response. Vascular invasion cannot be demonstrated on CD31 stain. There is an additional separate  microinvasive focus of mucinous carcinoma. Foci of DCIS are present throughout the breast tissue.  HORMONE RECEPTOR ASSAYS:  ER-strongly positive (90%)  PgR-weakly positive (1-2%)  Her2-equivocal (1 2+); to be sent for FISH testing  Ki67 proliferative activity-30% activity  Positive and negative controls reacted appropriately.  3. LYMPH NODE (SENTINEL LYMPH NODE, CLINICAL): NO EVIDENCE OF MALIGNANCY.  Note: The lymph node was examined at three levels with H&E and epithelial immunostains (AE1/AES, CAM5.2,  and WSK). Positive and negative controls reacted appropriately.  4. LYMPH NODES (22): NO EVIDENCE OF MALIGNANCY.         Objective:     Vitals:  Blood pressure 109/73, pulse 73, temperature 98.8 °F (37.1 °C), resp. rate 18, weight 82.4 kg (181 lb 9.6 oz).     Physical Examination:   GEN: no apparent distress, comfortable; AAOx3  HEAD: atraumatic and normocephalic  EYES: no pallor, no icterus, PERRLA  ENT: OMM, no pharyngeal erythema, external ears WNL; no nasal discharge; no thrush  NECK: no masses, thyroid normal, trachea midline, no LAD/LN's, supple  CV: RRR with no murmur; normal pulse; normal S1 and S2; no pedal edema  CHEST: Normal respiratory effort; CTAB; normal  breath sounds; no wheeze or crackles; portacath on right chest wall with a residual stitch  ABDOM: nontender and nondistended; soft; normal bowel sounds; no rebound/guarding  MUSC/Skeletal: ROM normal; no crepitus; joints normal; no deformities or arthropathy  EXTREM: no clubbing, cyanosis, inflammation or swelling  SKIN: no rashes, lesions, ulcers, petechiae or subcutaneous nodules  : no lopez  NEURO: grossly intact; motor/sensory WNL; AAOx3; no tremors  PSYCH: normal mood, affect and behavior  LYMPH: normal cervical, supraclavicular, axillary and groin LN's  Breast: s/p bilateral mastectomies and healing well; drains removed at end of last week    Labs:   10/9/2019  Lab Results   Component Value Date    WBC 8.22 10/09/2019    HGB 8.7 (L) 10/09/2019    HCT 26.4 (L) 10/09/2019     (H) 10/09/2019     10/09/2019     BMP  Lab Results   Component Value Date     10/09/2019    K 4.6 10/09/2019     10/09/2019    CO2 24 10/09/2019    BUN 13 10/09/2019    CREATININE 0.9 10/09/2019    CALCIUM 8.4 (L) 10/09/2019    ANIONGAP 8 10/09/2019    ESTGFRAFRICA >60 10/09/2019    EGFRNONAA >60 10/09/2019             Radiology/Diagnostic Studies:    MRI breast  8/7/2019:    1. Significant interval improvement of enhancing spiculated mass in left breast near 4:00 position, as well as more diffuse non mass like enhancement throughout the superior left breast since 04/26/2019, consistent with a favorable response to interval neoadjuvant treatment.  2. No significant change of lobular lower inner quadrant right breast mass consistent with biopsy-proven fibroadenoma.  3. Unchanged 7 mm right breast mass near 12:00 position felt to represent intramammary lymph node.      Nm Pet Ct Routine Skull To Mid Thigh    Result Date: 5/21/2019  INTEGRATED PET CT WITH IMAGE FUSION HISTORY:  Left breast cancer initial treatment evaluation TECHNIQUE: Following the injection of 12.7 mCi of F-18 labeled FDG into a right antecubital  vein, PET CT was performed from the vertex of the skull through the proximal thighs with an integrated full ring PET CT scanner with image fusion. The patient's serum glucose at the time of the exam was 82 mg/dL. FINDINGS: There is diffuse FDG activity in the subareolar left breast with skin thickening and multiple hypermetabolic masses. There is a 19 mm spiculated hypermetabolic mass in the inferior lateral left breast with a max SUV of 10.4. This was previously biopsied. There is a 2 cm hypermetabolic area in the lateral superior left breast with max SUV of 9.9. There is an 11 mm nodule within the inner right breast which was biopsied and shown to represent fibroadenoma. This shows no FDG activity. There is no axillary, mediastinal or hilar adenopathy. There are no pulmonary nodules, infiltrates or pleural effusions. CT of the head and neck show no intra-axial lesions or cervical adenopathy. CT of the abdomen and pelvis demonstrates physiologic activity in the GI tract and urinary system. The liver and adrenal glands are normal. There are mildly prominent lymph nodes within the right lower quadrant the largest measures 13 mm with a max SUV of 3.1. Findings are suspicious for mesenteric adenitis. There is no retroperitoneal adenopathy. There are no lytic or blastic lesions.     IMPRESSION: Diffuse FDG activity within the subareolar left breast with skin thickening and multiple hypermetabolic left breast masses consistent with patient's history of left breast cancer. No evidence of metastatic disease 11 mm nodule in the medial right breast which was shown to represent fibroadenoma Mildly prominent lymph nodes in the right lower quadrant with mild FDG activity suggestive of mesenteric adenitis. Follow-up CT scan in 3-6 months is recommended.     Read and electronically signed by: Radha Garnett MD on 5/21/2019 1:13 PM CDT RADHA GARNETT MD    [facility] Unknown Rad Eap    Result Date: 5/21/2019  Chest single view  CLINICAL DATA: Port-A-Cath placement FINDINGS: AP view shows the heart to be within normal size limits. The mediastinum is unremarkable. Right subclavian Port-A-Cath tip is at superior vena cava. No pneumothorax or other placement related complication is identified. No infiltrates or effusions are demonstrated. No acute osseous abnormalities are demonstrated. IMPRESSION: 1. Right sided Port-A-Cath appears appropriately positioned, with no pneumothorax or other placement related complication. No acute radiographic abnormalities. Read and electronically signed by: Teofilo Patterson MD on 5/21/2019 2:52 PM CDT TEOFILO PATTERSON MD      I have reviewed all available lab results and radiology reports.    Assessment/Plan:   (1) 38 y.o. female  with diagnosis of left breast cancer who has been referred by Dr Salvador Rey with [facility] for evaluation by medical oncology.      - She had presented with left breast pain which became progressive over a 4 month period.   - Radiology studies found a 2.5cm mass on the left breast along with two inflamed LN's on the right.   - She had left breast biopsy on 5/1/2019 which showed invasive ductal carcinoma grade 3. The right breast biopsy was negative for cancer.   - She is ER and PA positive. She is also Her2Nu +3.   - We discussed the latest data on Her2Nu positive breast cancers and the recommendation for neoadjuvant chemotherapy with a herceptin and perjeta.  - she will need echo, portacath, and PET scan  - will plan for herceptin, perjeta, carboplatin and taxotere regimen neoadjuvantly with 6 cycles  -  S/p set up chemotherapy school; discussed side-effect profile, provided literature on the regimen; obtained consents  - she is set up for May 27th to start  - PET scan and echo are on chart  - portacath placed on 5/21  - started chemotherapy with 1st cycle on 5/27/2019   - She had had a recent MRI of the breast again on 8/7  - she completed the neoadjuvant round and is  having surgery next week with Dr Garcia at [facility] in [place]      - She is seeing Dr Howard Cheatham with plastics at [facility] and had bilateral mastectomies on oct 8th 2019 at [facility].   - She will require herceptin maintenance regimen post-surgery recovery. - She will most likely also require a hysterectomy/oopherectomy and plans to see her GYN Dr Aly Contreras in the near future    (2) Left parotid tumor - removed in 2011     (3) Sarcoidosis     (4) Interstitial cystitis     (5) Diet controlled gestational DM     (6) Fibromyalgia     (7) Hx/of cervical dysplasia as teenager s/p cryoablation    (8) Migraine - now on meds prn    (9)  Portacath tip has recently been found to have sheared off and she has seen Dr Rey. She is not having any problems at this time and is prophylactically on leiquis.   -  She has seen Dr Hilton and Dr Lester Jones with [facility] and they were able to successfully removed the portacath tip and she has also had a new port placed at the same time.          VISIT DIAGNOSES:      Malignant neoplasm of overlapping sites of left breast in female, estrogen receptor positive    History of left breast cancer    HER2-positive carcinoma of left breast    Chemotherapy induced neutropenia          PLAN:  1. Await tumor board evaluation at [facility]  2.  F/u with GYN about eventual hysterectomy/bilateral oopherectomy options   3. Check labs weekly for at least the next 4 weeks  4.  F/u with Dr Rey as directed by him    5. Check latest echo report  6. Set up weekly herceptin for now - she wants to hold off on perjeta for now unitl she recovers better  7. RTC  3-4 weeks    Fax note to Ben Rey De BoisBlanc, [place], [place], [place]    Discussion:       I spent over 25 mins of time with the patient. Reviewed results of the recently ordered labs, tests and studies; made directives with regards to the results. Over half of this time was spent couseling and coordinating  care.    I have explained all of the above in detail and the patient understands all of the current recommendation(s). I have answered all of their questions to the best of my ability and to their complete satisfaction.   The patient is to continue with the current management plan.            Electronically signed by Abel Chambers MD

## 2019-10-29 ENCOUNTER — TELEPHONE (OUTPATIENT)
Dept: SURGERY | Facility: CLINIC | Age: 39
End: 2019-10-29

## 2019-10-29 ENCOUNTER — PATIENT MESSAGE (OUTPATIENT)
Dept: SURGERY | Facility: CLINIC | Age: 39
End: 2019-10-29

## 2019-10-29 NOTE — TELEPHONE ENCOUNTER
"Patient called c/o hypersensitivity to bilateral chest incisions, pt states increased senatitivity to cold causing pain, pt denies any redness or warmth to incision sites, pt denies any swelling to incision sites, pt states that "the only time it feels better is after a very warm shower," patient to send photos through MyChart of incisions, pt states pain medication is ineffective and only makes her "drowsy," will notify MD at this time   "

## 2019-11-03 RX ORDER — SODIUM CHLORIDE 0.9 % (FLUSH) 0.9 %
10 SYRINGE (ML) INJECTION
Status: CANCELLED | OUTPATIENT
Start: 2019-11-04

## 2019-11-03 RX ORDER — HEPARIN 100 UNIT/ML
500 SYRINGE INTRAVENOUS
Status: CANCELLED | OUTPATIENT
Start: 2019-11-04

## 2019-11-04 ENCOUNTER — INFUSION (OUTPATIENT)
Dept: INFUSION THERAPY | Facility: HOSPITAL | Age: 39
End: 2019-11-04
Attending: INTERNAL MEDICINE
Payer: MEDICAID

## 2019-11-04 VITALS
TEMPERATURE: 99 F | RESPIRATION RATE: 20 BRPM | SYSTOLIC BLOOD PRESSURE: 115 MMHG | DIASTOLIC BLOOD PRESSURE: 75 MMHG | BODY MASS INDEX: 30.78 KG/M2 | HEART RATE: 80 BPM | HEIGHT: 64 IN | WEIGHT: 180.31 LBS

## 2019-11-04 DIAGNOSIS — Z17.0 MALIGNANT NEOPLASM OF OVERLAPPING SITES OF LEFT BREAST IN FEMALE, ESTROGEN RECEPTOR POSITIVE: ICD-10-CM

## 2019-11-04 DIAGNOSIS — E86.0 DEHYDRATION: Primary | ICD-10-CM

## 2019-11-04 DIAGNOSIS — D70.1 CHEMOTHERAPY INDUCED NEUTROPENIA: ICD-10-CM

## 2019-11-04 DIAGNOSIS — C50.812 MALIGNANT NEOPLASM OF OVERLAPPING SITES OF LEFT BREAST IN FEMALE, ESTROGEN RECEPTOR POSITIVE: ICD-10-CM

## 2019-11-04 DIAGNOSIS — C50.912 HER2-POSITIVE CARCINOMA OF LEFT BREAST: ICD-10-CM

## 2019-11-04 DIAGNOSIS — T45.1X5A CHEMOTHERAPY INDUCED NEUTROPENIA: ICD-10-CM

## 2019-11-04 PROCEDURE — 63600175 PHARM REV CODE 636 W HCPCS: Performed by: INTERNAL MEDICINE

## 2019-11-04 PROCEDURE — 25000003 PHARM REV CODE 250: Performed by: INTERNAL MEDICINE

## 2019-11-04 PROCEDURE — A4216 STERILE WATER/SALINE, 10 ML: HCPCS | Performed by: INTERNAL MEDICINE

## 2019-11-04 PROCEDURE — 96413 CHEMO IV INFUSION 1 HR: CPT

## 2019-11-04 PROCEDURE — 96417 CHEMO IV INFUS EACH ADDL SEQ: CPT

## 2019-11-04 RX ORDER — SODIUM CHLORIDE 0.9 % (FLUSH) 0.9 %
10 SYRINGE (ML) INJECTION
Status: DISCONTINUED | OUTPATIENT
Start: 2019-11-04 | End: 2019-11-04 | Stop reason: HOSPADM

## 2019-11-04 RX ORDER — HEPARIN 100 UNIT/ML
500 SYRINGE INTRAVENOUS
Status: DISCONTINUED | OUTPATIENT
Start: 2019-11-04 | End: 2019-11-04 | Stop reason: HOSPADM

## 2019-11-04 RX ADMIN — PERTUZUMAB 420 MG: 30 INJECTION, SOLUTION, CONCENTRATE INTRAVENOUS at 01:11

## 2019-11-04 RX ADMIN — SODIUM CHLORIDE, PRESERVATIVE FREE 10 ML: 5 INJECTION INTRAVENOUS at 03:11

## 2019-11-04 RX ADMIN — TRASTUZUMAB 468 MG: 150 INJECTION, POWDER, LYOPHILIZED, FOR SOLUTION INTRAVENOUS at 02:11

## 2019-11-04 RX ADMIN — HEPARIN 500 UNITS: 100 SYRINGE at 03:11

## 2019-11-05 ENCOUNTER — TUMOR BOARD CONFERENCE (OUTPATIENT)
Dept: SURGERY | Facility: CLINIC | Age: 39
End: 2019-11-05

## 2019-11-05 NOTE — PROGRESS NOTES
Malignant neoplasm of overlapping sites of left breast in female, estrogen receptor positive    5/16/2019 Initial Diagnosis     Malignant neoplasm of overlapping sites of left breast in female, estrogen receptor positive      5/27/2019 -  Chemotherapy     Treatment Summary   Plan Name: OP BREAST TRASTUZUMAB PERTUZUMAB DOCETAXEL CARBOPLATIN Q3W  Treatment Goal: Curative  Status: Active  Start Date: 5/27/2019  End Date: 6/1/2020 (Planned)  Provider: Abel Chambers MD  Chemotherapy: CARBOplatin (PARAPLATIN) 900 mg in sodium chloride 0.9% 500 mL chemo infusion, 900 mg (100 % of original dose 900 mg), Intravenous, Clinic/HOD 1 time, 6 of 6 cycles  Dose modification:   (original dose 900 mg, Cycle 1)  Administration: 770 mg (7/29/2019), 770 mg (8/19/2019), 770 mg (9/9/2019)  DOCEtaxel (TAXOTERE) 75 mg/m2 = 140 mg in sodium chloride 0.9% 250 mL chemo infusion, 75 mg/m2 = 140 mg, Intravenous, Clinic/HOD 1 time, 6 of 6 cycles  Administration: 140 mg (7/29/2019), 140 mg (8/19/2019), 140 mg (9/9/2019)  trastuzumab 632 mg in sodium chloride 0.9% 250 mL chemo infusion, 8 mg/kg = 632 mg, Intravenous, Clinic/HOD 1 time, 7 of 17 cycles  Administration: 468 mg (11/4/2019), 468 mg (7/29/2019), 468 mg (8/19/2019), 468 mg (9/9/2019)  pertuzumab (PERJETA) 840 mg in sodium chloride 0.9% 250 mL infusion, 840 mg, Intravenous, Clinic/HOD 1 time, 7 of 17 cycles  Administration: 420 mg (11/4/2019), 420 mg (7/29/2019), 420 mg (8/19/2019), 420 mg (9/9/2019)        HER2-positive carcinoma of left breast    4/23/2019 Imaging Significant Findings     Mammogram and US  Left breast asymmetry and architectural distortion       5/1/2019 Biopsy     Invasive Ductal carcinoma      5/1/2019 Initial Diagnosis     HER2-positive  Invasive ductal carcinoma of left breast      5/1/2019 Breast Tumor Markers     Estrogen Receptor: Positive >90%  Progesterone Receptor: Positive >90%  HER2: Positive  Ki67: 10-30%      5/21/2019 Imaging Significant Findings      PET scan  IMPRESSION: Diffuse FDG activity within the subareolar left breast with skin  thickening and multiple hypermetabolic left breast masses consistent with  patient's history of left breast cancer.    No evidence of metastatic disease    11 mm nodule in the medial right breast which was shown to represent  fibroadenoma    Mildly prominent lymph nodes in the right lower quadrant with mild FDG activity  suggestive of mesenteric adenitis. Follow-up CT scan in 3-6 months is  recommended.      5/27/2019 - 9/9/2019 Chemotherapy     Treatment Summary   Plan Name: OP BREAST TRASTUZUMAB PERTUZUMAB DOCETAXEL CARBOPLATIN Q3W  Treatment Goal: Curative  Status: Active  Start Date: 5/27/2019  End Date: 6/1/2020 (Planned)  Provider: Abel Chambers MD  Chemotherapy: CARBOplatin (PARAPLATIN) 900 mg in sodium chloride 0.9% 500 mL chemo infusion, 900 mg (100 % of original dose 900 mg), Intravenous, Clinic/HOD 1 time, 6 of 6 cycles  Dose modification:   (original dose 900 mg, Cycle 1)  Administration: 770 mg (7/29/2019), 770 mg (8/19/2019), 770 mg (9/9/2019)  DOCEtaxel (TAXOTERE) 75 mg/m2 = 140 mg in sodium chloride 0.9% 250 mL chemo infusion, 75 mg/m2 = 140 mg, Intravenous, Clinic/HOD 1 time, 6 of 6 cycles  Administration: 140 mg (7/29/2019), 140 mg (8/19/2019), 140 mg (9/9/2019)  trastuzumab 632 mg in sodium chloride 0.9% 250 mL chemo infusion, 8 mg/kg = 632 mg, Intravenous, Clinic/HOD 1 time, 6 of 17 cycles  Administration: 468 mg (7/29/2019), 468 mg (8/19/2019), 468 mg (9/9/2019)  pertuzumab (PERJETA) 840 mg in sodium chloride 0.9% 250 mL infusion, 840 mg, Intravenous, Clinic/HOD 1 time, 6 of 17 cycles  Administration: 420 mg (7/29/2019), 420 mg (8/19/2019), 420 mg (9/9/2019)        8/7/2019 Imaging Significant Findings     Breast MRI  Impression:     1. Significant interval improvement of enhancing spiculated mass in left breast near 4:00 position, as well as more diffuse non mass like enhancement throughout the  superior left breast since 04/26/2019, consistent with a favorable response to interval neoadjuvant treatment.  2. No significant change of lobular lower inner quadrant right breast mass consistent with biopsy-proven fibroadenoma.  3. Unchanged 7 mm right breast mass near 12:00 position felt to represent intramammary lymph node.  BI-RADS CATEGORY 6: KNOWN MALIGNANCY      10/1/2019 Tumor Conference        Will likely need PBRT      10/8/2019 Breast Surgery     Surgery: Dr Rebecca Garcia, [phone] performed bilateral mastectomy with sentinel lymph node biopsy with pathology showing grade 2 invasive ductal carcinoma, 19 mm with 0 positive lymph nodes.         10/8/2019 Cancer Staged     ypT1 N0      11/5/2019 Tumor Conference        Radiation therapy recommended, then Kadcyla followed by endocrine therapy, then reconstruction.

## 2019-11-06 ENCOUNTER — CLINICAL SUPPORT (OUTPATIENT)
Dept: REHABILITATION | Facility: HOSPITAL | Age: 39
End: 2019-11-06
Payer: MEDICAID

## 2019-11-06 DIAGNOSIS — M62.81 MUSCLE WEAKNESS OF LEFT ARM: ICD-10-CM

## 2019-11-06 DIAGNOSIS — L90.5 SCAR OF BREAST: ICD-10-CM

## 2019-11-06 DIAGNOSIS — M25.619 LIMITED RANGE OF MOTION OF SHOULDER: ICD-10-CM

## 2019-11-06 PROCEDURE — 97161 PT EVAL LOW COMPLEX 20 MIN: CPT

## 2019-11-06 PROCEDURE — 97110 THERAPEUTIC EXERCISES: CPT

## 2019-11-06 NOTE — PATIENT INSTRUCTIONS
Butterflies - Scapular: Retraction in External Rotation          Lie on your back with your hands behind your head. Open your chest by  your elbows apart and gently down. Hold for 5 seconds. Then, bring  your elbows back together. Repeat 1-2x daily 3 x 10 reps     Cording - Self Mobilization Stretch    Lie on your back with your involved hand behind your head. Gently stretch your breast/armpit skin towards the opposite hip. Lower your elbow towards the bed. Hold for 5 seconds, then release the stretch on your skin and repeat.   Perform 1-2x day 5 repetitions for 3 sets.         Scapular Retraction using resistance bands         Hold end of band in each hand. Pull back keeping your elbows by your side and squeeze your shoulder blades together. Remember: Keep elbows by sides, thumbs up. Slowly release the band - do 3 sets of 15 reps each 1x day.       Shoulder Flexion - Wand (Supine)      Lie on back holding wand. Raise arms overhead. Let your arms fall back until you feel a gentle stretch.This should not cause pain or feel aggressive. Repeat 10 times and hold 30 seconds. Do 1-2 sessions per day.      Radiation Pose      Place R arm,above you head until you feel a slight stretch.   REST AND RELAX. Hold 30 seconds. Repeat 5 x. Do this on the L side as well.   Do 1-2 times per day.         Gentle Lower trunk rotation with butterfly stretch - only bring legs 1/2 the distance of the picture   3 x 10 reps each side       Copyright © 5253-0833 [employer]      Sidelying Shoulder Abduction    Do this on both sides. Keep your elbow straight. Lift your arm upward toward your head.  Perform 10 times. Perform 3 sets.   Perform 1-2 times per day.

## 2019-11-06 NOTE — PLAN OF CARE
[facility]  Physical Therapy Initial Evaluation    Name: Sheridan Todd  Clinic Number: [id number]    Therapy Diagnosis:   Encounter Diagnoses   Name Primary?    Limited range of motion of shoulder     Muscle weakness of left arm     Scar of breast      Physician: Rebecca Garcia MD     Physician Orders: PT Eval and Treat   Medical Diagnosis: Z85.3 (ICD-10-CM) - History of left breast cancer  Evaluation Date: 2019  Authorization period Expiration: TBD  Plan of Care Certification Period: 2019-2019  Insurance: Medicaid/[employer]     Visit #: 1/ Visits authorized: TBD    Time In: 3:05  Time Out: 3:50  Total Billable Time: 45 minutes    Precautions: cancer and post-mastectomy      History   History of Present Illness: Sheridan is a 38 y.o. female that presents to  [facility] s/p bilateral breast surgery. Left mastectomy due to invasive ductal carcinoma grade 3; right prophalactic mastectomy 10/08/2019  Chief complaint: Sensitivity of scars and skin in left axillary area  Surgical History:  Sheridan Todd  has a past surgical history that includes Vaginal delivery; PAROTID TUMOR; [place] tooth extraction; Cryotherapy; Cystoscopy with biopsy of bladder;  section (08/15/2016); Portacath placement (2019); Portacath placement (Right, 2019); Simple mastectomy (Bilateral, 10/8/2019); [place] lymph node biopsy (Left, 10/8/2019); and Breast reconstruction (Bilateral, 10/8/2019).    Chemotherapy: herceptin and PERJETA  Radiation: TBD    Past Medical History:   Diagnosis Date    Abnormal Pap smear of cervix     Chemotherapy induced neutropenia 2019    Fibromyalgia     Gestational diabetes     HER2-positive carcinoma of left breast 2019    lt    Hypertension     AFTER PREGNANCY- NO MED NOW    IC (interstitial cystitis)     Malignant neoplasm of overlapping sites of left breast in female,  estrogen receptor positive 5/16/2019    Malignant neoplasm of overlapping sites of left female breast 5/16/2019    Overweight and obesity(278.0)     Sarcoidosis     Sinusitis           Medications:  Sheridan has a current medication list which includes the following prescription(s): amoxicillin, butalbital-acetaminophen-caff, carafate, diphenoxylate-atropine 2.5-0.025 mg, fluconazole, hydrocodone-acetaminophen, mupirocin, ondansetron, oxycodone-acetaminophen, phenazopyridine, promethazine, pseudoephedrine hcl, and sumatriptan.    Allergies:  Review of patient's allergies indicates:   Allergen Reactions    Shellfish containing products Anaphylaxis     Other reaction(s): Vomiting  Only crabmeat  Other reaction(s): Vomiting  Only crabmeat    Codeine Nausea And Vomiting     Pt thinks it was tylenol #3 with codeine  Can take hydrocodone & oxycodone  Other reaction(s): Nausea    Tegaderm ag mesh [silver] Blisters     Redness, itching and tears skin         Prior Therapy: Pre-Op evaluation- see chart  Social History: Lives with  and 4 kids ages 8 to 12   Occupation:  - working part time  Prior Level of Function: No limitations in UE function  Current Level of Function: Some pain in chest with full shoulder flexion and abduction      Patient's Goals: Reduce hypersensitivity of surgical area; avoid lymphedema    Hand dominance: right    Subjective   Pt states: Doing pretty well following surgery. Primary complaint of sensitivity under left arm - skin fees very sensitive. Incision sensitivity.   Pain: 3/10 on VAS currently.   Best: 2/10  Worst: 6/10   Pain location: left axilla and bilateral chest at incisions   Objective   Mental status :alert and oriented    Postural examination/scapula alignment:  Forward head; mild Dowager's hump    Skin Integrity:   Scar Location: Bilateral chest wall  Appearance: clean, no drainage, tender, healing  Signs of infection:  "No  Drainage:none  Color:N/A    Edema: Mild  Location: left axilla    Axillary Web Syndrome/Cording:   None noted    Sensation: numbness noted at incisions        Range of Motion:      Shoulder Range of Motion:   Active  ROM Right Left   Flexion 160 160   Abduction 170 178   Extension 57 55   IR/90deg 80 80   ER/90deg 80 60          Strength: manual muscle test grades below     Upper Extremity Strength   (L) UE (R) UE   Shoulder flexion: 4+/5 5/5   Shoulder Abduction: 4/5 4+/5   Shoulder IR 4+/5 4+/5   Shoulder ER 4/5 4/5   Elbow flexion: 5/5 5/5   Elbow extension: 5/5 5/5   Lower Trap: 4/5 4+/5   Middle Trap: 4/5 4+/5    4+/5 4+/5           Baseline Measurements of BL UE's for early detection of Lymphedema:   LANDMARK RIGHT UE LEFT UE DIFFERENCE   E + 8" 35.5 cm 34.2cm 1.3 cm   E + 6" 33.2 cm 32.7 cm .5 cm   E + 4" 30.8 cm 30cm .8 cm   E + 2" 28.2 cm 27.3cm .9 cm   Elbow 26.0 cm 25.4 cm .6cm   W+ 8" 27.0 cm 26.4 cm .6 cm   W +  6" 26.9 cm 25.9 cm 1.0 cm   W + 4" 23.9 cm 23 cm .9 cm   Wrist 16.3 cm 16.1cm .20 cm   DPC 20.5 cm 20.3 cm .20 cm   IP Thumb .56 cm .58 cm .02 cm         Functional Mobility (Bed mobility, transfers)  Independent with all bed mobility and transfers      ADL's:  Independent with grooming, hygiene, dressing, toileting and bathing    Gait Assessment:   - AD used: none  - Assistance: independent  - Distance: community distances     Patient Education Provided   - role of therapy in multi - disciplinary team, goals for therapy  - Pt was educated in lymphedema etiology and management plans.    - Pt was provided with written risk reductions and precautions for managing lymphedema.   -HEP ROM for shoulders, gentle stretching of axilla, rows with band. Pt instructed to maintain pain free ranges of motion with all exercises.   -Use of wash rag in shower for gentle incision mobilization    Pt has no cultural, educational or language barriers to learning provided.  Treatment and Instruction of Home " Exercise Program    Time In: 3:30 PM  Time Out: 3:50 PM    Sheridan received individual therapeutic exercises to improve postural correction and alignment, stretching and soft tissue mobility, and strengthening for 20 minutes including the following:   Butterflies 1 x 10 reps holding 5 seconds  Axilla self mobilization left side  5 reps x 5 sec hold  Wand flexion and ER overhead 1 x 10 reps with 10 sec holds  LTR with butterfly stretch x 1 min  Sidelying shoulder abduction 2 x 10  Rows with pink band 2 x 10 reps      Sheridan received the following manual therapy techniques were performed to increased myofascial/soft tissue length, mobility and pliability, increase PROM, AROM and function as well as to decrease pain for 5 minutes:  Gentle incision massage/mobilization.        Written Home Exercises Provided and Patient Education: Handouts given   See EMR under patient instructions for program given  Pt demonstrated good understanding of the education provided. Patient demo good return demo of skill of exercises.        Assessment   This is a 38 y.o. female referred to outpatient physical therapy and presents with a medical diagnosis of invasive ductile carcinoma of left breast and was seen today post-operatively to establish PT plan of care for impairments following surgery including: Scar/incision sensitivity, left shoulder ROM limitations and muscular weakness of bilateral UE's. .     Pt instructed in HEP this session and was able to perform all exercises given independently. Pt instructed to follow up with therapist if any concerns arise with program established. Pt will continue to benefit from skilled physical therapy to address the impairments stated in chart below, provide patient/family education and to maximize pt's level of independence in home and community environment     Anticipated barriers to physical therapy: None     Pt's spiritual, cultural and educational needs considered and pt agreeable to  plan of care and goals as stated below:     Medical necessity is demonstrated by the following IMPAIRMENTS/PROMBLEM LIST:    History  Co-morbidities and personal factors that may impact the plan of care Co-morbidities:   history of cancer    Personal Factors:   no deficits     low   Examination  Body Structures and Functions, activity limitations and participation restrictions that may impact the plan of care Body Regions:   upper extremities  trunk    Body Systems:    gross symmetry  ROM  strength    Participation Restrictions:   Post-mastectomy    Activity limitations:   Learning and applying knowledge  no deficits    General Tasks and Commands  no deficits    Communication  no deficits    Mobility  lifting and carrying objects    Self care  washing oneself (bathing, drying, washing hands)  caring for body parts (brushing teeth, shaving, grooming)    Domestic Life  cooking  doing house work (cleaning house, washing dishes, laundry)    Interactions/Relationships  no deficits    Life Areas  no deficits    Community and Social Life  no deficits         low   Clinical Presentation stable and uncomplicated low   Decision Making/ Complexity Score: low       Goals: Pt agrees with goals set    Short Term goals: 2 weeks  1. Patient will demonstrate 100% understanding of lymphedema risk reduction practices to include self monitoring for lymphedema. (progressing, not met)  2. Patient will demonstrate independence with Home Exercise program established. (progressing, not met)  3. Pt will increase AROM/PROM in shoulder ER ROM to 75 degrees on left to improve functional reach, carry, push, pull pain free. (progressing, not met)  4. Strength will be assessed and appropriate goals set. (progressing, not met)    Long Term Goals: 4 weeks   1.  Pt will increase AROM/PROM in shoulder flexion to WNL on left to improve functional reach, carry, push, pull pain free. (progressing, not met)  2. Pt will increase strength to 4+/5 in gross  UE musculature to improve tolerance to all functional activities pain free. (progressing, not met)  3. Pt will demonstrate full/maximized tissue mobility to increase ROM and promote healthy tissue to be pain free at discharge. (progressing, not met)  4. Pt will report decrease in overall worst pain to 2/10 at discharge. (progressing, not met)  5. Patient will report compliance with walking program 5x week for 10 min each day to improve overall cardiovascular function and decrease cancer related fatigue at discharge. (progressing, not met)      Plan   Outpatient physical therapy 1x week for 4 weeks to include the following:   Manual Therapy, Patient Education, Self Care and Therapeutic Exercise.    Plan of care Certification Period: 11/25/2019 to 12/30/2019.    Therapist: Salina Suggs, PT  11/6/2019

## 2019-11-07 ENCOUNTER — DOCUMENTATION ONLY (OUTPATIENT)
Dept: SURGERY | Facility: CLINIC | Age: 39
End: 2019-11-07

## 2019-11-07 ENCOUNTER — PATIENT MESSAGE (OUTPATIENT)
Dept: SURGERY | Facility: CLINIC | Age: 39
End: 2019-11-07

## 2019-11-07 DIAGNOSIS — Z17.0 MALIGNANT NEOPLASM OF OVERLAPPING SITES OF LEFT BREAST IN FEMALE, ESTROGEN RECEPTOR POSITIVE: Primary | ICD-10-CM

## 2019-11-07 DIAGNOSIS — C50.812 MALIGNANT NEOPLASM OF OVERLAPPING SITES OF LEFT BREAST IN FEMALE, ESTROGEN RECEPTOR POSITIVE: Primary | ICD-10-CM

## 2019-11-07 PROBLEM — M25.619 LIMITED RANGE OF MOTION OF SHOULDER: Status: ACTIVE | Noted: 2019-11-07

## 2019-11-07 PROBLEM — M62.81 MUSCLE WEAKNESS OF LEFT ARM: Status: ACTIVE | Noted: 2019-11-07

## 2019-11-07 PROBLEM — L90.5 SCAR OF BREAST: Status: ACTIVE | Noted: 2019-11-07

## 2019-11-08 NOTE — PROGRESS NOTES
Faxed a referral for Radiation Oncology Dr. Darwin Long to fax # [phone] per Dr. Garcia's request.

## 2019-11-15 ENCOUNTER — DOCUMENTATION ONLY (OUTPATIENT)
Dept: RADIATION ONCOLOGY | Facility: CLINIC | Age: 39
End: 2019-11-15

## 2019-11-15 ENCOUNTER — OFFICE VISIT (OUTPATIENT)
Dept: RADIATION ONCOLOGY | Facility: CLINIC | Age: 39
End: 2019-11-15
Payer: MEDICAID

## 2019-11-15 ENCOUNTER — LAB VISIT (OUTPATIENT)
Dept: LAB | Facility: HOSPITAL | Age: 39
End: 2019-11-15
Attending: INTERNAL MEDICINE
Payer: MEDICAID

## 2019-11-15 VITALS
OXYGEN SATURATION: 99 % | WEIGHT: 179.63 LBS | SYSTOLIC BLOOD PRESSURE: 142 MMHG | BODY MASS INDEX: 30.83 KG/M2 | HEART RATE: 80 BPM | DIASTOLIC BLOOD PRESSURE: 87 MMHG

## 2019-11-15 DIAGNOSIS — C50.812 MALIGNANT NEOPLASM OF OVERLAPPING SITES OF LEFT BREAST IN FEMALE, ESTROGEN RECEPTOR POSITIVE: ICD-10-CM

## 2019-11-15 DIAGNOSIS — C50.812 MALIGNANT NEOPLASM OF OVERLAPPING SITES OF LEFT BREAST IN FEMALE, ESTROGEN RECEPTOR POSITIVE: Primary | ICD-10-CM

## 2019-11-15 DIAGNOSIS — Z17.0 MALIGNANT NEOPLASM OF OVERLAPPING SITES OF LEFT BREAST IN FEMALE, ESTROGEN RECEPTOR POSITIVE: Primary | ICD-10-CM

## 2019-11-15 DIAGNOSIS — Z17.0 MALIGNANT NEOPLASM OF OVERLAPPING SITES OF LEFT BREAST IN FEMALE, ESTROGEN RECEPTOR POSITIVE: ICD-10-CM

## 2019-11-15 LAB
ALBUMIN SERPL BCP-MCNC: 3.8 G/DL (ref 3.5–5.2)
ALP SERPL-CCNC: 58 U/L (ref 55–135)
ALT SERPL W/O P-5'-P-CCNC: 17 U/L (ref 10–44)
ANION GAP SERPL CALC-SCNC: 7 MMOL/L (ref 8–16)
AST SERPL-CCNC: 20 U/L (ref 10–40)
BASOPHILS # BLD AUTO: 0.02 K/UL (ref 0–0.2)
BASOPHILS NFR BLD: 0.4 % (ref 0–1.9)
BILIRUB SERPL-MCNC: 0.5 MG/DL (ref 0.1–1)
BUN SERPL-MCNC: 15 MG/DL (ref 6–20)
CALCIUM SERPL-MCNC: 9.7 MG/DL (ref 8.7–10.5)
CHLORIDE SERPL-SCNC: 107 MMOL/L (ref 95–110)
CO2 SERPL-SCNC: 28 MMOL/L (ref 23–29)
CREAT SERPL-MCNC: 0.9 MG/DL (ref 0.5–1.4)
DIFFERENTIAL METHOD: ABNORMAL
EOSINOPHIL # BLD AUTO: 0.1 K/UL (ref 0–0.5)
EOSINOPHIL NFR BLD: 1.3 % (ref 0–8)
ERYTHROCYTE [DISTWIDTH] IN BLOOD BY AUTOMATED COUNT: 11.6 % (ref 11.5–14.5)
EST. GFR  (AFRICAN AMERICAN): >60 ML/MIN/1.73 M^2
EST. GFR  (NON AFRICAN AMERICAN): >60 ML/MIN/1.73 M^2
GLUCOSE SERPL-MCNC: 110 MG/DL (ref 70–110)
HCT VFR BLD AUTO: 35 % (ref 37–48.5)
HGB BLD-MCNC: 11.4 G/DL (ref 12–16)
IMM GRANULOCYTES # BLD AUTO: 0.01 K/UL (ref 0–0.04)
IMM GRANULOCYTES NFR BLD AUTO: 0.2 % (ref 0–0.5)
LYMPHOCYTES # BLD AUTO: 1.7 K/UL (ref 1–4.8)
LYMPHOCYTES NFR BLD: 36.5 % (ref 18–48)
MCH RBC QN AUTO: 32.3 PG (ref 27–31)
MCHC RBC AUTO-ENTMCNC: 32.6 G/DL (ref 32–36)
MCV RBC AUTO: 99 FL (ref 82–98)
MONOCYTES # BLD AUTO: 0.3 K/UL (ref 0.3–1)
MONOCYTES NFR BLD: 6.6 % (ref 4–15)
NEUTROPHILS # BLD AUTO: 2.6 K/UL (ref 1.8–7.7)
NEUTROPHILS NFR BLD: 55 % (ref 38–73)
NRBC BLD-RTO: 0 /100 WBC
PLATELET # BLD AUTO: 169 K/UL (ref 150–350)
PMV BLD AUTO: 9 FL (ref 9.2–12.9)
POTASSIUM SERPL-SCNC: 4.1 MMOL/L (ref 3.5–5.1)
PROT SERPL-MCNC: 7.2 G/DL (ref 6–8.4)
RBC # BLD AUTO: 3.53 M/UL (ref 4–5.4)
SODIUM SERPL-SCNC: 142 MMOL/L (ref 136–145)
WBC # BLD AUTO: 4.68 K/UL (ref 3.9–12.7)

## 2019-11-15 PROCEDURE — 99205 OFFICE O/P NEW HI 60 MIN: CPT | Mod: S$GLB,,, | Performed by: RADIOLOGY

## 2019-11-15 PROCEDURE — 36415 COLL VENOUS BLD VENIPUNCTURE: CPT

## 2019-11-15 PROCEDURE — 99205 PR OFFICE/OUTPT VISIT, NEW, LEVL V, 60-74 MIN: ICD-10-PCS | Mod: S$GLB,,, | Performed by: RADIOLOGY

## 2019-11-15 PROCEDURE — 85025 COMPLETE CBC W/AUTO DIFF WBC: CPT

## 2019-11-15 PROCEDURE — 80053 COMPREHEN METABOLIC PANEL: CPT

## 2019-11-15 NOTE — PROGRESS NOTES
Sheridan Todd  [id number]  1980  11/15/2019  No referring provider defined for this encounter.    DIAGNOSIS: Cancer Staging  Malignant neoplasm of overlapping sites of left breast in female, estrogen receptor positive  Staging form: Breast, AJCC 8th Edition  - Clinical: No stage assigned - Unsigned  - Pathologic: No Stage Recommended (ypT1c(2), pN0, cM0, G2, ER+, IA+, HER2+) - Signed by Darwin oLng Jr., MD on 11/15/2019      TREATMENT SITE(S): left chest wall and draining lymphatics    **UPT (-)    INTENT: CURATIVE    TREATMENT SETTING: ADJUVANT     MODALITY: PHOTON; e- for boost    TECHNIQUE:  INTENSITY MODULATED RADIOTHERAPY (IMRT); en face for boost    IMRT MEDICAL NECESSITY: Target volume irregular shape/proximate to critical structures, Narrow margins needed to protect adjacent structures, High precision necessary, Conventional planning maneuvers insufficient and Concave target volume with critical tissues in concavity    I have personally performed treatment planning for the patient, reviewing relevant history/physical and imaging. I have defined GTV, CTV, PTV and organs at risk.     In order to accomplish this plan, I am ordering:  SIMULATION: CT SIM FOR PLACEMENT OF TREATMENT FIELDS    CONTRAST: none    TO ACCOMPLISH REPRODUCIBLE POSITION: VACLOC and WING BOARD    DEVICES FOR BEAM SHAPING: CUSTOMIZED MLC; blocks at boost    CUSTOMIZED BOLUS: 1CM EVERY OTHER DAY; 1cm daily at boost    IMAGING: CBCT DAILY    I have ordered a weekly physics check.    SPECIAL PHYSICS CONSULT: NO  REASON: N/A    SPECIAL TREATMENT CIRCUMSTANCE: YES  Concurrent or recent administration of chemotherapeutic agents which are known potent radiosensitizers and thus will require vigilant monitoring for exaggerated radiation toxicities.    LABS: NONE    ANTICIPATED PRESCRIPTION TO ISOCENTER: 5040cGy/28frx to left chest wall and draining lymphatics + 1000cGy/5frx boost to scar (total 6040cGy/33frx)    ENERGY: 6X; 6e- for  boost    TREATMENT: DAILY    PHYSICIAN: Darwin Long Jr, MD

## 2019-11-15 NOTE — PATIENT INSTRUCTIONS
Instructions given on breast radiation and skin care.  [facility] program booklet given.  Patient verbalized understanding.

## 2019-11-15 NOTE — PROGRESS NOTES
Sheridan Todd  [id number]  1980  11/15/2019  Rebecca Garcia Md  [address] [facility] [place] [address]    REASON FOR CONSULTATION: [id number] converted to [id number] triple (+) g2 IDC L breast    TREATMENT GOAL: adjuvant    HISTORY OF PRESENT ILLNESS:   39F presented with left breast pain with nipple inversion, no rapid swelling , warmth or redness with diagnostic mammogram and ultrasound revealing a 2.8 cm irregular hypoechoic mass at the 1 o'clock position left breast, 10 cm from the nipple with additional asymmetries and calcifications and left axillary subcentimeter lymphadenopathy.  Skin thickening and nipple inversion also appreciated.  There were also suspicious findings in the right breast including a 1.8 cm hypoechoic mass 7 cm from the nipple at the 5 o'clock position with a normal appearing right axillary lymph node.  MRI confirmed extensive mass and non masslike enhancement a left breast with nipple retraction and multiple foci of malignant calcifications as well as confirming 2 small masses in the right breast.    Biopsy of the right breast was negative with left breast biopsy revealing grade 3 invasive ductal carcinoma, Juan 8/9 with associated high-grade DCIS with comedo necrosis. Tumor was ER+ @ 95%, ME+ % 90%, her2+ @ 3+.  PET-CT confirmed diffuse FDG activity in the subareolar left breast with skin thickening and multiple hypermetabolic left breast mass is with no evidence of distant metastases or axillary lymphadenopathy.    She tested BRCA (-).    She completed neoadjuvant TCHP x 6c with Dr. Chambers.  Interim MRI demonstrated significant improvement.    Patient underwent bilateral mastectomy with left axillary lymph node dissection with Dr. Garcia that revealed:   - R breast: david   - L breast:  2 foci; 1.9 cm g2 IDC at LOQ, <1 mm mucinous adenoca at UOQ margin (-)   - [place] 6/9; multifocal high-grade DCIS; no skin or nipple  involvement   - ER+, [place] weakly +; her2 equivocal   - L axilla: 0/24LN    Her case was reviewed by Multidisciplinary Tumor Board the recommended adjuvant radiotherapy followed by Kadcyla and endocrine therapy then ROSI reconstruction.  She is also planning for hysterectomy at completion of treatment.    Today she denies fever, chills, chest pain, shortness of breath, cough or hemoptysis.  She denies bone pain.  She continues to be fatigued and reports some sensitivity left greater than right at the chest wall.  She has a previous diagnosis of migraine disorder as well as fibromyalgia.  She is not taking Neurontin.    Review of Systems   Constitutional: Positive for fatigue. Negative for appetite change, chills, fever and unexpected weight change.   HENT:   Negative for lump/mass, mouth sores, sore throat, trouble swallowing and voice change.    Eyes: Negative for eye problems and icterus.   Respiratory: Negative for cough, hemoptysis and shortness of breath.    Cardiovascular: Negative for chest pain and leg swelling.   Gastrointestinal: Negative for abdominal pain, constipation, diarrhea, nausea and vomiting.   Genitourinary: Negative for dysuria, frequency, hematuria, nocturia and vaginal bleeding.    Musculoskeletal: Negative for back pain, gait problem, neck pain and neck stiffness.   Skin: Positive for wound.   Neurological: Positive for numbness. Negative for extremity weakness, gait problem, headaches and seizures.   Hematological: Negative for adenopathy.   Psychiatric/Behavioral: The patient is nervous/anxious.      Past Medical History:   Diagnosis Date    Abnormal Pap smear of cervix     Chemotherapy induced neutropenia 5/24/2019    Fibromyalgia     Gestational diabetes     HER2-positive carcinoma of left breast 5/16/2019    lt    Hypertension     AFTER PREGNANCY- NO MED NOW    IC (interstitial cystitis)     Malignant neoplasm of overlapping sites of left breast in female, estrogen receptor  positive 2019    Malignant neoplasm of overlapping sites of left female breast 2019    Overweight and obesity(278.0)     Sarcoidosis     Sinusitis      Past Surgical History:   Procedure Laterality Date    BREAST RECONSTRUCTION Bilateral 10/8/2019    Procedure: RECONSTRUCTION, BREAST skin reduction after mastectomy;  Surgeon: Howard Cheatham MD;  Location: [facility];  Service: General;  Laterality: Bilateral;     SECTION  08/15/2016    Twins     CRYOTHERAPY      CYSTOSCOPY WITH BIOPSY OF BLADDER      x 2    PAROTID TUMOR      LEFT    PORTACATH PLACEMENT  2019    , removed bc cathetar severed from port, removed & relpaced with new powerport    PORTACATH PLACEMENT Right 2019    Power port by Dr. Hilton    SENTINEL LYMPH NODE BIOPSY Left 10/8/2019    Procedure: BIOPSY, LYMPH NODE, SENTINEL LEFT;  Surgeon: Rebecca Garcia MD;  Location: [facility];  Service: General;  Laterality: Left;    SIMPLE MASTECTOMY Bilateral 10/8/2019    Procedure: MASTECTOMY, SIMPLE BILATERAL (CONSENT AM OF) 4.0 hr case;  Surgeon: Rebecca Garcia MD;  Location: [facility];  Service: General;  Laterality: Bilateral;    VAGINAL DELIVERY       x2 PROM. Preclampsia/ gestational diabetes    WISDOM TOOTH EXTRACTION       Social History     Socioeconomic History    Marital status:      Spouse name: Not on file    Number of children: Not on file    Years of education: Not on file    Highest education level: Not on file   Occupational History     Employer: [employer]   Social Needs    Financial resource strain: Not on file    Food insecurity:     Worry: Not on file     Inability: Not on file    Transportation needs:     Medical: Not on file     Non-medical: Not on file   Tobacco Use    Smoking status: Former Smoker     Packs/day: 1.00     Years: 6.50     Pack years: 6.50     Types: Cigarettes     Start date: 2012    Smokeless tobacco: Never Used    Tobacco comment: quit 2 years ago    Substance and Sexual Activity    Alcohol use: Yes     Comment: rare    Drug use: No    Sexual activity: Yes     Partners: Male   Lifestyle    Physical activity:     Days per week: Not on file     Minutes per session: Not on file    Stress: Not on file   Relationships    Social connections:     Talks on phone: Not on file     Gets together: Not on file     Attends Jain service: Not on file     Active member of club or organization: Not on file     Attends meetings of clubs or organizations: Not on file     Relationship status: Not on file   Other Topics Concern    Not on file   Social History Narrative    Not on file     Family History   Problem Relation Age of Onset    Hypertension Mother     Hyperlipidemia Mother     Cancer Father         liver    Cancer Maternal Grandfather         throat    Kidney disease Maternal Grandfather     Ovarian cancer Other     Lung cancer Paternal Aunt     Breast cancer Neg Hx        PRIOR HISTORY OF CHEMOTHERAPY OR RADIOTHERAPY: Please see HPI for patients prior oncologic history.    Medication List with Changes/Refills   Current Medications    AMOXICILLIN (AMOXIL ORAL)    Take by mouth.    BUTALBITAL-ACETAMINOPHEN-CAFF -40 MG CAP    Take 1 capsule by mouth every 4 (four) hours as needed.    CARAFATE 100 MG/ML SUSPENSION    TAKE 10 ML BY MOUTH EVERY 4 TO 6 HOURS    DIPHENOXYLATE-ATROPINE 2.5-0.025 MG (LOMOTIL) 2.5-0.025 MG PER TABLET    TAKE 1 TABLET BY MOUTH WITH EVERY LOOSE STOOL (NO MORE THAN 8 TABLETS IN 24 HOUR)    FLUCONAZOLE (DIFLUCAN ORAL)    Take by mouth.    HYDROCODONE-ACETAMINOPHEN (NORCO) 5-325 MG PER TABLET    TAKE 1 TABLET BY MOUTH EVERY 24 HOURS AS NEEDED FOR SEVERE PAIN    MUPIROCIN (BACTROBAN) 2 % OINTMENT    APPLY OINTMENT EXTERNALLY TO AFFECTED AREA THREE TIMES DAILY    ONDANSETRON (ZOFRAN) 8 MG TABLET    Take 1 tablet (8 mg total) by mouth every 8 (eight) hours as needed for Nausea.    OXYCODONE-ACETAMINOPHEN (PERCOCET) 5-325 MG PER  TABLET    Take 1 tablet by mouth every 24 hours as needed for Pain (severe pain).    PHENAZOPYRIDINE (PYRIDIUM) 200 MG TABLET    Take 200 mg by mouth every 6 (six) hours as needed.    PROMETHAZINE (PHENERGAN) 25 MG TABLET    Take 1 tablet (25 mg total) by mouth every 6 (six) hours as needed for Nausea.    PSEUDOEPHEDRINE HCL (SUDAFED ORAL)    Take by mouth as needed.    SUMATRIPTAN (IMITREX) 25 MG TAB    Take 50 mg by mouth every 2 (two) hours as needed.     Review of patient's allergies indicates:   Allergen Reactions    Shellfish containing products Anaphylaxis     Other reaction(s): Vomiting  Only crabmeat  Other reaction(s): Vomiting  Only crabmeat    Codeine Nausea And Vomiting     Pt thinks it was tylenol #3 with codeine  Can take hydrocodone & oxycodone  Other reaction(s): Nausea    Tegaderm ag mesh [silver] Blisters     Redness, itching and tears skin        QUALITY OF LIFE: 90%- Able to Carry on Normal Activity: Minor Symptoms of Disease    Vitals:    11/15/19 0947   BP: (!) 142/87   Pulse: 80   SpO2: 99%   Weight: 81.5 kg (179 lb 9.6 oz)   PainSc:   2   PainLoc: Generalized     Body mass index is 30.83 kg/m².    PHYSICAL EXAM:   GENERAL: alert; in no apparent distress.   HEAD: normocephalic, atraumatic. Alopecia  EYES: pupils are equal, round, reactive to light and accommodation. Sclera anicteric. Conjunctiva not injected.   NOSE/THROAT: no nasal erythema or rhinorrhea. Oropharynx pink, without erythema, ulcerations or thrush.   NECK: no cervical motion rigidity; supple with no masses.  CHEST: Patient is speaking comfortably on room air with normal work of breathing without using accessory muscles of respiration.  ABDOMEN: soft, nontender, nondistended.  MUSCULOSKELETAL: no tenderness to palpation along the spine or scapulae. Normal range of motion.  NEUROLOGIC: cranial nerves II-XII intact bilaterally. Strength 5/5 in bilateral upper and lower extremities. No sensory deficits appreciated. Normal  gait.  LYMPHATIC: no axillary adenopathy appreciated bilaterally.   EXTREMITIES: no clubbing, cyanosis, edema.  SKIN: no erythema, rashes, ulcerations noted.   CW:  Bilateral chest wall incisions are clean dry and intact.  There is sensitivity at left lower inner quadrant.  No cellulitis, fluctuance, nodularity.    REVIEW OF IMAGING/PATHOLOGY/LABS: Please see HPI. All images reviewed personally by dictating physician.     ASSESSMENT: 39 y.o. female with stage [id number] converted to [id number] triple (+) g2 IDC L breast after TCHP x 6c s/p B mastectomies and L ALND; planned for HP x 1yr (Perjeta held during RT) + antiestrogen therapy.  PLAN:  Sheridan Todd originally presented with a locally advanced malignancy of the left breast done on questioning does not sound consistent with inflammatory disease but with imaging and clinical findings on my review of the chart concerning for skin involvement, cT4b disease.  The tumor was found to be triple positive and she completed TCHP x6 cycles and proceeded to bilateral mastectomy with left axillary lymph node dissection that revealed residual multifocal tumor within the left breast, negative skin and nipple at pathology and no evidence of lymphatic involvement.  She is planned for adjuvant Herceptin and Perjeta with multidisciplinary tumor board recommending consideration of Kadcyla.  She is also planned for antiestrogen therapy.  Multidisciplinary tumor Board recommended adjuvant radiotherapy presumed indication being original presentation of locally advanced T4 disease.    In this circumstance there is certainly a LC benefit for the addition of adjuvant radiotherapy.  Given her advanced disease presentation she is at risk of recurrence within the left chest wall and or lymphatic distribution.  Additional risk factors include young age and triple positive tumor.  I recommend treatment to the left chest wall and draining lymphatics, 5040 cGy using IMRT to spare the  underlying lung and heart with a 1000 cGy boost to the mastectomy scar.  Patient will continue on Q 3 week Herceptin and my understanding is Perjeta will be held.  I will confer with Dr. Chambers regarding multidisciplinary tumor board recommendation for [place], which would have to be held during radiotherapy.  At completion of radiotherapy patient will be started on antiestrogen therapy, is planned to proceed with hysterectomy as well as ROSI reconstructions at 6 months.    I carefully explained the process of simulation and treatment delivery with weekly physician visits.     We discussed the risks and benefits of the above treatment and have gone over in detail the acute and late toxicities of radiation therapy to the left chest wall and draining lymphatics. The patient expressed  understanding and has signed a consent form which is included in the patients chart.     The patient has our contact information and understands that they are free to contact us at any time with questions or concerns regarding radiation therapy.    DISPOSITION: RTC FOR CT SIM    I have personally seen and evaluated this patient. Greater than 50% of this time was spent discussing coordination of care and/or counseling.    PHYSICIAN: Darwin Long Jr, MD    Thank you for the opportunity to meet and consult with Sheridan Todd.   Please feel free to contact me to discuss the above recommendation further.

## 2019-11-17 PROBLEM — C50.912 BREAST CANCER, LEFT: Status: ACTIVE | Noted: 2019-10-08

## 2019-11-17 NOTE — PROGRESS NOTES
[facility] Hematology/Oncology  PROGRESS NOTE -  Follow-up Visit      Subjective:       Patient ID:   NAME: Sheridan Todd : 1980     39 y.o. female    Referring Doc: Krissy  Other Physicians: Jose Rodriguez De Boisblanc, Marshall; [facility]    Chief Complaint: left breast ca f/u     History of Present Illness:     Patient returns today for a regularly scheduled follow-up visit.  She has since completed the neoadjuvant chemotherapy on  and has subsequently surgery next at [facility] with Dr Garcia.  She denies any CP, SOB, HA's or N/V. She is here by herself. She has some fatigue. She decided to hold off on plastic reconstruction at this time.     She has since resumed maintenance therapy with herceptin/perjeta every 3 week3 therapy.       She saw Dr Garcia for follow-up on 2019. Dr Garcia still awaiting tumor board evaluation at [facility] but she may require XRT as well.       She saw Dr Long on 11/15/2019 for radiation evaluation. He has recommended a daily regimen for 6 weeks. She is planning to start XRT in next week or two. She should be able to continue the herceptin every 3 weeks and we are looking into use of perjta but it may need to be held for the duration of the XRT if not.       She previously had echo about two weeks before surgery.             ROS:   GEN: normal without any fever, night sweats or weight loss  HEENT: normal with no HA's, sore throat, stiff neck, changes in vision  CV: normal with no CP, SOB, PND, MENDIETA or orthopnea  PULM: normal with no SOB, cough, hemoptysis, sputum or pleuritic pain  GI: normal with no abdominal pain, nausea, vomiting, constipation, diarrhea, melanotic stools, BRBPR, or hematemesis  : normal with no hematuria, dysuria  BREAST: no identifiable abnormality at this time  SKIN: normal with no rash, erythema, bruising, or swelling    Allergies:  Review of patient's allergies indicates:   Allergen Reactions     Shellfish containing products Anaphylaxis     Other reaction(s): Vomiting  Only crabmeat  Other reaction(s): Vomiting  Only crabmeat    Codeine Nausea And Vomiting     Pt thinks it was tylenol #3 with codeine  Can take hydrocodone & oxycodone  Other reaction(s): Nausea    Tegaderm ag mesh [silver] Blisters     Redness, itching and tears skin        Medications:    Current Outpatient Medications:     amoxicillin (AMOXIL ORAL), Take by mouth., Disp: , Rfl:     butalbital-acetaminophen-caff -40 mg Cap, Take 1 capsule by mouth every 4 (four) hours as needed., Disp: , Rfl: 0    CARAFATE 100 mg/mL suspension, TAKE 10 ML BY MOUTH EVERY 4 TO 6 HOURS, Disp: , Rfl: 3    diphenoxylate-atropine 2.5-0.025 mg (LOMOTIL) 2.5-0.025 mg per tablet, TAKE 1 TABLET BY MOUTH WITH EVERY LOOSE STOOL (NO MORE THAN 8 TABLETS IN 24 HOUR), Disp: , Rfl: 0    fluconazole (DIFLUCAN ORAL), Take by mouth., Disp: , Rfl:     HYDROcodone-acetaminophen (NORCO) 5-325 mg per tablet, TAKE 1 TABLET BY MOUTH EVERY 24 HOURS AS NEEDED FOR SEVERE PAIN, Disp: , Rfl: 0    mupirocin (BACTROBAN) 2 % ointment, APPLY OINTMENT EXTERNALLY TO AFFECTED AREA THREE TIMES DAILY, Disp: , Rfl: 2    ondansetron (ZOFRAN) 8 MG tablet, Take 1 tablet (8 mg total) by mouth every 8 (eight) hours as needed for Nausea., Disp: 30 tablet, Rfl: 2    oxyCODONE-acetaminophen (PERCOCET) 5-325 mg per tablet, Take 1 tablet by mouth every 24 hours as needed for Pain (severe pain)., Disp: 10 tablet, Rfl: 0    phenazopyridine (PYRIDIUM) 200 MG tablet, Take 200 mg by mouth every 6 (six) hours as needed., Disp: , Rfl: 1    promethazine (PHENERGAN) 25 MG tablet, Take 1 tablet (25 mg total) by mouth every 6 (six) hours as needed for Nausea., Disp: 30 tablet, Rfl: 2    pseudoephedrine HCl (SUDAFED ORAL), Take by mouth as needed., Disp: , Rfl:     sumatriptan (IMITREX) 25 MG Tab, Take 50 mg by mouth every 2 (two) hours as needed., Disp: , Rfl:     PMHx/PSHx Updates:  See patient's  last visit with me on 10/28/2019.  See H&P on 5/16/2019        Pathology:  Cancer Staging      Breast biopsies: 5/1/2019:  Left breast: with invasive ductal carcinoma grade 3; ER positive at 95% and WI positive at 90%; Her2Nu was positive at +3; Her2/CEP 17 ratio was >10.6  - Ki67 was unfavorable at 69%  Right breast: negative for cancer      Bilateral Mastectomies 10/8/2019:    FINAL PATHOLOGIC DIAGNOSIS  1. BREAST (RIGHT MASTECTOMY): NO EVIDENCE OF MALIGNANCY IN SECTIONS OF SKIN, NIPPLE, AND  BREAST TISSUE.  2. BREAST (LEFT MASTECTOMY): INVASIVE DUCTAL CARCINOMA (2 SEPARATE RESIDUAL FOCI);  ASSOCIATED HIGH-GRADE DUCTAL CARCINOMA IN SITU; SECTIONS OF SKIN, NIPPLE, AND BREAST  MARGINS FREE OF MALIGNANCY.  Note: Two foci of residual ductal carcinoma are identified with differing histologies . The larger lesion consists of  small foci of invasive ductal carcinoma in a 19mm fibrotic nodule with suggestion of presurgical therapeutic  response. Vascular invasion cannot be demonstrated on CD31 stain. There is an additional separate  microinvasive focus of mucinous carcinoma. Foci of DCIS are present throughout the breast tissue.  HORMONE RECEPTOR ASSAYS:  ER-strongly positive (90%)  PgR-weakly positive (1-2%)  Her2-equivocal (1 2+); to be sent for FISH testing  Ki67 proliferative activity-30% activity  Positive and negative controls reacted appropriately.  3. LYMPH NODE (SENTINEL LYMPH NODE, CLINICAL): NO EVIDENCE OF MALIGNANCY.  Note: The lymph node was examined at three levels with H&E and epithelial immunostains (AE1/AES, CAM5.2,  and WSK). Positive and negative controls reacted appropriately.  4. LYMPH NODES (22): NO EVIDENCE OF MALIGNANCY.         Objective:     Vitals:  Blood pressure 120/72, pulse 61, temperature 98.5 °F (36.9 °C), temperature source Oral, resp. rate 18, weight 82.1 kg (180 lb 14.4 oz).     Physical Examination:   GEN: no apparent distress, comfortable; AAOx3  HEAD: atraumatic and  normocephalic  EYES: no pallor, no icterus, PERRLA  ENT: OMM, no pharyngeal erythema, external ears WNL; no nasal discharge; no thrush  NECK: no masses, thyroid normal, trachea midline, no LAD/LN's, supple  CV: RRR with no murmur; normal pulse; normal S1 and S2; no pedal edema  CHEST: Normal respiratory effort; CTAB; normal breath sounds; no wheeze or crackles; portacath on right chest wall    ABDOM: nontender and nondistended; soft; normal bowel sounds; no rebound/guarding  MUSC/Skeletal: ROM normal; no crepitus; joints normal; no deformities or arthropathy  EXTREM: no clubbing, cyanosis, inflammation or swelling  SKIN: no rashes, lesions, ulcers, petechiae or subcutaneous nodules  : no lopez  NEURO: grossly intact; motor/sensory WNL; AAOx3; no tremors  PSYCH: normal mood, affect and behavior  LYMPH: normal cervical, supraclavicular, axillary and groin LN's  Breast: s/p bilateral mastectomies and healing well; drains since removed      Labs:   11/15/2019  Lab Results   Component Value Date    WBC 4.68 11/15/2019    HGB 11.4 (L) 11/15/2019    HCT 35.0 (L) 11/15/2019    MCV 99 (H) 11/15/2019     11/15/2019     BMP  Lab Results   Component Value Date     11/15/2019    K 4.1 11/15/2019     11/15/2019    CO2 28 11/15/2019    BUN 15 11/15/2019    CREATININE 0.9 11/15/2019    CALCIUM 9.7 11/15/2019    ANIONGAP 7 (L) 11/15/2019    ESTGFRAFRICA >60.0 11/15/2019    EGFRNONAA >60.0 11/15/2019             Radiology/Diagnostic Studies:    MRI breast  8/7/2019:    1. Significant interval improvement of enhancing spiculated mass in left breast near 4:00 position, as well as more diffuse non mass like enhancement throughout the superior left breast since 04/26/2019, consistent with a favorable response to interval neoadjuvant treatment.  2. No significant change of lobular lower inner quadrant right breast mass consistent with biopsy-proven fibroadenoma.  3. Unchanged 7 mm right breast mass near 12:00 position  felt to represent intramammary lymph node.      Nm Pet Ct Routine Skull To Mid Thigh    Result Date: 5/21/2019  INTEGRATED PET CT WITH IMAGE FUSION HISTORY:  Left breast cancer initial treatment evaluation TECHNIQUE: Following the injection of 12.7 mCi of F-18 labeled FDG into a right antecubital vein, PET CT was performed from the vertex of the skull through the proximal thighs with an integrated full ring PET CT scanner with image fusion. The patient's serum glucose at the time of the exam was 82 mg/dL. FINDINGS: There is diffuse FDG activity in the subareolar left breast with skin thickening and multiple hypermetabolic masses. There is a 19 mm spiculated hypermetabolic mass in the inferior lateral left breast with a max SUV of 10.4. This was previously biopsied. There is a 2 cm hypermetabolic area in the lateral superior left breast with max SUV of 9.9. There is an 11 mm nodule within the inner right breast which was biopsied and shown to represent fibroadenoma. This shows no FDG activity. There is no axillary, mediastinal or hilar adenopathy. There are no pulmonary nodules, infiltrates or pleural effusions. CT of the head and neck show no intra-axial lesions or cervical adenopathy. CT of the abdomen and pelvis demonstrates physiologic activity in the GI tract and urinary system. The liver and adrenal glands are normal. There are mildly prominent lymph nodes within the right lower quadrant the largest measures 13 mm with a max SUV of 3.1. Findings are suspicious for mesenteric adenitis. There is no retroperitoneal adenopathy. There are no lytic or blastic lesions.     IMPRESSION: Diffuse FDG activity within the subareolar left breast with skin thickening and multiple hypermetabolic left breast masses consistent with patient's history of left breast cancer. No evidence of metastatic disease 11 mm nodule in the medial right breast which was shown to represent fibroadenoma Mildly prominent lymph nodes in the right  lower quadrant with mild FDG activity suggestive of mesenteric adenitis. Follow-up CT scan in 3-6 months is recommended.     Read and electronically signed by: Adriane Jacome MD on 5/21/2019 1:13 PM CDT ADRIANE JACOME MD    [facility] Unknown Rad Eap    Result Date: 5/21/2019  Chest single view CLINICAL DATA: Port-A-Cath placement FINDINGS: AP view shows the heart to be within normal size limits. The mediastinum is unremarkable. Right subclavian Port-A-Cath tip is at superior vena cava. No pneumothorax or other placement related complication is identified. No infiltrates or effusions are demonstrated. No acute osseous abnormalities are demonstrated. IMPRESSION: 1. Right sided Port-A-Cath appears appropriately positioned, with no pneumothorax or other placement related complication. No acute radiographic abnormalities. Read and electronically signed by: Teofilo Patterson MD on 5/21/2019 2:52 PM CDT TEOFILO PATTERSON MD      I have reviewed all available lab results and radiology reports.    Assessment/Plan:   (1) 39 y.o. female  with diagnosis of left breast cancer who has been referred by Dr Salvador Rey with [facility] for evaluation by medical oncology.      - She had presented with left breast pain which became progressive over a 4 month period.   - Radiology studies found a 2.5cm mass on the left breast along with two inflamed LN's on the right.   - She had left breast biopsy on 5/1/2019 which showed invasive ductal carcinoma grade 3. The right breast biopsy was negative for cancer.   - She is ER and SD positive. She is also Her2Nu +3.   - We discussed the latest data on Her2Nu positive breast cancers and the recommendation for neoadjuvant chemotherapy with a herceptin and perjeta.  - she will need echo, portacath, and PET scan  - will plan for herceptin, perjeta, carboplatin and taxotere regimen neoadjuvantly with 6 cycles  -  S/p set up chemotherapy school; discussed side-effect profile, provided  literature on the regimen; obtained consents  - she is set up for May 27th to start  - PET scan and echo are on chart  - portacath placed on 5/21  - started chemotherapy with 1st cycle on 5/27/2019   - She had had a recent MRI of the breast again on 8/7  - she completed the neoadjuvant round and is having surgery next week with Dr Garcia at [facility] in [place]      - She is seeing Dr Howard Cheatham with plastics at [facility] and had bilateral mastectomies on oct 8th 2019 at [facility].   - She requires herceptin maintenance regimen post-surgery recovery. - She will most likely also require a hysterectomy/oopherectomy and plans to see her GYN Dr Aly Contreras in the near future     - she saw Dr Garcia for follow-up on 11/16/2019. Dr Garcia still awaiting tumor board evaluation at [facility] but she may require XRT as well.       - She saw Dr Long on 11/15/2019 for radiation evaluation. He has recommended a daily regimen for 6 weeks. She is planning to start XRT in next week or two. She should be able to continue the herceptin every 3 weeks and we are looking into use of perjta but it may need to be held for the duration of the XRT if not.    (2) Left parotid tumor - removed in 2011     (3) Sarcoidosis     (4) Interstitial cystitis     (5) Diet controlled gestational DM     (6) Fibromyalgia     (7) Hx/of cervical dysplasia as teenager s/p cryoablation    (8) Migraine - now on meds prn    (9)  Portacath tip has recently been found to have sheared off and she has seen Dr Rey. She is not having any problems at this time and is prophylactically on leiquis.   -  She has seen Dr Hilton and Dr Lester Jones with [facility] and they were able to successfully removed the portacath tip and she has also had a new port placed at the same time.          VISIT DIAGNOSES:      HER2-positive carcinoma of left breast    Chemotherapy induced neutropenia    Malignant neoplasm of left female breast, unspecified  estrogen receptor status, unspecified site of breast    Malignant neoplasm of overlapping sites of left breast in female, estrogen receptor positive          PLAN:  1. Await tumor board evaluation at [facility]  2.  F/u with GYN about eventual hysterectomy/bilateral oopherectomy options   3. Check labs weekly for at least the next 4 weeks  4.  F/u with Dr Rey as directed by him    5. Check latest echo report  6. proceed with herceptin therapy every three weeks and she is planning to start XRT 6 weeks; there is probably not enough data on perfjeta and XRT to continue with perjeta while on radiation    7. RTC  3-4 weeks    Fax note to Ben Rey De BoisBlanc, Marshall, Katira, Mackey    Discussion:       I spent over 25 mins of time with the patient. Reviewed results of the recently ordered labs, tests and studies; made directives with regards to the results. Over half of this time was spent couseling and coordinating care.    I have explained all of the above in detail and the patient understands all of the current recommendation(s). I have answered all of their questions to the best of my ability and to their complete satisfaction.   The patient is to continue with the current management plan.            Electronically signed by Abel Chambers MD

## 2019-11-18 ENCOUNTER — OFFICE VISIT (OUTPATIENT)
Dept: HEMATOLOGY/ONCOLOGY | Facility: CLINIC | Age: 39
End: 2019-11-18
Payer: MEDICAID

## 2019-11-18 VITALS
DIASTOLIC BLOOD PRESSURE: 72 MMHG | SYSTOLIC BLOOD PRESSURE: 120 MMHG | HEART RATE: 61 BPM | RESPIRATION RATE: 18 BRPM | WEIGHT: 180.88 LBS | TEMPERATURE: 99 F | BODY MASS INDEX: 31.05 KG/M2

## 2019-11-18 DIAGNOSIS — C50.912 MALIGNANT NEOPLASM OF LEFT FEMALE BREAST, UNSPECIFIED ESTROGEN RECEPTOR STATUS, UNSPECIFIED SITE OF BREAST: ICD-10-CM

## 2019-11-18 DIAGNOSIS — C50.812 MALIGNANT NEOPLASM OF OVERLAPPING SITES OF LEFT BREAST IN FEMALE, ESTROGEN RECEPTOR POSITIVE: ICD-10-CM

## 2019-11-18 DIAGNOSIS — E53.8 B12 DEFICIENCY: ICD-10-CM

## 2019-11-18 DIAGNOSIS — Z17.0 MALIGNANT NEOPLASM OF OVERLAPPING SITES OF LEFT BREAST IN FEMALE, ESTROGEN RECEPTOR POSITIVE: ICD-10-CM

## 2019-11-18 DIAGNOSIS — D70.1 CHEMOTHERAPY INDUCED NEUTROPENIA: ICD-10-CM

## 2019-11-18 DIAGNOSIS — C50.912 HER2-POSITIVE CARCINOMA OF LEFT BREAST: Primary | ICD-10-CM

## 2019-11-18 DIAGNOSIS — T45.1X5A CHEMOTHERAPY INDUCED NEUTROPENIA: ICD-10-CM

## 2019-11-18 PROCEDURE — 99214 OFFICE O/P EST MOD 30 MIN: CPT | Mod: S$GLB,,, | Performed by: INTERNAL MEDICINE

## 2019-11-18 PROCEDURE — 99214 PR OFFICE/OUTPT VISIT, EST, LEVL IV, 30-39 MIN: ICD-10-PCS | Mod: S$GLB,,, | Performed by: INTERNAL MEDICINE

## 2019-11-18 RX ORDER — CYANOCOBALAMIN 1000 UG/ML
100 INJECTION, SOLUTION INTRAMUSCULAR; SUBCUTANEOUS
Status: DISCONTINUED | OUTPATIENT
Start: 2019-11-18 | End: 2019-11-18

## 2019-11-18 RX ORDER — CYANOCOBALAMIN 1000 UG/ML
1000 INJECTION, SOLUTION INTRAMUSCULAR; SUBCUTANEOUS
Status: COMPLETED | OUTPATIENT
Start: 2019-11-18 | End: 2019-11-18

## 2019-11-18 RX ADMIN — CYANOCOBALAMIN 1000 MCG: 1000 INJECTION, SOLUTION INTRAMUSCULAR; SUBCUTANEOUS at 01:11

## 2019-11-24 RX ORDER — HEPARIN 100 UNIT/ML
500 SYRINGE INTRAVENOUS
Status: CANCELLED | OUTPATIENT
Start: 2019-11-25

## 2019-11-24 RX ORDER — SODIUM CHLORIDE 0.9 % (FLUSH) 0.9 %
10 SYRINGE (ML) INJECTION
Status: CANCELLED | OUTPATIENT
Start: 2019-11-25

## 2019-11-25 ENCOUNTER — INFUSION (OUTPATIENT)
Dept: INFUSION THERAPY | Facility: HOSPITAL | Age: 39
End: 2019-11-25
Attending: INTERNAL MEDICINE
Payer: MEDICAID

## 2019-11-25 ENCOUNTER — TELEPHONE (OUTPATIENT)
Dept: HEMATOLOGY/ONCOLOGY | Facility: CLINIC | Age: 39
End: 2019-11-25

## 2019-11-25 VITALS
BODY MASS INDEX: 31.35 KG/M2 | WEIGHT: 183.63 LBS | TEMPERATURE: 98 F | DIASTOLIC BLOOD PRESSURE: 82 MMHG | HEIGHT: 64 IN | OXYGEN SATURATION: 99 % | RESPIRATION RATE: 18 BRPM | HEART RATE: 73 BPM | SYSTOLIC BLOOD PRESSURE: 121 MMHG

## 2019-11-25 DIAGNOSIS — D70.1 CHEMOTHERAPY INDUCED NEUTROPENIA: ICD-10-CM

## 2019-11-25 DIAGNOSIS — C50.912 MALIGNANT NEOPLASM OF LEFT FEMALE BREAST, UNSPECIFIED ESTROGEN RECEPTOR STATUS, UNSPECIFIED SITE OF BREAST: Primary | ICD-10-CM

## 2019-11-25 DIAGNOSIS — T45.1X5A CHEMOTHERAPY INDUCED NEUTROPENIA: ICD-10-CM

## 2019-11-25 DIAGNOSIS — Z17.0 MALIGNANT NEOPLASM OF OVERLAPPING SITES OF LEFT BREAST IN FEMALE, ESTROGEN RECEPTOR POSITIVE: ICD-10-CM

## 2019-11-25 DIAGNOSIS — C50.912 HER2-POSITIVE CARCINOMA OF LEFT BREAST: ICD-10-CM

## 2019-11-25 DIAGNOSIS — E86.0 DEHYDRATION: Primary | ICD-10-CM

## 2019-11-25 DIAGNOSIS — C50.812 MALIGNANT NEOPLASM OF OVERLAPPING SITES OF LEFT BREAST IN FEMALE, ESTROGEN RECEPTOR POSITIVE: ICD-10-CM

## 2019-11-25 LAB
ALBUMIN SERPL BCP-MCNC: 3.8 G/DL (ref 3.5–5.2)
ALP SERPL-CCNC: 58 U/L (ref 55–135)
ALT SERPL W/O P-5'-P-CCNC: 15 U/L (ref 10–44)
ANION GAP SERPL CALC-SCNC: 10 MMOL/L (ref 8–16)
AST SERPL-CCNC: 18 U/L (ref 10–40)
BASOPHILS # BLD AUTO: 0.02 K/UL (ref 0–0.2)
BASOPHILS NFR BLD: 0.4 % (ref 0–1.9)
BILIRUB SERPL-MCNC: 0.6 MG/DL (ref 0.1–1)
BUN SERPL-MCNC: 16 MG/DL (ref 6–20)
CALCIUM SERPL-MCNC: 9 MG/DL (ref 8.7–10.5)
CHLORIDE SERPL-SCNC: 105 MMOL/L (ref 95–110)
CO2 SERPL-SCNC: 25 MMOL/L (ref 23–29)
CREAT SERPL-MCNC: 0.9 MG/DL (ref 0.5–1.4)
DIFFERENTIAL METHOD: ABNORMAL
EOSINOPHIL # BLD AUTO: 0 K/UL (ref 0–0.5)
EOSINOPHIL NFR BLD: 0.7 % (ref 0–8)
ERYTHROCYTE [DISTWIDTH] IN BLOOD BY AUTOMATED COUNT: 11.4 % (ref 11.5–14.5)
EST. GFR  (AFRICAN AMERICAN): >60 ML/MIN/1.73 M^2
EST. GFR  (NON AFRICAN AMERICAN): >60 ML/MIN/1.73 M^2
GLUCOSE SERPL-MCNC: 103 MG/DL (ref 70–110)
HCT VFR BLD AUTO: 34.3 % (ref 37–48.5)
HGB BLD-MCNC: 11.5 G/DL (ref 12–16)
IMM GRANULOCYTES # BLD AUTO: 0.01 K/UL (ref 0–0.04)
IMM GRANULOCYTES NFR BLD AUTO: 0.2 % (ref 0–0.5)
LYMPHOCYTES # BLD AUTO: 1.9 K/UL (ref 1–4.8)
LYMPHOCYTES NFR BLD: 35 % (ref 18–48)
MCH RBC QN AUTO: 32.3 PG (ref 27–31)
MCHC RBC AUTO-ENTMCNC: 33.5 G/DL (ref 32–36)
MCV RBC AUTO: 96 FL (ref 82–98)
MONOCYTES # BLD AUTO: 0.3 K/UL (ref 0.3–1)
MONOCYTES NFR BLD: 6.1 % (ref 4–15)
NEUTROPHILS # BLD AUTO: 3.1 K/UL (ref 1.8–7.7)
NEUTROPHILS NFR BLD: 57.6 % (ref 38–73)
NRBC BLD-RTO: 0 /100 WBC
PLATELET # BLD AUTO: 177 K/UL (ref 150–350)
PMV BLD AUTO: 9.6 FL (ref 9.2–12.9)
POTASSIUM SERPL-SCNC: 3.8 MMOL/L (ref 3.5–5.1)
PROT SERPL-MCNC: 7.2 G/DL (ref 6–8.4)
RBC # BLD AUTO: 3.56 M/UL (ref 4–5.4)
SODIUM SERPL-SCNC: 140 MMOL/L (ref 136–145)
WBC # BLD AUTO: 5.45 K/UL (ref 3.9–12.7)

## 2019-11-25 PROCEDURE — 85025 COMPLETE CBC W/AUTO DIFF WBC: CPT

## 2019-11-25 PROCEDURE — 36591 DRAW BLOOD OFF VENOUS DEVICE: CPT

## 2019-11-25 PROCEDURE — 80053 COMPREHEN METABOLIC PANEL: CPT

## 2019-11-25 PROCEDURE — 63600175 PHARM REV CODE 636 W HCPCS: Performed by: INTERNAL MEDICINE

## 2019-11-25 PROCEDURE — 96413 CHEMO IV INFUSION 1 HR: CPT

## 2019-11-25 RX ORDER — LIDOCAINE AND PRILOCAINE 25; 25 MG/G; MG/G
CREAM TOPICAL
Qty: 30 G | Refills: 0 | Status: ON HOLD | OUTPATIENT
Start: 2019-11-25 | End: 2020-03-05 | Stop reason: HOSPADM

## 2019-11-25 RX ORDER — HEPARIN 100 UNIT/ML
500 SYRINGE INTRAVENOUS
Status: DISCONTINUED | OUTPATIENT
Start: 2019-11-25 | End: 2019-11-25 | Stop reason: HOSPADM

## 2019-11-25 RX ORDER — SODIUM CHLORIDE 0.9 % (FLUSH) 0.9 %
10 SYRINGE (ML) INJECTION
Status: DISCONTINUED | OUTPATIENT
Start: 2019-11-25 | End: 2019-11-25 | Stop reason: HOSPADM

## 2019-11-25 RX ADMIN — TRASTUZUMAB 468 MG: 150 INJECTION, POWDER, LYOPHILIZED, FOR SOLUTION INTRAVENOUS at 02:11

## 2019-11-25 RX ADMIN — SODIUM CHLORIDE: 0.9 INJECTION, SOLUTION INTRAVENOUS at 02:11

## 2019-11-25 RX ADMIN — HEPARIN 500 UNITS: 100 SYRINGE at 03:11

## 2019-11-27 ENCOUNTER — TELEPHONE (OUTPATIENT)
Dept: HEMATOLOGY/ONCOLOGY | Facility: CLINIC | Age: 39
End: 2019-11-27

## 2019-11-27 DIAGNOSIS — C50.912 HER2-POSITIVE CARCINOMA OF LEFT BREAST: Primary | ICD-10-CM

## 2019-11-27 DIAGNOSIS — C50.912 MALIGNANT NEOPLASM OF LEFT FEMALE BREAST, UNSPECIFIED ESTROGEN RECEPTOR STATUS, UNSPECIFIED SITE OF BREAST: ICD-10-CM

## 2019-12-02 ENCOUNTER — CLINICAL SUPPORT (OUTPATIENT)
Dept: REHABILITATION | Facility: HOSPITAL | Age: 39
End: 2019-12-02
Payer: MEDICAID

## 2019-12-02 ENCOUNTER — TELEPHONE (OUTPATIENT)
Dept: HEMATOLOGY/ONCOLOGY | Facility: CLINIC | Age: 39
End: 2019-12-02

## 2019-12-02 DIAGNOSIS — M25.619 LIMITED RANGE OF MOTION OF SHOULDER: ICD-10-CM

## 2019-12-02 DIAGNOSIS — L90.5 SCAR OF BREAST: ICD-10-CM

## 2019-12-02 DIAGNOSIS — M62.81 MUSCLE WEAKNESS OF LEFT ARM: ICD-10-CM

## 2019-12-02 PROCEDURE — 97110 THERAPEUTIC EXERCISES: CPT

## 2019-12-02 NOTE — TELEPHONE ENCOUNTER
----- Message from Emelia Gomez sent at 12/2/2019 10:21 AM CST -----  Saturday pt had a few min where she was a little disoriented. (felt off) Yesterday she had a moment where she felt disoriented and dizzy. For the rest of the after that she didn't feel well and was tired. She also has severe nausea. Pt would like a call back to discuss these symptoms.     # [phone]

## 2019-12-02 NOTE — TELEPHONE ENCOUNTER
Patient returned my call and I instructed her that the labs came back okay.  As far as her nausea, she can continue to take her nausea medications.  If the nausea persists she can alternate the meds.  I instructed the patient that he has ordered the echo and she can go at any time.  She requested to be connected to [employer].  I instructed her that if her symptoms persist, she should tell radiation, as she is going there daily.

## 2019-12-02 NOTE — PROGRESS NOTES
"  Physical Therapy Daily Treatment Note     Name: Sheridan Todd  Clinic Number: [id number]    Therapy Diagnosis:   Encounter Diagnoses   Name Primary?    Limited range of motion of shoulder     Muscle weakness of left arm     Scar of breast      Physician: Rebecca Garcia MD    Visit Date: 12/2/2019    Physician Orders: PT Eval and Treat   Medical Diagnosis: Z85.3 (ICD-10-CM) - History of left breast cancer  Evaluation Date: 11/6/2019  Authorization period Expiration: 12/25/2019  Plan of Care Certification Period: 11/25/2019-12/30/2019  Insurance: Medicaid/[employer]    Visit #: 1/ 6        Time In: 10:55 AM  Time Out: 11:40 AM  Total Billable Time: 45 minutes    Precautions: Standard and cancer Post-mastectomy    Subjective     Pt reports: Started radiation treatments last week and still doing chemo. Has good and bad days fatigue wise. Has been doing stretches but not band ex. Hypersensitivity was improving with daily massage but has returned some since starting radiation. Also having some tingling in LUE since beginning radiation. .  She was somewhat compliant with home exercise program.  Response to previous treatment: Good - a little sore  Functional change: Lifting her 3 year old    Pain: 4/10  Location: left axilla and chest     Objective     Sheridan received therapeutic exercises to develop strength, endurance, ROM, flexibility and posture for 45 minutes including:  Bike warm up 11 min Level 3  Wand flexion and ER (overhead) 10 x 5 sec ea  Side lying shoulder abd and ER with 1# 2 x 10 ea  Standing shoulder rows and extension with orange band 2 x 10  Standing shoulder ER with orange band 2 x 10  Median nerve glides 5 x 10 sec hold LUE    BUE girth measurements:     RIGHT   LEFT  Elbow   26cm     25cm  2" below elbow 27cm   26.5cm  4" below elbow 25.8cm  24.6cm      Home Exercises Provided and Patient Education Provided     Education provided:   - Updated HEP  -Cardiovascular ex for cancer " related fatigue    Written Home Exercises Provided: yes.  Exercises were reviewed and Sheridan was able to demonstrate them prior to the end of the session.  Sheridan demonstrated good  understanding of the education provided.     See EMR under Patient Instructions for exercises provided 12/2/2019.    Assessment     Pt is progressing with left shoulder AROM. Demonstrates weakness of shoulder girdle and will benefit from gradual progression of strengthening program. No indication of lymphedema from today's measurements.   Sheridan is progressing well towards her goals.   Pt prognosis is Excellent.     Pt will continue to benefit from skilled outpatient physical therapy to address the deficits listed in the problem list box on initial evaluation, provide pt/family education and to maximize pt's level of independence in the home and community environment.     Pt's spiritual, cultural and educational needs considered and pt agreeable to plan of care and goals.     Anticipated barriers to physical therapy: None    Goals: Short Term goals: 2 weeks  1. Patient will demonstrate 100% understanding of lymphedema risk reduction practices to include self monitoring for lymphedema. (progressing 12/2/2019    2. Patient will demonstrate independence with Home Exercise program established. (progressing, not met)  3. Pt will increase AROM/PROM in shoulder ER ROM to 75 degrees on left to improve functional reach, carry, push, pull pain free. Met 12/2/2019  4. Strength will be assessed and appropriate goals set. (progressing, not met)     Long Term Goals: 4 weeks   1.  Pt will increase AROM/PROM in shoulder flexion to WNL on left to improve functional reach, carry, push, pull pain free. (progressing, not met)  2. Pt will increase strength to 4+/5 in gross UE musculature to improve tolerance to all functional activities pain free. (progressing, not met)  3. Pt will demonstrate full/maximized tissue mobility to increase ROM and  promote healthy tissue to be pain free at discharge. (progressing, not met)  4. Pt will report decrease in overall worst pain to 2/10 at discharge. (progressing, not met)  5. Patient will report compliance with walking program 5x week for 10 min each day to improve overall cardiovascular function and decrease cancer related fatigue at discharge. (progressing, not met)    Plan     Continue weekly PT sessions to monitor for lymphedema through course of radiation (6 weeks) and to progress shoulder ROM/strengthening ex program.     Salina Suggs, PT

## 2019-12-03 ENCOUNTER — TELEPHONE (OUTPATIENT)
Dept: RADIATION ONCOLOGY | Facility: CLINIC | Age: 39
End: 2019-12-03

## 2019-12-03 DIAGNOSIS — Z17.0 MALIGNANT NEOPLASM OF OVERLAPPING SITES OF LEFT BREAST IN FEMALE, ESTROGEN RECEPTOR POSITIVE: Primary | ICD-10-CM

## 2019-12-03 DIAGNOSIS — R41.0 DISORIENTATION: ICD-10-CM

## 2019-12-03 DIAGNOSIS — R42 DIZZINESS: ICD-10-CM

## 2019-12-03 DIAGNOSIS — C50.812 MALIGNANT NEOPLASM OF OVERLAPPING SITES OF LEFT BREAST IN FEMALE, ESTROGEN RECEPTOR POSITIVE: Primary | ICD-10-CM

## 2019-12-03 DIAGNOSIS — R11.2 INTRACTABLE NAUSEA AND VOMITING: ICD-10-CM

## 2019-12-03 DIAGNOSIS — R51.9 PERSISTENT HEADACHES: ICD-10-CM

## 2019-12-05 ENCOUNTER — HOSPITAL ENCOUNTER (OUTPATIENT)
Dept: RADIOLOGY | Facility: HOSPITAL | Age: 39
Discharge: HOME | End: 2019-12-05
Attending: RADIOLOGY
Payer: MEDICAID

## 2019-12-05 DIAGNOSIS — R42 DIZZINESS: ICD-10-CM

## 2019-12-05 DIAGNOSIS — R41.0 DISORIENTATION: ICD-10-CM

## 2019-12-05 DIAGNOSIS — Z17.0 MALIGNANT NEOPLASM OF OVERLAPPING SITES OF LEFT BREAST IN FEMALE, ESTROGEN RECEPTOR POSITIVE: ICD-10-CM

## 2019-12-05 DIAGNOSIS — R11.2 INTRACTABLE NAUSEA AND VOMITING: ICD-10-CM

## 2019-12-05 DIAGNOSIS — C50.812 MALIGNANT NEOPLASM OF OVERLAPPING SITES OF LEFT BREAST IN FEMALE, ESTROGEN RECEPTOR POSITIVE: ICD-10-CM

## 2019-12-05 DIAGNOSIS — R51.9 PERSISTENT HEADACHES: ICD-10-CM

## 2019-12-05 PROCEDURE — A9585 GADOBUTROL INJECTION: HCPCS | Mod: PO | Performed by: RADIOLOGY

## 2019-12-05 PROCEDURE — 25500020 PHARM REV CODE 255: Mod: PO | Performed by: RADIOLOGY

## 2019-12-05 PROCEDURE — 70553 MRI BRAIN STEM W/O & W/DYE: CPT | Mod: TC,PO

## 2019-12-05 RX ORDER — GADOBUTROL 604.72 MG/ML
8 INJECTION INTRAVENOUS
Status: COMPLETED | OUTPATIENT
Start: 2019-12-05 | End: 2019-12-05

## 2019-12-05 RX ADMIN — GADOBUTROL 8 ML: 604.72 INJECTION INTRAVENOUS at 03:12

## 2019-12-10 ENCOUNTER — CLINICAL SUPPORT (OUTPATIENT)
Dept: CARDIOLOGY | Facility: HOSPITAL | Age: 39
End: 2019-12-10
Attending: INTERNAL MEDICINE
Payer: MEDICAID

## 2019-12-10 DIAGNOSIS — C50.912 MALIGNANT NEOPLASM OF LEFT FEMALE BREAST, UNSPECIFIED ESTROGEN RECEPTOR STATUS, UNSPECIFIED SITE OF BREAST: ICD-10-CM

## 2019-12-10 DIAGNOSIS — C50.912 HER2-POSITIVE CARCINOMA OF LEFT BREAST: ICD-10-CM

## 2019-12-10 PROCEDURE — 93306 TTE W/DOPPLER COMPLETE: CPT

## 2019-12-11 ENCOUNTER — DOCUMENTATION ONLY (OUTPATIENT)
Dept: RADIATION ONCOLOGY | Facility: CLINIC | Age: 39
End: 2019-12-11

## 2019-12-11 LAB
AORTIC ROOT ANNULUS: 3.07 CM
AORTIC VALVE CUSP SEPERATION: 2.43 CM
AV INDEX (PROSTH): 1.02
AV MEAN GRADIENT: 2 MMHG
AV PEAK GRADIENT: 4 MMHG
AV VALVE AREA: 3.31 CM2
AV VELOCITY RATIO: 86.05
CV ECHO LV RWT: 0.35 CM
DOP CALC AO PEAK VEL: 0.98 M/S
DOP CALC AO VTI: 17.65 CM
DOP CALC LVOT AREA: 3.2 CM2
DOP CALC LVOT DIAMETER: 2.03 CM
DOP CALC LVOT PEAK VEL: 84.33 M/S
DOP CALC LVOT STROKE VOLUME: 58.42 CM3
DOP CALCLVOT PEAK VEL VTI: 18.06 CM
E WAVE DECELERATION TIME: 232.06 MSEC
E/A RATIO: 0.93
E/E' RATIO: 4.95 M/S
ECHO LV POSTERIOR WALL: 0.81 CM (ref 0.6–1.1)
FRACTIONAL SHORTENING: 43 % (ref 28–44)
INTERVENTRICULAR SEPTUM: 0.78 CM (ref 0.6–1.1)
LEFT ATRIUM SIZE: 3.23 CM
LEFT INTERNAL DIMENSION IN SYSTOLE: 2.6 CM (ref 2.1–4)
LEFT VENTRICULAR INTERNAL DIMENSION IN DIASTOLE: 4.59 CM (ref 3.5–6)
LEFT VENTRICULAR MASS: 116.52 G
LV LATERAL E/E' RATIO: 4.73 M/S
LV SEPTAL E/E' RATIO: 5.2 M/S
MV PEAK A VEL: 0.56 M/S
MV PEAK E VEL: 0.52 M/S
PISA TR MAX VEL: 2.29 M/S
PV PEAK VELOCITY: 116.11 CM/S
RIGHT VENTRICULAR END-DIASTOLIC DIMENSION: 280 CM
TDI LATERAL: 0.11 M/S
TDI SEPTAL: 0.1 M/S
TDI: 0.11 M/S
TR MAX PG: 21 MMHG

## 2019-12-13 ENCOUNTER — LAB VISIT (OUTPATIENT)
Dept: LAB | Facility: HOSPITAL | Age: 39
End: 2019-12-13
Attending: INTERNAL MEDICINE
Payer: MEDICAID

## 2019-12-13 DIAGNOSIS — C50.812 MALIGNANT NEOPLASM OF OVERLAPPING SITES OF LEFT BREAST IN FEMALE, ESTROGEN RECEPTOR POSITIVE: ICD-10-CM

## 2019-12-13 DIAGNOSIS — Z17.0 MALIGNANT NEOPLASM OF OVERLAPPING SITES OF LEFT BREAST IN FEMALE, ESTROGEN RECEPTOR POSITIVE: ICD-10-CM

## 2019-12-13 LAB
ALBUMIN SERPL BCP-MCNC: 3.8 G/DL (ref 3.5–5.2)
ALP SERPL-CCNC: 54 U/L (ref 55–135)
ALT SERPL W/O P-5'-P-CCNC: 17 U/L (ref 10–44)
ANION GAP SERPL CALC-SCNC: 3 MMOL/L (ref 8–16)
AST SERPL-CCNC: 19 U/L (ref 10–40)
BASOPHILS # BLD AUTO: 0.01 K/UL (ref 0–0.2)
BASOPHILS NFR BLD: 0.3 % (ref 0–1.9)
BILIRUB SERPL-MCNC: 0.8 MG/DL (ref 0.1–1)
BUN SERPL-MCNC: 20 MG/DL (ref 6–20)
CALCIUM SERPL-MCNC: 9.3 MG/DL (ref 8.7–10.5)
CHLORIDE SERPL-SCNC: 108 MMOL/L (ref 95–110)
CO2 SERPL-SCNC: 30 MMOL/L (ref 23–29)
CREAT SERPL-MCNC: 0.8 MG/DL (ref 0.5–1.4)
DIFFERENTIAL METHOD: ABNORMAL
EOSINOPHIL # BLD AUTO: 0 K/UL (ref 0–0.5)
EOSINOPHIL NFR BLD: 1.2 % (ref 0–8)
ERYTHROCYTE [DISTWIDTH] IN BLOOD BY AUTOMATED COUNT: 11.9 % (ref 11.5–14.5)
EST. GFR  (AFRICAN AMERICAN): >60 ML/MIN/1.73 M^2
EST. GFR  (NON AFRICAN AMERICAN): >60 ML/MIN/1.73 M^2
GLUCOSE SERPL-MCNC: 88 MG/DL (ref 70–110)
HCT VFR BLD AUTO: 36.9 % (ref 37–48.5)
HGB BLD-MCNC: 12.3 G/DL (ref 12–16)
IMM GRANULOCYTES # BLD AUTO: 0.01 K/UL (ref 0–0.04)
IMM GRANULOCYTES NFR BLD AUTO: 0.3 % (ref 0–0.5)
LYMPHOCYTES # BLD AUTO: 0.6 K/UL (ref 1–4.8)
LYMPHOCYTES NFR BLD: 18.3 % (ref 18–48)
MCH RBC QN AUTO: 31.5 PG (ref 27–31)
MCHC RBC AUTO-ENTMCNC: 33.3 G/DL (ref 32–36)
MCV RBC AUTO: 95 FL (ref 82–98)
MONOCYTES # BLD AUTO: 0.3 K/UL (ref 0.3–1)
MONOCYTES NFR BLD: 10.1 % (ref 4–15)
NEUTROPHILS # BLD AUTO: 2.3 K/UL (ref 1.8–7.7)
NEUTROPHILS NFR BLD: 69.8 % (ref 38–73)
NRBC BLD-RTO: 0 /100 WBC
PLATELET # BLD AUTO: 142 K/UL (ref 150–350)
PMV BLD AUTO: 9.5 FL (ref 9.2–12.9)
POTASSIUM SERPL-SCNC: 4.4 MMOL/L (ref 3.5–5.1)
PROT SERPL-MCNC: 7.3 G/DL (ref 6–8.4)
RBC # BLD AUTO: 3.9 M/UL (ref 4–5.4)
SODIUM SERPL-SCNC: 141 MMOL/L (ref 136–145)
WBC # BLD AUTO: 3.27 K/UL (ref 3.9–12.7)

## 2019-12-13 PROCEDURE — 36415 COLL VENOUS BLD VENIPUNCTURE: CPT

## 2019-12-13 PROCEDURE — 80053 COMPREHEN METABOLIC PANEL: CPT

## 2019-12-13 PROCEDURE — 85025 COMPLETE CBC W/AUTO DIFF WBC: CPT

## 2019-12-15 RX ORDER — HEPARIN 100 UNIT/ML
500 SYRINGE INTRAVENOUS
Status: CANCELLED | OUTPATIENT
Start: 2019-12-16

## 2019-12-15 RX ORDER — SODIUM CHLORIDE 0.9 % (FLUSH) 0.9 %
10 SYRINGE (ML) INJECTION
Status: CANCELLED | OUTPATIENT
Start: 2019-12-16

## 2019-12-15 NOTE — PROGRESS NOTES
[facility] Hematology/Oncology  PROGRESS NOTE -  Follow-up Visit      Subjective:       Patient ID:   NAME: Sheridan Todd : 1980     39 y.o. female    Referring Doc: Krissy  Other Physicians: Jose Rodriguez De Boisblanc, Marshall; Howard Cheatham ([facility])    Chief Complaint: left breast ca f/u     History of Present Illness:     Patient returns today for a regularly scheduled follow-up visit.  She has since completed the neoadjuvant chemotherapy on 2019 and has subsequently surgery next at [facility] with Dr Garcia.  She denies any CP, SOB, HA's or N/V. She is here by herself. She has some fatigue. She decided to hold off on plastic reconstruction at this time.     She has since resumed maintenance therapy with herceptin/perjeta every 3 week3 therapy.      XRT through 2020 per patient with herceptin only; perjeta will resume after XRT is complete            ROS:   GEN: normal without any fever, night sweats or weight loss  HEENT: normal with no HA's, sore throat, stiff neck, changes in vision  CV: normal with no CP, SOB, PND, MENDIETA or orthopnea  PULM: normal with no SOB, cough, hemoptysis, sputum or pleuritic pain  GI: normal with no abdominal pain, nausea, vomiting, constipation, diarrhea, melanotic stools, BRBPR, or hematemesis  : normal with no hematuria, dysuria  BREAST: no identifiable abnormality at this time  SKIN: normal with no rash, erythema, bruising, or swelling    Allergies:  Review of patient's allergies indicates:   Allergen Reactions    Shellfish containing products Anaphylaxis     Other reaction(s): Vomiting  Only crabmeat  Other reaction(s): Vomiting  Only crabmeat    Codeine Nausea And Vomiting     Pt thinks it was tylenol #3 with codeine  Can take hydrocodone & oxycodone  Other reaction(s): Nausea    Tegaderm ag mesh [silver] Blisters     Redness, itching and tears skin        Medications:    Current Outpatient Medications:      butalbital-acetaminophen-caff -40 mg Cap, Take 1 capsule by mouth every 4 (four) hours as needed., Disp: , Rfl: 0    CARAFATE 100 mg/mL suspension, TAKE 10 ML BY MOUTH EVERY 4 TO 6 HOURS, Disp: , Rfl: 3    diphenoxylate-atropine 2.5-0.025 mg (LOMOTIL) 2.5-0.025 mg per tablet, TAKE 1 TABLET BY MOUTH WITH EVERY LOOSE STOOL (NO MORE THAN 8 TABLETS IN 24 HOUR), Disp: , Rfl: 0    fluconazole (DIFLUCAN ORAL), Take by mouth., Disp: , Rfl:     HYDROcodone-acetaminophen (NORCO) 5-325 mg per tablet, TAKE 1 TABLET BY MOUTH EVERY 24 HOURS AS NEEDED FOR SEVERE PAIN, Disp: , Rfl: 0    lidocaine-prilocaine (EMLA) cream, Apply topically as needed. 30 minutes prior to port being accessed., Disp: 30 g, Rfl: 0    ondansetron (ZOFRAN) 8 MG tablet, Take 1 tablet (8 mg total) by mouth every 8 (eight) hours as needed for Nausea., Disp: 30 tablet, Rfl: 2    oxyCODONE-acetaminophen (PERCOCET) 5-325 mg per tablet, Take 1 tablet by mouth every 24 hours as needed for Pain (severe pain)., Disp: 10 tablet, Rfl: 0    phenazopyridine (PYRIDIUM) 200 MG tablet, Take 200 mg by mouth every 6 (six) hours as needed., Disp: , Rfl: 1    promethazine (PHENERGAN) 25 MG tablet, Take 1 tablet (25 mg total) by mouth every 6 (six) hours as needed for Nausea., Disp: 30 tablet, Rfl: 2    pseudoephedrine HCl (SUDAFED ORAL), Take by mouth as needed., Disp: , Rfl:     sumatriptan (IMITREX) 25 MG Tab, Take 50 mg by mouth every 2 (two) hours as needed., Disp: , Rfl:     amoxicillin (AMOXIL ORAL), Take by mouth., Disp: , Rfl:     mupirocin (BACTROBAN) 2 % ointment, APPLY OINTMENT EXTERNALLY TO AFFECTED AREA THREE TIMES DAILY, Disp: , Rfl: 2    PMHx/PSHx Updates:  See patient's last visit with me on 11/18/2019.  See H&P on 5/16/2019        Pathology:  Cancer Staging      Breast biopsies: 5/1/2019:  Left breast: with invasive ductal carcinoma grade 3; ER positive at 95% and MO positive at 90%; Her2Nu was positive at +3; Her2/CEP 17 ratio was >10.6  -  Ki67 was unfavorable at 69%  Right breast: negative for cancer      Bilateral Mastectomies 10/8/2019:    FINAL PATHOLOGIC DIAGNOSIS  1. BREAST (RIGHT MASTECTOMY): NO EVIDENCE OF MALIGNANCY IN SECTIONS OF SKIN, NIPPLE, AND  BREAST TISSUE.  2. BREAST (LEFT MASTECTOMY): INVASIVE DUCTAL CARCINOMA (2 SEPARATE RESIDUAL FOCI);  ASSOCIATED HIGH-GRADE DUCTAL CARCINOMA IN SITU; SECTIONS OF SKIN, NIPPLE, AND BREAST  MARGINS FREE OF MALIGNANCY.  Note: Two foci of residual ductal carcinoma are identified with differing histologies . The larger lesion consists of  small foci of invasive ductal carcinoma in a 19mm fibrotic nodule with suggestion of presurgical therapeutic  response. Vascular invasion cannot be demonstrated on CD31 stain. There is an additional separate  microinvasive focus of mucinous carcinoma. Foci of DCIS are present throughout the breast tissue.  HORMONE RECEPTOR ASSAYS:  ER-strongly positive (90%)  PgR-weakly positive (1-2%)  Her2-equivocal (1 2+); to be sent for FISH testing  Ki67 proliferative activity-30% activity  Positive and negative controls reacted appropriately.  3. LYMPH NODE (SENTINEL LYMPH NODE, CLINICAL): NO EVIDENCE OF MALIGNANCY.  Note: The lymph node was examined at three levels with H&E and epithelial immunostains (AE1/AES, CAM5.2,  and WSK). Positive and negative controls reacted appropriately.  4. LYMPH NODES (22): NO EVIDENCE OF MALIGNANCY.         Objective:     Vitals:  Blood pressure 120/71, pulse 77, temperature 98.9 °F (37.2 °C), resp. rate 18, weight 84.1 kg (185 lb 8 oz).     Physical Examination:   GEN: no apparent distress, comfortable; AAOx3  HEAD: atraumatic and normocephalic  EYES: no pallor, no icterus, PERRLA  ENT: OMM, no pharyngeal erythema, external ears WNL; no nasal discharge; no thrush  NECK: no masses, thyroid normal, trachea midline, no LAD/LN's, supple  CV: RRR with no murmur; normal pulse; normal S1 and S2; no pedal edema  CHEST: Normal respiratory effort; CTAB;  normal breath sounds; no wheeze or crackles; portacath on right chest wall    ABDOM: nontender and nondistended; soft; normal bowel sounds; no rebound/guarding  MUSC/Skeletal: ROM normal; no crepitus; joints normal; no deformities or arthropathy  EXTREM: no clubbing, cyanosis, inflammation or swelling  SKIN: no rashes, lesions, ulcers, petechiae or subcutaneous nodules  : no lopez  NEURO: grossly intact; motor/sensory WNL; AAOx3; no tremors  PSYCH: normal mood, affect and behavior  LYMPH: normal cervical, supraclavicular, axillary and groin LN's  Breast: s/p bilateral mastectomies and healing well; drains since removed      Labs:   12/13/2019  Lab Results   Component Value Date    WBC 3.27 (L) 12/13/2019    HGB 12.3 12/13/2019    HCT 36.9 (L) 12/13/2019    MCV 95 12/13/2019     (L) 12/13/2019     BMP  Lab Results   Component Value Date     12/13/2019    K 4.4 12/13/2019     12/13/2019    CO2 30 (H) 12/13/2019    BUN 20 12/13/2019    CREATININE 0.8 12/13/2019    CALCIUM 9.3 12/13/2019    ANIONGAP 3 (L) 12/13/2019    ESTGFRAFRICA >60.0 12/13/2019    EGFRNONAA >60.0 12/13/2019             Radiology/Diagnostic Studies:    MRI head  12/5/2019:    Impression       No evidence of intracranial metastatic disease.           MRI breast  8/7/2019:    1. Significant interval improvement of enhancing spiculated mass in left breast near 4:00 position, as well as more diffuse non mass like enhancement throughout the superior left breast since 04/26/2019, consistent with a favorable response to interval neoadjuvant treatment.  2. No significant change of lobular lower inner quadrant right breast mass consistent with biopsy-proven fibroadenoma.  3. Unchanged 7 mm right breast mass near 12:00 position felt to represent intramammary lymph node.      Nm Pet Ct Routine Skull To Mid Thigh    Result Date: 5/21/2019  INTEGRATED PET CT WITH IMAGE FUSION HISTORY:  Left breast cancer initial treatment evaluation  TECHNIQUE: Following the injection of 12.7 mCi of F-18 labeled FDG into a right antecubital vein, PET CT was performed from the vertex of the skull through the proximal thighs with an integrated full ring PET CT scanner with image fusion. The patient's serum glucose at the time of the exam was 82 mg/dL. FINDINGS: There is diffuse FDG activity in the subareolar left breast with skin thickening and multiple hypermetabolic masses. There is a 19 mm spiculated hypermetabolic mass in the inferior lateral left breast with a max SUV of 10.4. This was previously biopsied. There is a 2 cm hypermetabolic area in the lateral superior left breast with max SUV of 9.9. There is an 11 mm nodule within the inner right breast which was biopsied and shown to represent fibroadenoma. This shows no FDG activity. There is no axillary, mediastinal or hilar adenopathy. There are no pulmonary nodules, infiltrates or pleural effusions. CT of the head and neck show no intra-axial lesions or cervical adenopathy. CT of the abdomen and pelvis demonstrates physiologic activity in the GI tract and urinary system. The liver and adrenal glands are normal. There are mildly prominent lymph nodes within the right lower quadrant the largest measures 13 mm with a max SUV of 3.1. Findings are suspicious for mesenteric adenitis. There is no retroperitoneal adenopathy. There are no lytic or blastic lesions.     IMPRESSION: Diffuse FDG activity within the subareolar left breast with skin thickening and multiple hypermetabolic left breast masses consistent with patient's history of left breast cancer. No evidence of metastatic disease 11 mm nodule in the medial right breast which was shown to represent fibroadenoma Mildly prominent lymph nodes in the right lower quadrant with mild FDG activity suggestive of mesenteric adenitis. Follow-up CT scan in 3-6 months is recommended.     Read and electronically signed by: Radha Jacome MD on 5/21/2019 1:13 PM CDT  ADRIANE GARNETT MD    [facility] Unknown Rad Eap    Result Date: 5/21/2019  Chest single view CLINICAL DATA: Port-A-Cath placement FINDINGS: AP view shows the heart to be within normal size limits. The mediastinum is unremarkable. Right subclavian Port-A-Cath tip is at superior vena cava. No pneumothorax or other placement related complication is identified. No infiltrates or effusions are demonstrated. No acute osseous abnormalities are demonstrated. IMPRESSION: 1. Right sided Port-A-Cath appears appropriately positioned, with no pneumothorax or other placement related complication. No acute radiographic abnormalities. Read and electronically signed by: Teofilo Patterson MD on 5/21/2019 2:52 PM CDT TEOFILO PATTERSON MD      I have reviewed all available lab results and radiology reports.    Assessment/Plan:   (1) 39 y.o. female  with diagnosis of left breast cancer who has been referred by Dr Salvador Rey with [facility] for evaluation by medical oncology.      - She had presented with left breast pain which became progressive over a 4 month period.   - Radiology studies found a 2.5cm mass on the left breast along with two inflamed LN's on the right.   - She had left breast biopsy on 5/1/2019 which showed invasive ductal carcinoma grade 3. The right breast biopsy was negative for cancer.   - She is ER and NH positive. She is also Her2Nu +3.   - We discussed the latest data on Her2Nu positive breast cancers and the recommendation for neoadjuvant chemotherapy with a herceptin and perjeta.  - she will need echo, portacath, and PET scan  - will plan for herceptin, perjeta, carboplatin and taxotere regimen neoadjuvantly with 6 cycles  -  S/p set up chemotherapy school; discussed side-effect profile, provided literature on the regimen; obtained consents  - she is set up for May 27th to start  - PET scan and echo are on chart  - portacath placed on 5/21  - started chemotherapy with 1st cycle on 5/27/2019   - She had  had a recent MRI of the breast again on 8/7  - she completed the neoadjuvant round and is having surgery next week with Dr Garcia at [facility] in [place]      - She is seeing Dr Howard Cheatham with plastics at [facility] and had bilateral mastectomies on oct 8th 2019 at [facility].   - She requires herceptin maintenance regimen post-surgery recovery. - She will most likely also require a hysterectomy/oopherectomy and plans to see her GYN Dr Aly Contreras in the near future     - she saw Dr Garcia for follow-up on 11/16/2019. Dr Garcia still awaiting tumor board evaluation at [facility] but she may require XRT as well.       - She saw Dr Long on 11/15/2019 for radiation evaluation. He has recommended a daily regimen for 6 weeks. She is planning to start XRT in next week or two. She should be able to continue the herceptin every 3 weeks and we are looking into use of perjta but it may need to be held for the duration of the XRT if not.    (2) Left parotid tumor - removed in 2011     (3) Sarcoidosis     (4) Interstitial cystitis     (5) Diet controlled gestational DM     (6) Fibromyalgia     (7) Hx/of cervical dysplasia as teenager s/p cryoablation    (8) Migraine - now on meds prn    (9)  Portacath tip has recently been found to have sheared off and she has seen Dr Rey. She is not having any problems at this time and is prophylactically on leiquis.   -  She has seen Dr Hilton and Dr Lester Jones with [facility] and they were able to successfully removed the portacath tip and she has also had a new port placed at the same time.          VISIT DIAGNOSES:      Malignant neoplasm of left female breast, unspecified estrogen receptor status, unspecified site of breast    HER2-positive carcinoma of left breast    Chemotherapy induced neutropenia    Malignant neoplasm of overlapping sites of left breast in female, estrogen receptor positive          PLAN:  1.  continue current plans  2.  F/u with GYN  about eventual hysterectomy/bilateral oopherectomy options   3. Check labs weekly for at least the next 4 weeks  4.  F/u with Dr Rey as directed by him    5. Check latest echo report  6. proceed with herceptin therapy every three weeks while on XRT (due to be completed on Jan 14th) then resume the perjeta thereafter  7. RTC  4 weeks    Fax note to Ben Rye De BoisBlanc, Marshall, Katira, Mackey    Discussion:       I spent over 25 mins of time with the patient. Reviewed results of the recently ordered labs, tests and studies; made directives with regards to the results. Over half of this time was spent couseling and coordinating care.    I have explained all of the above in detail and the patient understands all of the current recommendation(s). I have answered all of their questions to the best of my ability and to their complete satisfaction.   The patient is to continue with the current management plan.            Electronically signed by Abel Chambers MD

## 2019-12-16 ENCOUNTER — INFUSION (OUTPATIENT)
Dept: INFUSION THERAPY | Facility: HOSPITAL | Age: 39
End: 2019-12-16
Attending: INTERNAL MEDICINE
Payer: MEDICAID

## 2019-12-16 ENCOUNTER — CLINICAL SUPPORT (OUTPATIENT)
Dept: REHABILITATION | Facility: HOSPITAL | Age: 39
End: 2019-12-16
Payer: MEDICAID

## 2019-12-16 ENCOUNTER — OFFICE VISIT (OUTPATIENT)
Dept: HEMATOLOGY/ONCOLOGY | Facility: CLINIC | Age: 39
End: 2019-12-16
Payer: MEDICAID

## 2019-12-16 VITALS
WEIGHT: 185.5 LBS | RESPIRATION RATE: 18 BRPM | HEART RATE: 77 BPM | TEMPERATURE: 99 F | BODY MASS INDEX: 31.84 KG/M2 | DIASTOLIC BLOOD PRESSURE: 71 MMHG | SYSTOLIC BLOOD PRESSURE: 120 MMHG

## 2019-12-16 VITALS
HEART RATE: 77 BPM | SYSTOLIC BLOOD PRESSURE: 124 MMHG | DIASTOLIC BLOOD PRESSURE: 76 MMHG | WEIGHT: 185.69 LBS | BODY MASS INDEX: 31.7 KG/M2 | HEIGHT: 64 IN | TEMPERATURE: 98 F | RESPIRATION RATE: 18 BRPM

## 2019-12-16 DIAGNOSIS — C50.912 HER2-POSITIVE CARCINOMA OF LEFT BREAST: ICD-10-CM

## 2019-12-16 DIAGNOSIS — L90.5 SCAR OF BREAST: ICD-10-CM

## 2019-12-16 DIAGNOSIS — E86.0 DEHYDRATION: Primary | ICD-10-CM

## 2019-12-16 DIAGNOSIS — M62.81 MUSCLE WEAKNESS OF LEFT ARM: ICD-10-CM

## 2019-12-16 DIAGNOSIS — M25.619 LIMITED RANGE OF MOTION OF SHOULDER: ICD-10-CM

## 2019-12-16 DIAGNOSIS — C50.812 MALIGNANT NEOPLASM OF OVERLAPPING SITES OF LEFT BREAST IN FEMALE, ESTROGEN RECEPTOR POSITIVE: ICD-10-CM

## 2019-12-16 DIAGNOSIS — Z17.0 MALIGNANT NEOPLASM OF OVERLAPPING SITES OF LEFT BREAST IN FEMALE, ESTROGEN RECEPTOR POSITIVE: ICD-10-CM

## 2019-12-16 DIAGNOSIS — D70.1 CHEMOTHERAPY INDUCED NEUTROPENIA: ICD-10-CM

## 2019-12-16 DIAGNOSIS — T45.1X5A CHEMOTHERAPY INDUCED NEUTROPENIA: ICD-10-CM

## 2019-12-16 DIAGNOSIS — C50.912 MALIGNANT NEOPLASM OF LEFT FEMALE BREAST, UNSPECIFIED ESTROGEN RECEPTOR STATUS, UNSPECIFIED SITE OF BREAST: Primary | ICD-10-CM

## 2019-12-16 PROCEDURE — 96413 CHEMO IV INFUSION 1 HR: CPT

## 2019-12-16 PROCEDURE — A4216 STERILE WATER/SALINE, 10 ML: HCPCS | Performed by: INTERNAL MEDICINE

## 2019-12-16 PROCEDURE — 63600175 PHARM REV CODE 636 W HCPCS: Mod: JG | Performed by: INTERNAL MEDICINE

## 2019-12-16 PROCEDURE — 97110 THERAPEUTIC EXERCISES: CPT

## 2019-12-16 PROCEDURE — 99214 OFFICE O/P EST MOD 30 MIN: CPT | Mod: S$GLB,,, | Performed by: INTERNAL MEDICINE

## 2019-12-16 PROCEDURE — 25000003 PHARM REV CODE 250: Performed by: INTERNAL MEDICINE

## 2019-12-16 PROCEDURE — 99214 PR OFFICE/OUTPT VISIT, EST, LEVL IV, 30-39 MIN: ICD-10-PCS | Mod: S$GLB,,, | Performed by: INTERNAL MEDICINE

## 2019-12-16 RX ORDER — HEPARIN 100 UNIT/ML
500 SYRINGE INTRAVENOUS
Status: DISCONTINUED | OUTPATIENT
Start: 2019-12-16 | End: 2019-12-16 | Stop reason: HOSPADM

## 2019-12-16 RX ORDER — SODIUM CHLORIDE 0.9 % (FLUSH) 0.9 %
10 SYRINGE (ML) INJECTION
Status: DISCONTINUED | OUTPATIENT
Start: 2019-12-16 | End: 2019-12-16 | Stop reason: HOSPADM

## 2019-12-16 RX ADMIN — SODIUM CHLORIDE, PRESERVATIVE FREE 10 ML: 5 INJECTION INTRAVENOUS at 03:12

## 2019-12-16 RX ADMIN — HEPARIN 500 UNITS: 100 SYRINGE at 03:12

## 2019-12-16 RX ADMIN — TRASTUZUMAB 468 MG: 150 INJECTION, POWDER, LYOPHILIZED, FOR SOLUTION INTRAVENOUS at 02:12

## 2019-12-16 NOTE — PROGRESS NOTES
"  Physical Therapy Daily Treatment Note   Monthly Note  Name: Sheridan Todd  Clinic Number: [id number]    Therapy Diagnosis:   Encounter Diagnoses   Name Primary?    Limited range of motion of shoulder     Muscle weakness of left arm     Scar of breast      Physician: Rebecca Garcia MD    Visit Date: 12/16/2019    Physician Orders: PT Eval and Treat   Medical Diagnosis: Z85.3 (ICD-10-CM) - History of left breast cancer  Evaluation Date: 11/6/2019  Authorization period Expiration: 12/25/2019  Plan of Care Certification Period: 11/25/2019-12/30/2019  Insurance: Medicaid/[employer]    Visit #: 4/ 6        Time In: 10:00 AM  Time Out: 10:50 AM  Total Billable Time: 50 minutes    Precautions: Standard and cancer Post-mastectomy    Subjective     Pt reports: Managing OK. Having chemo and radiation treatments - coping with fatigue and nausea. Has been doing stretches in shower but not much else. Numbness and tingling have subsided. Now having itching over area that is being irradiated.   She was somewhat compliant with home exercise program.  Response to previous treatment: Good - shoulder and chest didn't bother me for several days after last visit.   Functional change: Lifting her 3 year old    Pain: 3/10  Location: left axilla and chest     Objective     Sheridan received therapeutic exercises to develop strength, endurance, ROM, flexibility and posture for 50 minutes including:  Bike warm up 10  min Level 3  Wand flexion and ER (overhead) 10 x 5 sec ea  +Supine scap protraction 2# 2x10  +Supine pulldown with PTB and TRA activation  2x10  Side lying shoulder abd and ER with 1# 2 x 15 abd and 1x15/1x10 ER  Standing shoulder rows and extension with orange band 2 x 10  Standing shoulder ER with orange band 2 x 10  Median nerve glides 5 x 10 sec hold LUE DNP    BUE girth measurements:     RIGHT   L 12/2/19   L12/16/19  Elbow   26cm     25cm         25.4cm     2" below elbow 27cm   26.5cm      26cm  4" below " elbow 25.8cm  24.6cm      23cm     L shoulder   AROM 12/16/19 11/7/19   Flexion 160 160   Abduction 170 178   Extension 57 55   IR/90deg 80 80   ER/90deg 70 60           Strength: manual muscle test grades below      Upper Extremity Strength    (L) UE (R) UE   Shoulder flexion: 4+/5 5/5   Shoulder Abduction: 4/5 4+/5   Shoulder IR 4+/5 4+/5   Shoulder ER 4+/5 4/5   Elbow flexion: 5/5 5/5   Elbow extension: 5/5 5/5   Lower Trap: 4/5 4+/5   Middle Trap: 4/5 4+/5    4+/5 4+/5       Home Exercises Provided and Patient Education Provided     Education provided:   - Updated HEP  -Cardiovascular ex for cancer related fatigue    Written Home Exercises Provided: yes.  Exercises were reviewed and Sheridan was able to demonstrate them prior to the end of the session.  Sheridan demonstrated good  understanding of the education provided.     See EMR under Patient Instructions for exercises provided 12/2/2019.    Assessment     Pt is progressing with left shoulder AROM. Demonstrates weakness of shoulder girdle and will benefit from gradual progression of strengthening program. No indication of lymphedema from today's measurements. Pt will benefit from continued weekly visits to progress strengthening, improve tissue mobility and monitor for volume changes in LUE through her radiation treatment protocol.   Sheridan is progressing well towards her goals.   Pt prognosis is Excellent.     Pt will continue to benefit from skilled outpatient physical therapy to address the deficits listed in the problem list box on initial evaluation, provide pt/family education and to maximize pt's level of independence in the home and community environment.     Pt's spiritual, cultural and educational needs considered and pt agreeable to plan of care and goals.     Anticipated barriers to physical therapy: None    Goals: Short Term goals: 2 weeks  1. Patient will demonstrate 100% understanding of lymphedema risk reduction practices to include  self monitoring for lymphedema. (progressing 12/16/2019    2. Patient will demonstrate independence with Home Exercise program established. Met 12/16/19  3. Pt will increase AROM/PROM in shoulder ER ROM to 75 degrees on left to improve functional reach, carry, push, pull pain free. Met 12/2/2019  4. Strength will be assessed and appropriate goals set. Met - see LTG below     Long Term Goals: 4 weeks   1.  Pt will increase AROM/PROM in shoulder flexion to WNL on left to improve functional reach, carry, push, pull pain free. Progressing 12/16/2019    2. Pt will increase strength to 4+/5 in gross UE musculature to improve tolerance to all functional activities pain free. Progressing 12/16/2019    3. Pt will demonstrate full/maximized tissue mobility to increase ROM and promote healthy tissue to be pain free at discharge. Progressing 12/16/2019    4. Pt will report decrease in overall worst pain to 2/10 at discharge. (progressing, not met)  5. Patient will report compliance with walking program 5x week for 10 min each day to improve overall cardiovascular function and decrease cancer related fatigue at discharge. (progressing, not met)    Plan     Continue weekly PT sessions  to monitor for lymphedema through course of radiation (6 weeks) and to progress shoulder ROM/strengthening ex program.     Salina Suggs, PT

## 2019-12-16 NOTE — PLAN OF CARE
Problem: Fatigue  Goal: Improved Activity Tolerance  Outcome: Ongoing, Progressing  Intervention: Promote Energy Conservation  Flowsheets (Taken 12/16/2019 0113)  Fatigue Management: fatigue-related activity identified  Sleep/Rest Enhancement: consistent schedule promoted; regular sleep/rest pattern promoted  Activity Management: activity encouraged

## 2019-12-16 NOTE — PLAN OF CARE
"  Outpatient Therapy Updated Plan of Care     Visit Date: 12/16/2019    Name: Sheridan Todd  Clinic Number: [id number]    Therapy Diagnosis:   Encounter Diagnoses   Name Primary?    Limited range of motion of shoulder     Muscle weakness of left arm     Scar of breast      Physician: Rebecca Garcia MD      Physician Orders: PT Eval and Treat   Medical Diagnosis: Z85.3 (ICD-10-CM) - History of left breast cancer  Evaluation Date: 10/2/2019  Authorization period Expiration: 12/25/2019  Plan of Care Certification Period: New POC 12/16/2019 - 1/21/2020  Insurance: Medicaid/[employer]     Visit #: 4/ Visits authorized: 6  Precautions: Standard and cancer Post-mastectomy  Functional Level Prior to Evaluation:  No deficits with left UE motion or strength    Subjective     Update:  Pt reports: Managing OK. Having chemo and radiation treatments - coping with fatigue and nausea. Has been doing stretches in shower but not much else. Numbness and tingling have subsided. Now having itching over area that is being irradiated.   She was somewhat compliant with home exercise program.    Objective   Update: BUE girth measurements:                                      RIGHT   L 12/2/19   L12/16/19  Elbow                          26cm     25cm         25.4cm     2" below elbow            27cm     26.5cm      26cm  4" below elbow            25.8cm  24.6cm      23cm      L shoulder   AROM 12/16/19 11/7/19   Flexion 160 160   Abduction 170 178   Extension 57 55   IR/90deg 80 80   ER/90deg 70 60         Strength: manual muscle test grades below      Upper Extremity Strength    (L) UE (R) UE   Shoulder flexion: 4+/5 5/5   Shoulder Abduction: 4/5 4+/5   Shoulder IR 4+/5 4+/5   Shoulder ER 4+/5 4/5   Elbow flexion: 5/5 5/5   Elbow extension: 5/5 5/5   Lower Trap: 4/5 4+/5   Middle Trap: 4/5 4+/5    4+/5 4+/5       Assessment     Update:   Pt is demonstrating improved shoulder AROM and strength. She continues to monitored " for increased limb circumference LUE and is not showing signs of lymphedema. She does have soft tissue restrictions s/p surgery which are improving but still present.     Goals: Short Term goals: 2 weeks  1. Patient will demonstrate 100% understanding of lymphedema risk reduction practices to include self monitoring for lymphedema. (progressing 12/16/2019     2. Patient will demonstrate independence with Home Exercise program established. Met 12/16/19  3. Pt will increase AROM/PROM in shoulder ER ROM to 75 degrees on left to improve functional reach, carry, push, pull pain free. Met 12/2/2019  4. Strength will be assessed and appropriate goals set. Met - see LTG below     Long Term Goals: 4 weeks   1.  Pt will increase AROM/PROM in shoulder flexion to WNL on left to improve functional reach, carry, push, pull pain free. Progressing 12/16/2019     2. Pt will increase strength to 4+/5 in gross UE musculature to improve tolerance to all functional activities pain free. Progressing 12/16/2019     3. Pt will demonstrate full/maximized tissue mobility to increase ROM and promote healthy tissue to be pain free at discharge. Progressing 12/16/2019     4. Pt will report decrease in overall worst pain to 2/10 at discharge. (progressing, not met)  5. Patient will report compliance with walking program 5x week for 10 min each day to improve overall cardiovascular function and decrease cancer related fatigue at discharge. (progressing, not met)    Reasons for Recertification of Therapy:   Continued left UE weakness,  Limited functional external rotation; soft tissue restrictions s/p mastectomy    Plan     Updated Certification Period: 12/16/2019 to 1/21/2020  Recommended Treatment Plan: 1-2 times per week for 6 weeks: Manual Therapy, Patient Education, Therapeutic Activites and Therapeutic Exercise    Salina Suggs, PT  12/16/2019      I CERTIFY THE NEED FOR THESE SERVICES FURNISHED UNDER THIS PLAN OF TREATMENT AND WHILE UNDER  MY CARE    Physician's comments:        Physician's Signature: ___________________________________________________

## 2019-12-23 ENCOUNTER — CLINICAL SUPPORT (OUTPATIENT)
Dept: REHABILITATION | Facility: HOSPITAL | Age: 39
End: 2019-12-23
Payer: MEDICAID

## 2019-12-23 DIAGNOSIS — M62.81 MUSCLE WEAKNESS OF LEFT ARM: ICD-10-CM

## 2019-12-23 DIAGNOSIS — L90.5 SCAR OF BREAST: ICD-10-CM

## 2019-12-23 DIAGNOSIS — M25.619 LIMITED RANGE OF MOTION OF SHOULDER: ICD-10-CM

## 2019-12-23 PROCEDURE — 97110 THERAPEUTIC EXERCISES: CPT

## 2019-12-23 NOTE — PROGRESS NOTES
"  Physical Therapy Daily Treatment Note   Monthly Note  Name: Sheridan Todd  Clinic Number: [id number]    Therapy Diagnosis:   Encounter Diagnoses   Name Primary?    Limited range of motion of shoulder     Muscle weakness of left arm     Scar of breast      Physician: Rebecca Garcia MD    Visit Date: 12/23/2019    Physician Orders: PT Eval and Treat   Medical Diagnosis: Z85.3 (ICD-10-CM) - History of left breast cancer  Evaluation Date: 11/6/2019  Authorization period Expiration: 12/25/2019  Plan of Care Certification Period: 11/25/2019-12/30/2019  Insurance: Medicaid/[employer]    Visit #: 5/ 6        Time In: 10:05 AM  Time Out:  10:50 AM  Total Billable Time:  45 minutes    Precautions: Standard and cancer Post-mastectomy    Subjective     Pt reports:  No real shoulder pain. Skin in axilla and chest feel raw from radiation. Using topical cream prescribed with some help.  She was somewhat compliant with home exercise program.  Response to previous treatment: Good - feels good after sessions   Functional change: Lifting her 3 year old    Pain: 3/10  Location: left axilla and chest     Objective     Sheridan received therapeutic exercises to develop strength, endurance, ROM, flexibility and posture for 50 minutes including:  Bike warm up 10  min Level 3  Wand flexion and ER (overhead) 10 x 5 sec ea  Supine scap protraction 3# 2x10  Supine pulldown with GTB and TRA activation  2x15  Side lying shoulder abd and ER with 1# 2 x 15  abd and 2# ER 2 x 10  Standing shoulder rows with 1.5 plates and extension with  1 plate at Cybex pulleys 2 x 15 each  Standing shoulder ER with orange band 2 x 10  Median nerve glides 5 x 10 sec hold LUE DNP    BUE girth measurements:     RIGHT   L 12/2/19   L12/16/19  Elbow   26cm     25cm         25.4cm     2" below elbow 27cm   26.5cm      26cm  4" below elbow 25.8cm  24.6cm      23cm     L shoulder   AROM 12/16/19 11/7/19   Flexion 160 160   Abduction 170 178   Extension " 57 55   IR/90deg 80 80   ER/90deg 70 60           Strength: manual muscle test grades below      Upper Extremity Strength    (L) UE (R) UE   Shoulder flexion: 4+/5 5/5   Shoulder Abduction: 4/5 4+/5   Shoulder IR 4+/5 4+/5   Shoulder ER 4+/5 4/5   Elbow flexion: 5/5 5/5   Elbow extension: 5/5 5/5   Lower Trap: 4/5 4+/5   Middle Trap: 4/5 4+/5    4+/5 4+/5       Home Exercises Provided and Patient Education Provided     Education provided:   -Cardiovascular ex for cancer related fatigue    Written Home Exercises Provided: yes.  Exercises were reviewed and Sheridan was able to demonstrate them prior to the end of the session.  Sheridan demonstrated good  understanding of the education provided.     See EMR under Patient Instructions for exercises provided 12/2/2019.    Assessment     Pt is progressing in strength with ability to progress to resisted ex on Cybex pulley system today. Continued tightness of soft tissue in axilla and left pec region. Pt will benefit from continued weekly visits to progress strengthening, improve tissue mobility and monitor for volume changes in LUE through her radiation treatment protocol.   Sheridan is progressing well towards her goals.   Pt prognosis is Excellent.     Pt will continue to benefit from skilled outpatient physical therapy to address the deficits listed in the problem list box on initial evaluation, provide pt/family education and to maximize pt's level of independence in the home and community environment.     Pt's spiritual, cultural and educational needs considered and pt agreeable to plan of care and goals.     Anticipated barriers to physical therapy: None    Goals: Short Term goals: 2 weeks  1. Patient will demonstrate 100% understanding of lymphedema risk reduction practices to include self monitoring for lymphedema. (progressing 12/23/2019    2. Patient will demonstrate independence with Home Exercise program established. Met 12/16/19  3. Pt will increase  AROM/PROM in shoulder ER ROM to 75 degrees on left to improve functional reach, carry, push, pull pain free. Met 12/2/2019  4. Strength will be assessed and appropriate goals set. Met - see LTG below     Long Term Goals: 4 weeks   1.  Pt will increase AROM/PROM in shoulder flexion to WNL on left to improve functional reach, carry, push, pull pain free. Progressing 12/23/2019    2. Pt will increase strength to 4+/5 in gross UE musculature to improve tolerance to all functional activities pain free. Progressing 12/23/2019    3. Pt will demonstrate full/maximized tissue mobility to increase ROM and promote healthy tissue to be pain free at discharge. Progressing 12/23/2019    4. Pt will report decrease in overall worst pain to 2/10 at discharge. (progressing, not met)  5. Patient will report compliance with walking program 5x week for 10 min each day to improve overall cardiovascular function and decrease cancer related fatigue at discharge. (progressing, not met)    Plan     Continue weekly PT sessions  to monitor for lymphedema through course of radiation (6 weeks) and to progress shoulder ROM/strengthening ex program.     Salina Suggs, PT

## 2020-01-01 RX ORDER — SODIUM CHLORIDE 0.9 % (FLUSH) 0.9 %
10 SYRINGE (ML) INJECTION
Status: CANCELLED | OUTPATIENT
Start: 2020-01-06

## 2020-01-01 RX ORDER — HEPARIN 100 UNIT/ML
500 SYRINGE INTRAVENOUS
Status: CANCELLED | OUTPATIENT
Start: 2020-01-06

## 2020-01-03 ENCOUNTER — LAB VISIT (OUTPATIENT)
Dept: LAB | Facility: HOSPITAL | Age: 40
End: 2020-01-03
Attending: INTERNAL MEDICINE
Payer: MEDICAID

## 2020-01-03 DIAGNOSIS — C50.812 MALIGNANT NEOPLASM OF OVERLAPPING SITES OF LEFT BREAST IN FEMALE, ESTROGEN RECEPTOR POSITIVE: ICD-10-CM

## 2020-01-03 DIAGNOSIS — Z17.0 MALIGNANT NEOPLASM OF OVERLAPPING SITES OF LEFT BREAST IN FEMALE, ESTROGEN RECEPTOR POSITIVE: ICD-10-CM

## 2020-01-03 LAB
ALBUMIN SERPL BCP-MCNC: 3.7 G/DL (ref 3.5–5.2)
ALP SERPL-CCNC: 52 U/L (ref 55–135)
ALT SERPL W/O P-5'-P-CCNC: 17 U/L (ref 10–44)
ANION GAP SERPL CALC-SCNC: 7 MMOL/L (ref 8–16)
AST SERPL-CCNC: 19 U/L (ref 10–40)
BASOPHILS # BLD AUTO: 0.03 K/UL (ref 0–0.2)
BASOPHILS NFR BLD: 0.8 % (ref 0–1.9)
BILIRUB SERPL-MCNC: 0.5 MG/DL (ref 0.1–1)
BUN SERPL-MCNC: 17 MG/DL (ref 6–20)
CALCIUM SERPL-MCNC: 9.1 MG/DL (ref 8.7–10.5)
CHLORIDE SERPL-SCNC: 108 MMOL/L (ref 95–110)
CO2 SERPL-SCNC: 28 MMOL/L (ref 23–29)
CREAT SERPL-MCNC: 1 MG/DL (ref 0.5–1.4)
DIFFERENTIAL METHOD: ABNORMAL
EOSINOPHIL # BLD AUTO: 0.1 K/UL (ref 0–0.5)
EOSINOPHIL NFR BLD: 1.3 % (ref 0–8)
ERYTHROCYTE [DISTWIDTH] IN BLOOD BY AUTOMATED COUNT: 12.4 % (ref 11.5–14.5)
EST. GFR  (AFRICAN AMERICAN): >60 ML/MIN/1.73 M^2
EST. GFR  (NON AFRICAN AMERICAN): >60 ML/MIN/1.73 M^2
GLUCOSE SERPL-MCNC: 77 MG/DL (ref 70–110)
HCT VFR BLD AUTO: 36.8 % (ref 37–48.5)
HGB BLD-MCNC: 12.3 G/DL (ref 12–16)
IMM GRANULOCYTES # BLD AUTO: 0.01 K/UL (ref 0–0.04)
IMM GRANULOCYTES NFR BLD AUTO: 0.3 % (ref 0–0.5)
LYMPHOCYTES # BLD AUTO: 0.5 K/UL (ref 1–4.8)
LYMPHOCYTES NFR BLD: 13.4 % (ref 18–48)
MCH RBC QN AUTO: 31.3 PG (ref 27–31)
MCHC RBC AUTO-ENTMCNC: 33.4 G/DL (ref 32–36)
MCV RBC AUTO: 94 FL (ref 82–98)
MONOCYTES # BLD AUTO: 0.6 K/UL (ref 0.3–1)
MONOCYTES NFR BLD: 15.2 % (ref 4–15)
NEUTROPHILS # BLD AUTO: 2.7 K/UL (ref 1.8–7.7)
NEUTROPHILS NFR BLD: 69 % (ref 38–73)
NRBC BLD-RTO: 0 /100 WBC
PLATELET # BLD AUTO: 152 K/UL (ref 150–350)
PMV BLD AUTO: 9.4 FL (ref 9.2–12.9)
POTASSIUM SERPL-SCNC: 4 MMOL/L (ref 3.5–5.1)
PROT SERPL-MCNC: 7.4 G/DL (ref 6–8.4)
RBC # BLD AUTO: 3.93 M/UL (ref 4–5.4)
SODIUM SERPL-SCNC: 143 MMOL/L (ref 136–145)
WBC # BLD AUTO: 3.88 K/UL (ref 3.9–12.7)

## 2020-01-03 PROCEDURE — 80053 COMPREHEN METABOLIC PANEL: CPT

## 2020-01-03 PROCEDURE — 85025 COMPLETE CBC W/AUTO DIFF WBC: CPT

## 2020-01-03 PROCEDURE — 36415 COLL VENOUS BLD VENIPUNCTURE: CPT

## 2020-01-06 ENCOUNTER — INFUSION (OUTPATIENT)
Dept: INFUSION THERAPY | Facility: HOSPITAL | Age: 40
End: 2020-01-06
Attending: INTERNAL MEDICINE
Payer: MEDICAID

## 2020-01-06 VITALS
SYSTOLIC BLOOD PRESSURE: 117 MMHG | RESPIRATION RATE: 18 BRPM | HEIGHT: 64 IN | TEMPERATURE: 98 F | BODY MASS INDEX: 31.77 KG/M2 | HEART RATE: 70 BPM | WEIGHT: 186.06 LBS | DIASTOLIC BLOOD PRESSURE: 69 MMHG

## 2020-01-06 DIAGNOSIS — C50.912 HER2-POSITIVE CARCINOMA OF LEFT BREAST: ICD-10-CM

## 2020-01-06 DIAGNOSIS — Z17.0 MALIGNANT NEOPLASM OF OVERLAPPING SITES OF LEFT BREAST IN FEMALE, ESTROGEN RECEPTOR POSITIVE: ICD-10-CM

## 2020-01-06 DIAGNOSIS — D70.1 CHEMOTHERAPY INDUCED NEUTROPENIA: ICD-10-CM

## 2020-01-06 DIAGNOSIS — T45.1X5A CHEMOTHERAPY INDUCED NEUTROPENIA: ICD-10-CM

## 2020-01-06 DIAGNOSIS — E86.0 DEHYDRATION: Primary | ICD-10-CM

## 2020-01-06 DIAGNOSIS — C50.812 MALIGNANT NEOPLASM OF OVERLAPPING SITES OF LEFT BREAST IN FEMALE, ESTROGEN RECEPTOR POSITIVE: ICD-10-CM

## 2020-01-06 PROCEDURE — 63600175 PHARM REV CODE 636 W HCPCS: Mod: JG | Performed by: INTERNAL MEDICINE

## 2020-01-06 PROCEDURE — A4216 STERILE WATER/SALINE, 10 ML: HCPCS | Performed by: INTERNAL MEDICINE

## 2020-01-06 PROCEDURE — 25000003 PHARM REV CODE 250: Performed by: INTERNAL MEDICINE

## 2020-01-06 PROCEDURE — 96413 CHEMO IV INFUSION 1 HR: CPT

## 2020-01-06 RX ORDER — HEPARIN 100 UNIT/ML
500 SYRINGE INTRAVENOUS
Status: DISCONTINUED | OUTPATIENT
Start: 2020-01-06 | End: 2020-01-06 | Stop reason: HOSPADM

## 2020-01-06 RX ORDER — SODIUM CHLORIDE 0.9 % (FLUSH) 0.9 %
10 SYRINGE (ML) INJECTION
Status: DISCONTINUED | OUTPATIENT
Start: 2020-01-06 | End: 2020-01-06 | Stop reason: HOSPADM

## 2020-01-06 RX ADMIN — SODIUM CHLORIDE, PRESERVATIVE FREE 10 ML: 5 INJECTION INTRAVENOUS at 11:01

## 2020-01-06 RX ADMIN — TRASTUZUMAB 468 MG: 150 INJECTION, POWDER, LYOPHILIZED, FOR SOLUTION INTRAVENOUS at 10:01

## 2020-01-06 RX ADMIN — HEPARIN 500 UNITS: 100 SYRINGE at 11:01

## 2020-01-12 PROBLEM — Z90.13 S/P BILATERAL MASTECTOMY: Status: ACTIVE | Noted: 2020-01-12

## 2020-01-13 ENCOUNTER — OFFICE VISIT (OUTPATIENT)
Dept: HEMATOLOGY/ONCOLOGY | Facility: CLINIC | Age: 40
End: 2020-01-13
Payer: MEDICAID

## 2020-01-13 VITALS
TEMPERATURE: 98 F | BODY MASS INDEX: 31.98 KG/M2 | WEIGHT: 186.31 LBS | DIASTOLIC BLOOD PRESSURE: 79 MMHG | RESPIRATION RATE: 18 BRPM | HEART RATE: 73 BPM | SYSTOLIC BLOOD PRESSURE: 117 MMHG

## 2020-01-13 DIAGNOSIS — Z90.13 S/P BILATERAL MASTECTOMY: ICD-10-CM

## 2020-01-13 DIAGNOSIS — C50.912 HER2-POSITIVE CARCINOMA OF LEFT BREAST: ICD-10-CM

## 2020-01-13 DIAGNOSIS — T45.1X5A CHEMOTHERAPY INDUCED NEUTROPENIA: ICD-10-CM

## 2020-01-13 DIAGNOSIS — Z17.0 MALIGNANT NEOPLASM OF OVERLAPPING SITES OF LEFT BREAST IN FEMALE, ESTROGEN RECEPTOR POSITIVE: ICD-10-CM

## 2020-01-13 DIAGNOSIS — C50.812 MALIGNANT NEOPLASM OF OVERLAPPING SITES OF LEFT BREAST IN FEMALE, ESTROGEN RECEPTOR POSITIVE: ICD-10-CM

## 2020-01-13 DIAGNOSIS — C50.912 MALIGNANT NEOPLASM OF LEFT FEMALE BREAST, UNSPECIFIED ESTROGEN RECEPTOR STATUS, UNSPECIFIED SITE OF BREAST: Primary | ICD-10-CM

## 2020-01-13 DIAGNOSIS — D70.1 CHEMOTHERAPY INDUCED NEUTROPENIA: ICD-10-CM

## 2020-01-13 PROCEDURE — 99214 OFFICE O/P EST MOD 30 MIN: CPT | Mod: S$GLB,,, | Performed by: INTERNAL MEDICINE

## 2020-01-13 PROCEDURE — 99214 PR OFFICE/OUTPT VISIT, EST, LEVL IV, 30-39 MIN: ICD-10-PCS | Mod: S$GLB,,, | Performed by: INTERNAL MEDICINE

## 2020-01-13 NOTE — PROGRESS NOTES
[facility] Hematology/Oncology  PROGRESS NOTE -  Follow-up Visit      Subjective:       Patient ID:   NAME: Sheridan Todd : 1980     39 y.o. female    Referring Doc: Krissy  Other Physicians: Jose Rodriguez De Boisblanc, Marshall; Howard Cheatham ([facility])    Chief Complaint: left breast ca f/u     History of Present Illness:     Patient returns today for a regularly scheduled follow-up visit.  She has since completed the neoadjuvant chemotherapy on 2019 and has subsequently surgery next at [facility] with Dr Garcia.  She denies any CP, SOB, HA's or N/V. She is here by herself. She has some fatigue. She decided to hold off on plastic reconstruction at this time.     She has since resumed maintenance therapy with herceptin/perjeta every 3 week3 therapy.      XRT finishing up tomorrow per patient with herceptin only at this time (last treatment was last Monday); perjeta will resume next month once XRT is complete            ROS:   GEN: normal without any fever, night sweats or weight loss  HEENT: normal with no HA's, sore throat, stiff neck, changes in vision  CV: normal with no CP, SOB, PND, MENDIETA or orthopnea  PULM: normal with no SOB, cough, hemoptysis, sputum or pleuritic pain  GI: normal with no abdominal pain, nausea, vomiting, constipation, diarrhea, melanotic stools, BRBPR, or hematemesis  : normal with no hematuria, dysuria  BREAST: no identifiable abnormality at this time  SKIN: normal with no rash, erythema, bruising, or swelling    Allergies:  Review of patient's allergies indicates:   Allergen Reactions    Shellfish containing products Anaphylaxis     Other reaction(s): Vomiting  Only crabmeat  Other reaction(s): Vomiting  Only crabmeat    Codeine Nausea And Vomiting     Pt thinks it was tylenol #3 with codeine  Can take hydrocodone & oxycodone  Other reaction(s): Nausea    Tegaderm ag mesh [silver] Blisters     Redness, itching and tears skin         Medications:    Current Outpatient Medications:     amoxicillin (AMOXIL ORAL), Take by mouth., Disp: , Rfl:     butalbital-acetaminophen-caff -40 mg Cap, Take 1 capsule by mouth every 4 (four) hours as needed., Disp: , Rfl: 0    CARAFATE 100 mg/mL suspension, TAKE 10 ML BY MOUTH EVERY 4 TO 6 HOURS, Disp: , Rfl: 3    diphenoxylate-atropine 2.5-0.025 mg (LOMOTIL) 2.5-0.025 mg per tablet, TAKE 1 TABLET BY MOUTH WITH EVERY LOOSE STOOL (NO MORE THAN 8 TABLETS IN 24 HOUR), Disp: , Rfl: 0    fluconazole (DIFLUCAN ORAL), Take by mouth., Disp: , Rfl:     HYDROcodone-acetaminophen (NORCO) 5-325 mg per tablet, TAKE 1 TABLET BY MOUTH EVERY 24 HOURS AS NEEDED FOR SEVERE PAIN, Disp: , Rfl: 0    lidocaine-prilocaine (EMLA) cream, Apply topically as needed. 30 minutes prior to port being accessed., Disp: 30 g, Rfl: 0    mupirocin (BACTROBAN) 2 % ointment, APPLY OINTMENT EXTERNALLY TO AFFECTED AREA THREE TIMES DAILY, Disp: , Rfl: 2    ondansetron (ZOFRAN) 8 MG tablet, Take 1 tablet (8 mg total) by mouth every 8 (eight) hours as needed for Nausea., Disp: 30 tablet, Rfl: 2    oxyCODONE-acetaminophen (PERCOCET) 5-325 mg per tablet, Take 1 tablet by mouth every 24 hours as needed for Pain (severe pain)., Disp: 10 tablet, Rfl: 0    phenazopyridine (PYRIDIUM) 200 MG tablet, Take 200 mg by mouth every 6 (six) hours as needed., Disp: , Rfl: 1    promethazine (PHENERGAN) 25 MG tablet, Take 1 tablet (25 mg total) by mouth every 6 (six) hours as needed for Nausea., Disp: 30 tablet, Rfl: 2    pseudoephedrine HCl (SUDAFED ORAL), Take by mouth as needed., Disp: , Rfl:     sumatriptan (IMITREX) 25 MG Tab, Take 50 mg by mouth every 2 (two) hours as needed., Disp: , Rfl:     PMHx/PSHx Updates:  See patient's last visit with me on 12/16/2019.  See H&P on 5/16/2019        Pathology:  Cancer Staging      Breast biopsies: 5/1/2019:  Left breast: with invasive ductal carcinoma grade 3; ER positive at 95% and DC positive at 90%;  Her2Nu was positive at +3; Her2/CEP 17 ratio was >10.6  - Ki67 was unfavorable at 69%  Right breast: negative for cancer      Bilateral Mastectomies 10/8/2019:    FINAL PATHOLOGIC DIAGNOSIS  1. BREAST (RIGHT MASTECTOMY): NO EVIDENCE OF MALIGNANCY IN SECTIONS OF SKIN, NIPPLE, AND  BREAST TISSUE.  2. BREAST (LEFT MASTECTOMY): INVASIVE DUCTAL CARCINOMA (2 SEPARATE RESIDUAL FOCI);  ASSOCIATED HIGH-GRADE DUCTAL CARCINOMA IN SITU; SECTIONS OF SKIN, NIPPLE, AND BREAST  MARGINS FREE OF MALIGNANCY.  Note: Two foci of residual ductal carcinoma are identified with differing histologies . The larger lesion consists of  small foci of invasive ductal carcinoma in a 19mm fibrotic nodule with suggestion of presurgical therapeutic  response. Vascular invasion cannot be demonstrated on CD31 stain. There is an additional separate  microinvasive focus of mucinous carcinoma. Foci of DCIS are present throughout the breast tissue.  HORMONE RECEPTOR ASSAYS:  ER-strongly positive (90%)  PgR-weakly positive (1-2%)  Her2-equivocal (1 2+); to be sent for FISH testing  Ki67 proliferative activity-30% activity  Positive and negative controls reacted appropriately.  3. LYMPH NODE (SENTINEL LYMPH NODE, CLINICAL): NO EVIDENCE OF MALIGNANCY.  Note: The lymph node was examined at three levels with H&E and epithelial immunostains (AE1/AES, CAM5.2,  and WSK). Positive and negative controls reacted appropriately.  4. LYMPH NODES (22): NO EVIDENCE OF MALIGNANCY.         Objective:     Vitals:  Blood pressure 117/79, pulse 73, temperature 98.1 °F (36.7 °C), temperature source Oral, resp. rate 18, weight 84.5 kg (186 lb 4.8 oz).     Physical Examination:   GEN: no apparent distress, comfortable; AAOx3  HEAD: atraumatic and normocephalic  EYES: no pallor, no icterus, PERRLA  ENT: OMM, no pharyngeal erythema, external ears WNL; no nasal discharge; no thrush  NECK: no masses, thyroid normal, trachea midline, no LAD/LN's, supple  CV: RRR with no murmur; normal  pulse; normal S1 and S2; no pedal edema  CHEST: Normal respiratory effort; CTAB; normal breath sounds; no wheeze or crackles; portacath on right chest wall    ABDOM: nontender and nondistended; soft; normal bowel sounds; no rebound/guarding  MUSC/Skeletal: ROM normal; no crepitus; joints normal; no deformities or arthropathy  EXTREM: no clubbing, cyanosis, inflammation or swelling  SKIN: no rashes, lesions, ulcers, petechiae or subcutaneous nodules  : no lopez  NEURO: grossly intact; motor/sensory WNL; AAOx3; no tremors  PSYCH: normal mood, affect and behavior  LYMPH: normal cervical, supraclavicular, axillary and groin LN's  Breast: s/p bilateral mastectomies and healing well; drains since removed      Labs:   1/3/2020  Lab Results   Component Value Date    WBC 3.88 (L) 01/03/2020    HGB 12.3 01/03/2020    HCT 36.8 (L) 01/03/2020    MCV 94 01/03/2020     01/03/2020     BMP  Lab Results   Component Value Date     01/03/2020    K 4.0 01/03/2020     01/03/2020    CO2 28 01/03/2020    BUN 17 01/03/2020    CREATININE 1.0 01/03/2020    CALCIUM 9.1 01/03/2020    ANIONGAP 7 (L) 01/03/2020    ESTGFRAFRICA >60.0 01/03/2020    EGFRNONAA >60.0 01/03/2020             Radiology/Diagnostic Studies:    MRI head  12/5/2019:    Impression       No evidence of intracranial metastatic disease.           MRI breast  8/7/2019:    1. Significant interval improvement of enhancing spiculated mass in left breast near 4:00 position, as well as more diffuse non mass like enhancement throughout the superior left breast since 04/26/2019, consistent with a favorable response to interval neoadjuvant treatment.  2. No significant change of lobular lower inner quadrant right breast mass consistent with biopsy-proven fibroadenoma.  3. Unchanged 7 mm right breast mass near 12:00 position felt to represent intramammary lymph node.      Nm Pet Ct Routine Skull To Mid Thigh    Result Date: 5/21/2019  INTEGRATED PET CT WITH IMAGE  FUSION HISTORY:  Left breast cancer initial treatment evaluation TECHNIQUE: Following the injection of 12.7 mCi of F-18 labeled FDG into a right antecubital vein, PET CT was performed from the vertex of the skull through the proximal thighs with an integrated full ring PET CT scanner with image fusion. The patient's serum glucose at the time of the exam was 82 mg/dL. FINDINGS: There is diffuse FDG activity in the subareolar left breast with skin thickening and multiple hypermetabolic masses. There is a 19 mm spiculated hypermetabolic mass in the inferior lateral left breast with a max SUV of 10.4. This was previously biopsied. There is a 2 cm hypermetabolic area in the lateral superior left breast with max SUV of 9.9. There is an 11 mm nodule within the inner right breast which was biopsied and shown to represent fibroadenoma. This shows no FDG activity. There is no axillary, mediastinal or hilar adenopathy. There are no pulmonary nodules, infiltrates or pleural effusions. CT of the head and neck show no intra-axial lesions or cervical adenopathy. CT of the abdomen and pelvis demonstrates physiologic activity in the GI tract and urinary system. The liver and adrenal glands are normal. There are mildly prominent lymph nodes within the right lower quadrant the largest measures 13 mm with a max SUV of 3.1. Findings are suspicious for mesenteric adenitis. There is no retroperitoneal adenopathy. There are no lytic or blastic lesions.     IMPRESSION: Diffuse FDG activity within the subareolar left breast with skin thickening and multiple hypermetabolic left breast masses consistent with patient's history of left breast cancer. No evidence of metastatic disease 11 mm nodule in the medial right breast which was shown to represent fibroadenoma Mildly prominent lymph nodes in the right lower quadrant with mild FDG activity suggestive of mesenteric adenitis. Follow-up CT scan in 3-6 months is recommended.     Read and  electronically signed by: Adriane Jacome MD on 5/21/2019 1:13 PM CDT ADRIANE JACOME MD    [facility] Unknown Rad Eap    Result Date: 5/21/2019  Chest single view CLINICAL DATA: Port-A-Cath placement FINDINGS: AP view shows the heart to be within normal size limits. The mediastinum is unremarkable. Right subclavian Port-A-Cath tip is at superior vena cava. No pneumothorax or other placement related complication is identified. No infiltrates or effusions are demonstrated. No acute osseous abnormalities are demonstrated. IMPRESSION: 1. Right sided Port-A-Cath appears appropriately positioned, with no pneumothorax or other placement related complication. No acute radiographic abnormalities. Read and electronically signed by: Teofilo Patterson MD on 5/21/2019 2:52 PM CDT TEOFILO PATTERSON MD      I have reviewed all available lab results and radiology reports.    Assessment/Plan:   (1) 39 y.o. female  with diagnosis of left breast cancer who has been referred by Dr Salvador Rey with [facility] for evaluation by medical oncology.      - She had presented with left breast pain which became progressive over a 4 month period.   - Radiology studies found a 2.5cm mass on the left breast along with two inflamed LN's on the right.   - She had left breast biopsy on 5/1/2019 which showed invasive ductal carcinoma grade 3. The right breast biopsy was negative for cancer.   - She is ER and MS positive. She is also Her2Nu +3.   - We discussed the latest data on Her2Nu positive breast cancers and the recommendation for neoadjuvant chemotherapy with a herceptin and perjeta.  - she will need echo, portacath, and PET scan  - will plan for herceptin, perjeta, carboplatin and taxotere regimen neoadjuvantly with 6 cycles  -  S/p set up chemotherapy school; discussed side-effect profile, provided literature on the regimen; obtained consents  - she is set up for May 27th to start  - PET scan and echo are on chart  - portacath placed  on 5/21  - started chemotherapy with 1st cycle on 5/27/2019   - She had had a recent MRI of the breast again on 8/7  - she completed the neoadjuvant round and is having surgery next week with Dr Garcia at [facility] in [place]      - She saw Dr Howard Cheatham with plastics at [facility] and had bilateral mastectomies on Oct 8th 2019 at [facility].   - She requires herceptin maintenance regimen post-surgery recovery. - She will most likely also require a hysterectomy/oopherectomy and plans to see her GYN Dr Aly Contreras in the near future     - she saw Dr Garcia for follow-up on 11/16/2019. Dr Garcia still awaiting tumor board evaluation at [facility] but she may require XRT as well.       - She saw Dr Long on 11/15/2019 for radiation evaluation. He has recommended a daily regimen for 6 weeks. She is planning to start XRT in next week or two. She should be able to continue the herceptin every 3 weeks and we are looking into use of perjta but it may need to be held for the duration of the XRT if not.    - XRT finishing up tomorrow per patient with herceptin only at this time (last treatment was last Monday); perjeta will resume next month once XRT is complete      (2) Left parotid tumor - removed in 2011     (3) Sarcoidosis     (4) Interstitial cystitis     (5) Diet controlled gestational DM     (6) Fibromyalgia     (7) Hx/of cervical dysplasia as teenager s/p cryoablation    (8) Migraine - now on meds prn    (9)  Portacath tip has recently been found to have sheared off and she has seen Dr Rey. She is not having any problems at this time and is prophylactically on leiquis.   -  She has seen Dr Hilton and Dr Lester Jones with [facility] and they were able to successfully removed the portacath tip and she has also had a new port placed at the same time.          VISIT DIAGNOSES:      Malignant neoplasm of left female breast, unspecified estrogen receptor status, unspecified site of  breast    Chemotherapy induced neutropenia    HER2-positive carcinoma of left breast    Malignant neoplasm of overlapping sites of left breast in female, estrogen receptor positive    S/P bilateral mastectomy          PLAN:  1.  continue current plans  2.  F/u with GYN about eventual hysterectomy/bilateral oopherectomy options   3. Check labs weekly for at least the next 4 weeks  4.  F/u with Dr Rey as directed by him    5. Check echo again in MArch 2020  6. XRT finishing up tomorrow per patient with herceptin only at this time (last treatment was last Monday); perjeta will resume next month once XRT is complete    7. RTC  4 weeks    Fax note to Ben Rey De BoisBlanc, Marshall, Katira, Mackey, Mannina    Discussion:       I spent over 25 mins of time with the patient. Reviewed results of the recently ordered labs, tests and studies; made directives with regards to the results. Over half of this time was spent couseling and coordinating care.    I have explained all of the above in detail and the patient understands all of the current recommendation(s). I have answered all of their questions to the best of my ability and to their complete satisfaction.   The patient is to continue with the current management plan.            Electronically signed by Abel Chambers MD

## 2020-01-15 ENCOUNTER — TELEPHONE (OUTPATIENT)
Dept: HEMATOLOGY/ONCOLOGY | Facility: CLINIC | Age: 40
End: 2020-01-15

## 2020-01-15 NOTE — TELEPHONE ENCOUNTER
Patient called the office and said that her 2 young children have tested positive for flu type A.  She is concerned because she is receiving chemotherapy treatment.  Instructed the patient that I will have to ask Dr. Chambers and get back with her.     Spoke with Dr. Chambers and he said to order Tamiflu for the patient.  Spoke with JAYLEEN Hardwick and she instructed me to ask the patient if she has had the flu shot this year. If she has not had the flu shot then she needs Tamiflu for 14 days.  If she has had the flu shot then she only needs 7 days.  Patient has not had the flu shot.  Called into her pharmacy [facility] [phone] Tamiflu 75 mg 1 po daily #14 with no refills.

## 2020-01-17 ENCOUNTER — TELEPHONE (OUTPATIENT)
Dept: HEMATOLOGY/ONCOLOGY | Facility: CLINIC | Age: 40
End: 2020-01-17

## 2020-01-17 NOTE — TELEPHONE ENCOUNTER
Called the patient and instructed her that I finally got approval for her Tamiflu.  Patient stated that she got approval a few days ago.  She told me that her pharmacy only had the generic and her insurance only approves the Tamiflu itself.

## 2020-01-20 ENCOUNTER — PATIENT MESSAGE (OUTPATIENT)
Dept: SURGERY | Facility: CLINIC | Age: 40
End: 2020-01-20

## 2020-01-24 ENCOUNTER — CLINICAL SUPPORT (OUTPATIENT)
Dept: REHABILITATION | Facility: HOSPITAL | Age: 40
End: 2020-01-24
Payer: MEDICAID

## 2020-01-24 ENCOUNTER — LAB VISIT (OUTPATIENT)
Dept: LAB | Facility: HOSPITAL | Age: 40
End: 2020-01-24
Attending: INTERNAL MEDICINE
Payer: MEDICAID

## 2020-01-24 DIAGNOSIS — M62.81 MUSCLE WEAKNESS OF LEFT ARM: ICD-10-CM

## 2020-01-24 DIAGNOSIS — C50.812 MALIGNANT NEOPLASM OF OVERLAPPING SITES OF LEFT BREAST IN FEMALE, ESTROGEN RECEPTOR POSITIVE: ICD-10-CM

## 2020-01-24 DIAGNOSIS — M25.619 LIMITED RANGE OF MOTION OF SHOULDER: ICD-10-CM

## 2020-01-24 DIAGNOSIS — L90.5 SCAR OF BREAST: ICD-10-CM

## 2020-01-24 DIAGNOSIS — Z17.0 MALIGNANT NEOPLASM OF OVERLAPPING SITES OF LEFT BREAST IN FEMALE, ESTROGEN RECEPTOR POSITIVE: ICD-10-CM

## 2020-01-24 LAB
BASOPHILS # BLD AUTO: 0.03 K/UL (ref 0–0.2)
BASOPHILS NFR BLD: 1 % (ref 0–1.9)
DIFFERENTIAL METHOD: ABNORMAL
EOSINOPHIL # BLD AUTO: 0.1 K/UL (ref 0–0.5)
EOSINOPHIL NFR BLD: 2 % (ref 0–8)
ERYTHROCYTE [DISTWIDTH] IN BLOOD BY AUTOMATED COUNT: 13.2 % (ref 11.5–14.5)
HCT VFR BLD AUTO: 33.3 % (ref 37–48.5)
HGB BLD-MCNC: 11.3 G/DL (ref 12–16)
IMM GRANULOCYTES # BLD AUTO: 0.01 K/UL (ref 0–0.04)
IMM GRANULOCYTES NFR BLD AUTO: 0.3 % (ref 0–0.5)
LYMPHOCYTES # BLD AUTO: 0.5 K/UL (ref 1–4.8)
LYMPHOCYTES NFR BLD: 16.3 % (ref 18–48)
MCH RBC QN AUTO: 31.1 PG (ref 27–31)
MCHC RBC AUTO-ENTMCNC: 33.9 G/DL (ref 32–36)
MCV RBC AUTO: 92 FL (ref 82–98)
MONOCYTES # BLD AUTO: 0.4 K/UL (ref 0.3–1)
MONOCYTES NFR BLD: 12.2 % (ref 4–15)
NEUTROPHILS # BLD AUTO: 2 K/UL (ref 1.8–7.7)
NEUTROPHILS NFR BLD: 68.2 % (ref 38–73)
NRBC BLD-RTO: 0 /100 WBC
PLATELET # BLD AUTO: 127 K/UL (ref 150–350)
PMV BLD AUTO: 9.7 FL (ref 9.2–12.9)
RBC # BLD AUTO: 3.63 M/UL (ref 4–5.4)
WBC # BLD AUTO: 2.94 K/UL (ref 3.9–12.7)

## 2020-01-24 PROCEDURE — 85025 COMPLETE CBC W/AUTO DIFF WBC: CPT

## 2020-01-24 PROCEDURE — 36415 COLL VENOUS BLD VENIPUNCTURE: CPT

## 2020-01-24 PROCEDURE — 97110 THERAPEUTIC EXERCISES: CPT

## 2020-01-24 RX ORDER — HEPARIN 100 UNIT/ML
500 SYRINGE INTRAVENOUS
Status: CANCELLED | OUTPATIENT
Start: 2020-01-27

## 2020-01-24 RX ORDER — SODIUM CHLORIDE 0.9 % (FLUSH) 0.9 %
10 SYRINGE (ML) INJECTION
Status: CANCELLED | OUTPATIENT
Start: 2020-01-27

## 2020-01-24 NOTE — PLAN OF CARE
"  Outpatient Therapy Updated Plan of Care     Visit Date: 1/24/2020    Name: Sheridan Todd  Clinic Number: [id number]    Therapy Diagnosis:   Encounter Diagnoses   Name Primary?    Limited range of motion of shoulder     Muscle weakness of left arm     Scar of breast      Physician: Rebecca Garcia MD    Physician Orders: PT Eval and Treat   Medical Diagnosis: Z85.3 (ICD-10-CM) - History of left breast cancer  Evaluation Date: 10/2/2019  Authorization period Expiration: 1/25/2020  Plan of Care Certification Period: New POC 1/24/2020 - 3/13/2020  Insurance: Medicaid/[employer]     Visit #: 1 Visits authorized: TBD  Precautions: Standard and cancer Post-mastectomy  Functional Level Prior to Evaluation:  No deficits with left UE motion or strength    Subjective     Update: Pt is finished radiation treatments. Has been concerned about increased nerve like pain in LUE. States it is worse with use of the left arm during daily activity. Pt has not been in for PT the last few weeks due to children being sick with the flu.     Objective     Update:      Postural examination/scapula alignment:  Forward head; mild Dowager's hump     Skin Integrity:   Scar Location: Bilateral chest wall  Appearance: clean, tender, healed  Signs of infection: No  Drainage:none       Edema: Mild  Location: left axilla which is also tender to palpation     Axillary Web Syndrome/Cording:   None noted     Sensation: numbness noted at incisions       Measurements of BL UE's for early detection of Lymphedema:   LANDMARK RIGHT UE LEFT UE DIFFERENCE   E + 8" 35.5 cm 33.9 cm 1.6 cm L<R   E + 6" 33.2 cm 32.9 cm .3 cm   E + 4" 30.8 cm 30 cm .8 cm   E + 2" 28.2 cm 27.0cm 1.2 cm   L<R   Elbow 26.0 cm 25.4 cm .6cm   W+ 8" 27.0 cm 26.2 cm .8 cm   W +  6" 26.9 cm 26.4 cm .5 cm   W + 4" 23.9 cm 23.3 cm .6 cm   Wrist 16.3 cm 16.5 cm .20 cm   DPC 20.5 cm 20.5 cm .0 cm   IP Thumb .56 cm .60 cm .04  cm        L shoulder   AROM 1/24/19 11/7/19   Flexion " 165 160   Abduction 180 178   Extension 57 55   IR/90deg 80 80   ER/90deg 80 60         Strength: manual muscle test grades below      Upper Extremity Strength    (L) UE (R) UE   Shoulder flexion: 4+/5 5/5   Shoulder Abduction: 4/5 4+/5   Shoulder IR 4+/5 5/5   Shoulder ER 4+/5 4+/5   Elbow flexion: 5/5 5/5   Elbow extension: 5/5 5/5   Lower Trap: 4/5 4+/5   Middle Trap: 4/5 4+/5    5/5 5/5     Special Tests:  + neural tension test left median nerve    Assessment     Update: Patient has demonstrated improvement in left shoulder AROM with PT. Incisions are healed with some adhesion present. She tests + for neural adhesion of the median nerve. She continues to have some mild weakness in the left shoulder girdle. She will benefit from continued PT to address neural tissue adhesion and weakness.    Short Term goals: 2 weeks  1. Patient will demonstrate 100% understanding of lymphedema risk reduction practices to include self monitoring for lymphedema. Met 1/24/2020  2. Patient will demonstrate independence with Home Exercise program established. Met 1/24/2020  3. Pt will increase AROM/PROM in shoulder ER ROM to 75 degrees on left to improve functional reach, carry, push, pull pain free. Met 1/24/2020  4. Strength will be assessed and appropriate goals set.Met 1/24/2020     Long Term Goals: 4 weeks   1.  Pt will increase AROM/PROM in shoulder flexion to WNL on left to improve functional reach, carry, push, pull pain free.      Met 1/24/2020  2. Pt will increase strength to 4+/5 in gross UE musculature to improve tolerance to all functional activities pain free. (progressing, not met)  3. Pt will demonstrate full/maximized tissue mobility to increase ROM and promote healthy tissue to be pain free at discharge. (progressing, not met)  4. Pt will report decrease in overall worst pain to 2/10 at discharge. (progressing, not met)  5. Patient will report compliance with walking program 5x week for 10 min each day to  improve overall cardiovascular function and decrease cancer related fatigue at discharge. (progressing, not met)  Reasons for Recertification of Therapy:     + neural tension testing median n. Following radiation treatment  Continued weakness of left shoulder girdle.     Plan     Updated Certification Period: 1/24/2020 to 3/13/2020  Recommended Treatment Plan: 1 times per week for 6 weeks: Manual Therapy, Patient Education and Therapeutic Exercise      Slaina Suggs, PT  1/24/2020      I CERTIFY THE NEED FOR THESE SERVICES FURNISHED UNDER THIS PLAN OF TREATMENT AND WHILE UNDER MY CARE    Physician's comments:        Physician's Signature: ___________________________________________________

## 2020-01-27 ENCOUNTER — INFUSION (OUTPATIENT)
Dept: INFUSION THERAPY | Facility: HOSPITAL | Age: 40
End: 2020-01-27
Attending: INTERNAL MEDICINE
Payer: MEDICAID

## 2020-01-27 VITALS
SYSTOLIC BLOOD PRESSURE: 102 MMHG | WEIGHT: 186.31 LBS | HEIGHT: 64 IN | BODY MASS INDEX: 31.81 KG/M2 | TEMPERATURE: 98 F | RESPIRATION RATE: 18 BRPM | HEART RATE: 70 BPM | DIASTOLIC BLOOD PRESSURE: 55 MMHG

## 2020-01-27 DIAGNOSIS — T45.1X5A CHEMOTHERAPY INDUCED NEUTROPENIA: ICD-10-CM

## 2020-01-27 DIAGNOSIS — C50.812 MALIGNANT NEOPLASM OF OVERLAPPING SITES OF LEFT BREAST IN FEMALE, ESTROGEN RECEPTOR POSITIVE: ICD-10-CM

## 2020-01-27 DIAGNOSIS — C50.912 HER2-POSITIVE CARCINOMA OF LEFT BREAST: ICD-10-CM

## 2020-01-27 DIAGNOSIS — Z17.0 MALIGNANT NEOPLASM OF OVERLAPPING SITES OF LEFT BREAST IN FEMALE, ESTROGEN RECEPTOR POSITIVE: ICD-10-CM

## 2020-01-27 DIAGNOSIS — D70.1 CHEMOTHERAPY INDUCED NEUTROPENIA: ICD-10-CM

## 2020-01-27 DIAGNOSIS — E86.0 DEHYDRATION: Primary | ICD-10-CM

## 2020-01-27 PROCEDURE — A4216 STERILE WATER/SALINE, 10 ML: HCPCS | Performed by: INTERNAL MEDICINE

## 2020-01-27 PROCEDURE — 96413 CHEMO IV INFUSION 1 HR: CPT

## 2020-01-27 PROCEDURE — 63600175 PHARM REV CODE 636 W HCPCS: Performed by: INTERNAL MEDICINE

## 2020-01-27 PROCEDURE — 25000003 PHARM REV CODE 250: Performed by: INTERNAL MEDICINE

## 2020-01-27 RX ORDER — HEPARIN 100 UNIT/ML
500 SYRINGE INTRAVENOUS
Status: DISCONTINUED | OUTPATIENT
Start: 2020-01-27 | End: 2020-01-27 | Stop reason: HOSPADM

## 2020-01-27 RX ORDER — SODIUM CHLORIDE 0.9 % (FLUSH) 0.9 %
10 SYRINGE (ML) INJECTION
Status: DISCONTINUED | OUTPATIENT
Start: 2020-01-27 | End: 2020-01-27 | Stop reason: HOSPADM

## 2020-01-27 RX ADMIN — SODIUM CHLORIDE, PRESERVATIVE FREE 10 ML: 5 INJECTION INTRAVENOUS at 11:01

## 2020-01-27 RX ADMIN — TRASTUZUMAB 468 MG: 150 INJECTION, POWDER, LYOPHILIZED, FOR SOLUTION INTRAVENOUS at 10:01

## 2020-01-27 RX ADMIN — HEPARIN 500 UNITS: 100 SYRINGE at 11:01

## 2020-02-04 ENCOUNTER — TELEPHONE (OUTPATIENT)
Dept: SURGERY | Facility: CLINIC | Age: 40
End: 2020-02-04

## 2020-02-04 NOTE — TELEPHONE ENCOUNTER
Returned call to patient, pt can only do Monday or Tuesday appointments, appt rescheduled and confirmed for Monday 2/10/2020 at 12:45 am at [facility]

## 2020-02-10 ENCOUNTER — OFFICE VISIT (OUTPATIENT)
Dept: SURGERY | Facility: CLINIC | Age: 40
End: 2020-02-10
Payer: MEDICAID

## 2020-02-10 ENCOUNTER — CLINICAL SUPPORT (OUTPATIENT)
Dept: REHABILITATION | Facility: HOSPITAL | Age: 40
End: 2020-02-10
Payer: MEDICAID

## 2020-02-10 VITALS
BODY MASS INDEX: 31.81 KG/M2 | WEIGHT: 186.31 LBS | HEIGHT: 64 IN | DIASTOLIC BLOOD PRESSURE: 78 MMHG | HEART RATE: 66 BPM | SYSTOLIC BLOOD PRESSURE: 124 MMHG

## 2020-02-10 DIAGNOSIS — M62.81 MUSCLE WEAKNESS OF LEFT ARM: ICD-10-CM

## 2020-02-10 DIAGNOSIS — Z17.0 MALIGNANT NEOPLASM OF OVERLAPPING SITES OF LEFT BREAST IN FEMALE, ESTROGEN RECEPTOR POSITIVE: Primary | ICD-10-CM

## 2020-02-10 DIAGNOSIS — C50.812 MALIGNANT NEOPLASM OF OVERLAPPING SITES OF LEFT BREAST IN FEMALE, ESTROGEN RECEPTOR POSITIVE: Primary | ICD-10-CM

## 2020-02-10 DIAGNOSIS — L90.5 SCAR OF BREAST: ICD-10-CM

## 2020-02-10 DIAGNOSIS — M25.619 LIMITED RANGE OF MOTION OF SHOULDER: ICD-10-CM

## 2020-02-10 PROCEDURE — 99999 PR PBB SHADOW E&M-EST. PATIENT-LVL III: CPT | Mod: PBBFAC,,, | Performed by: SURGERY

## 2020-02-10 PROCEDURE — 99213 OFFICE O/P EST LOW 20 MIN: CPT | Mod: S$PBB,,, | Performed by: SURGERY

## 2020-02-10 PROCEDURE — 99213 OFFICE O/P EST LOW 20 MIN: CPT | Mod: PBBFAC | Performed by: SURGERY

## 2020-02-10 PROCEDURE — 99213 PR OFFICE/OUTPT VISIT, EST, LEVL III, 20-29 MIN: ICD-10-PCS | Mod: S$PBB,,, | Performed by: SURGERY

## 2020-02-10 PROCEDURE — 99999 PR PBB SHADOW E&M-EST. PATIENT-LVL III: ICD-10-PCS | Mod: PBBFAC,,, | Performed by: SURGERY

## 2020-02-10 PROCEDURE — 97110 THERAPEUTIC EXERCISES: CPT

## 2020-02-10 NOTE — PROGRESS NOTES
Physical Therapy Daily Treatment Note   Monthly Note  Name: Sheridan Todd  Clinic Number: [id number]    Therapy Diagnosis:   Encounter Diagnoses   Name Primary?    Limited range of motion of shoulder     Muscle weakness of left arm     Scar of breast      Physician: Rebecca Garcia MD    Visit Date: 2/10/2020    Physician Orders: PT Eval and Treat   Medical Diagnosis: Z85.3 (ICD-10-CM) - History of left breast cancer  Evaluation Date: 11/6/2019  Authorization period Expiration: 3/24/2020  Plan of Care Certification Period: 1/24/2020-3/13/2020  Insurance: Medicaid/[employer]  Monthly Note Due 2/24/2020  Visit # /authorized: 6/ 18        Time In: 10:15 AM  Time Out:   11:00 AM  Total Billable Time:  45 minutes    Precautions: Standard and cancer Post-mastectomy    Subjective     Pt reports:  No real shoulder pain. Increased fatigue this week. Pt is scheduled for a hysterectomy 3/3/2020.   She was somewhat compliant with home exercise program.  Response to previous treatment: Good - feels good after sessions   Functional change: Lifting her kids isn't as hard    Pain: 3/10  Location: left axilla and chest     Objective     Sheridan received therapeutic exercises to develop strength, endurance, ROM, flexibility and posture for 45 minutes including:    UBE warm up 3/3  Wand flexion and ER (overhead) 10 x 5 sec ea  Supine scap protraction 2# 2x10  Supine pulldown with GTB and TRA activation  2x15  Side lying shoulder abd  3 x 10  +Seated shoulder ER in plane of scapula 2# 2x10 with slow eccentric control when lowering     Cybex Pulleys  Standing shoulder rows with 1.5 plates and extension with  1 plate at Cybex pulleys 2 x 15 each  +Seated eccentric abduction 1 plate 2 x 15  +Lat pull downs wide  2 plates 2 x 15      Did not perform:   Standing shoulder ER with orange band 2 x 10  Bike warm up 10 / min Level 3  Median nerve glides 5 x 10 sec hold LUE     BUE girth measurements:     RIGHT   L 12/2/19    "L12/16/19   L 2/10/2020  Elbow   26cm     25cm         25.4cm        25.3cm  2" below elbow 27cm   26.5cm      26cm            26cm  4" below elbow 25.8cm  24.6cm      23cm            247cm        Home Exercises Provided and Patient Education Provided     Education provided:   -Cardiovascular ex for cancer related fatigue    Written Home Exercises Provided: yes.  Exercises were reviewed and Sheridan was able to demonstrate them prior to the end of the session.  Sheridan demonstrated good  understanding of the education provided.     See EMR under Patient Instructions for exercises provided 12/2/2019.    Assessment     Pt is progressing in strength with ability to progress  resisted ex on Cybex pulley system. Continued tightness of soft tissue in axilla and left pec region. Pt will benefit from continued weekly visits to progress strengthening, improve tissue mobility and monitor for volume changes in LUE through her radiation treatment protocol.   Sheridan is progressing well towards her goals.   Pt prognosis is Excellent.     Pt will continue to benefit from skilled outpatient physical therapy to address the deficits listed in the problem list box on initial evaluation, provide pt/family education and to maximize pt's level of independence in the home and community environment.     Pt's spiritual, cultural and educational needs considered and pt agreeable to plan of care and goals.     Anticipated barriers to physical therapy: None    Goals: Short Term goals: 2 weeks  1. Patient will demonstrate 100% understanding of lymphedema risk reduction practices to include self monitoring for lymphedema. (progressing 2/10/2020    2. Patient will demonstrate independence with Home Exercise program established. Met 12/16/19  3. Pt will increase AROM/PROM in shoulder ER ROM to 75 degrees on left to improve functional reach, carry, push, pull pain free. Met 12/2/2019  4. Strength will be assessed and appropriate goals set. " Met - see LTG below     Long Term Goals: 4 weeks   1.  Pt will increase AROM/PROM in shoulder flexion to WNL on left to improve functional reach, carry, push, pull pain free. Progressing 2/10/2020    2. Pt will increase strength to 4+/5 in gross UE musculature to improve tolerance to all functional activities pain free. Progressing 2/10/2020    3. Pt will demonstrate full/maximized tissue mobility to increase ROM and promote healthy tissue to be pain free at discharge. Progressing 2/10/2020    4. Pt will report decrease in overall worst pain to 2/10 at discharge. (progressing, not met)  5. Patient will report compliance with walking program 5x week for 10 min each day to improve overall cardiovascular function and decrease cancer related fatigue at discharge. (progressing, not met)    Plan     Continue weekly PT sessions  to monitor for lymphedema through course of radiation (6 weeks) and to progress shoulder ROM/strengthening ex program.     Salina Suggs, PT

## 2020-02-10 NOTE — PROGRESS NOTES
[facility] [place]       Post-Op        REFERRING PHYSICIAN:  No referring provider defined for this encounter.       Abel Chambers MD      DIAGNOSIS:    This is a 39 y.o. female with a stage ympT1 pN0 Mx grade 2 ER + WA + (1-2%) HER2 positive IDC of the left breast.    TREATMENT SUMMARY:  The patient is status post bilateral simple mastectomy and sentinel node biopsy on 10/8/2019.  Left axillary dissection.  Final pathology showed invasive ductal carcinoma of the left breast.  1.9cm in size.  Negative margins and no lymph node involvement.    INTERVAL HISTORY:   Sheridan Todd comes in for a f/u. Doing well. No new complaints.   Finished radiation on January 14th. Breast reconstruction is going to be with Dr. Cheatham. Hoping for summer.  Still in chemo with Dr. Chambers (Herceptin and Perjeta)  hysterectomy/bilateral oopherectomy scheduled for march.   Has not started endocrine therapy yet.    MEDICATIONS:  Current Outpatient Medications   Medication Sig Dispense Refill    amoxicillin (AMOXIL ORAL) Take by mouth.      butalbital-acetaminophen-caff -40 mg Cap Take 1 capsule by mouth every 4 (four) hours as needed.  0    CARAFATE 100 mg/mL suspension TAKE 10 ML BY MOUTH EVERY 4 TO 6 HOURS  3    diphenoxylate-atropine 2.5-0.025 mg (LOMOTIL) 2.5-0.025 mg per tablet TAKE 1 TABLET BY MOUTH WITH EVERY LOOSE STOOL (NO MORE THAN 8 TABLETS IN 24 HOUR)  0    fluconazole (DIFLUCAN ORAL) Take by mouth.      HYDROcodone-acetaminophen (NORCO) 5-325 mg per tablet TAKE 1 TABLET BY MOUTH EVERY 24 HOURS AS NEEDED FOR SEVERE PAIN  0    lidocaine-prilocaine (EMLA) cream Apply topically as needed. 30 minutes prior to port being accessed. 30 g 0    mupirocin (BACTROBAN) 2 % ointment APPLY OINTMENT EXTERNALLY TO AFFECTED AREA THREE TIMES DAILY  2    ondansetron (ZOFRAN) 8 MG tablet Take 1 tablet (8 mg total) by mouth every 8 (eight) hours as needed for Nausea. 30 tablet 2    oxyCODONE-acetaminophen  (PERCOCET) 5-325 mg per tablet Take 1 tablet by mouth every 24 hours as needed for Pain (severe pain). 10 tablet 0    phenazopyridine (PYRIDIUM) 200 MG tablet Take 200 mg by mouth every 6 (six) hours as needed.  1    promethazine (PHENERGAN) 25 MG tablet Take 1 tablet (25 mg total) by mouth every 6 (six) hours as needed for Nausea. 30 tablet 2    pseudoephedrine HCl (SUDAFED ORAL) Take by mouth as needed.      sumatriptan (IMITREX) 25 MG Tab Take 50 mg by mouth every 2 (two) hours as needed.       No current facility-administered medications for this visit.        ALLERGIES:   Review of patient's allergies indicates:   Allergen Reactions    Shellfish containing products Anaphylaxis     Other reaction(s): Vomiting  Only crabmeat  Other reaction(s): Vomiting  Only crabmeat    Codeine Nausea And Vomiting     Pt thinks it was tylenol #3 with codeine  Can take hydrocodone & oxycodone  Other reaction(s): Nausea    Tegaderm ag mesh [silver] Blisters     Redness, itching and tears skin        PHYSICAL EXAMINATION:   General:  This is a well appearing female with appropriate speech, affect and gait.     Breast: no masses. No skin changes. No LAD.  Slight hyperpigmentation with XRT left chest wall.    IMPRESSION:   JOHN    PLAN:   1. return in 6 months for a follow up office visit and breast exam  2. The patient is advised in continued exam of the breast chest wall and to report to this office sooner should she note any areas of abnormality or concern.   3.  Continue follow up with Med Onc  4. Plan was discussed with patient, questions were answered.

## 2020-02-12 ENCOUNTER — TELEPHONE (OUTPATIENT)
Dept: HEMATOLOGY/ONCOLOGY | Facility: CLINIC | Age: 40
End: 2020-02-12

## 2020-02-12 DIAGNOSIS — E53.8 B12 DEFICIENCY: ICD-10-CM

## 2020-02-12 DIAGNOSIS — C50.912 HER2-POSITIVE CARCINOMA OF LEFT BREAST: Primary | ICD-10-CM

## 2020-02-12 NOTE — TELEPHONE ENCOUNTER
Called the patient and she instructed me that she is very tired, weak and would like a B-12 injection.  Spoke with Dr. Chambers and he instructed me that she will need to get a Vitamin B-12 lab checked first.  Instructed the patient that she needs to get blood work first before she can get the injection.  Patient instructed me that she has to come on Friday to get her regular labs done and she will just get it done then.  She can get her B-12 injection Monday when she comes to see Dr. Chambers.

## 2020-02-12 NOTE — TELEPHONE ENCOUNTER
----- Message from Susanna Ortiz, Patient Care Assistant sent at 2/11/2020  3:00 PM CST -----  Patient called in stating she is wanting to speak to someone cause she has been feeling weak and tired and need to know if she need to come in tomorrow to get a b-12 shot. She can be reached at [phone]

## 2020-02-13 ENCOUNTER — OFFICE VISIT (OUTPATIENT)
Dept: RADIATION ONCOLOGY | Facility: CLINIC | Age: 40
End: 2020-02-13
Payer: MEDICAID

## 2020-02-13 ENCOUNTER — LAB VISIT (OUTPATIENT)
Dept: LAB | Facility: HOSPITAL | Age: 40
End: 2020-02-13
Attending: INTERNAL MEDICINE
Payer: MEDICAID

## 2020-02-13 VITALS — WEIGHT: 187.19 LBS | BODY MASS INDEX: 32.13 KG/M2

## 2020-02-13 DIAGNOSIS — E53.8 B12 DEFICIENCY: ICD-10-CM

## 2020-02-13 DIAGNOSIS — C50.812 MALIGNANT NEOPLASM OF OVERLAPPING SITES OF LEFT BREAST IN FEMALE, ESTROGEN RECEPTOR POSITIVE: ICD-10-CM

## 2020-02-13 DIAGNOSIS — C50.812 MALIGNANT NEOPLASM OF OVERLAPPING SITES OF LEFT BREAST IN FEMALE, ESTROGEN RECEPTOR POSITIVE: Primary | ICD-10-CM

## 2020-02-13 DIAGNOSIS — Z17.0 MALIGNANT NEOPLASM OF OVERLAPPING SITES OF LEFT BREAST IN FEMALE, ESTROGEN RECEPTOR POSITIVE: Primary | ICD-10-CM

## 2020-02-13 DIAGNOSIS — C50.912 HER2-POSITIVE CARCINOMA OF LEFT BREAST: ICD-10-CM

## 2020-02-13 DIAGNOSIS — Z17.0 MALIGNANT NEOPLASM OF OVERLAPPING SITES OF LEFT BREAST IN FEMALE, ESTROGEN RECEPTOR POSITIVE: ICD-10-CM

## 2020-02-13 LAB
ALBUMIN SERPL BCP-MCNC: 4.2 G/DL (ref 3.5–5.2)
ALP SERPL-CCNC: 71 U/L (ref 55–135)
ALT SERPL W/O P-5'-P-CCNC: 26 U/L (ref 10–44)
ANION GAP SERPL CALC-SCNC: 9 MMOL/L (ref 8–16)
AST SERPL-CCNC: 26 U/L (ref 10–40)
BASOPHILS # BLD AUTO: 0.02 K/UL (ref 0–0.2)
BASOPHILS NFR BLD: 0.5 % (ref 0–1.9)
BILIRUB SERPL-MCNC: 0.9 MG/DL (ref 0.1–1)
BUN SERPL-MCNC: 15 MG/DL (ref 6–20)
CALCIUM SERPL-MCNC: 9.1 MG/DL (ref 8.7–10.5)
CHLORIDE SERPL-SCNC: 105 MMOL/L (ref 95–110)
CO2 SERPL-SCNC: 26 MMOL/L (ref 23–29)
CREAT SERPL-MCNC: 1 MG/DL (ref 0.5–1.4)
DIFFERENTIAL METHOD: ABNORMAL
EOSINOPHIL # BLD AUTO: 0.1 K/UL (ref 0–0.5)
EOSINOPHIL NFR BLD: 2.1 % (ref 0–8)
ERYTHROCYTE [DISTWIDTH] IN BLOOD BY AUTOMATED COUNT: 14.8 % (ref 11.5–14.5)
EST. GFR  (AFRICAN AMERICAN): >60 ML/MIN/1.73 M^2
EST. GFR  (NON AFRICAN AMERICAN): >60 ML/MIN/1.73 M^2
GLUCOSE SERPL-MCNC: 120 MG/DL (ref 70–110)
HCT VFR BLD AUTO: 35.5 % (ref 37–48.5)
HGB BLD-MCNC: 12.1 G/DL (ref 12–16)
IMM GRANULOCYTES # BLD AUTO: 0.02 K/UL (ref 0–0.04)
IMM GRANULOCYTES NFR BLD AUTO: 0.5 % (ref 0–0.5)
LYMPHOCYTES # BLD AUTO: 0.6 K/UL (ref 1–4.8)
LYMPHOCYTES NFR BLD: 15.3 % (ref 18–48)
MCH RBC QN AUTO: 31.6 PG (ref 27–31)
MCHC RBC AUTO-ENTMCNC: 34.1 G/DL (ref 32–36)
MCV RBC AUTO: 93 FL (ref 82–98)
MONOCYTES # BLD AUTO: 0.3 K/UL (ref 0.3–1)
MONOCYTES NFR BLD: 8.5 % (ref 4–15)
NEUTROPHILS # BLD AUTO: 2.8 K/UL (ref 1.8–7.7)
NEUTROPHILS NFR BLD: 73.1 % (ref 38–73)
NRBC BLD-RTO: 0 /100 WBC
PLATELET # BLD AUTO: 160 K/UL (ref 150–350)
PMV BLD AUTO: 9.1 FL (ref 9.2–12.9)
POTASSIUM SERPL-SCNC: 3.6 MMOL/L (ref 3.5–5.1)
PROT SERPL-MCNC: 7.6 G/DL (ref 6–8.4)
RBC # BLD AUTO: 3.83 M/UL (ref 4–5.4)
SODIUM SERPL-SCNC: 140 MMOL/L (ref 136–145)
VIT B12 SERPL-MCNC: 236 PG/ML (ref 210–950)
WBC # BLD AUTO: 3.78 K/UL (ref 3.9–12.7)

## 2020-02-13 PROCEDURE — 85025 COMPLETE CBC W/AUTO DIFF WBC: CPT

## 2020-02-13 PROCEDURE — 36415 COLL VENOUS BLD VENIPUNCTURE: CPT

## 2020-02-13 PROCEDURE — 82607 VITAMIN B-12: CPT

## 2020-02-13 PROCEDURE — 80053 COMPREHEN METABOLIC PANEL: CPT

## 2020-02-13 NOTE — PROGRESS NOTES
Sheridan Todd  [id number]  1980 2/13/2020  No referring provider defined for this encounter.    DIAGNOSIS: Cancer Staging  Malignant neoplasm of overlapping sites of left breast in female, estrogen receptor positive  Staging form: Breast, AJCC 8th Edition  - Clinical: No stage assigned - Unsigned  - Pathologic: No Stage Recommended (ypT1c(2), pN0, cM0, G2, ER+, UT+, HER2+) - Signed by Darwin Long Jr., MD on 11/15/2019    REASON FOR VISIT: Routine scheduled follow-up.    HISTORY OF PRESENT ILLNESS:   [id number] converted to [id number] triple (+) g2 IDC L breast    TREATMENT GOAL: adjuvant    HISTORY OF PRESENT ILLNESS:   39F presented with left breast pain with nipple inversion, no rapid swelling , warmth or redness with diagnostic mammogram and ultrasound revealing a 2.8 cm irregular hypoechoic mass at the 1 o'clock position left breast, 10 cm from the nipple with additional asymmetries and calcifications and left axillary subcentimeter lymphadenopathy.  Skin thickening and nipple inversion also appreciated.  There were also suspicious findings in the right breast including a 1.8 cm hypoechoic mass 7 cm from the nipple at the 5 o'clock position with a normal appearing right axillary lymph node.  MRI confirmed extensive mass and non masslike enhancement a left breast with nipple retraction and multiple foci of malignant calcifications as well as confirming 2 small masses in the right breast.    Biopsy of the right breast was negative with left breast biopsy revealing grade 3 invasive ductal carcinoma, [place] 8/9 with associated high-grade DCIS with comedo necrosis. Tumor was ER+ @ 95%, UT+ % 90%, her2+ @ 3+.  PET-CT confirmed diffuse FDG activity in the subareolar left breast with skin thickening and multiple hypermetabolic left breast mass is with no evidence of distant metastases or axillary lymphadenopathy.    She tested BRCA (-).    She completed neoadjuvant TCHP x 6c with Dr. Chambers.   Interim MRI demonstrated significant improvement.    Patient underwent bilateral mastectomy with left axillary lymph node dissection with Dr. Garcia that revealed:   - R breast: david   - L breast:  2 foci; 1.9 cm g2 IDC at LOQ, <1 mm mucinous adenoca at UOQ margin (-)   - Juan 6/9; multifocal high-grade DCIS; no skin or nipple involvement   - ER+, ME weakly +; her2 equivocal   - L axilla: 0/24LN    Her case was reviewed by Multidisciplinary Tumor Board the recommended adjuvant radiotherapy followed by Kadcyla and endocrine therapy then RSOI reconstruction.  She is also planning for hysterectomy at completion of treatment.    Patient completed adjuvant left chest wall and draining lymphatic radiotherapy to 5040 cGy with 1000 cGy boost to her mastectomy incision on January 14, 2020.  Treatment was well tolerated.    Continues on Herceptin Q 3 weeks and plan to begin Perjeta.    INTERVAL HISTORY:   Patient reports mild residual fatigue that is improving.  She denies cough, shortness of breath or chest pain.  She has mild tenderness of the left chest wall and axilla.  She is practicing range of motion exercises, massages and using a moisturizer.  She has plans to proceed with hysterectomy and follow-up with reconstructive surgeon.  She continues to follow with Dr. Chambers and Dr. Garcia.    Review of Systems   Constitutional: Positive for fatigue. Negative for appetite change, chills, fever and unexpected weight change.   HENT:   Negative for lump/mass, mouth sores, sore throat, trouble swallowing and voice change.    Eyes: Negative for eye problems and icterus.   Respiratory: Negative for cough, hemoptysis and shortness of breath.    Cardiovascular: Negative for chest pain and leg swelling.   Gastrointestinal: Negative for abdominal pain, constipation, diarrhea, nausea and vomiting.   Genitourinary: Negative for dysuria, frequency, hematuria, nocturia and vaginal bleeding.    Musculoskeletal: Negative for back  pain, gait problem, neck pain and neck stiffness.   Neurological: Negative for extremity weakness, gait problem, headaches, numbness and seizures.   Hematological: Negative for adenopathy.     Past Medical History:   Diagnosis Date    Abnormal Pap smear of cervix     Breast cancer, left 10/8/2019    Chemotherapy induced neutropenia 2019    Fibromyalgia     Gestational diabetes     HER2-positive carcinoma of left breast 2019    lt    Hypertension     AFTER PREGNANCY- NO MED NOW    IC (interstitial cystitis)     Malignant neoplasm of overlapping sites of left breast in female, estrogen receptor positive 2019    Malignant neoplasm of overlapping sites of left female breast 2019    Overweight and obesity(278.0)     S/P bilateral mastectomy 2020    Sarcoidosis     Sinusitis      Past Surgical History:   Procedure Laterality Date    BREAST RECONSTRUCTION Bilateral 10/8/2019    Procedure: RECONSTRUCTION, BREAST skin reduction after mastectomy;  Surgeon: Howard Cheatham MD;  Location: [facility];  Service: General;  Laterality: Bilateral;     SECTION  08/15/2016    Twins     CRYOTHERAPY      CYSTOSCOPY WITH BIOPSY OF BLADDER      x 2    PAROTID TUMOR      LEFT    PORTACATH PLACEMENT  2019    , removed bc cathetar severed from port, removed & relpaced with new powerport    PORTACATH PLACEMENT Right 2019    Power port by Dr. Hilton    SENTINEL LYMPH NODE BIOPSY Left 10/8/2019    Procedure: BIOPSY, LYMPH NODE, SENTINEL LEFT;  Surgeon: Rebecca Garcia MD;  Location: [facility];  Service: General;  Laterality: Left;    SIMPLE MASTECTOMY Bilateral 10/8/2019    Procedure: MASTECTOMY, SIMPLE BILATERAL (CONSENT AM OF) 4.0 hr case;  Surgeon: Rebecca Garcia MD;  Location: [facility];  Service: General;  Laterality: Bilateral;    VAGINAL DELIVERY       x2 PROM. Preclampsia/ gestational diabetes    WISDOM TOOTH EXTRACTION       Social History     Socioeconomic  History    Marital status:      Spouse name: Not on file    Number of children: Not on file    Years of education: Not on file    Highest education level: Not on file   Occupational History     Employer: [employer]   Social Needs    Financial resource strain: Not on file    Food insecurity:     Worry: Not on file     Inability: Not on file    Transportation needs:     Medical: Not on file     Non-medical: Not on file   Tobacco Use    Smoking status: Former Smoker     Packs/day: 1.00     Years: 6.50     Pack years: 6.50     Types: Cigarettes     Start date: 5/12/2012    Smokeless tobacco: Never Used    Tobacco comment: quit 2 years ago   Substance and Sexual Activity    Alcohol use: Yes     Comment: rare    Drug use: No    Sexual activity: Yes     Partners: Male   Lifestyle    Physical activity:     Days per week: Not on file     Minutes per session: Not on file    Stress: Not on file   Relationships    Social connections:     Talks on phone: Not on file     Gets together: Not on file     Attends Orthodox service: Not on file     Active member of club or organization: Not on file     Attends meetings of clubs or organizations: Not on file     Relationship status: Not on file   Other Topics Concern    Not on file   Social History Narrative    Not on file     Family History   Problem Relation Age of Onset    Hypertension Mother     Hyperlipidemia Mother     Cancer Father         liver    Cancer Maternal Grandfather         throat    Kidney disease Maternal Grandfather     Ovarian cancer Other     Lung cancer Paternal Aunt     Breast cancer Neg Hx      Medication List with Changes/Refills   Current Medications    BUTALBITAL-ACETAMINOPHEN-CAFF -40 MG CAP    Take 1 capsule by mouth every 4 (four) hours as needed.    CARAFATE 100 MG/ML SUSPENSION    TAKE 10 ML BY MOUTH EVERY 4 TO 6 HOURS    DIPHENOXYLATE-ATROPINE 2.5-0.025 MG (LOMOTIL) 2.5-0.025 MG PER TABLET    TAKE 1 TABLET BY  MOUTH WITH EVERY LOOSE STOOL (NO MORE THAN 8 TABLETS IN 24 HOUR)    FLUCONAZOLE (DIFLUCAN ORAL)    Take by mouth.    HYDROCODONE-ACETAMINOPHEN (NORCO) 5-325 MG PER TABLET    TAKE 1 TABLET BY MOUTH EVERY 24 HOURS AS NEEDED FOR SEVERE PAIN    LIDOCAINE-PRILOCAINE (EMLA) CREAM    Apply topically as needed. 30 minutes prior to port being accessed.    MUPIROCIN (BACTROBAN) 2 % OINTMENT    APPLY OINTMENT EXTERNALLY TO AFFECTED AREA THREE TIMES DAILY    ONDANSETRON (ZOFRAN) 8 MG TABLET    Take 1 tablet (8 mg total) by mouth every 8 (eight) hours as needed for Nausea.    OXYCODONE-ACETAMINOPHEN (PERCOCET) 5-325 MG PER TABLET    Take 1 tablet by mouth every 24 hours as needed for Pain (severe pain).    PHENAZOPYRIDINE (PYRIDIUM) 200 MG TABLET    Take 200 mg by mouth every 6 (six) hours as needed.    PROMETHAZINE (PHENERGAN) 25 MG TABLET    Take 1 tablet (25 mg total) by mouth every 6 (six) hours as needed for Nausea.    PSEUDOEPHEDRINE HCL (SUDAFED ORAL)    Take by mouth as needed.    SUMATRIPTAN (IMITREX) 25 MG TAB    Take 50 mg by mouth every 2 (two) hours as needed.     Review of patient's allergies indicates:   Allergen Reactions    Shellfish containing products Anaphylaxis     Other reaction(s): Vomiting  Only crabmeat  Other reaction(s): Vomiting  Only crabmeat    Codeine Nausea And Vomiting     Pt thinks it was tylenol #3 with codeine  Can take hydrocodone & oxycodone  Other reaction(s): Nausea    Tegaderm ag mesh [silver] Blisters     Redness, itching and tears skin        QUALITY OF LIFE: 90%- Able to Carry on Normal Activity: Minor Symptoms of Disease    Vitals:    02/13/20 1427   Weight: 84.9 kg (187 lb 3.2 oz)     Body mass index is 32.13 kg/m².    PHYSICAL EXAM:   GENERAL: alert; in no apparent distress.   HEAD: normocephalic, atraumatic.  Alopecia  EYES: pupils are equal, round, reactive to light and accommodation. Sclera anicteric. Conjunctiva not injected.   NOSE/THROAT: no nasal erythema or rhinorrhea.  Oropharynx pink, without erythema, ulcerations or thrush.   NECK: no cervical motion rigidity; supple with no masses.  CHEST: Patient is speaking comfortably on room air with normal work of breathing without using accessory muscles of respiration.  ABDOMEN: soft, nontender, nondistended.   MUSCULOSKELETAL: no tenderness to palpation along the spine or scapulae. Normal range of motion.  NEUROLOGIC: cranial nerves II-XII intact bilaterally. Strength 5/5 in bilateral upper and lower extremities. No sensory deficits appreciated. Normal gait.  LYMPHATIC: no axillary adenopathy appreciated bilaterally.   EXTREMITIES: no clubbing, cyanosis, edema.  SKIN: no erythema, rashes, ulcerations noted.   CW:  Left chest wall with minimal residual hyperpigmentation, no palpable seroma, nodularity, cellulitis or fluctuance    ANCILLARY DATA: none    ASSESSMENT: 39 y.o. female with stage [id number] converted to [id number] triple (+) g2 IDC L breast after TCHP x 6c s/p B mastectomies and L ALND; planned for HP x 1yr (Perjeta held during RT) + antiestrogen therapy s/p adjuvant radiotherapy, 5040 cGy to left chest wall and draining lymphatics followed by 1000 cGy boost to mastectomy incision ending January 14, 2020.  PLAN:   Sheridan Todd did very well throughout treatment and today presents with minimal residual hyperpigmentation and fatigue.  I assured her both would correct with time.  I recommended moisturizers containing vitamin E with massages as well as range of motion exercises for left upper extremity.  She continues to work with physical therapy.  By review of systems and physical exam there is no evidence of disease at today's visit.  She will continue on dual HER2 blockade and has plans for follow-up imaging later this year.  She will proceed with hysterectomy as well as reconstruction.  Return to clinic in 6 months.    All questions answered and contact information provided. Patient understands free to call us  anytime with any questions or concerns regarding radiation therapy.    I have personally seen and evaluated this patient. Greater than 50% of this time was spent discussing coordination of care and/or counseling.    PHYSICIAN: Darwin Long Jr, MD

## 2020-02-14 ENCOUNTER — TELEPHONE (OUTPATIENT)
Dept: HEMATOLOGY/ONCOLOGY | Facility: CLINIC | Age: 40
End: 2020-02-14

## 2020-02-14 DIAGNOSIS — E53.8 B12 DEFICIENCY: Primary | ICD-10-CM

## 2020-02-14 RX ORDER — CYANOCOBALAMIN 1000 UG/ML
1000 INJECTION, SOLUTION INTRAMUSCULAR; SUBCUTANEOUS
Status: DISCONTINUED | OUTPATIENT
Start: 2020-02-17 | End: 2020-03-05 | Stop reason: HOSPADM

## 2020-02-14 RX ORDER — HEPARIN 100 UNIT/ML
500 SYRINGE INTRAVENOUS
Status: CANCELLED | OUTPATIENT
Start: 2020-02-17

## 2020-02-14 RX ORDER — SODIUM CHLORIDE 0.9 % (FLUSH) 0.9 %
10 SYRINGE (ML) INJECTION
Status: CANCELLED | OUTPATIENT
Start: 2020-02-17

## 2020-02-14 NOTE — TELEPHONE ENCOUNTER
Called the patient and instructed her that her B-12 level is low and Dr. Chambers wants her to have B-12 injections monthly.

## 2020-02-14 NOTE — TELEPHONE ENCOUNTER
----- Message from Abel Chambers MD sent at 2/14/2020 11:08 AM CST -----  See is she is on B12? If not, start 1000mcg SQ monthly

## 2020-02-16 NOTE — PROGRESS NOTES
[facility] Hematology/Oncology  PROGRESS NOTE -  Follow-up Visit      Subjective:       Patient ID:   NAME: Sheridan Todd : 1980     39 y.o. female    Referring Doc: Krissy  Other Physicians: Jose Rodriguez De Boisblanc, Marshall; Howard Cheatham ([facility])    Chief Complaint: left breast ca f/u     History of Present Illness:     Patient returns today for a regularly scheduled follow-up visit.  She has since completed the neoadjuvant chemotherapy on 2019 and has subsequently surgery next at [facility] with Dr Garcia.  She denies any CP, SOB, HA's or N/V. She is here by herself. She has some fatigue. She decided to hold off on plastic reconstruction at this time.     She has since resumed maintenance therapy with herceptin/perjeta every 3 week3 therapy.      XRT completed and she has since resumed the perjeta with the herceptin; she has a planned hysterectomy with Dr bryant on ; we will move the next treatment tentatively due on  out a week further to give her time to heal; she has some aches behind the left knee and is seeing PT - will get US out of abundance of precaution          ROS:   GEN: normal without any fever, night sweats or weight loss  HEENT: normal with no HA's, sore throat, stiff neck, changes in vision  CV: normal with no CP, SOB, PND, MENDIETA or orthopnea  PULM: normal with no SOB, cough, hemoptysis, sputum or pleuritic pain  GI: normal with no abdominal pain, nausea, vomiting, constipation, diarrhea, melanotic stools, BRBPR, or hematemesis  : normal with no hematuria, dysuria  BREAST: no identifiable abnormality at this time  SKIN: normal with no rash, erythema, bruising, or swelling    Allergies:  Review of patient's allergies indicates:   Allergen Reactions    Shellfish containing products Anaphylaxis     Other reaction(s): Vomiting  Only crabmeat  Other reaction(s): Vomiting  Only crabmeat    Codeine Nausea And Vomiting     Pt thinks it was  tylenol #3 with codeine  Can take hydrocodone & oxycodone  Other reaction(s): Nausea    Tegaderm ag mesh [silver] Blisters     Redness, itching and tears skin        Medications:    Current Outpatient Medications:     butalbital-acetaminophen-caff -40 mg Cap, Take 1 capsule by mouth every 4 (four) hours as needed., Disp: , Rfl: 0    CARAFATE 100 mg/mL suspension, TAKE 10 ML BY MOUTH EVERY 4 TO 6 HOURS, Disp: , Rfl: 3    diphenoxylate-atropine 2.5-0.025 mg (LOMOTIL) 2.5-0.025 mg per tablet, TAKE 1 TABLET BY MOUTH WITH EVERY LOOSE STOOL (NO MORE THAN 8 TABLETS IN 24 HOUR), Disp: , Rfl: 0    fluconazole (DIFLUCAN ORAL), Take by mouth., Disp: , Rfl:     HYDROcodone-acetaminophen (NORCO) 5-325 mg per tablet, TAKE 1 TABLET BY MOUTH EVERY 24 HOURS AS NEEDED FOR SEVERE PAIN, Disp: , Rfl: 0    lidocaine-prilocaine (EMLA) cream, Apply topically as needed. 30 minutes prior to port being accessed., Disp: 30 g, Rfl: 0    mupirocin (BACTROBAN) 2 % ointment, APPLY OINTMENT EXTERNALLY TO AFFECTED AREA THREE TIMES DAILY, Disp: , Rfl: 2    ondansetron (ZOFRAN) 8 MG tablet, Take 1 tablet (8 mg total) by mouth every 8 (eight) hours as needed for Nausea., Disp: 30 tablet, Rfl: 2    oxyCODONE-acetaminophen (PERCOCET) 5-325 mg per tablet, Take 1 tablet by mouth every 24 hours as needed for Pain (severe pain)., Disp: 10 tablet, Rfl: 0    phenazopyridine (PYRIDIUM) 200 MG tablet, Take 200 mg by mouth every 6 (six) hours as needed., Disp: , Rfl: 1    promethazine (PHENERGAN) 25 MG tablet, Take 1 tablet (25 mg total) by mouth every 6 (six) hours as needed for Nausea., Disp: 30 tablet, Rfl: 2    pseudoephedrine HCl (SUDAFED ORAL), Take by mouth as needed., Disp: , Rfl:     sumatriptan (IMITREX) 25 MG Tab, Take 50 mg by mouth every 2 (two) hours as needed., Disp: , Rfl:     Current Facility-Administered Medications:     cyanocobalamin injection 1,000 mcg, 1,000 mcg, Subcutaneous, Q30 Days, Abel Chambers,  MD    PMHx/PSHx Updates:  See patient's last visit with me on 1/13/2020.  See H&P on 5/16/2019        Pathology:  Cancer Staging      Breast biopsies: 5/1/2019:  Left breast: with invasive ductal carcinoma grade 3; ER positive at 95% and NM positive at 90%; Her2Nu was positive at +3; Her2/CEP 17 ratio was >10.6  - Ki67 was unfavorable at 69%  Right breast: negative for cancer      Bilateral Mastectomies 10/8/2019:    FINAL PATHOLOGIC DIAGNOSIS  1. BREAST (RIGHT MASTECTOMY): NO EVIDENCE OF MALIGNANCY IN SECTIONS OF SKIN, NIPPLE, AND  BREAST TISSUE.  2. BREAST (LEFT MASTECTOMY): INVASIVE DUCTAL CARCINOMA (2 SEPARATE RESIDUAL FOCI);  ASSOCIATED HIGH-GRADE DUCTAL CARCINOMA IN SITU; SECTIONS OF SKIN, NIPPLE, AND BREAST  MARGINS FREE OF MALIGNANCY.  Note: Two foci of residual ductal carcinoma are identified with differing histologies . The larger lesion consists of  small foci of invasive ductal carcinoma in a 19mm fibrotic nodule with suggestion of presurgical therapeutic  response. Vascular invasion cannot be demonstrated on CD31 stain. There is an additional separate  microinvasive focus of mucinous carcinoma. Foci of DCIS are present throughout the breast tissue.  HORMONE RECEPTOR ASSAYS:  ER-strongly positive (90%)  PgR-weakly positive (1-2%)  Her2-equivocal (1 2+); to be sent for FISH testing  Ki67 proliferative activity-30% activity  Positive and negative controls reacted appropriately.  3. LYMPH NODE (SENTINEL LYMPH NODE, CLINICAL): NO EVIDENCE OF MALIGNANCY.  Note: The lymph node was examined at three levels with H&E and epithelial immunostains (AE1/AES, CAM5.2,  and WSK). Positive and negative controls reacted appropriately.  4. LYMPH NODES (22): NO EVIDENCE OF MALIGNANCY.         Objective:     Vitals:  Blood pressure 129/81, pulse 73, temperature 98.3 °F (36.8 °C), resp. rate 18, weight 85.6 kg (188 lb 11.4 oz).     Physical Examination:   GEN: no apparent distress, comfortable; AAOx3  HEAD: atraumatic and  normocephalic  EYES: no pallor, no icterus, PERRLA  ENT: OMM, no pharyngeal erythema, external ears WNL; no nasal discharge; no thrush  NECK: no masses, thyroid normal, trachea midline, no LAD/LN's, supple  CV: RRR with no murmur; normal pulse; normal S1 and S2; no pedal edema  CHEST: Normal respiratory effort; CTAB; normal breath sounds; no wheeze or crackles; portacath on right chest wall    ABDOM: nontender and nondistended; soft; normal bowel sounds; no rebound/guarding  MUSC/Skeletal: ROM normal; no crepitus; joints normal; no deformities or arthropathy  EXTREM: no clubbing, cyanosis, inflammation or swelling  SKIN: no rashes, lesions, ulcers, petechiae or subcutaneous nodules  : no lopez  NEURO: grossly intact; motor/sensory WNL; AAOx3; no tremors  PSYCH: normal mood, affect and behavior  LYMPH: normal cervical, supraclavicular, axillary and groin LN's  Breast: s/p bilateral mastectomies and healing well; drains since removed      Labs:   2/13/2020  Lab Results   Component Value Date    WBC 3.78 (L) 02/13/2020    HGB 12.1 02/13/2020    HCT 35.5 (L) 02/13/2020    MCV 93 02/13/2020     02/13/2020     BMP  Lab Results   Component Value Date     02/13/2020    K 3.6 02/13/2020     02/13/2020    CO2 26 02/13/2020    BUN 15 02/13/2020    CREATININE 1.0 02/13/2020    CALCIUM 9.1 02/13/2020    ANIONGAP 9 02/13/2020    ESTGFRAFRICA >60.0 02/13/2020    EGFRNONAA >60.0 02/13/2020             Radiology/Diagnostic Studies:    MRI head  12/5/2019:    Impression       No evidence of intracranial metastatic disease.           MRI breast  8/7/2019:    1. Significant interval improvement of enhancing spiculated mass in left breast near 4:00 position, as well as more diffuse non mass like enhancement throughout the superior left breast since 04/26/2019, consistent with a favorable response to interval neoadjuvant treatment.  2. No significant change of lobular lower inner quadrant right breast mass consistent  with biopsy-proven fibroadenoma.  3. Unchanged 7 mm right breast mass near 12:00 position felt to represent intramammary lymph node.      Nm Pet Ct Routine Skull To Mid Thigh    Result Date: 5/21/2019  INTEGRATED PET CT WITH IMAGE FUSION HISTORY:  Left breast cancer initial treatment evaluation TECHNIQUE: Following the injection of 12.7 mCi of F-18 labeled FDG into a right antecubital vein, PET CT was performed from the vertex of the skull through the proximal thighs with an integrated full ring PET CT scanner with image fusion. The patient's serum glucose at the time of the exam was 82 mg/dL. FINDINGS: There is diffuse FDG activity in the subareolar left breast with skin thickening and multiple hypermetabolic masses. There is a 19 mm spiculated hypermetabolic mass in the inferior lateral left breast with a max SUV of 10.4. This was previously biopsied. There is a 2 cm hypermetabolic area in the lateral superior left breast with max SUV of 9.9. There is an 11 mm nodule within the inner right breast which was biopsied and shown to represent fibroadenoma. This shows no FDG activity. There is no axillary, mediastinal or hilar adenopathy. There are no pulmonary nodules, infiltrates or pleural effusions. CT of the head and neck show no intra-axial lesions or cervical adenopathy. CT of the abdomen and pelvis demonstrates physiologic activity in the GI tract and urinary system. The liver and adrenal glands are normal. There are mildly prominent lymph nodes within the right lower quadrant the largest measures 13 mm with a max SUV of 3.1. Findings are suspicious for mesenteric adenitis. There is no retroperitoneal adenopathy. There are no lytic or blastic lesions.     IMPRESSION: Diffuse FDG activity within the subareolar left breast with skin thickening and multiple hypermetabolic left breast masses consistent with patient's history of left breast cancer. No evidence of metastatic disease 11 mm nodule in the medial right  breast which was shown to represent fibroadenoma Mildly prominent lymph nodes in the right lower quadrant with mild FDG activity suggestive of mesenteric adenitis. Follow-up CT scan in 3-6 months is recommended.     Read and electronically signed by: Adriane Jacome MD on 5/21/2019 1:13 PM CDT ADRIANE JACOME MD    [facility] Unknown Rad Eap    Result Date: 5/21/2019  Chest single view CLINICAL DATA: Port-A-Cath placement FINDINGS: AP view shows the heart to be within normal size limits. The mediastinum is unremarkable. Right subclavian Port-A-Cath tip is at superior vena cava. No pneumothorax or other placement related complication is identified. No infiltrates or effusions are demonstrated. No acute osseous abnormalities are demonstrated. IMPRESSION: 1. Right sided Port-A-Cath appears appropriately positioned, with no pneumothorax or other placement related complication. No acute radiographic abnormalities. Read and electronically signed by: Teofilo Patterson MD on 5/21/2019 2:52 PM CDT TEOFILO PATTERSON MD      I have reviewed all available lab results and radiology reports.    Assessment/Plan:   (1) 39 y.o. female  with diagnosis of left breast cancer who has been referred by Dr Salvador Rey with [facility] for evaluation by medical oncology.      - She had presented with left breast pain which became progressive over a 4 month period.   - Radiology studies found a 2.5cm mass on the left breast along with two inflamed LN's on the right.   - She had left breast biopsy on 5/1/2019 which showed invasive ductal carcinoma grade 3. The right breast biopsy was negative for cancer.   - She is ER and UT positive. She is also Her2Nu +3.   - We discussed the latest data on Her2Nu positive breast cancers and the recommendation for neoadjuvant chemotherapy with a herceptin and perjeta.  - she will need echo, portacath, and PET scan  - will plan for herceptin, perjeta, carboplatin and taxotere regimen neoadjuvantly with  6 cycles  -  S/p set up chemotherapy school; discussed side-effect profile, provided literature on the regimen; obtained consents  - she is set up for May 27th to start  - PET scan and echo are on chart  - portacath placed on 5/21  - started chemotherapy with 1st cycle on 5/27/2019   - She had had a recent MRI of the breast again on 8/7  - she completed the neoadjuvant round and is having surgery next week with Dr Garcai at [facility] in [place]      - She saw Dr Howard Cheatham with plastics at [facility] and had bilateral mastectomies on Oct 8th 2019 at [facility].   - She requires herceptin maintenance regimen post-surgery recovery. - She will most likely also require a hysterectomy/oopherectomy and plans to see her GYN Dr Aly Contreras in the near future     - she saw Dr Garcia for follow-up on 11/16/2019. Dr Garcia still awaiting tumor board evaluation at [facility] but she may require XRT as well.       - She saw Dr Long on 11/15/2019 for radiation evaluation. He has recommended a daily regimen for 6 weeks. She is planning to start XRT in next week or two. She should be able to continue the herceptin every 3 weeks and we are looking into use of perjta but it may need to be held for the duration of the XRT if not.    - XRT completed and she has since resumed perjeta with the herceptin      (2) Left parotid tumor - removed in 2011     (3) Sarcoidosis     (4) Interstitial cystitis     (5) Diet controlled gestational DM     (6) Fibromyalgia     (7) Hx/of cervical dysplasia as teenager s/p cryoablation    (8) Migraine - now on meds prn    (9)  Portacath tip has recently been found to have sheared off and she has seen Dr Rey. She is not having any problems at this time and is prophylactically on leiquis.   -  She has seen Dr Hilton and Dr Lester Jones with [facility] and they were able to successfully removed the portacath tip and she has also had a new port placed at the same time.           VISIT DIAGNOSES:      HER2-positive carcinoma of left breast    Chemotherapy induced neutropenia    Malignant neoplasm of left female breast, unspecified estrogen receptor status, unspecified site of breast    S/P bilateral mastectomy    Malignant neoplasm of overlapping sites of left breast in female, estrogen receptor positive          PLAN:  1.  continue current plans  2.  F/u with GYN with planned hysterectomy on 2/3/2020; will push the next therapy a week out from the tentative date on 2/9 tpo give her some more healing time post-op  3. Check labs weekly for at least the next 4 weeks  4.  F/u with Dr Rey as directed by him    5. Check echo again in MArch 2020  6. Doppler US of left leg         RTC  4 weeks    Fax note to Ben Rey De BoisBlanc, Marshall, Katira, Mackey, Mannina    Discussion:       I spent over 25 mins of time with the patient. Reviewed results of the recently ordered labs, tests and studies; made directives with regards to the results. Over half of this time was spent couseling and coordinating care.    I have explained all of the above in detail and the patient understands all of the current recommendation(s). I have answered all of their questions to the best of my ability and to their complete satisfaction.   The patient is to continue with the current management plan.            Electronically signed by Abel Chambers MD

## 2020-02-17 ENCOUNTER — OFFICE VISIT (OUTPATIENT)
Dept: HEMATOLOGY/ONCOLOGY | Facility: CLINIC | Age: 40
End: 2020-02-17
Payer: MEDICAID

## 2020-02-17 ENCOUNTER — INFUSION (OUTPATIENT)
Dept: INFUSION THERAPY | Facility: HOSPITAL | Age: 40
End: 2020-02-17
Attending: INTERNAL MEDICINE
Payer: MEDICAID

## 2020-02-17 ENCOUNTER — CLINICAL SUPPORT (OUTPATIENT)
Dept: REHABILITATION | Facility: HOSPITAL | Age: 40
End: 2020-02-17
Payer: MEDICAID

## 2020-02-17 VITALS
BODY MASS INDEX: 32.21 KG/M2 | RESPIRATION RATE: 18 BRPM | WEIGHT: 188.69 LBS | HEIGHT: 64 IN | HEART RATE: 64 BPM | DIASTOLIC BLOOD PRESSURE: 75 MMHG | SYSTOLIC BLOOD PRESSURE: 113 MMHG | TEMPERATURE: 98 F

## 2020-02-17 VITALS
SYSTOLIC BLOOD PRESSURE: 129 MMHG | DIASTOLIC BLOOD PRESSURE: 81 MMHG | RESPIRATION RATE: 18 BRPM | WEIGHT: 188.69 LBS | BODY MASS INDEX: 32.39 KG/M2 | HEART RATE: 73 BPM | TEMPERATURE: 98 F

## 2020-02-17 DIAGNOSIS — C50.812 MALIGNANT NEOPLASM OF OVERLAPPING SITES OF LEFT BREAST IN FEMALE, ESTROGEN RECEPTOR POSITIVE: ICD-10-CM

## 2020-02-17 DIAGNOSIS — C50.912 HER2-POSITIVE CARCINOMA OF LEFT BREAST: ICD-10-CM

## 2020-02-17 DIAGNOSIS — R60.0 EDEMA LEG: ICD-10-CM

## 2020-02-17 DIAGNOSIS — M25.619 LIMITED RANGE OF MOTION OF SHOULDER: ICD-10-CM

## 2020-02-17 DIAGNOSIS — D70.1 CHEMOTHERAPY INDUCED NEUTROPENIA: ICD-10-CM

## 2020-02-17 DIAGNOSIS — Z17.0 MALIGNANT NEOPLASM OF OVERLAPPING SITES OF LEFT BREAST IN FEMALE, ESTROGEN RECEPTOR POSITIVE: ICD-10-CM

## 2020-02-17 DIAGNOSIS — M62.81 MUSCLE WEAKNESS OF LEFT ARM: ICD-10-CM

## 2020-02-17 DIAGNOSIS — C50.912 MALIGNANT NEOPLASM OF LEFT FEMALE BREAST, UNSPECIFIED ESTROGEN RECEPTOR STATUS, UNSPECIFIED SITE OF BREAST: ICD-10-CM

## 2020-02-17 DIAGNOSIS — T45.1X5A CHEMOTHERAPY INDUCED NEUTROPENIA: ICD-10-CM

## 2020-02-17 DIAGNOSIS — E86.0 DEHYDRATION: Primary | ICD-10-CM

## 2020-02-17 DIAGNOSIS — C50.912 HER2-POSITIVE CARCINOMA OF LEFT BREAST: Primary | ICD-10-CM

## 2020-02-17 DIAGNOSIS — E53.8 B12 DEFICIENCY: ICD-10-CM

## 2020-02-17 DIAGNOSIS — L90.5 SCAR OF BREAST: ICD-10-CM

## 2020-02-17 DIAGNOSIS — Z90.13 S/P BILATERAL MASTECTOMY: ICD-10-CM

## 2020-02-17 PROCEDURE — 96413 CHEMO IV INFUSION 1 HR: CPT

## 2020-02-17 PROCEDURE — 96417 CHEMO IV INFUS EACH ADDL SEQ: CPT

## 2020-02-17 PROCEDURE — 99214 PR OFFICE/OUTPT VISIT, EST, LEVL IV, 30-39 MIN: ICD-10-PCS | Mod: S$GLB,,, | Performed by: INTERNAL MEDICINE

## 2020-02-17 PROCEDURE — A4216 STERILE WATER/SALINE, 10 ML: HCPCS | Performed by: INTERNAL MEDICINE

## 2020-02-17 PROCEDURE — 63600175 PHARM REV CODE 636 W HCPCS: Mod: JW,JG | Performed by: INTERNAL MEDICINE

## 2020-02-17 PROCEDURE — 25000003 PHARM REV CODE 250: Performed by: INTERNAL MEDICINE

## 2020-02-17 PROCEDURE — 99214 OFFICE O/P EST MOD 30 MIN: CPT | Mod: S$GLB,,, | Performed by: INTERNAL MEDICINE

## 2020-02-17 PROCEDURE — 97110 THERAPEUTIC EXERCISES: CPT

## 2020-02-17 RX ORDER — HEPARIN 100 UNIT/ML
500 SYRINGE INTRAVENOUS
Status: DISCONTINUED | OUTPATIENT
Start: 2020-02-17 | End: 2020-02-17 | Stop reason: HOSPADM

## 2020-02-17 RX ORDER — SODIUM CHLORIDE 0.9 % (FLUSH) 0.9 %
10 SYRINGE (ML) INJECTION
Status: DISCONTINUED | OUTPATIENT
Start: 2020-02-17 | End: 2020-02-17 | Stop reason: HOSPADM

## 2020-02-17 RX ADMIN — PERTUZUMAB 420 MG: 30 INJECTION, SOLUTION, CONCENTRATE INTRAVENOUS at 10:02

## 2020-02-17 RX ADMIN — TRASTUZUMAB 468 MG: 150 INJECTION, POWDER, LYOPHILIZED, FOR SOLUTION INTRAVENOUS at 12:02

## 2020-02-17 RX ADMIN — SODIUM CHLORIDE, PRESERVATIVE FREE 10 ML: 5 INJECTION INTRAVENOUS at 01:02

## 2020-02-17 RX ADMIN — HEPARIN 500 UNITS: 100 SYRINGE at 01:02

## 2020-02-17 NOTE — PROGRESS NOTES
Physical Therapy Daily Treatment Note   Monthly Note  Name: Sheridan Todd  Clinic Number: [id number]    Therapy Diagnosis:   Encounter Diagnoses   Name Primary?    Limited range of motion of shoulder     Muscle weakness of left arm     Scar of breast      Physician: Rebecca Garcia MD    Visit Date: 2/17/2020    Physician Orders: PT Eval and Treat   Medical Diagnosis: Z85.3 (ICD-10-CM) - History of left breast cancer  Evaluation Date: 11/6/2019  Authorization period Expiration: 3/24/2020  Plan of Care Certification Period: 1/24/2020-3/13/2020  Insurance: Medicaid/[employer]  Monthly Note Due 2/24/2020  Visit # /authorized: 7/ 18        Time In: 10:15 AM  Time Out:   11:00 AM  Total Billable Time:  45 minutes    Precautions: Standard and cancer Post-mastectomy    Subjective     Pt reports:  No real shoulder pain. Having pain in the back of her left knee. Hurts to walk. Pt is scheduled for a hysterectomy 3/3/2020.   She was somewhat compliant with home exercise program.  Response to previous treatment: Good - feels good after sessions   Functional change: Went skating with her kids    Pain: 1/10  Location: left axilla and chest     Objective     Sheridan received therapeutic exercises to develop strength, endurance, ROM, flexibility and posture for 45 minutes including:      Wand flexion and ER (overhead) 10 x 5 sec ea  Supine scap protraction 2# 2x10  Supine pulldown with GTB and TRA activation  2x15  Side lying shoulder abd 1# 3 x 10  Seated shoulder ER in plane of scapula 2# 3x10 with slow eccentric control when lowering     Cybex Pulleys  Standing shoulder rows with 1.5 plates at Cybex pulleys 2 x 15 each  Seated eccentric abduction 1.5 plate 2 x 15  Lat pull downs wide  2 plates 2 x 15    IASTM to left hamstrings. Pt educated in HS stretch. Advised her to check with her MD about leg pain.     Did not perform:   Standing shoulder ER with orange band 2 x 10  Bike warm up 10 / min Level 3  Median  "nerve glides 5 x 10 sec hold LUE   UBE warm up 3/3      BUE girth measurements:     RIGHT   L 12/2/19   L12/16/19   L 2/10/2020  Elbow   26cm     25cm         25.4cm        25.3cm  2" below elbow 27cm   26.5cm      26cm            26cm  4" below elbow 25.8cm  24.6cm      23cm            247cm        Home Exercises Provided and Patient Education Provided     Education provided:   -Hamstring stretches for pain at lateral left knee.     Written Home Exercises Provided: yes.  Exercises were reviewed and Sheridan was able to demonstrate them prior to the end of the session.  Sheridan demonstrated good  understanding of the education provided.     See EMR under Patient Instructions for exercises provided 12/2/2019.    Assessment   Pt with localized lateral knee pain at biceps femoris insertion. Pt advised to check with MD as she is concerned about post chemotherapy neuropathy.   Pt is progressing in strength with ability to progress resisted ex on Cybex pulley system. Continued tightness of soft tissue in axilla and left pec region. Pt will benefit from continued weekly visits to progress strengthening, improve tissue mobility and monitor for volume changes in LUE through her radiation treatment protocol.   Sheridan is progressing well towards her goals.   Pt prognosis is Excellent.     Pt will continue to benefit from skilled outpatient physical therapy to address the deficits listed in the problem list box on initial evaluation, provide pt/family education and to maximize pt's level of independence in the home and community environment.     Pt's spiritual, cultural and educational needs considered and pt agreeable to plan of care and goals.     Anticipated barriers to physical therapy: None    Goals: Short Term goals: 2 weeks  1. Patient will demonstrate 100% understanding of lymphedema risk reduction practices to include self monitoring for lymphedema. (progressing 2/17/2020    2. Patient will demonstrate " independence with Home Exercise program established. Met 12/16/19  3. Pt will increase AROM/PROM in shoulder ER ROM to 75 degrees on left to improve functional reach, carry, push, pull pain free. Met 12/2/2019  4. Strength will be assessed and appropriate goals set. Met - see LTG below     Long Term Goals: 4 weeks   1.  Pt will increase AROM/PROM in shoulder flexion to WNL on left to improve functional reach, carry, push, pull pain free. Progressing 2/17/2020    2. Pt will increase strength to 4+/5 in gross UE musculature to improve tolerance to all functional activities pain free. Progressing 2/17/2020    3. Pt will demonstrate full/maximized tissue mobility to increase ROM and promote healthy tissue to be pain free at discharge. Progressing 2/17/2020    4. Pt will report decrease in overall worst pain to 2/10 at discharge. (progressing, not met)  5. Patient will report compliance with walking program 5x week for 10 min each day to improve overall cardiovascular function and decrease cancer related fatigue at discharge. (progressing, not met)    Plan     Continue weekly PT sessions  to monitor for lymphedema through course of radiation (6 weeks) and to progress shoulder ROM/strengthening ex program.     Salina Suggs, PT

## 2020-02-26 ENCOUNTER — CLINICAL SUPPORT (OUTPATIENT)
Dept: REHABILITATION | Facility: HOSPITAL | Age: 40
End: 2020-02-26
Payer: MEDICAID

## 2020-02-26 DIAGNOSIS — M62.81 MUSCLE WEAKNESS OF LEFT ARM: ICD-10-CM

## 2020-02-26 DIAGNOSIS — L90.5 SCAR OF BREAST: ICD-10-CM

## 2020-02-26 DIAGNOSIS — M25.619 LIMITED RANGE OF MOTION OF SHOULDER: ICD-10-CM

## 2020-02-26 PROCEDURE — 97110 THERAPEUTIC EXERCISES: CPT

## 2020-02-26 NOTE — PROGRESS NOTES
Physical Therapy Daily Treatment Note   Monthly Note  Name: Sheridan Todd  Clinic Number: [id number]    Therapy Diagnosis:   Encounter Diagnoses   Name Primary?    Limited range of motion of shoulder     Muscle weakness of left arm     Scar of breast      Physician: Rebecca Garcia MD    Visit Date: 2/26/2020    Physician Orders: PT Eval and Treat   Medical Diagnosis: Z85.3 (ICD-10-CM) - History of left breast cancer  Evaluation Date: 11/6/2019  Authorization period Expiration: 3/24/2020  Plan of Care Certification Period: 1/24/2020-3/13/2020  Insurance: Medicaid/Healthy Blue  Monthly Note Due 3/26/2020  Visit # /authorized: 8/ 18        Time In: 10:10 AM  Time Out:   11:03 AM  Total Billable Time:  53 minutes    Precautions: Standard and cancer Post-mastectomy    Subjective     Pt reports: Pain in hamstring resolved following treatment last visit. MD did order a Doppler US out of an abundance of caution.   Feels tight under axilla and tender at left incision by dog ear. Getting prepared for hysterectomy on 3/2.    She was somewhat compliant with home exercise program.  Response to previous treatment: Had increased nerve-like pain in neck and shoulder but also had chemo and a new drug which may have been side effect.   Functional change: No new reports    Pain: 1/10  Location: left axilla and chest     Objective        L shoulder   AROM 1/24/19 11/7/19   Flexion 170 160   Abduction 180 178   Extension 57 55   IR/90deg 80 80   ER/90deg 90 60             Strength: manual muscle test grades below      Upper Extremity Strength    (L) UE (R) UE   Shoulder flexion: 4+/5 5/5   Shoulder Abduction: 4/5 4+/5   Shoulder IR 4+/5 5/5   Shoulder ER 4+/5 4+/5   Elbow flexion: 5/5 5/5   Elbow extension: 5/5 5/5   Lower Trap: 4/5 4+/5   Middle Trap: 4/5 4+/5    5/5 5/5             Sheridan received therapeutic exercises to develop strength, endurance, ROM, flexibility and posture for 45 minutes  "including:    +Diaphragmatic breathing 5 min  +Chin tucks on towel roll 2 x 10 hold 5 sec  Wand flexion and ER (overhead) 10 x 5 sec ea  Supine scap protraction 2# 2x10  Supine pulldown with GTB and TRA activation  2x15  Side lying shoulder abd 1# 3 x 10  Seated shoulder ER in plane of scapula 2# 3x10 with slow eccentric control when lowering     Cybex Pulleys  Standing shoulder rows with 1.5 plates at Cybex pulleys 2 x 15 each  DNP  Seated eccentric abduction 1.5 plate 2 x 15  DNP  Lat pull downs wide  2.5 plates 2 x 15  .     Did not perform:   Standing shoulder ER with orange band 2 x 10  Bike warm up 10 / min Level 3  Median nerve glides 5 x 10 sec hold LUE   UBE warm up 3/3      BUE girth measurements:     RIGHT   L 12/2/19   L12/16/19   L 2/10/2020  Elbow   26cm     25cm         25.4cm        25.3cm  2" below elbow 27cm   26.5cm      26cm            26cm  4" below elbow 25.8cm  24.6cm      23cm            247cm    Manual therapy 8 min:  Skin rolling and scar massage bilateral mastectomy scars and left pec region.     Home Exercises Provided and Patient Education Provided     Education provided:   -Hamstring stretches for pain at lateral left knee.     Written Home Exercises Provided: yes.  Exercises were reviewed and Sheridan was able to demonstrate them prior to the end of the session.  Sheridan demonstrated good  understanding of the education provided.     See EMR under Patient Instructions for exercises provided 12/2/2019.    Assessment   Improved left shoulder AROM. Poor use of internal obliques with inhalation and overuse of accessory muscles.   Pt is progressing in strength with ability to progress resisted ex on Cybex pulley system. Continued tightness of soft tissue in axilla and left pec region. Pt will benefit from continued weekly visits to progress strengthening, improve tissue mobility and monitor for volume changes in LUE through her radiation treatment protocol.   Sheridan is progressing " well towards her goals.   Pt prognosis is Excellent.     Pt will continue to benefit from skilled outpatient physical therapy to address the deficits listed in the problem list box on initial evaluation, provide pt/family education and to maximize pt's level of independence in the home and community environment.     Pt's spiritual, cultural and educational needs considered and pt agreeable to plan of care and goals.     Anticipated barriers to physical therapy: None    Goals: Short Term goals: 2 weeks  1. Patient will demonstrate 100% understanding of lymphedema risk reduction practices to include self monitoring for lymphedema. (progressing 2/26/2020    2. Patient will demonstrate independence with Home Exercise program established. Met 12/16/19  3. Pt will increase AROM/PROM in shoulder ER ROM to 75 degrees on left to improve functional reach, carry, push, pull pain free. Met 12/2/2019  4. Strength will be assessed and appropriate goals set. Met - see LTG below   5. New STG: Pt will demonstrate ability to engage oblique muscles during forced exhalation to improve core stability.   Long Term Goals: 4 weeks   1.  Pt will increase AROM/PROM in shoulder flexion to WNL on left to improve functional reach, carry, push, pull pain free. Met 2/26/2020  2. Pt will increase strength to 4+/5 in gross UE musculature to improve tolerance to all functional activities pain free. Progressing 2/26/2020    3. Pt will demonstrate full/maximized tissue mobility to increase ROM and promote healthy tissue to be pain free at discharge. Progressing 2/26/2020    4. Pt will report decrease in overall worst pain to 2/10 at discharge. Progressing 2/26/2020    5. Patient will report compliance with walking program 5x week for 10 min each day to improve overall cardiovascular function and decrease cancer related fatigue at discharge. not met)    Plan     Continue weekly PT sessions  to monitor for lymphedema through course of radiation (6  weeks) and to progress shoulder ROM/strengthening ex program.     Salina Suggs, PT

## 2020-02-28 ENCOUNTER — HOSPITAL ENCOUNTER (OUTPATIENT)
Dept: PREADMISSION TESTING | Facility: HOSPITAL | Age: 40
Discharge: HOME | End: 2020-02-28
Attending: SPECIALIST
Payer: MEDICAID

## 2020-02-28 VITALS
OXYGEN SATURATION: 99 % | DIASTOLIC BLOOD PRESSURE: 83 MMHG | WEIGHT: 189.06 LBS | TEMPERATURE: 98 F | SYSTOLIC BLOOD PRESSURE: 132 MMHG | RESPIRATION RATE: 18 BRPM | HEIGHT: 64 IN | BODY MASS INDEX: 32.28 KG/M2 | HEART RATE: 82 BPM

## 2020-02-28 DIAGNOSIS — Z01.818 PRE-OP TESTING: Primary | ICD-10-CM

## 2020-02-28 RX ORDER — CEFOXITIN SODIUM 2 G/50ML
2 INJECTION, SOLUTION INTRAVENOUS ONCE
Status: CANCELLED | OUTPATIENT
Start: 2020-03-03

## 2020-02-28 NOTE — DISCHARGE INSTRUCTIONS
To confirm, Your doctor has instructed you that surgery is scheduled for:   March 3, 2020 Tuesday    Pre-Op will call the afternoon prior to surgery between 4:00 and 6:00 PM with the final arrival time.   March 2, Monday      Please report to [facility] [place] on [address] the morning of surgery.     Do not eat or drink anything after midnight the night before your surgery - THIS INCLUDES  WATER, GUM, MINTS AND CANDY.  YOU MAY BRUSH YOUR TEETH BUT DO NOT SWALLOW     DO NOT TAKE THESE MEDICATIONS 5-7 DAYS PRIOR to your procedure or per your surgeon's request: ASPIRIN, ALEVE, ADVIL, IBUPROFEN,  ANTWAN SELTZER, BC , FISH OIL , VITAMIN E, HERBALS  (May take Tylenol)                                                          IMPORTANT INSTRUCTIONS      · Shower the night before AND the morning of your procedure with a Chlorhexidine wash such as Hibiclens or Dial antibacterial soap from the neck down.   ·  Do not get it on your face or in your eyes.  You may use your own shampoo and face wash. This helps your skin to be as bacteria free as possible.   ·  DO NOT remove hair from the surgery site.  Do not shave the incision site unless you are given specific instructions to do so.    · Sleep in a bed with clean sheets.  Do not sleep with a pet in the bed.   · Please leave all jewelry, piercing's and valuables at home.   · ONLY for patients requiring bowel prep, written instructions will be given by your doctor's office.  · If your doctor has scheduled you for an overnight stay, bring a small overnight bag with any personal items you need.    · Make arrangements in advance for transportation home by a responsible adult.      · You must make arrangements for transportation, TAXI'S, UBER'S OR LYFTS ARE NOT ALLOWED.        If you have any questions about these instructions, call Pre-Op Admit  Nursing at [phone] or the Pre-Op Day Surgery Unit at [phone].

## 2020-03-02 ENCOUNTER — CLINICAL SUPPORT (OUTPATIENT)
Dept: REHABILITATION | Facility: HOSPITAL | Age: 40
End: 2020-03-02
Payer: MEDICAID

## 2020-03-02 DIAGNOSIS — L90.5 SCAR OF BREAST: ICD-10-CM

## 2020-03-02 DIAGNOSIS — M62.81 MUSCLE WEAKNESS OF LEFT ARM: ICD-10-CM

## 2020-03-02 DIAGNOSIS — M25.619 LIMITED RANGE OF MOTION OF SHOULDER: ICD-10-CM

## 2020-03-02 PROCEDURE — 97110 THERAPEUTIC EXERCISES: CPT

## 2020-03-02 NOTE — PROGRESS NOTES
Physical Therapy Daily Treatment Note   Monthly Note  Name: Sheridan Todd  Clinic Number: [id number]    Therapy Diagnosis:   Encounter Diagnoses   Name Primary?    Limited range of motion of shoulder     Muscle weakness of left arm     Scar of breast      Physician: Rebecca Garcia MD    Visit Date: 3/2/2020    Physician Orders: PT Eval and Treat   Medical Diagnosis: Z85.3 (ICD-10-CM) - History of left breast cancer  Evaluation Date: 11/6/2019  Authorization period Expiration: 3/24/2020  Plan of Care Certification Period: 1/24/2020-3/13/2020  Insurance: Medicaid/Healthy Blue  Monthly Note Due 3/26/2020  Visit # /authorized: 9/ 18        Time In: 10:10 AM  Time Out:  10:50  AM  Total Billable Time:  40 minutes    Precautions: Standard and cancer Post-mastectomy    Subjective     Pt reports: Scheduled for hysterectomy tomorrow.    Skin at left chest and scar felt much better following skin rolling last session.     She was somewhat compliant with home exercise program.  Response to previous treatment: Improved chest wall mobility and less pain.   Functional change: No new reports    Pain: 1/10  Location: left axilla and chest     Objective         Sheridan received therapeutic exercises to develop strength, endurance, ROM, flexibility and posture for 20 minutes including:    Diaphragmatic breathing 7 min  Chin tucks on towel roll 2 x 10 hold 5 sec  Wand flexion and ER (overhead) 10 x 5 sec ea  Side lying shoulder abd  3 x 10  Seated shoulder ER in plane of scapula 2# 3x10 with slow eccentric control when lowering       Did Not Perform:  Cybex Pulleys  Standing shoulder rows with 1.5 plates at Cybex pulleys 2 x 15 each  DNP  Seated eccentric abduction 1.5 plate 2 x 15  DNP  Lat pull downs wide  2.5 plates 2 x 15  .     Did not perform:   Standing shoulder ER with orange band 2 x 10  Bike warm up 10 / min Level 3  Median nerve glides 5 x 10 sec hold LUE   UBE warm up 3/3  Supine scap protraction 2#  "2x10  Supine pulldown with GTB and TRA activation  2x15    BUE girth measurements:     RIGHT   L 12/2/19   L12/16/19   L 2/10/2020  Elbow   26cm     25cm         25.4cm        25.3cm  2" below elbow 27cm   26.5cm      26cm            26cm  4" below elbow 25.8cm  24.6cm      23cm            247cm    Manual therapy 20 min:  Skin rolling and scar massage bilateral mastectomy scars and left pec region.  STM of neck with passive UT stretches     Home Exercises Provided and Patient Education Provided     Education provided:   -Skin rolling  -Diaphragmatic breathing    Written Home Exercises Provided: yes.  Exercises were reviewed and Sheridan was able to demonstrate them prior to the end of the session.  Sheridan demonstrated good  understanding of the education provided.     See EMR under Patient Instructions for exercises provided 12/2/2019.    Assessment   Improved left shoulder AROM.  Continued tightness of soft tissue in axilla and left pec region. Pt is scheduled for hysterectomy tomorrow. Will follow up and re-assess patient when she is cleared to return for ex.  Pt will benefit from continued weekly visits to progress strengthening, improve tissue mobility and monitor for volume changes in LUE through her radiation treatment protocol.   Sheridan is progressing well towards her goals.   Pt prognosis is Excellent.     Pt will continue to benefit from skilled outpatient physical therapy to address the deficits listed in the problem list box on initial evaluation, provide pt/family education and to maximize pt's level of independence in the home and community environment.     Pt's spiritual, cultural and educational needs considered and pt agreeable to plan of care and goals.     Anticipated barriers to physical therapy: None    Goals: Short Term goals: 2 weeks  1. Patient will demonstrate 100% understanding of lymphedema risk reduction practices to include self monitoring for lymphedema. (progressing " 3/2/2020    2. Patient will demonstrate independence with Home Exercise program established. Met 12/16/19  3. Pt will increase AROM/PROM in shoulder ER ROM to 75 degrees on left to improve functional reach, carry, push, pull pain free. Met 12/2/2019  4. Strength will be assessed and appropriate goals set. Met - see LTG below   5. New STG: Pt will demonstrate ability to engage oblique muscles during forced exhalation to improve core stability.   Long Term Goals: 4 weeks   1.  Pt will increase AROM/PROM in shoulder flexion to WNL on left to improve functional reach, carry, push, pull pain free. Met 2/26/2020  2. Pt will increase strength to 4+/5 in gross UE musculature to improve tolerance to all functional activities pain free. Progressing 3/2/2020    3. Pt will demonstrate full/maximized tissue mobility to increase ROM and promote healthy tissue to be pain free at discharge. Progressing 3/2/2020    4. Pt will report decrease in overall worst pain to 2/10 at discharge. Progressing 3/2/2020    5. Patient will report compliance with walking program 5x week for 10 min each day to improve overall cardiovascular function and decrease cancer related fatigue at discharge. not met)    Plan     Pt to call following release to return to PT after surgery for re-assessment visit.     Salina Suggs, PT

## 2020-03-03 ENCOUNTER — ANESTHESIA EVENT (OUTPATIENT)
Dept: SURGERY | Facility: HOSPITAL | Age: 40
DRG: 743 | End: 2020-03-03
Payer: MEDICAID

## 2020-03-03 ENCOUNTER — HOSPITAL ENCOUNTER (INPATIENT)
Facility: HOSPITAL | Age: 40
LOS: 2 days | Discharge: HOME | DRG: 743 | End: 2020-03-05
Attending: SPECIALIST | Admitting: SPECIALIST
Payer: MEDICAID

## 2020-03-03 ENCOUNTER — ANESTHESIA (OUTPATIENT)
Dept: SURGERY | Facility: HOSPITAL | Age: 40
DRG: 743 | End: 2020-03-03
Payer: MEDICAID

## 2020-03-03 DIAGNOSIS — Z01.818 PRE-OP TESTING: ICD-10-CM

## 2020-03-03 DIAGNOSIS — Z90.722 HISTORY OF TOTAL HYSTERECTOMY WITH BILATERAL SALPINGO-OOPHORECTOMY (BSO): Primary | ICD-10-CM

## 2020-03-03 DIAGNOSIS — Z90.79 HISTORY OF TOTAL HYSTERECTOMY WITH BILATERAL SALPINGO-OOPHORECTOMY (BSO): Primary | ICD-10-CM

## 2020-03-03 DIAGNOSIS — Z90.710 HISTORY OF TOTAL HYSTERECTOMY WITH BILATERAL SALPINGO-OOPHORECTOMY (BSO): Primary | ICD-10-CM

## 2020-03-03 LAB
BASOPHILS # BLD AUTO: 0.01 K/UL (ref 0–0.2)
BASOPHILS # BLD AUTO: 0.01 K/UL (ref 0–0.2)
BASOPHILS NFR BLD: 0.1 % (ref 0–1.9)
BASOPHILS NFR BLD: 0.1 % (ref 0–1.9)
BILIRUB UR QL STRIP: NEGATIVE
CLARITY UR: CLEAR
COLOR UR: YELLOW
DIFFERENTIAL METHOD: ABNORMAL
DIFFERENTIAL METHOD: ABNORMAL
EOSINOPHIL # BLD AUTO: 0 K/UL (ref 0–0.5)
EOSINOPHIL # BLD AUTO: 0 K/UL (ref 0–0.5)
EOSINOPHIL NFR BLD: 0 % (ref 0–8)
EOSINOPHIL NFR BLD: 0 % (ref 0–8)
ERYTHROCYTE [DISTWIDTH] IN BLOOD BY AUTOMATED COUNT: 13.8 % (ref 11.5–14.5)
ERYTHROCYTE [DISTWIDTH] IN BLOOD BY AUTOMATED COUNT: 13.8 % (ref 11.5–14.5)
GLUCOSE UR QL STRIP: NEGATIVE
HCT VFR BLD AUTO: 31.8 % (ref 37–48.5)
HCT VFR BLD AUTO: 31.8 % (ref 37–48.5)
HGB BLD-MCNC: 10.7 G/DL (ref 12–16)
HGB BLD-MCNC: 10.7 G/DL (ref 12–16)
HGB UR QL STRIP: ABNORMAL
IMM GRANULOCYTES # BLD AUTO: 0.03 K/UL (ref 0–0.04)
IMM GRANULOCYTES # BLD AUTO: 0.03 K/UL (ref 0–0.04)
IMM GRANULOCYTES NFR BLD AUTO: 0.4 % (ref 0–0.5)
IMM GRANULOCYTES NFR BLD AUTO: 0.4 % (ref 0–0.5)
KETONES UR QL STRIP: NEGATIVE
LEUKOCYTE ESTERASE UR QL STRIP: NEGATIVE
LYMPHOCYTES # BLD AUTO: 0.5 K/UL (ref 1–4.8)
LYMPHOCYTES # BLD AUTO: 0.5 K/UL (ref 1–4.8)
LYMPHOCYTES NFR BLD: 6 % (ref 18–48)
LYMPHOCYTES NFR BLD: 6 % (ref 18–48)
MCH RBC QN AUTO: 32 PG (ref 27–31)
MCH RBC QN AUTO: 32 PG (ref 27–31)
MCHC RBC AUTO-ENTMCNC: 33.6 G/DL (ref 32–36)
MCHC RBC AUTO-ENTMCNC: 33.6 G/DL (ref 32–36)
MCV RBC AUTO: 95 FL (ref 82–98)
MCV RBC AUTO: 95 FL (ref 82–98)
MONOCYTES # BLD AUTO: 0.2 K/UL (ref 0.3–1)
MONOCYTES # BLD AUTO: 0.2 K/UL (ref 0.3–1)
MONOCYTES NFR BLD: 2.6 % (ref 4–15)
MONOCYTES NFR BLD: 2.6 % (ref 4–15)
NEUTROPHILS # BLD AUTO: 7.8 K/UL (ref 1.8–7.7)
NEUTROPHILS # BLD AUTO: 7.8 K/UL (ref 1.8–7.7)
NEUTROPHILS NFR BLD: 90.9 % (ref 38–73)
NEUTROPHILS NFR BLD: 90.9 % (ref 38–73)
NITRITE UR QL STRIP: NEGATIVE
NRBC BLD-RTO: 0 /100 WBC
NRBC BLD-RTO: 0 /100 WBC
PH UR STRIP: 6 [PH] (ref 5–8)
PLATELET # BLD AUTO: 125 K/UL (ref 150–350)
PLATELET # BLD AUTO: 125 K/UL (ref 150–350)
PMV BLD AUTO: 9.1 FL (ref 9.2–12.9)
PMV BLD AUTO: 9.1 FL (ref 9.2–12.9)
PROT UR QL STRIP: NEGATIVE
RBC # BLD AUTO: 3.34 M/UL (ref 4–5.4)
RBC # BLD AUTO: 3.34 M/UL (ref 4–5.4)
SP GR UR STRIP: 1.02 (ref 1–1.03)
URN SPEC COLLECT METH UR: ABNORMAL
UROBILINOGEN UR STRIP-ACNC: NEGATIVE EU/DL
WBC # BLD AUTO: 8.56 K/UL (ref 3.9–12.7)
WBC # BLD AUTO: 8.56 K/UL (ref 3.9–12.7)

## 2020-03-03 PROCEDURE — 81003 URINALYSIS AUTO W/O SCOPE: CPT

## 2020-03-03 PROCEDURE — 63600175 PHARM REV CODE 636 W HCPCS: Performed by: ANESTHESIOLOGY

## 2020-03-03 PROCEDURE — 25000003 PHARM REV CODE 250: Performed by: SPECIALIST

## 2020-03-03 PROCEDURE — 37000008 HC ANESTHESIA 1ST 15 MINUTES: Performed by: SPECIALIST

## 2020-03-03 PROCEDURE — 63600175 PHARM REV CODE 636 W HCPCS: Performed by: NURSE ANESTHETIST, CERTIFIED REGISTERED

## 2020-03-03 PROCEDURE — 27201107 HC STYLET, STANDARD: Performed by: ANESTHESIOLOGY

## 2020-03-03 PROCEDURE — 71000033 HC RECOVERY, INTIAL HOUR: Performed by: SPECIALIST

## 2020-03-03 PROCEDURE — 99900035 HC TECH TIME PER 15 MIN (STAT)

## 2020-03-03 PROCEDURE — 25000003 PHARM REV CODE 250: Performed by: ANESTHESIOLOGY

## 2020-03-03 PROCEDURE — 94761 N-INVAS EAR/PLS OXIMETRY MLT: CPT

## 2020-03-03 PROCEDURE — S0028 INJECTION, FAMOTIDINE, 20 MG: HCPCS | Performed by: NURSE ANESTHETIST, CERTIFIED REGISTERED

## 2020-03-03 PROCEDURE — 94799 UNLISTED PULMONARY SVC/PX: CPT

## 2020-03-03 PROCEDURE — 63600175 PHARM REV CODE 636 W HCPCS: Performed by: SPECIALIST

## 2020-03-03 PROCEDURE — 36000709 HC OR TIME LEV III EA ADD 15 MIN: Performed by: SPECIALIST

## 2020-03-03 PROCEDURE — 27201423 OPTIME MED/SURG SUP & DEVICES STERILE SUPPLY: Performed by: SPECIALIST

## 2020-03-03 PROCEDURE — 37000009 HC ANESTHESIA EA ADD 15 MINS: Performed by: SPECIALIST

## 2020-03-03 PROCEDURE — 12000002 HC ACUTE/MED SURGE SEMI-PRIVATE ROOM

## 2020-03-03 PROCEDURE — 36415 COLL VENOUS BLD VENIPUNCTURE: CPT

## 2020-03-03 PROCEDURE — 36000708 HC OR TIME LEV III 1ST 15 MIN: Performed by: SPECIALIST

## 2020-03-03 PROCEDURE — 27000221 HC OXYGEN, UP TO 24 HOURS

## 2020-03-03 PROCEDURE — 27000673 HC TUBING BLOOD Y: Performed by: ANESTHESIOLOGY

## 2020-03-03 PROCEDURE — 27202107 HC XP QUATRO SENSOR: Performed by: ANESTHESIOLOGY

## 2020-03-03 PROCEDURE — 25000003 PHARM REV CODE 250: Performed by: NURSE ANESTHETIST, CERTIFIED REGISTERED

## 2020-03-03 PROCEDURE — 85025 COMPLETE CBC W/AUTO DIFF WBC: CPT

## 2020-03-03 PROCEDURE — 94770 HC EXHALED C02 TEST: CPT

## 2020-03-03 PROCEDURE — 71000039 HC RECOVERY, EACH ADD'L HOUR: Performed by: SPECIALIST

## 2020-03-03 RX ORDER — ACETAMINOPHEN 500 MG
1000 TABLET ORAL
Status: COMPLETED | OUTPATIENT
Start: 2020-03-03 | End: 2020-03-03

## 2020-03-03 RX ORDER — SUCCINYLCHOLINE CHLORIDE 20 MG/ML
INJECTION INTRAMUSCULAR; INTRAVENOUS
Status: DISCONTINUED | OUTPATIENT
Start: 2020-03-03 | End: 2020-03-03

## 2020-03-03 RX ORDER — PROPOFOL 10 MG/ML
VIAL (ML) INTRAVENOUS
Status: DISCONTINUED | OUTPATIENT
Start: 2020-03-03 | End: 2020-03-03

## 2020-03-03 RX ORDER — BISACODYL 10 MG
10 SUPPOSITORY, RECTAL RECTAL DAILY PRN
Status: DISCONTINUED | OUTPATIENT
Start: 2020-03-03 | End: 2020-03-05 | Stop reason: HOSPADM

## 2020-03-03 RX ORDER — SODIUM CHLORIDE, SODIUM LACTATE, POTASSIUM CHLORIDE, CALCIUM CHLORIDE 600; 310; 30; 20 MG/100ML; MG/100ML; MG/100ML; MG/100ML
INJECTION, SOLUTION INTRAVENOUS CONTINUOUS PRN
Status: DISCONTINUED | OUTPATIENT
Start: 2020-03-03 | End: 2020-03-03

## 2020-03-03 RX ORDER — POLYETHYLENE GLYCOL 3350 17 G/17G
17 POWDER, FOR SOLUTION ORAL DAILY
Status: DISCONTINUED | OUTPATIENT
Start: 2020-03-03 | End: 2020-03-05 | Stop reason: HOSPADM

## 2020-03-03 RX ORDER — ROCURONIUM BROMIDE 10 MG/ML
INJECTION, SOLUTION INTRAVENOUS
Status: DISCONTINUED | OUTPATIENT
Start: 2020-03-03 | End: 2020-03-03

## 2020-03-03 RX ORDER — LIDOCAINE HYDROCHLORIDE 20 MG/ML
INJECTION, SOLUTION EPIDURAL; INFILTRATION; INTRACAUDAL; PERINEURAL
Status: DISCONTINUED | OUTPATIENT
Start: 2020-03-03 | End: 2020-03-03

## 2020-03-03 RX ORDER — NEOSTIGMINE METHYLSULFATE 1 MG/ML
INJECTION, SOLUTION INTRAVENOUS
Status: DISCONTINUED | OUTPATIENT
Start: 2020-03-03 | End: 2020-03-03

## 2020-03-03 RX ORDER — MIDAZOLAM HYDROCHLORIDE 1 MG/ML
INJECTION INTRAMUSCULAR; INTRAVENOUS
Status: DISCONTINUED | OUTPATIENT
Start: 2020-03-03 | End: 2020-03-03

## 2020-03-03 RX ORDER — DIPHENHYDRAMINE HCL 25 MG
25 CAPSULE ORAL EVERY 4 HOURS PRN
Status: DISCONTINUED | OUTPATIENT
Start: 2020-03-03 | End: 2020-03-05 | Stop reason: HOSPADM

## 2020-03-03 RX ORDER — GLYCOPYRROLATE 0.2 MG/ML
INJECTION INTRAMUSCULAR; INTRAVENOUS
Status: DISCONTINUED | OUTPATIENT
Start: 2020-03-03 | End: 2020-03-03

## 2020-03-03 RX ORDER — ONDANSETRON 2 MG/ML
4 INJECTION INTRAMUSCULAR; INTRAVENOUS EVERY 6 HOURS PRN
Status: DISCONTINUED | OUTPATIENT
Start: 2020-03-03 | End: 2020-03-05 | Stop reason: HOSPADM

## 2020-03-03 RX ORDER — HYDROMORPHONE HCL IN 0.9% NACL 6 MG/30 ML
PATIENT CONTROLLED ANALGESIA SYRINGE INTRAVENOUS CONTINUOUS
Status: DISCONTINUED | OUTPATIENT
Start: 2020-03-03 | End: 2020-03-03

## 2020-03-03 RX ORDER — OXYCODONE AND ACETAMINOPHEN 10; 325 MG/1; MG/1
1 TABLET ORAL EVERY 4 HOURS PRN
Status: DISCONTINUED | OUTPATIENT
Start: 2020-03-03 | End: 2020-03-05 | Stop reason: HOSPADM

## 2020-03-03 RX ORDER — SODIUM CHLORIDE 0.9 % (FLUSH) 0.9 %
10 SYRINGE (ML) INJECTION
Status: DISCONTINUED | OUTPATIENT
Start: 2020-03-03 | End: 2020-03-03

## 2020-03-03 RX ORDER — ONDANSETRON 2 MG/ML
INJECTION INTRAMUSCULAR; INTRAVENOUS
Status: DISCONTINUED | OUTPATIENT
Start: 2020-03-03 | End: 2020-03-03

## 2020-03-03 RX ORDER — ENOXAPARIN SODIUM 100 MG/ML
40 INJECTION SUBCUTANEOUS EVERY 24 HOURS
Status: DISCONTINUED | OUTPATIENT
Start: 2020-03-04 | End: 2020-03-05 | Stop reason: HOSPADM

## 2020-03-03 RX ORDER — CEFOXITIN SODIUM 2 G/50ML
INJECTION, SOLUTION INTRAVENOUS
Status: DISCONTINUED | OUTPATIENT
Start: 2020-03-03 | End: 2020-03-03

## 2020-03-03 RX ORDER — CEFOXITIN SODIUM 2 G/50ML
2 INJECTION, SOLUTION INTRAVENOUS ONCE
Status: DISCONTINUED | OUTPATIENT
Start: 2020-03-03 | End: 2020-03-03 | Stop reason: HOSPADM

## 2020-03-03 RX ORDER — ONDANSETRON 2 MG/ML
4 INJECTION INTRAMUSCULAR; INTRAVENOUS DAILY PRN
Status: DISCONTINUED | OUTPATIENT
Start: 2020-03-03 | End: 2020-03-03 | Stop reason: HOSPADM

## 2020-03-03 RX ORDER — NALOXONE HCL 0.4 MG/ML
0.02 VIAL (ML) INJECTION ONCE AS NEEDED
Status: DISCONTINUED | OUTPATIENT
Start: 2020-03-03 | End: 2020-03-05 | Stop reason: HOSPADM

## 2020-03-03 RX ORDER — IBUPROFEN 400 MG/1
800 TABLET ORAL EVERY 6 HOURS
Status: DISCONTINUED | OUTPATIENT
Start: 2020-03-04 | End: 2020-03-04

## 2020-03-03 RX ORDER — DEXAMETHASONE SODIUM PHOSPHATE 4 MG/ML
INJECTION, SOLUTION INTRA-ARTICULAR; INTRALESIONAL; INTRAMUSCULAR; INTRAVENOUS; SOFT TISSUE
Status: DISCONTINUED | OUTPATIENT
Start: 2020-03-03 | End: 2020-03-03

## 2020-03-03 RX ORDER — ACETAMINOPHEN 10 MG/ML
1000 INJECTION, SOLUTION INTRAVENOUS ONCE
Status: COMPLETED | OUTPATIENT
Start: 2020-03-03 | End: 2020-03-03

## 2020-03-03 RX ORDER — SCOLOPAMINE TRANSDERMAL SYSTEM 1 MG/1
1 PATCH, EXTENDED RELEASE TRANSDERMAL
Status: DISCONTINUED | OUTPATIENT
Start: 2020-03-03 | End: 2020-03-05 | Stop reason: HOSPADM

## 2020-03-03 RX ORDER — DIPHENHYDRAMINE HYDROCHLORIDE 50 MG/ML
12.5 INJECTION INTRAMUSCULAR; INTRAVENOUS
Status: DISCONTINUED | OUTPATIENT
Start: 2020-03-03 | End: 2020-03-03 | Stop reason: HOSPADM

## 2020-03-03 RX ORDER — HYDROMORPHONE HYDROCHLORIDE 1 MG/ML
0.2 INJECTION, SOLUTION INTRAMUSCULAR; INTRAVENOUS; SUBCUTANEOUS
Status: COMPLETED | OUTPATIENT
Start: 2020-03-03 | End: 2020-03-03

## 2020-03-03 RX ORDER — FENTANYL CITRATE 50 UG/ML
INJECTION, SOLUTION INTRAMUSCULAR; INTRAVENOUS
Status: DISCONTINUED | OUTPATIENT
Start: 2020-03-03 | End: 2020-03-03

## 2020-03-03 RX ORDER — OXYCODONE AND ACETAMINOPHEN 5; 325 MG/1; MG/1
1 TABLET ORAL EVERY 4 HOURS PRN
Status: DISCONTINUED | OUTPATIENT
Start: 2020-03-03 | End: 2020-03-05 | Stop reason: HOSPADM

## 2020-03-03 RX ORDER — ONDANSETRON 4 MG/1
8 TABLET, ORALLY DISINTEGRATING ORAL EVERY 8 HOURS PRN
Status: DISCONTINUED | OUTPATIENT
Start: 2020-03-03 | End: 2020-03-05 | Stop reason: HOSPADM

## 2020-03-03 RX ORDER — SODIUM CHLORIDE, SODIUM LACTATE, POTASSIUM CHLORIDE, CALCIUM CHLORIDE 600; 310; 30; 20 MG/100ML; MG/100ML; MG/100ML; MG/100ML
INJECTION, SOLUTION INTRAVENOUS CONTINUOUS
Status: DISCONTINUED | OUTPATIENT
Start: 2020-03-03 | End: 2020-03-05 | Stop reason: HOSPADM

## 2020-03-03 RX ORDER — DIPHENHYDRAMINE HYDROCHLORIDE 50 MG/ML
12.5 INJECTION INTRAMUSCULAR; INTRAVENOUS EVERY 4 HOURS PRN
Status: DISCONTINUED | OUTPATIENT
Start: 2020-03-03 | End: 2020-03-03

## 2020-03-03 RX ORDER — KETAMINE HYDROCHLORIDE 50 MG/ML
INJECTION, SOLUTION INTRAMUSCULAR; INTRAVENOUS
Status: DISCONTINUED | OUTPATIENT
Start: 2020-03-03 | End: 2020-03-03

## 2020-03-03 RX ORDER — FAMOTIDINE 10 MG/ML
INJECTION INTRAVENOUS
Status: DISCONTINUED | OUTPATIENT
Start: 2020-03-03 | End: 2020-03-03

## 2020-03-03 RX ORDER — OXYCODONE HYDROCHLORIDE 5 MG/1
5 TABLET ORAL
Status: DISCONTINUED | OUTPATIENT
Start: 2020-03-03 | End: 2020-03-03 | Stop reason: HOSPADM

## 2020-03-03 RX ADMIN — ROCURONIUM BROMIDE 5 MG: 10 INJECTION, SOLUTION INTRAVENOUS at 07:03

## 2020-03-03 RX ADMIN — SODIUM CHLORIDE, POTASSIUM CHLORIDE, SODIUM LACTATE AND CALCIUM CHLORIDE: 600; 310; 30; 20 INJECTION, SOLUTION INTRAVENOUS at 11:03

## 2020-03-03 RX ADMIN — ACETAMINOPHEN 1000 MG: 500 TABLET, FILM COATED ORAL at 06:03

## 2020-03-03 RX ADMIN — KETAMINE HYDROCHLORIDE 25 MG: 50 INJECTION INTRAMUSCULAR; INTRAVENOUS at 07:03

## 2020-03-03 RX ADMIN — SODIUM CHLORIDE, POTASSIUM CHLORIDE, SODIUM LACTATE AND CALCIUM CHLORIDE: 600; 310; 30; 20 INJECTION, SOLUTION INTRAVENOUS at 06:03

## 2020-03-03 RX ADMIN — HYDROMORPHONE HYDROCHLORIDE 0.2 MG: 1 INJECTION, SOLUTION INTRAMUSCULAR; INTRAVENOUS; SUBCUTANEOUS at 09:03

## 2020-03-03 RX ADMIN — LIDOCAINE HYDROCHLORIDE 100 MG: 20 INJECTION, SOLUTION EPIDURAL; INFILTRATION; INTRACAUDAL; PERINEURAL at 07:03

## 2020-03-03 RX ADMIN — FENTANYL CITRATE 50 MCG: 50 INJECTION INTRAMUSCULAR; INTRAVENOUS at 08:03

## 2020-03-03 RX ADMIN — SODIUM CHLORIDE, SODIUM LACTATE, POTASSIUM CHLORIDE, AND CALCIUM CHLORIDE: .6; .31; .03; .02 INJECTION, SOLUTION INTRAVENOUS at 07:03

## 2020-03-03 RX ADMIN — FENTANYL CITRATE 50 MCG: 50 INJECTION INTRAMUSCULAR; INTRAVENOUS at 07:03

## 2020-03-03 RX ADMIN — SCOPALAMINE 1 PATCH: 1 PATCH, EXTENDED RELEASE TRANSDERMAL at 06:03

## 2020-03-03 RX ADMIN — PROPOFOL 150 MG: 10 INJECTION, EMULSION INTRAVENOUS at 07:03

## 2020-03-03 RX ADMIN — FAMOTIDINE 20 MG: 10 INJECTION, SOLUTION INTRAVENOUS at 07:03

## 2020-03-03 RX ADMIN — NEOSTIGMINE METHYLSULFATE 4 MG: 1 INJECTION INTRAVENOUS at 08:03

## 2020-03-03 RX ADMIN — ONDANSETRON HYDROCHLORIDE 4 MG: 2 SOLUTION INTRAMUSCULAR; INTRAVENOUS at 08:03

## 2020-03-03 RX ADMIN — ONDANSETRON 4 MG: 2 INJECTION INTRAMUSCULAR; INTRAVENOUS at 07:03

## 2020-03-03 RX ADMIN — GLYCOPYRROLATE 0.4 MG: 0.2 INJECTION INTRAMUSCULAR; INTRAVENOUS at 08:03

## 2020-03-03 RX ADMIN — SUCCINYLCHOLINE CHLORIDE 140 MG: 20 INJECTION, SOLUTION INTRAMUSCULAR; INTRAVENOUS at 07:03

## 2020-03-03 RX ADMIN — DEXAMETHASONE SODIUM PHOSPHATE 8 MG: 4 INJECTION, SOLUTION INTRAMUSCULAR; INTRAVENOUS at 07:03

## 2020-03-03 RX ADMIN — ONDANSETRON HYDROCHLORIDE 4 MG: 2 SOLUTION INTRAMUSCULAR; INTRAVENOUS at 12:03

## 2020-03-03 RX ADMIN — CEFOXITIN SODIUM 2 G: 2 INJECTION, SOLUTION INTRAVENOUS at 07:03

## 2020-03-03 RX ADMIN — MIDAZOLAM 2 MG: 1 INJECTION INTRAMUSCULAR; INTRAVENOUS at 06:03

## 2020-03-03 RX ADMIN — ROCURONIUM BROMIDE 30 MG: 10 INJECTION, SOLUTION INTRAVENOUS at 07:03

## 2020-03-03 RX ADMIN — Medication: at 09:03

## 2020-03-03 RX ADMIN — PROMETHAZINE HYDROCHLORIDE 6.25 MG: 25 INJECTION INTRAMUSCULAR; INTRAVENOUS at 09:03

## 2020-03-03 RX ADMIN — SODIUM CHLORIDE, SODIUM LACTATE, POTASSIUM CHLORIDE, AND CALCIUM CHLORIDE: .6; .31; .03; .02 INJECTION, SOLUTION INTRAVENOUS at 08:03

## 2020-03-03 RX ADMIN — ACETAMINOPHEN 1000 MG: 10 INJECTION, SOLUTION INTRAVENOUS at 06:03

## 2020-03-03 NOTE — OP NOTE
Diagnosis:  Patient Active Problem List   Diagnosis    Metrorrhagia    Diet controlled White classification A1 gestational diabetes mellitus (GDM)    Overweight and obesity(278.0)    IC (interstitial cystitis)    Sarcoidosis    Fibromyalgia    Encounter for postoperative wound care    Malignant neoplasm of overlapping sites of left breast in female, estrogen receptor positive    HER2-positive carcinoma of left breast    Chemotherapy induced neutropenia    Dehydration    Breast cancer, left    Limited range of motion of shoulder    Muscle weakness of left arm    Scar of breast    S/P bilateral mastectomy    Pre-op testing       Preop:  Previous breast cancer status post bilateral mastectomy menorrhagia dysmenorrhea    Postop:  Same    Procedure:  Total abdominal hysterectomy with bilateral salpingo oophorectomy    Surgeon:  Ben    Assistant:  William  Anesthesia:  General    Estimated blood loss:  200 cc    Findings:  Vesicouterine  adhesions from previous  normal tubes and small ovaries    Complications:  None    Procedure in detail:    After appropriate informed consent was obtained after the above findings were noted, the patient was taken to the operating room placed in a dorsal supine position. Patterson catheter was placed by nurses.  After patient was prepped vaginally and abdominally in the usual sterile fashion, she was then draped appropriately.  A Pfannenstiel incision was then made which was taken down to the fascia which was nicked in the midline and extended laterally with Zamora scissors.  The underlying rectus muscles were dissected off sharply and then  in the midline. The peritoneum was then entered bluntly and extended superiorly and inferiorly. The  bowel was packed away with moist laparotomy packs.  The uterus was grasped with a 3 prong tenaculum and elevated and the pelvis then examined.  The round ligaments were then clamped cauterized and cut with the LigaSure  device. We noted dense vesicouterine adhesions over the old lower segment at the near the cervix, this was taken down sharply with Hilton and pushed with peanut gauze to below level of cervix. Attention was then turned to the infundibulopelvic pedicles which were elevated also clamped cauterized and cut with the same device which the ovaries and tubes from their supply.  The broad ligament was then taken down systematically in a similar fashion and the uterine artery pedicles were also taken down in a similar fashion. After some of the cardinal ligaments were taken in a similar fashion we then switched to straight [facility] clamps near the uterosacral ligaments where we placed angle stitch after cutting uterus and cervix off of the cuff  And used 0 vicryl to incorporate the cuff corner into the uterosacral ligament.    After the specimens were removed the pelvis was inspected irrigation was used to copiously wash the surgical field and any bleeding points were cauterized.  There was some mild capillary/venous oozing which was controlled with Surgicel powder and pressure.  The laparotomy packs were removed. The muscles were gently reapproximated with a 3.0 Monocryl in a figure-of-eight fashion. The fascia was closed with 2 sutures of 0 Maxon in a running fashion meeting in the middle.  The subcutaneous tissues were copiously irrigated as well and any bleeding points were cauterized.  The skin was undermined from previous scarring and released with the cautery then closed with staples and a pressure dressing placed after hemostasis was ensured.  Counts were correct x2 patient tolerated procedure well was sent to recovery room in stable condition.

## 2020-03-03 NOTE — ANESTHESIA PREPROCEDURE EVALUATION
2020  Sheridan Todd is a 39 y.o., female.  Patient Active Problem List   Diagnosis    Metrorrhagia    Diet controlled White classification A1 gestational diabetes mellitus (GDM)    Overweight and obesity(278.0)    IC (interstitial cystitis)    Sarcoidosis    Fibromyalgia    Encounter for postoperative wound care    Malignant neoplasm of overlapping sites of left breast in female, estrogen receptor positive    HER2-positive carcinoma of left breast    Chemotherapy induced neutropenia    Dehydration    Breast cancer, left    Limited range of motion of shoulder    Muscle weakness of left arm    Scar of breast    S/P bilateral mastectomy       Past Surgical History:   Procedure Laterality Date    BREAST RECONSTRUCTION Bilateral 10/8/2019    Procedure: RECONSTRUCTION, BREAST skin reduction after mastectomy;  Surgeon: Howard Cheatham MD;  Location: [facility];  Service: General;  Laterality: Bilateral;     SECTION  08/15/2016    Twins     CRYOTHERAPY      CYSTOSCOPY WITH BIOPSY OF BLADDER      x 2    PAROTID TUMOR      LEFT    PORTACATH PLACEMENT  2019    , removed bc cathetar severed from port, removed & relpaced with new powerport    PORTACATH PLACEMENT Right 2019    Power port by Dr. Hilton    SENTINEL LYMPH NODE BIOPSY Left 10/8/2019    Procedure: BIOPSY, LYMPH NODE, SENTINEL LEFT;  Surgeon: Rebecca Garcia MD;  Location: [facility];  Service: General;  Laterality: Left;    SIMPLE MASTECTOMY Bilateral 10/8/2019    Procedure: MASTECTOMY, SIMPLE BILATERAL (CONSENT AM OF) 4.0 hr case;  Surgeon: Rebecca Garcia MD;  Location: [facility];  Service: General;  Laterality: Bilateral;    VAGINAL DELIVERY       x2 PROM. Preclampsia/ gestational diabetes    WISDOM TOOTH EXTRACTION          Tobacco Use:  The patient  reports that she quit smoking about 9  years ago. Her smoking use included cigarettes. She has a 6.50 pack-year smoking history. She has never used smokeless tobacco.     No results found for this or any previous visit.          Lab Results   Component Value Date    WBC 3.61 (L) 02/28/2020    HGB 11.6 (L) 02/28/2020    HCT 34.5 (L) 02/28/2020    MCV 95 02/28/2020     02/28/2020     BMP  Lab Results   Component Value Date     02/28/2020    K 4.4 02/28/2020     (H) 02/28/2020    CO2 25 02/28/2020    BUN 21 (H) 02/28/2020    CREATININE 1.0 02/28/2020    CALCIUM 9.3 02/28/2020    ANIONGAP 6 (L) 02/28/2020    ESTGFRAFRICA >60.0 02/28/2020    EGFRNONAA >60.0 02/28/2020     Reason for Exam   Priority: Routine   Dx: HER2-positive carcinoma of left breast [C50.912 (ICD-10-CM)]; Malignant neoplasm of left female breast, unspecified estrogen receptor status, unspecified site of breast [C50.912 (ICD-10-CM)]   Comments:    Conclusion     · Increased (hyperdynamic) left ventricular systolic function. The estimated ejection fraction is 75%  · Normal LV diastolic function.  · Normal right ventricular systolic function.  · Moderate tricuspid regurgitation.            Anesthesia Evaluation    I have reviewed the Patient Summary Reports.    I have reviewed the Nursing Notes.   I have reviewed the Medications.     Review of Systems  Anesthesia Hx:  No problems with previous Anesthesia Denies Hx of Anesthetic complications  Denies Family Hx of Anesthesia complications.   Denies Personal Hx of Anesthesia complications.   Social:  Former Smoker, No Alcohol Use    Hematology/Oncology:         -- Anemia: Current/Recent Cancer. Breast left axillary node dissection chemotherapy, radiation and surgery   EENT/Dental:EENT/Dental Normal   Cardiovascular:   Hypertension, well controlled ECG has been reviewed.    Pulmonary:   Hx Scarcoidosis   Renal/:  Renal/ Normal     Hepatic/GI:  Hepatic/GI Normal    Musculoskeletal:  Musculoskeletal Normal Fibromyalgia    Neurological:   Neuromuscular Disease,    Endocrine:  Endocrine Normal    Dermatological:  Skin Normal    Psych:  Psychiatric Normal           Physical Exam  General:  Well nourished    Airway/Jaw/Neck:  Airway Findings: Mouth Opening: Normal Tongue: Normal  General Airway Assessment: Adult  Mallampati: I  Improves to I with phonation.  TM Distance: < 4 cm  Jaw/Neck Findings:  Neck ROM: Normal ROM      Dental:  Dental Findings:    Chest/Lungs:  Chest/Lungs Findings: Clear to auscultation, Normal Respiratory Rate     Heart/Vascular:  Heart Findings: Rate: Normal  Rhythm: Regular Rhythm  Sounds: Normal        Mental Status:  Mental Status Findings:  Cooperative, Alert and Oriented         Anesthesia Plan  Type of Anesthesia, risks & benefits discussed:  Anesthesia Type:  general  Patient's Preference: General  Intra-op Monitoring Plan: standard ASA monitors  Intra-op Monitoring Plan Comments:   Post Op Pain Control Plan: multimodal analgesia, per primary service following discharge from PACU and PCA  Post Op Pain Control Plan Comments:   Induction:   IV  Beta Blocker:  Patient is not currently on a Beta-Blocker (No further documentation required).       Informed Consent: Patient understands risks and agrees with Anesthesia plan.  Questions answered. Anesthesia consent signed with patient.  ASA Score: 3     Day of Surgery Review of History & Physical:        Anesthesia Plan Notes: GETA use LTA  Decadron 8mgiv  Zofran 4mg iv  Pepcid 20mg iv   Kentamine  PreOp Meds: Tylenol 1000mg po        Ready For Surgery From Anesthesia Perspective.

## 2020-03-03 NOTE — ANESTHESIA POSTPROCEDURE EVALUATION
Anesthesia Post Evaluation    Patient: Sheridan Todd    Procedure(s) Performed: Procedure(s) (LRB):  HYSTERECTOMY, TOTAL, ABDOMINAL, WITH BILATERAL SALPINGO-OOPHORECTOMY (N/A)    Final Anesthesia Type: general    Patient location during evaluation: PACU  Patient participation: Yes- Able to Participate  Level of consciousness: awake and alert, oriented and awake  Post-procedure vital signs: reviewed and stable  Pain management: adequate  Airway patency: patent    PONV status at discharge: No PONV  Anesthetic complications: no      Cardiovascular status: blood pressure returned to baseline, hemodynamically stable and stable  Respiratory status: unassisted, spontaneous ventilation and nasal cannula  Hydration status: euvolemic  Follow-up not needed.          Vitals Value Taken Time   /62 3/3/2020 10:30 AM   Temp 37.1 °C (98.7 °F) 3/3/2020  9:45 AM   Pulse 57 3/3/2020 10:44 AM   Resp 21 3/3/2020 10:44 AM   SpO2 93 % 3/3/2020 10:44 AM   Vitals shown include unvalidated device data.      No case tracking events are documented in the log.      Pain/Wesley Score: Pain Rating Prior to Med Admin: 9 (3/3/2020  9:38 AM)  Wesley Score: 9 (3/3/2020  9:15 AM)

## 2020-03-04 ENCOUNTER — ANESTHESIA (OUTPATIENT)
Dept: OBSTETRICS AND GYNECOLOGY | Facility: HOSPITAL | Age: 40
End: 2020-03-04

## 2020-03-04 ENCOUNTER — ANESTHESIA EVENT (OUTPATIENT)
Dept: OBSTETRICS AND GYNECOLOGY | Facility: HOSPITAL | Age: 40
End: 2020-03-04

## 2020-03-04 LAB
BASOPHILS # BLD AUTO: 0.01 K/UL (ref 0–0.2)
BASOPHILS NFR BLD: 0.2 % (ref 0–1.9)
DIFFERENTIAL METHOD: ABNORMAL
EOSINOPHIL # BLD AUTO: 0 K/UL (ref 0–0.5)
EOSINOPHIL NFR BLD: 0.2 % (ref 0–8)
ERYTHROCYTE [DISTWIDTH] IN BLOOD BY AUTOMATED COUNT: 13.8 % (ref 11.5–14.5)
HCT VFR BLD AUTO: 26.2 % (ref 37–48.5)
HGB BLD-MCNC: 9.1 G/DL (ref 12–16)
IMM GRANULOCYTES # BLD AUTO: 0.02 K/UL (ref 0–0.04)
IMM GRANULOCYTES NFR BLD AUTO: 0.3 % (ref 0–0.5)
LYMPHOCYTES # BLD AUTO: 0.8 K/UL (ref 1–4.8)
LYMPHOCYTES NFR BLD: 12.1 % (ref 18–48)
MCH RBC QN AUTO: 31.9 PG (ref 27–31)
MCHC RBC AUTO-ENTMCNC: 34.7 G/DL (ref 32–36)
MCV RBC AUTO: 92 FL (ref 82–98)
MONOCYTES # BLD AUTO: 0.6 K/UL (ref 0.3–1)
MONOCYTES NFR BLD: 10 % (ref 4–15)
NEUTROPHILS # BLD AUTO: 4.9 K/UL (ref 1.8–7.7)
NEUTROPHILS NFR BLD: 77.2 % (ref 38–73)
NRBC BLD-RTO: 0 /100 WBC
PLATELET # BLD AUTO: 132 K/UL (ref 150–350)
PMV BLD AUTO: 10.4 FL (ref 9.2–12.9)
RBC # BLD AUTO: 2.85 M/UL (ref 4–5.4)
WBC # BLD AUTO: 6.39 K/UL (ref 3.9–12.7)

## 2020-03-04 PROCEDURE — 36415 COLL VENOUS BLD VENIPUNCTURE: CPT

## 2020-03-04 PROCEDURE — 12000002 HC ACUTE/MED SURGE SEMI-PRIVATE ROOM

## 2020-03-04 PROCEDURE — 25000003 PHARM REV CODE 250: Performed by: SPECIALIST

## 2020-03-04 PROCEDURE — 63600175 PHARM REV CODE 636 W HCPCS: Performed by: SPECIALIST

## 2020-03-04 PROCEDURE — 63600175 PHARM REV CODE 636 W HCPCS: Performed by: ANESTHESIOLOGY

## 2020-03-04 PROCEDURE — 85025 COMPLETE CBC W/AUTO DIFF WBC: CPT

## 2020-03-04 RX ORDER — SIMETHICONE 80 MG
1 TABLET,CHEWABLE ORAL EVERY 6 HOURS PRN
Status: DISCONTINUED | OUTPATIENT
Start: 2020-03-04 | End: 2020-03-05 | Stop reason: HOSPADM

## 2020-03-04 RX ORDER — PHENAZOPYRIDINE HYDROCHLORIDE 100 MG/1
200 TABLET, FILM COATED ORAL EVERY 8 HOURS PRN
Status: DISCONTINUED | OUTPATIENT
Start: 2020-03-04 | End: 2020-03-05 | Stop reason: HOSPADM

## 2020-03-04 RX ORDER — HYDROMORPHONE HYDROCHLORIDE 1 MG/ML
1.5 INJECTION, SOLUTION INTRAMUSCULAR; INTRAVENOUS; SUBCUTANEOUS EVERY 4 HOURS PRN
Status: DISCONTINUED | OUTPATIENT
Start: 2020-03-04 | End: 2020-03-05 | Stop reason: HOSPADM

## 2020-03-04 RX ORDER — ADHESIVE BANDAGE
30 BANDAGE TOPICAL EVERY 4 HOURS PRN
Status: DISCONTINUED | OUTPATIENT
Start: 2020-03-04 | End: 2020-03-05 | Stop reason: HOSPADM

## 2020-03-04 RX ORDER — IBUPROFEN 400 MG/1
800 TABLET ORAL EVERY 6 HOURS PRN
Status: DISCONTINUED | OUTPATIENT
Start: 2020-03-04 | End: 2020-03-05 | Stop reason: HOSPADM

## 2020-03-04 RX ORDER — DIPHENHYDRAMINE HYDROCHLORIDE 50 MG/ML
12.5 INJECTION INTRAMUSCULAR; INTRAVENOUS EVERY 4 HOURS PRN
Status: DISCONTINUED | OUTPATIENT
Start: 2020-03-04 | End: 2020-03-05 | Stop reason: HOSPADM

## 2020-03-04 RX ADMIN — OXYCODONE HYDROCHLORIDE AND ACETAMINOPHEN 1 TABLET: 5; 325 TABLET ORAL at 09:03

## 2020-03-04 RX ADMIN — IBUPROFEN 800 MG: 400 TABLET ORAL at 07:03

## 2020-03-04 RX ADMIN — OXYCODONE HYDROCHLORIDE AND ACETAMINOPHEN 1 TABLET: 5; 325 TABLET ORAL at 07:03

## 2020-03-04 RX ADMIN — DIPHENHYDRAMINE HYDROCHLORIDE 12.5 MG: 50 INJECTION INTRAMUSCULAR; INTRAVENOUS at 08:03

## 2020-03-04 RX ADMIN — IBUPROFEN 800 MG: 400 TABLET ORAL at 08:03

## 2020-03-04 RX ADMIN — SIMETHICONE CHEW TAB 80 MG 80 MG: 80 TABLET ORAL at 08:03

## 2020-03-04 RX ADMIN — DIPHENHYDRAMINE HYDROCHLORIDE 12.5 MG: 50 INJECTION INTRAMUSCULAR; INTRAVENOUS at 12:03

## 2020-03-04 RX ADMIN — IBUPROFEN 800 MG: 400 TABLET ORAL at 01:03

## 2020-03-04 RX ADMIN — POLYETHYLENE GLYCOL 3350 17 G: 17 POWDER, FOR SOLUTION ORAL at 08:03

## 2020-03-04 RX ADMIN — PHENAZOPYRIDINE HYDROCHLORIDE 200 MG: 100 TABLET ORAL at 08:03

## 2020-03-04 RX ADMIN — SODIUM CHLORIDE, POTASSIUM CHLORIDE, SODIUM LACTATE AND CALCIUM CHLORIDE: 600; 310; 30; 20 INJECTION, SOLUTION INTRAVENOUS at 01:03

## 2020-03-04 RX ADMIN — OXYCODONE HYDROCHLORIDE AND ACETAMINOPHEN 1 TABLET: 5; 325 TABLET ORAL at 11:03

## 2020-03-04 RX ADMIN — OXYCODONE HYDROCHLORIDE AND ACETAMINOPHEN 1 TABLET: 10; 325 TABLET ORAL at 03:03

## 2020-03-04 RX ADMIN — DIPHENHYDRAMINE HYDROCHLORIDE 25 MG: 25 CAPSULE ORAL at 01:03

## 2020-03-04 RX ADMIN — DIPHENHYDRAMINE HYDROCHLORIDE 25 MG: 25 CAPSULE ORAL at 04:03

## 2020-03-04 RX ADMIN — OXYCODONE HYDROCHLORIDE AND ACETAMINOPHEN 1 TABLET: 5; 325 TABLET ORAL at 04:03

## 2020-03-04 NOTE — PLAN OF CARE
Pt stable throughout night. VSS, pain well controlled, good urine output-lopez catheter d/c'd @ 0300 (ordered to be d/c'd @ 0600) due to pt c/o pressure to bladder and feeling of fullness-- once d/c'd pt ambulated to BR and voided 100mL and pt stated that pressure to bladder was relieved, dressing to incision, pt tolerating PO fluids well.  Will cont to monitor.         Jadyn Keys RN  03/04/2020  4:15 AM

## 2020-03-04 NOTE — ANESTHESIA POST-OP PAIN MANAGEMENT
Acute Pain Service Progress Note    Sheridan Todd is a 39 y.o., female, [id number].    Postop day 1.  Status post total abdominal hysterectomy under general endotracheal anesthesia.  Patient placed on PCA Dilaudid for postoperative pain control.  This morning patient is resting comfortably in bed she is alert and oriented without complaints.  She reports overall good pain control since surgery with decreasing use of PCA overnight.  Patient now tolerating oral pain medications well and PCA use has decreased.  She denies any significant nausea or vomiting.  She does report mild to moderate pruritus which has been improved with oral Benadryl however patient's still has some active itching.    Will discontinue PCA.  We will continue oral pain medications as ordered and will order IV Dilaudid as needed for breakthrough pain. Will also make IV Benadryl available if needed for itching.    Continue pain management as noted above.    Please re-consult if needed.    Problem List:    Active Hospital Problems    Diagnosis  POA    Pre-op testing [Z01.818]  Not Applicable      Resolved Hospital Problems   No resolved problems to display.           Vitals   Vitals:    03/04/20 0306   BP: (!) 147/88   Pulse: 74   Resp: 13   Temp: 36.7 °C (98 °F)              Evaluator Duong Vasquez

## 2020-03-04 NOTE — PLAN OF CARE
03/03/20 1337   PRE-TX-O2   O2 Device (Oxygen Therapy) nasal cannula   Flow (L/min) 3   SpO2 97 %   Pulse Oximetry Type Continuous   $ Pulse Oximetry - Multiple Charge Pulse Oximetry - Multiple   Pulse (!) 54   Resp 10   Positioning HOB elevated 45 degrees   ETCO2   $ ETCO2 Charge Exhaled CO2 Monitoring   $ ETCO2 Usage Currently wearing   ETCO2 (mmHg) 43 mmHg   ETCO2 Device Type Portable Bedside Monitor   ETCO2 High Alarm 50   ETCO2 Low Alarm 25   Incentive Spirometer   $ Incentive Spirometer Charges unable to perform   Respiratory Evaluation   $ Care Plan Tech Time 15 min

## 2020-03-04 NOTE — PLAN OF CARE
Patient in no apparent distress. VSS. Ambulating and voiding without difficulty. Ambulating independently.  Bowel sounds noted in all four quadrants, passing gas without difficulty.  Pain controlled with medication.  No acute events this shift. No additional needs at this time.

## 2020-03-04 NOTE — PROGRESS NOTES
[facility]  Obstetrics & Gynecology  Progress Note    Patient Name: Sheridan Todd  MRN: [id number]  Admission Date: 3/3/2020  Principal Problem: Metrorrhagia    Subjective:     Interval History:  Patient doing well this morning was able to get the catheter out and ambulated to the bathroom has voided without difficulty denies any dizziness or weakness no flatus at this point yet      Review of patient's allergies indicates:   Allergen Reactions    Shellfish containing products Anaphylaxis and Nausea And Vomiting     Only crabmeat      Codeine Nausea And Vomiting     Pt thinks it was tylenol #3 with codeine  Can take hydrocodone & oxycodone  Other reaction(s): Nausea    Tegaderm ag mesh [silver] Blisters     Redness, itching and tears skin        Objective:     Vital Signs (Most Recent):  Temp: 98.6 °F (37 °C) (03/04/20 0740)  Pulse: 63 (03/04/20 0740)  Resp: 17 (03/04/20 0740)  BP: 120/78 (03/04/20 0740)  SpO2: 97 % (03/04/20 0740) Vital Signs (24h Range):  Temp:  [97.8 °F (36.6 °C)-98.9 °F (37.2 °C)] 98.6 °F (37 °C)  Pulse:  [47-84] 63  Resp:  [10-20] 17  SpO2:  [94 %-100 %] 97 %  BP: (120-153)/(59-88) 120/78     I&O (Last 24H):    Intake/Output Summary (Last 24 hours) at 3/4/2020 0826  Last data filed at 3/4/2020 0740  Gross per 24 hour   Intake 6254.8 ml   Output 4010 ml   Net 2244.8 ml       Physical Exam    Laboratory:  CBC:   Recent Labs   Lab 03/04/20  0426   WBC 6.39   RBC 2.85*   HGB 9.1*   HCT 26.2*   *   MCV 92   MCH 31.9*   MCHC 34.7       Diagnostic Results:  Labs: Reviewed  Slight decrease in hemoglobin    Assessment/Plan:     Active Diagnoses:    Diagnosis Date Noted POA    PRINCIPAL PROBLEM:  Metrorrhagia [N92.1] 05/12/2014 Yes     Chronic    Pre-op testing [Z01.818] 03/03/2020 Not Applicable      Problems Resolved During this Admission:       Status post TAHBSO anemia suspected postsurgical   will continue to monitor routine advances    Aly Contreras,  MD  Obstetrics & Gynecology  [facility]

## 2020-03-05 VITALS
SYSTOLIC BLOOD PRESSURE: 112 MMHG | TEMPERATURE: 99 F | RESPIRATION RATE: 17 BRPM | OXYGEN SATURATION: 97 % | BODY MASS INDEX: 32.28 KG/M2 | HEIGHT: 64 IN | WEIGHT: 189.06 LBS | HEART RATE: 75 BPM | DIASTOLIC BLOOD PRESSURE: 72 MMHG

## 2020-03-05 PROCEDURE — 25000003 PHARM REV CODE 250: Performed by: SPECIALIST

## 2020-03-05 RX ADMIN — IBUPROFEN 800 MG: 400 TABLET ORAL at 04:03

## 2020-03-05 RX ADMIN — OXYCODONE HYDROCHLORIDE AND ACETAMINOPHEN 1 TABLET: 5; 325 TABLET ORAL at 01:03

## 2020-03-05 RX ADMIN — OXYCODONE HYDROCHLORIDE AND ACETAMINOPHEN 1 TABLET: 5; 325 TABLET ORAL at 06:03

## 2020-03-05 RX ADMIN — IBUPROFEN 800 MG: 400 TABLET ORAL at 11:03

## 2020-03-05 RX ADMIN — POLYETHYLENE GLYCOL 3350 17 G: 17 POWDER, FOR SOLUTION ORAL at 08:03

## 2020-03-05 RX ADMIN — DIPHENHYDRAMINE HYDROCHLORIDE 25 MG: 25 CAPSULE ORAL at 06:03

## 2020-03-05 RX ADMIN — SIMETHICONE CHEW TAB 80 MG 80 MG: 80 TABLET ORAL at 01:03

## 2020-03-05 RX ADMIN — OXYCODONE HYDROCHLORIDE AND ACETAMINOPHEN 1 TABLET: 5; 325 TABLET ORAL at 11:03

## 2020-03-05 RX ADMIN — SIMETHICONE CHEW TAB 80 MG 80 MG: 80 TABLET ORAL at 08:03

## 2020-03-05 NOTE — PROGRESS NOTES
[facility]  Obstetrics & Gynecology  Progress Note    Patient Name: Sheridan Todd  MRN: [id number]  Admission Date: 3/3/2020  Principal Problem: Metrorrhagia    Subjective:     Interval History:  Patient doing well ready go home as ambulating well voiding well having multiple bowel movements denies any weakness or dizziness    Review of patient's allergies indicates:   Allergen Reactions    Shellfish containing products Anaphylaxis and Nausea And Vomiting     Only crabmeat      Codeine Nausea And Vomiting     Pt thinks it was tylenol #3 with codeine  Can take hydrocodone & oxycodone  Other reaction(s): Nausea    Tegaderm ag mesh [silver] Blisters     Redness, itching and tears skin        Objective:     Vital Signs (Most Recent):  Temp: 99.1 °F (37.3 °C) (03/05/20 1143)  Pulse: 75 (03/05/20 1143)  Resp: 17 (03/05/20 1143)  BP: 112/72 (03/05/20 1143)  SpO2: 97 % (03/05/20 1143) Vital Signs (24h Range):  Temp:  [98 °F (36.7 °C)-99.1 °F (37.3 °C)] 99.1 °F (37.3 °C)  Pulse:  [71-89] 75  Resp:  [17-18] 17  SpO2:  [95 %-98 %] 97 %  BP: ()/(67-76) 112/72     I&O (Last 24H):    Intake/Output Summary (Last 24 hours) at 3/5/2020 1317  Last data filed at 3/5/2020 0853  Gross per 24 hour   Intake 1320 ml   Output --   Net 1320 ml       Physical Exam    Laboratory:  CBC:   Recent Labs   Lab 03/04/20  0426   WBC 6.39   RBC 2.85*   HGB 9.1*   HCT 26.2*   *   MCV 92   MCH 31.9*   MCHC 34.7       Assessment/Plan:     Active Diagnoses:    Diagnosis Date Noted POA    PRINCIPAL PROBLEM:  Metrorrhagia [N92.1] 05/12/2014 Yes     Chronic    Pre-op testing [Z01.818] 03/03/2020 Not Applicable      Problems Resolved During this Admission:       Status post TAHBSO at discharge home see orders follow up tomorrow for staple removal    Aly Contreras MD  Obstetrics & Gynecology  [facility]

## 2020-03-05 NOTE — DISCHARGE INSTRUCTIONS
Pelvic rest for 6 weeks (no sex, tampons, douching, nothing in the vagina)    You can experience vaginal spotting on and off for up to 2 weeks.    Activity:  NO strenuous activities or exercising for 6 weeks.  Do not /lift anything over 15 pounds and no heavy housework or cleaning for 6 weeks.  Limit stair climbing to twice a day during the first 2 weeks.   NO driving for 4 weeks.  You may take short car trips but do not drive.    You may shower ONLY for the first 2 weeks, after 2 weeks you can soak in a bathtub.  Use a mild soap, no heavy perfumes or fragrances to avoid irritation.     Walking frequently following a  delivery promotes healing and decreases pain associated with gas.   If constipation develops:  You may take Colace (stool softener), Milk of Magnesia, Dulcolax or Miralax.  All of these medications are sold over the counter.      Incision Care:   Clean your incision with mild soap & warm water only- do not scrub- let warm water run over it, then pat dry.      Pain Relief:  You may take Motrin for mild pain & uterine cramping.        Call your doctor for any of the following:  Difficulty breathing, problems with any of your medications, inability to eat.    Foul smelling vaginal discharge.  If you notice pus-like drainage from your incision, if your incision or the area around it becomes hot or swollen, or if you notice a foul smelling odor.  Temperature above 100.4.    If you experience pain in your legs/calves, if one leg increases in size and becomes swollen or becomes hot to touch or discolored.

## 2020-03-05 NOTE — PLAN OF CARE
Pt stable throughout night. VSS, pain well controlled, voiding without difficulty,passing flatus, + BM, incision C/D/I. Will cont to monitor.        Jadyn Keys RN  03/05/2020  4:44 AM

## 2020-03-05 NOTE — DISCHARGE SUMMARY
[facility]  Obstetrics & Gynecology  Discharge Summary    Patient Name: Sheridan Todd  MRN: [id number]  Admission Date: 3/3/2020  Hospital Length of Stay: 2 days  Discharge Date and Time:  03/05/2020 1:22 PM  Attending Physician: Esteban Feliz MD   Discharging Provider: Esteban Feliz MD  Primary Care Provider: Abel Chambers MD    HPI:  Patient admitted for [facility] Course:  Patient did well during her hospital course is ready for discharge.    Procedure(s) (LRB):  HYSTERECTOMY, TOTAL, ABDOMINAL, WITH BILATERAL SALPINGO-OOPHORECTOMY (N/A)     Consults (From admission, onward)        Status Ordering Provider     Inpatient consult to Anesthesiology  Once     Provider:  (Not yet assigned)    Acknowledged ESTEBAN FELIZ          Significant Diagnostic Studies: Labs:   CBC   Recent Labs   Lab 03/03/20  1525 03/04/20  0426   WBC 8.56  8.56 6.39   HGB 10.7*  10.7* 9.1*   HCT 31.8*  31.8* 26.2*   *  125* 132*       Pending Diagnostic Studies:     None        Final Active Diagnoses:    Diagnosis Date Noted POA    PRINCIPAL PROBLEM:  Metrorrhagia [N92.1] 05/12/2014 Yes     Chronic    Pre-op testing [Z01.818] 03/03/2020 Not Applicable      Problems Resolved During this Admission:        Discharged Condition: good    Disposition: Home or Self Care    Follow Up:  Follow-up Information     Follow up In 1 day.               Patient Instructions:      Diet Adult Regular     Notify your health care provider if you experience any of the following:  temperature >100.4     Notify your health care provider if you experience any of the following:   Order Comments: Foul odorous discharge, profuse bleeding, wound breakdown/issues     Remove dressing in 24 hours     Pelvic Rest     Lifting restrictions     Weight bearing restrictions (specify):     Medications:  Reconciled Home Medications:      Medication List      STOP taking these medications    butalbital-acetaminophen-caff  -40 mg Cap     Carafate 100 mg/mL suspension  Generic drug:  sucralfate     diphenoxylate-atropine 2.5-0.025 mg 2.5-0.025 mg per tablet  Commonly known as:  LOMOTIL     HYDROcodone-acetaminophen 5-325 mg per tablet  Commonly known as:  NORCO     lidocaine-prilocaine cream  Commonly known as:  EMLA     mupirocin 2 % ointment  Commonly known as:  BACTROBAN     ondansetron 8 MG tablet  Commonly known as:  ZOFRAN     oxyCODONE-acetaminophen 5-325 mg per tablet  Commonly known as:  PERCOCET     phenazopyridine 200 MG tablet  Commonly known as:  PYRIDIUM     promethazine 25 MG tablet  Commonly known as:  PHENERGAN     SUDAFED ORAL     sumatriptan 25 MG Tab  Commonly known as:  IMITREX            Aly Contreras MD  Obstetrics & Gynecology  [facility]

## 2020-03-05 NOTE — NURSING
Discharge instructions reviewed with patient, verbalized understanding.  VSS, no acute distress noted.

## 2020-03-11 RX ORDER — HEPARIN 100 UNIT/ML
500 SYRINGE INTRAVENOUS
Status: CANCELLED | OUTPATIENT
Start: 2020-03-16

## 2020-03-11 RX ORDER — SODIUM CHLORIDE 0.9 % (FLUSH) 0.9 %
10 SYRINGE (ML) INJECTION
Status: CANCELLED | OUTPATIENT
Start: 2020-03-16

## 2020-03-12 ENCOUNTER — LAB VISIT (OUTPATIENT)
Dept: LAB | Facility: HOSPITAL | Age: 40
End: 2020-03-12
Attending: INTERNAL MEDICINE
Payer: MEDICAID

## 2020-03-12 DIAGNOSIS — C50.812 MALIGNANT NEOPLASM OF OVERLAPPING SITES OF LEFT BREAST IN FEMALE, ESTROGEN RECEPTOR POSITIVE: ICD-10-CM

## 2020-03-12 DIAGNOSIS — Z17.0 MALIGNANT NEOPLASM OF OVERLAPPING SITES OF LEFT BREAST IN FEMALE, ESTROGEN RECEPTOR POSITIVE: ICD-10-CM

## 2020-03-12 LAB
ALBUMIN SERPL BCP-MCNC: 3.8 G/DL (ref 3.5–5.2)
ALP SERPL-CCNC: 69 U/L (ref 55–135)
ALT SERPL W/O P-5'-P-CCNC: 26 U/L (ref 10–44)
ANION GAP SERPL CALC-SCNC: 6 MMOL/L (ref 8–16)
AST SERPL-CCNC: 22 U/L (ref 10–40)
BASOPHILS # BLD AUTO: 0.03 K/UL (ref 0–0.2)
BASOPHILS NFR BLD: 0.6 % (ref 0–1.9)
BILIRUB SERPL-MCNC: 0.8 MG/DL (ref 0.1–1)
BUN SERPL-MCNC: 27 MG/DL (ref 6–20)
CALCIUM SERPL-MCNC: 9.2 MG/DL (ref 8.7–10.5)
CHLORIDE SERPL-SCNC: 108 MMOL/L (ref 95–110)
CO2 SERPL-SCNC: 26 MMOL/L (ref 23–29)
CREAT SERPL-MCNC: 1.1 MG/DL (ref 0.5–1.4)
DIFFERENTIAL METHOD: ABNORMAL
EOSINOPHIL # BLD AUTO: 0.1 K/UL (ref 0–0.5)
EOSINOPHIL NFR BLD: 1.1 % (ref 0–8)
ERYTHROCYTE [DISTWIDTH] IN BLOOD BY AUTOMATED COUNT: 13.7 % (ref 11.5–14.5)
EST. GFR  (AFRICAN AMERICAN): >60 ML/MIN/1.73 M^2
EST. GFR  (NON AFRICAN AMERICAN): >60 ML/MIN/1.73 M^2
GLUCOSE SERPL-MCNC: 97 MG/DL (ref 70–110)
HCT VFR BLD AUTO: 31.1 % (ref 37–48.5)
HGB BLD-MCNC: 10.6 G/DL (ref 12–16)
IMM GRANULOCYTES # BLD AUTO: 0.03 K/UL (ref 0–0.04)
IMM GRANULOCYTES NFR BLD AUTO: 0.6 % (ref 0–0.5)
LYMPHOCYTES # BLD AUTO: 0.7 K/UL (ref 1–4.8)
LYMPHOCYTES NFR BLD: 15 % (ref 18–48)
MCH RBC QN AUTO: 32.1 PG (ref 27–31)
MCHC RBC AUTO-ENTMCNC: 34.1 G/DL (ref 32–36)
MCV RBC AUTO: 94 FL (ref 82–98)
MONOCYTES # BLD AUTO: 0.4 K/UL (ref 0.3–1)
MONOCYTES NFR BLD: 8.5 % (ref 4–15)
NEUTROPHILS # BLD AUTO: 3.5 K/UL (ref 1.8–7.7)
NEUTROPHILS NFR BLD: 74.2 % (ref 38–73)
NRBC BLD-RTO: 0 /100 WBC
PLATELET # BLD AUTO: 176 K/UL (ref 150–350)
PMV BLD AUTO: 8.6 FL (ref 9.2–12.9)
POTASSIUM SERPL-SCNC: 3.7 MMOL/L (ref 3.5–5.1)
PROT SERPL-MCNC: 7.2 G/DL (ref 6–8.4)
RBC # BLD AUTO: 3.3 M/UL (ref 4–5.4)
SODIUM SERPL-SCNC: 140 MMOL/L (ref 136–145)
WBC # BLD AUTO: 4.73 K/UL (ref 3.9–12.7)

## 2020-03-12 PROCEDURE — 85025 COMPLETE CBC W/AUTO DIFF WBC: CPT

## 2020-03-12 PROCEDURE — 36415 COLL VENOUS BLD VENIPUNCTURE: CPT

## 2020-03-12 PROCEDURE — 80053 COMPREHEN METABOLIC PANEL: CPT

## 2020-03-16 ENCOUNTER — TELEPHONE (OUTPATIENT)
Dept: HEMATOLOGY/ONCOLOGY | Facility: CLINIC | Age: 40
End: 2020-03-16

## 2020-03-16 ENCOUNTER — INFUSION (OUTPATIENT)
Dept: INFUSION THERAPY | Facility: HOSPITAL | Age: 40
End: 2020-03-16
Attending: INTERNAL MEDICINE
Payer: MEDICAID

## 2020-03-16 ENCOUNTER — OFFICE VISIT (OUTPATIENT)
Dept: HEMATOLOGY/ONCOLOGY | Facility: CLINIC | Age: 40
End: 2020-03-16
Payer: MEDICAID

## 2020-03-16 VITALS
SYSTOLIC BLOOD PRESSURE: 128 MMHG | WEIGHT: 187.19 LBS | RESPIRATION RATE: 17 BRPM | BODY MASS INDEX: 32.13 KG/M2 | HEART RATE: 64 BPM | DIASTOLIC BLOOD PRESSURE: 76 MMHG | TEMPERATURE: 98 F | OXYGEN SATURATION: 100 %

## 2020-03-16 VITALS
RESPIRATION RATE: 18 BRPM | OXYGEN SATURATION: 100 % | TEMPERATURE: 98 F | DIASTOLIC BLOOD PRESSURE: 66 MMHG | BODY MASS INDEX: 32.13 KG/M2 | HEART RATE: 80 BPM | SYSTOLIC BLOOD PRESSURE: 114 MMHG | WEIGHT: 187.19 LBS

## 2020-03-16 DIAGNOSIS — C50.912 HER2-POSITIVE CARCINOMA OF LEFT BREAST: ICD-10-CM

## 2020-03-16 DIAGNOSIS — Z79.899 ENCOUNTER FOR LONG-TERM (CURRENT) USE OF OTHER MEDICATIONS: ICD-10-CM

## 2020-03-16 DIAGNOSIS — Z90.13 S/P BILATERAL MASTECTOMY: ICD-10-CM

## 2020-03-16 DIAGNOSIS — C50.912 MALIGNANT NEOPLASM OF LEFT FEMALE BREAST, UNSPECIFIED ESTROGEN RECEPTOR STATUS, UNSPECIFIED SITE OF BREAST: Primary | ICD-10-CM

## 2020-03-16 DIAGNOSIS — C50.812 MALIGNANT NEOPLASM OF OVERLAPPING SITES OF LEFT BREAST IN FEMALE, ESTROGEN RECEPTOR POSITIVE: ICD-10-CM

## 2020-03-16 DIAGNOSIS — T45.1X5A CHEMOTHERAPY INDUCED NEUTROPENIA: ICD-10-CM

## 2020-03-16 DIAGNOSIS — Z17.0 MALIGNANT NEOPLASM OF OVERLAPPING SITES OF LEFT BREAST IN FEMALE, ESTROGEN RECEPTOR POSITIVE: ICD-10-CM

## 2020-03-16 DIAGNOSIS — D70.1 CHEMOTHERAPY INDUCED NEUTROPENIA: ICD-10-CM

## 2020-03-16 DIAGNOSIS — E86.0 DEHYDRATION: Primary | ICD-10-CM

## 2020-03-16 PROCEDURE — 99214 OFFICE O/P EST MOD 30 MIN: CPT | Mod: S$GLB,,, | Performed by: INTERNAL MEDICINE

## 2020-03-16 PROCEDURE — 25000003 PHARM REV CODE 250: Performed by: INTERNAL MEDICINE

## 2020-03-16 PROCEDURE — 99214 PR OFFICE/OUTPT VISIT, EST, LEVL IV, 30-39 MIN: ICD-10-PCS | Mod: S$GLB,,, | Performed by: INTERNAL MEDICINE

## 2020-03-16 PROCEDURE — 63600175 PHARM REV CODE 636 W HCPCS: Mod: JG | Performed by: INTERNAL MEDICINE

## 2020-03-16 PROCEDURE — A4216 STERILE WATER/SALINE, 10 ML: HCPCS | Performed by: INTERNAL MEDICINE

## 2020-03-16 PROCEDURE — 96417 CHEMO IV INFUS EACH ADDL SEQ: CPT

## 2020-03-16 PROCEDURE — 96413 CHEMO IV INFUSION 1 HR: CPT

## 2020-03-16 RX ORDER — ANASTROZOLE 1 MG/1
1 TABLET ORAL DAILY
Qty: 30 TABLET | Refills: 6 | Status: ON HOLD | OUTPATIENT
Start: 2020-03-16 | End: 2020-07-26 | Stop reason: HOSPADM

## 2020-03-16 RX ORDER — SODIUM CHLORIDE 0.9 % (FLUSH) 0.9 %
10 SYRINGE (ML) INJECTION
Status: DISCONTINUED | OUTPATIENT
Start: 2020-03-16 | End: 2020-03-16 | Stop reason: HOSPADM

## 2020-03-16 RX ORDER — HEPARIN 100 UNIT/ML
500 SYRINGE INTRAVENOUS
Status: DISCONTINUED | OUTPATIENT
Start: 2020-03-16 | End: 2020-03-16 | Stop reason: HOSPADM

## 2020-03-16 RX ORDER — CYANOCOBALAMIN 1000 UG/ML
1000 INJECTION, SOLUTION INTRAMUSCULAR; SUBCUTANEOUS
Status: SHIPPED | OUTPATIENT
Start: 2020-03-16

## 2020-03-16 RX ADMIN — HEPARIN 500 UNITS: 100 SYRINGE at 01:03

## 2020-03-16 RX ADMIN — CYANOCOBALAMIN 1000 MCG: 1000 INJECTION, SOLUTION INTRAMUSCULAR; SUBCUTANEOUS at 11:03

## 2020-03-16 RX ADMIN — TRASTUZUMAB 468 MG: 150 INJECTION, POWDER, LYOPHILIZED, FOR SOLUTION INTRAVENOUS at 12:03

## 2020-03-16 RX ADMIN — SODIUM CHLORIDE, PRESERVATIVE FREE 10 ML: 5 INJECTION INTRAVENOUS at 01:03

## 2020-03-16 RX ADMIN — PERTUZUMAB 420 MG: 30 INJECTION, SOLUTION, CONCENTRATE INTRAVENOUS at 11:03

## 2020-03-16 NOTE — PROGRESS NOTES
[facility] Hematology/Oncology  PROGRESS NOTE -  Follow-up Visit      Subjective:       Patient ID:   NAME: Sheridan Todd : 1980     39 y.o. female    Referring Doc: Krissy  Other Physicians: Jose Rodriguez De Boisblanc, Marshall; Howard Cheatham ([facility])    Chief Complaint: left breast ca f/u     History of Present Illness:     Patient returns today for a regularly scheduled follow-up visit.  She has since completed the neoadjuvant chemotherapy on 2019 and has subsequently surgery next at [facility] with Dr Garcia.  She denies any CP, SOB, HA's or N/V. She is here by herself.    She has since resumed maintenance therapy with herceptin/perjeta every 3 weeks therapy.      XRT completed was previously completed and she has had her hysterectomy/oopherectomy with Dr bryant on 2020; she is healed well from the surgery and has had no postop compliactions    She is resuming herceptin and perjeta today; we discussed starting her on an oral antihormone with arimidex in the near future    I had long talk with her about COVID-19 precautions      ROS:   GEN: normal without any fever, night sweats or weight loss  HEENT: normal with no HA's, sore throat, stiff neck, changes in vision  CV: normal with no CP, SOB, PND, MENDIETA or orthopnea  PULM: normal with no SOB, cough, hemoptysis, sputum or pleuritic pain  GI: normal with no abdominal pain, nausea, vomiting, constipation, diarrhea, melanotic stools, BRBPR, or hematemesis  : normal with no hematuria, dysuria  BREAST: no identifiable abnormality at this time  SKIN: normal with no rash, erythema, bruising, or swelling    Allergies:  Review of patient's allergies indicates:   Allergen Reactions    Shellfish containing products Anaphylaxis and Nausea And Vomiting     Only crabmeat      Codeine Nausea And Vomiting     Pt thinks it was tylenol #3 with codeine  Can take hydrocodone & oxycodone  Other reaction(s): Nausea    Tegaderm ag mesh  [silver] Blisters     Redness, itching and tears skin        Medications:  No current outpatient medications on file.    Current Facility-Administered Medications:     cyanocobalamin injection 1,000 mcg, 1,000 mcg, Subcutaneous, Q30 Days, Abel Chambers MD    Facility-Administered Medications Ordered in Other Visits:     alteplase injection 2 mg, 2 mg, Intra-Catheter, PRN, Abel Chambers MD    heparin, porcine (PF) 100 unit/mL injection flush 500 Units, 500 Units, Intravenous, PRN, Abel Chambers MD    pertuzumab (PERJETA) 420 mg in sodium chloride 0.9% 264 mL infusion, 420 mg, Intravenous, 1 time in Clinic/HOD, Abel Chambers MD    sodium chloride 0.9% 100 mL flush bag, , Intravenous, 1 time in Clinic/HOD, Abel Chambers MD    sodium chloride 0.9% flush 10 mL, 10 mL, Intravenous, PRN, Abel Chambers MD    trastuzumab 468 mg in sodium chloride 0.9% 250 mL chemo infusion, 6 mg/kg (Treatment Plan Recorded), Intravenous, 1 time in Clinic/HOD, Abel Chambers MD    PMHx/PSHx Updates:  See patient's last visit with me on 2/17/2020.  See H&P on 5/16/2019        Pathology:  Cancer Staging      Breast biopsies: 5/1/2019:  Left breast: with invasive ductal carcinoma grade 3; ER positive at 95% and UT positive at 90%; Her2Nu was positive at +3; Her2/CEP 17 ratio was >10.6  - Ki67 was unfavorable at 69%  Right breast: negative for cancer      Bilateral Mastectomies 10/8/2019:    FINAL PATHOLOGIC DIAGNOSIS  1. BREAST (RIGHT MASTECTOMY): NO EVIDENCE OF MALIGNANCY IN SECTIONS OF SKIN, NIPPLE, AND  BREAST TISSUE.  2. BREAST (LEFT MASTECTOMY): INVASIVE DUCTAL CARCINOMA (2 SEPARATE RESIDUAL FOCI);  ASSOCIATED HIGH-GRADE DUCTAL CARCINOMA IN SITU; SECTIONS OF SKIN, NIPPLE, AND BREAST  MARGINS FREE OF MALIGNANCY.  Note: Two foci of residual ductal carcinoma are identified with differing histologies . The larger lesion consists of  small foci of invasive ductal carcinoma in a 19mm fibrotic nodule  with suggestion of presurgical therapeutic  response. Vascular invasion cannot be demonstrated on CD31 stain. There is an additional separate  microinvasive focus of mucinous carcinoma. Foci of DCIS are present throughout the breast tissue.  HORMONE RECEPTOR ASSAYS:  ER-strongly positive (90%)  PgR-weakly positive (1-2%)  Her2-equivocal (1 2+); to be sent for FISH testing  Ki67 proliferative activity-30% activity  Positive and negative controls reacted appropriately.  3. LYMPH NODE (SENTINEL LYMPH NODE, CLINICAL): NO EVIDENCE OF MALIGNANCY.  Note: The lymph node was examined at three levels with H&E and epithelial immunostains (AE1/AES, CAM5.2,  and WSK). Positive and negative controls reacted appropriately.  4. LYMPH NODES (22): NO EVIDENCE OF MALIGNANCY.         Objective:     Vitals:  Blood pressure 114/66, pulse 80, temperature 98.2 °F (36.8 °C), temperature source Oral, resp. rate 18, weight 84.9 kg (187 lb 2.7 oz), SpO2 100 %.     Physical Examination:   GEN: no apparent distress, comfortable; AAOx3  HEAD: atraumatic and normocephalic  EYES: no pallor, no icterus, PERRLA  ENT: OMM, no pharyngeal erythema, external ears WNL; no nasal discharge; no thrush  NECK: no masses, thyroid normal, trachea midline, no LAD/LN's, supple  CV: RRR with no murmur; normal pulse; normal S1 and S2; no pedal edema  CHEST: Normal respiratory effort; CTAB; normal breath sounds; no wheeze or crackles; portacath on right chest wall    ABDOM: nontender and nondistended; soft; normal bowel sounds; no rebound/guarding  MUSC/Skeletal: ROM normal; no crepitus; joints normal; no deformities or arthropathy  EXTREM: no clubbing, cyanosis, inflammation or swelling  SKIN: no rashes, lesions, ulcers, petechiae or subcutaneous nodules  : no lopez  NEURO: grossly intact; motor/sensory WNL; AAOx3; no tremors  PSYCH: normal mood, affect and behavior  LYMPH: normal cervical, supraclavicular, axillary and groin LN's  Breast: s/p bilateral  mastectomies       Labs:   3/12/2020  Lab Results   Component Value Date    WBC 4.73 03/12/2020    HGB 10.6 (L) 03/12/2020    HCT 31.1 (L) 03/12/2020    MCV 94 03/12/2020     03/12/2020     BMP  Lab Results   Component Value Date     03/12/2020    K 3.7 03/12/2020     03/12/2020    CO2 26 03/12/2020    BUN 27 (H) 03/12/2020    CREATININE 1.1 03/12/2020    CALCIUM 9.2 03/12/2020    ANIONGAP 6 (L) 03/12/2020    ESTGFRAFRICA >60.0 03/12/2020    EGFRNONAA >60.0 03/12/2020             Radiology/Diagnostic Studies:    MRI head  12/5/2019:    Impression       No evidence of intracranial metastatic disease.           MRI breast  8/7/2019:    1. Significant interval improvement of enhancing spiculated mass in left breast near 4:00 position, as well as more diffuse non mass like enhancement throughout the superior left breast since 04/26/2019, consistent with a favorable response to interval neoadjuvant treatment.  2. No significant change of lobular lower inner quadrant right breast mass consistent with biopsy-proven fibroadenoma.  3. Unchanged 7 mm right breast mass near 12:00 position felt to represent intramammary lymph node.      Nm Pet Ct Routine Skull To Mid Thigh    Result Date: 5/21/2019  INTEGRATED PET CT WITH IMAGE FUSION HISTORY:  Left breast cancer initial treatment evaluation TECHNIQUE: Following the injection of 12.7 mCi of F-18 labeled FDG into a right antecubital vein, PET CT was performed from the vertex of the skull through the proximal thighs with an integrated full ring PET CT scanner with image fusion. The patient's serum glucose at the time of the exam was 82 mg/dL. FINDINGS: There is diffuse FDG activity in the subareolar left breast with skin thickening and multiple hypermetabolic masses. There is a 19 mm spiculated hypermetabolic mass in the inferior lateral left breast with a max SUV of 10.4. This was previously biopsied. There is a 2 cm hypermetabolic area in the lateral superior  left breast with max SUV of 9.9. There is an 11 mm nodule within the inner right breast which was biopsied and shown to represent fibroadenoma. This shows no FDG activity. There is no axillary, mediastinal or hilar adenopathy. There are no pulmonary nodules, infiltrates or pleural effusions. CT of the head and neck show no intra-axial lesions or cervical adenopathy. CT of the abdomen and pelvis demonstrates physiologic activity in the GI tract and urinary system. The liver and adrenal glands are normal. There are mildly prominent lymph nodes within the right lower quadrant the largest measures 13 mm with a max SUV of 3.1. Findings are suspicious for mesenteric adenitis. There is no retroperitoneal adenopathy. There are no lytic or blastic lesions.     IMPRESSION: Diffuse FDG activity within the subareolar left breast with skin thickening and multiple hypermetabolic left breast masses consistent with patient's history of left breast cancer. No evidence of metastatic disease 11 mm nodule in the medial right breast which was shown to represent fibroadenoma Mildly prominent lymph nodes in the right lower quadrant with mild FDG activity suggestive of mesenteric adenitis. Follow-up CT scan in 3-6 months is recommended.     Read and electronically signed by: Radha Garnett MD on 5/21/2019 1:13 PM CDT RADHA GARNETT MD    [facility] Unknown Rad Eap    Result Date: 5/21/2019  Chest single view CLINICAL DATA: Port-A-Cath placement FINDINGS: AP view shows the heart to be within normal size limits. The mediastinum is unremarkable. Right subclavian Port-A-Cath tip is at superior vena cava. No pneumothorax or other placement related complication is identified. No infiltrates or effusions are demonstrated. No acute osseous abnormalities are demonstrated. IMPRESSION: 1. Right sided Port-A-Cath appears appropriately positioned, with no pneumothorax or other placement related complication. No acute radiographic abnormalities. Read  and electronically signed by: Teofilo Patterson MD on 5/21/2019 2:52 PM CDT TEOFILO PATTERSON MD      I have reviewed all available lab results and radiology reports.    Assessment/Plan:   (1) 39 y.o. female  with diagnosis of left breast cancer who has been referred by Dr Salvador Rey with [facility] for evaluation by medical oncology.      - She had presented with left breast pain which became progressive over a 4 month period.   - Radiology studies found a 2.5cm mass on the left breast along with two inflamed LN's on the right.   - She had left breast biopsy on 5/1/2019 which showed invasive ductal carcinoma grade 3. The right breast biopsy was negative for cancer.   - She is ER and KS positive. She is also Her2Nu +3.   - We discussed the latest data on Her2Nu positive breast cancers and the recommendation for neoadjuvant chemotherapy with a herceptin and perjeta.  - she will need echo, portacath, and PET scan  - will plan for herceptin, perjeta, carboplatin and taxotere regimen neoadjuvantly with 6 cycles  -  S/p set up chemotherapy school; discussed side-effect profile, provided literature on the regimen; obtained consents  - she is set up for May 27th to start  - PET scan and echo are on chart  - portacath placed on 5/21  - started chemotherapy with 1st cycle on 5/27/2019   - She had had a recent MRI of the breast again on 8/7  - she completed the neoadjuvant round and is having surgery next week with Dr Garcia at [facility] in [place]      - She saw Dr Howard Cheatham with plastics at [facility] and had bilateral mastectomies on Oct 8th 2019 at [facility].   - She requires herceptin maintenance regimen post-surgery recovery. - She will most likely also require a hysterectomy/oopherectomy and plans to see her GYN Dr Aly Contreras in the near future     - she saw Dr Garcia for follow-up on 11/16/2019. Dr Garcia still awaiting tumor board evaluation at [facility] but she may require XRT  as well.       - She saw Dr Long on 11/15/2019 for radiation evaluation. He has recommended a daily regimen for 6 weeks. She is planning to start XRT in next week or two. She should be able to continue the herceptin every 3 weeks and we are looking into use of perjta but it may need to be held for the duration of the XRT if not.    - XRT completed was previously completed and she has since resumed therapy  -  she has had her hysterectomy/oopherectomy with Dr Contreras on March 3rd 2020; she is healed well from the surgery and has had no postop compliactions    - She is resuming herceptin and perjeta today post-op;   - we discussed starting her on an oral antihormone with arimidex in the near future and the rational for its use    - I had long talk with her about COVID-19 precautions          (2) Left parotid tumor - removed in 2011     (3) Sarcoidosis     (4) Interstitial cystitis     (5) Diet controlled gestational DM     (6) Fibromyalgia     (7) Hx/of cervical dysplasia as teenager s/p cryoablation    (8) Migraine - now on meds prn    (9)  Portacath tip has recently been found to have sheared off and she has seen Dr Rey. She is not having any problems at this time and is prophylactically on leiquis.   -  She has seen Dr Hilton and Dr Lester Jones with [facility] and they were able to successfully removed the portacath tip and she has also had a new port placed at the same time.          VISIT DIAGNOSES:      Malignant neoplasm of left female breast, unspecified estrogen receptor status, unspecified site of breast    HER2-positive carcinoma of left breast    Chemotherapy induced neutropenia    Malignant neoplasm of overlapping sites of left breast in female, estrogen receptor positive    S/P bilateral mastectomy          PLAN:  1.  continue current plans  2.  F/u with GYN as directed by Dr Contreras  3. Check labs every 4 weeks at least  4.  F/u with Dr Rey as directed by him    5. Planned repeat echo this month  MArch 2020  6. Discussed and will set up oral antihormone therapy with arimidex in near future         RTC  4 weeks    Fax note to Ben Rey De BoisBlanc, Marshall, Katira, Mackey, Mannina    Discussion:     Antihormone Therapy Discussion:    I discussed the advantages of antihormone therapy with the patient with regard to their particular neoplastic or carcinoma in situ condition. I went over the side-effect profile of the medication including risk for potential development of endometrial cancer and/or hyperplasia in women who still have a uterus and the need for yearly GYN evaluation and follow-up. I discussed the risks for thromboembolic events such as DVT's, pulmonary emboli, CVA's, retinal vascular clots, phlebitis, and TIA's. I discussed the potential risks for development of ocular disturbances, retinopathy, cataracts, corneal changes, flushing, hot flashes, amenorrhea, altered menses, fluid retention, weight changes, elevations in LFT's, liver damage, and mood disturbances. I discussed the potential risk of arthropathy and joint pains/aches which could be chronic and debilitating. I discussed potential adverse effects on bone mineralization and the risk of osteopenia and/or osteoporosis which could led to increase risk of fractures.   A consent form was obtained and a copy was provided to the patient.      COVID-19 Discussion:    I had long discussion with patient and any applicable family about the COVID-19 coronavirus epidemic and the recommended precautions with regard to cancer and/or hematology patients. I have re-iterated the CDC recommendations for adequate hand washing, use of hand -like products, and coughing into elbow, etc. In addition, especially for our patients who are on chemotherapy and/or our otherwise immunocompromised patients, I have recommended avoidance of crowds, including movie theaters, restaurants, churches, etc. I have recommended avoidance of any sick or symptomatic  family members and/or friends. I have also recommended avoidance of any raw and unwashed food products, and general avoidance of food items that have not been prepared by themselves. The patient has been asked to call us immediately with any symptom developments, issues, questions or other general concerns.         I spent over 25 mins of time with the patient. Reviewed results of the recently ordered labs, tests and studies; made directives with regards to the results. Over half of this time was spent couseling and coordinating care.    I have explained all of the above in detail and the patient understands all of the current recommendation(s). I have answered all of their questions to the best of my ability and to their complete satisfaction.   The patient is to continue with the current management plan.            Electronically signed by Abel Chambers MD

## 2020-03-16 NOTE — PLAN OF CARE
Port accessed per MD orders. Remained free of sings of infection. Pt given Pertuzumab plus trastuzumab per MD orders. Pt tolerated.

## 2020-03-20 ENCOUNTER — TELEPHONE (OUTPATIENT)
Dept: CARDIOLOGY | Facility: HOSPITAL | Age: 40
End: 2020-03-20

## 2020-03-23 ENCOUNTER — CLINICAL SUPPORT (OUTPATIENT)
Dept: CARDIOLOGY | Facility: HOSPITAL | Age: 40
End: 2020-03-23
Attending: INTERNAL MEDICINE
Payer: MEDICAID

## 2020-03-23 ENCOUNTER — TELEPHONE (OUTPATIENT)
Dept: REHABILITATION | Facility: HOSPITAL | Age: 40
End: 2020-03-23

## 2020-03-23 LAB
AORTIC ROOT ANNULUS: 2.36 CM
AORTIC VALVE CUSP SEPERATION: 1.56 CM
AV INDEX (PROSTH): 0.83
AV MEAN GRADIENT: 4 MMHG
AV PEAK GRADIENT: 6 MMHG
AV VALVE AREA: 2.73 CM2
AV VELOCITY RATIO: 64.14
CV ECHO LV RWT: 0.48 CM
DOP CALC AO PEAK VEL: 1.21 M/S
DOP CALC AO VTI: 21.3 CM
DOP CALC LVOT AREA: 3.3 CM2
DOP CALC LVOT DIAMETER: 2.05 CM
DOP CALC LVOT PEAK VEL: 77.61 M/S
DOP CALC LVOT STROKE VOLUME: 58.09 CM3
DOP CALCLVOT PEAK VEL VTI: 17.61 CM
E WAVE DECELERATION TIME: 194.76 MSEC
E/A RATIO: 1.07
E/E' RATIO: 8.82 M/S
ECHO LV POSTERIOR WALL: 1.06 CM (ref 0.6–1.1)
FRACTIONAL SHORTENING: 39 % (ref 28–44)
INTERVENTRICULAR SEPTUM: 1.03 CM (ref 0.6–1.1)
LEFT ATRIUM SIZE: 3.5 CM
LEFT INTERNAL DIMENSION IN SYSTOLE: 2.71 CM (ref 2.1–4)
LEFT VENTRICULAR INTERNAL DIMENSION IN DIASTOLE: 4.44 CM (ref 3.5–6)
LEFT VENTRICULAR MASS: 159.44 G
LV LATERAL E/E' RATIO: 6.82 M/S
LV SEPTAL E/E' RATIO: 12.5 M/S
MV PEAK A VEL: 0.7 M/S
MV PEAK E VEL: 0.75 M/S
PISA TR MAX VEL: 2.65 M/S
PV PEAK VELOCITY: 88 CM/S
RA PRESSURE: 3 MMHG
RIGHT VENTRICULAR END-DIASTOLIC DIMENSION: 229 CM
TDI LATERAL: 0.11 M/S
TDI SEPTAL: 0.06 M/S
TDI: 0.09 M/S
TR MAX PG: 28 MMHG
TV REST PULMONARY ARTERY PRESSURE: 31 MMHG

## 2020-03-23 PROCEDURE — 93306 TTE W/DOPPLER COMPLETE: CPT

## 2020-03-23 NOTE — TELEPHONE ENCOUNTER
Called patient to check on her. She is immuno-compromised and also s/p hysterectomy 3/6/2020. Discussed the opportunity to receive tele-health services for PT and patient would not like to be considered for tele-health visits when they become available, stating she is home with 4 kids and would not be able to do them.   Pt has HEP and will continue this until she is able to return to clinic for treatment.

## 2020-03-26 ENCOUNTER — TELEPHONE (OUTPATIENT)
Dept: PLASTIC SURGERY | Facility: CLINIC | Age: 40
End: 2020-03-26

## 2020-03-26 NOTE — TELEPHONE ENCOUNTER
Spoke with pt and informed her that her appt/consult for reconstruction would be rescheduled for a new date once we are able to see pts in cllinic again . Pt verbalized understanding.

## 2020-04-01 ENCOUNTER — LAB VISIT (OUTPATIENT)
Dept: LAB | Facility: HOSPITAL | Age: 40
End: 2020-04-01
Attending: INTERNAL MEDICINE
Payer: MEDICAID

## 2020-04-01 ENCOUNTER — TELEPHONE (OUTPATIENT)
Dept: HEMATOLOGY/ONCOLOGY | Facility: CLINIC | Age: 40
End: 2020-04-01

## 2020-04-01 DIAGNOSIS — C50.912 HER2-POSITIVE CARCINOMA OF LEFT BREAST: ICD-10-CM

## 2020-04-01 DIAGNOSIS — C50.912 MALIGNANT NEOPLASM OF LEFT FEMALE BREAST, UNSPECIFIED ESTROGEN RECEPTOR STATUS, UNSPECIFIED SITE OF BREAST: Primary | ICD-10-CM

## 2020-04-01 DIAGNOSIS — D64.9 FATIGUE ASSOCIATED WITH ANEMIA: ICD-10-CM

## 2020-04-01 DIAGNOSIS — C50.912 MALIGNANT NEOPLASM OF LEFT FEMALE BREAST, UNSPECIFIED ESTROGEN RECEPTOR STATUS, UNSPECIFIED SITE OF BREAST: ICD-10-CM

## 2020-04-01 LAB
ALBUMIN SERPL BCP-MCNC: 3.8 G/DL (ref 3.5–5.2)
ALP SERPL-CCNC: 73 U/L (ref 55–135)
ALT SERPL W/O P-5'-P-CCNC: 23 U/L (ref 10–44)
ANION GAP SERPL CALC-SCNC: 8 MMOL/L (ref 8–16)
AST SERPL-CCNC: 22 U/L (ref 10–40)
BASOPHILS # BLD AUTO: 0.03 K/UL (ref 0–0.2)
BASOPHILS NFR BLD: 0.6 % (ref 0–1.9)
BILIRUB SERPL-MCNC: 0.8 MG/DL (ref 0.1–1)
BUN SERPL-MCNC: 14 MG/DL (ref 6–20)
CALCIUM SERPL-MCNC: 9 MG/DL (ref 8.7–10.5)
CHLORIDE SERPL-SCNC: 107 MMOL/L (ref 95–110)
CO2 SERPL-SCNC: 26 MMOL/L (ref 23–29)
CREAT SERPL-MCNC: 1 MG/DL (ref 0.5–1.4)
DIFFERENTIAL METHOD: ABNORMAL
EOSINOPHIL # BLD AUTO: 0.1 K/UL (ref 0–0.5)
EOSINOPHIL NFR BLD: 1.4 % (ref 0–8)
ERYTHROCYTE [DISTWIDTH] IN BLOOD BY AUTOMATED COUNT: 12.9 % (ref 11.5–14.5)
EST. GFR  (AFRICAN AMERICAN): >60 ML/MIN/1.73 M^2
EST. GFR  (NON AFRICAN AMERICAN): >60 ML/MIN/1.73 M^2
GLUCOSE SERPL-MCNC: 114 MG/DL (ref 70–110)
HCT VFR BLD AUTO: 33.2 % (ref 37–48.5)
HGB BLD-MCNC: 11 G/DL (ref 12–16)
IMM GRANULOCYTES # BLD AUTO: 0.02 K/UL (ref 0–0.04)
IMM GRANULOCYTES NFR BLD AUTO: 0.4 % (ref 0–0.5)
LYMPHOCYTES # BLD AUTO: 0.9 K/UL (ref 1–4.8)
LYMPHOCYTES NFR BLD: 17.4 % (ref 18–48)
MCH RBC QN AUTO: 31.5 PG (ref 27–31)
MCHC RBC AUTO-ENTMCNC: 33.1 G/DL (ref 32–36)
MCV RBC AUTO: 95 FL (ref 82–98)
MONOCYTES # BLD AUTO: 0.4 K/UL (ref 0.3–1)
MONOCYTES NFR BLD: 7.2 % (ref 4–15)
NEUTROPHILS # BLD AUTO: 3.7 K/UL (ref 1.8–7.7)
NEUTROPHILS NFR BLD: 73 % (ref 38–73)
NRBC BLD-RTO: 0 /100 WBC
PLATELET # BLD AUTO: 161 K/UL (ref 150–350)
PMV BLD AUTO: 8.6 FL (ref 9.2–12.9)
POTASSIUM SERPL-SCNC: 3.9 MMOL/L (ref 3.5–5.1)
PROT SERPL-MCNC: 7.1 G/DL (ref 6–8.4)
RBC # BLD AUTO: 3.49 M/UL (ref 4–5.4)
SODIUM SERPL-SCNC: 141 MMOL/L (ref 136–145)
WBC # BLD AUTO: 5 K/UL (ref 3.9–12.7)

## 2020-04-01 PROCEDURE — 36415 COLL VENOUS BLD VENIPUNCTURE: CPT

## 2020-04-01 PROCEDURE — 80053 COMPREHEN METABOLIC PANEL: CPT

## 2020-04-01 PROCEDURE — 85025 COMPLETE CBC W/AUTO DIFF WBC: CPT

## 2020-04-03 NOTE — PROGRESS NOTES
[facility] Hematology/Oncology  PROGRESS NOTE -  Follow-up Visit      Subjective:       Patient ID:   NAME: Sheridan Todd : 1980     39 y.o. female    Referring Doc: Krissy  Other Physicians: Jose Rodriguez De Boisblanc, Marshall; Howard Cheatham ([facility])    Chief Complaint: left breast ca f/u     History of Present Illness:     Patient returns today for a regularly scheduled follow-up visit.  She has since completed the neoadjuvant chemotherapy on 2019 and has subsequently surgery next at [facility] with Dr Garcia.  She denies any CP, SOB, HA's or N/V. She is here by herself.    She has since resumed maintenance therapy with herceptin/perjeta every 3 weeks therapy.      XRT completed was previously completed and she has had her hysterectomy/oopherectomy with Dr bryant on 2020; she is healed well from the surgery and has had no postop compliactions    She has since resumed herceptin and perjeta; we discussed starting her on an oral antihormone with arimidex and she started on Momday    I had long talk again with her about COVID-19 precautions      ROS:   GEN: normal without any fever, night sweats or weight loss  HEENT: normal with no HA's, sore throat, stiff neck, changes in vision  CV: normal with no CP, SOB, PND, MENDIETA or orthopnea  PULM: normal with no SOB, cough, hemoptysis, sputum or pleuritic pain  GI: normal with no abdominal pain, nausea, vomiting, constipation, diarrhea, melanotic stools, BRBPR, or hematemesis  : normal with no hematuria, dysuria  BREAST: no identifiable abnormality at this time  SKIN: normal with no rash, erythema, bruising, or swelling    Allergies:  Review of patient's allergies indicates:   Allergen Reactions    Shellfish containing products Anaphylaxis and Nausea And Vomiting     Only crabmeat      Codeine Nausea And Vomiting     Pt thinks it was tylenol #3 with codeine  Can take hydrocodone & oxycodone  Other reaction(s): Nausea     Tegaderm ag mesh [silver] Blisters     Redness, itching and tears skin        Medications:    Current Outpatient Medications:     anastrozole (ARIMIDEX) 1 mg Tab, Take 1 tablet (1 mg total) by mouth once daily., Disp: 30 tablet, Rfl: 6    Current Facility-Administered Medications:     cyanocobalamin injection 1,000 mcg, 1,000 mcg, Subcutaneous, Q30 Days, Abel Chambers MD, 1,000 mcg at 03/16/20 1129    Facility-Administered Medications Ordered in Other Visits:     alteplase injection 2 mg, 2 mg, Intra-Catheter, PRN, Abel Chambers MD    heparin, porcine (PF) 100 unit/mL injection flush 500 Units, 500 Units, Intravenous, PRN, Abel Chambers MD    pertuzumab (PERJETA) 420 mg in sodium chloride 0.9% 264 mL infusion, 420 mg, Intravenous, 1 time in Clinic/HOD, Abel Chambers MD    sodium chloride 0.9% 100 mL flush bag, , Intravenous, 1 time in Clinic/HOD, Abel Chambers MD    sodium chloride 0.9% flush 10 mL, 10 mL, Intravenous, PRN, Abel Chambers MD    trastuzumab 468 mg in sodium chloride 0.9% 250 mL chemo infusion, 6 mg/kg (Treatment Plan Recorded), Intravenous, 1 time in Clinic/HOD, Abel Chambers MD    PMHx/PSHx Updates:  See patient's last visit with me on 2/17/2020.  See H&P on 5/16/2019        Pathology:  Cancer Staging      Breast biopsies: 5/1/2019:  Left breast: with invasive ductal carcinoma grade 3; ER positive at 95% and MT positive at 90%; Her2Nu was positive at +3; Her2/CEP 17 ratio was >10.6  - Ki67 was unfavorable at 69%  Right breast: negative for cancer      Bilateral Mastectomies 10/8/2019:    FINAL PATHOLOGIC DIAGNOSIS  1. BREAST (RIGHT MASTECTOMY): NO EVIDENCE OF MALIGNANCY IN SECTIONS OF SKIN, NIPPLE, AND  BREAST TISSUE.  2. BREAST (LEFT MASTECTOMY): INVASIVE DUCTAL CARCINOMA (2 SEPARATE RESIDUAL FOCI);  ASSOCIATED HIGH-GRADE DUCTAL CARCINOMA IN SITU; SECTIONS OF SKIN, NIPPLE, AND BREAST  MARGINS FREE OF MALIGNANCY.  Note: Two foci of residual ductal  carcinoma are identified with differing histologies . The larger lesion consists of  small foci of invasive ductal carcinoma in a 19mm fibrotic nodule with suggestion of presurgical therapeutic  response. Vascular invasion cannot be demonstrated on CD31 stain. There is an additional separate  microinvasive focus of mucinous carcinoma. Foci of DCIS are present throughout the breast tissue.  HORMONE RECEPTOR ASSAYS:  ER-strongly positive (90%)  PgR-weakly positive (1-2%)  Her2-equivocal (1 2+); to be sent for FISH testing  Ki67 proliferative activity-30% activity  Positive and negative controls reacted appropriately.  3. LYMPH NODE (SENTINEL LYMPH NODE, CLINICAL): NO EVIDENCE OF MALIGNANCY.  Note: The lymph node was examined at three levels with H&E and epithelial immunostains (AE1/AES, CAM5.2,  and WSK). Positive and negative controls reacted appropriately.  4. LYMPH NODES (22): NO EVIDENCE OF MALIGNANCY.         Objective:     Vitals:  Blood pressure 116/73, pulse 70, temperature 97.9 °F (36.6 °C), resp. rate 18, weight 85.4 kg (188 lb 4.4 oz).     Physical Examination:   GEN: no apparent distress, comfortable; AAOx3  HEAD: atraumatic and normocephalic  EYES: no pallor, no icterus, PERRLA  ENT: OMM, no pharyngeal erythema, external ears WNL; no nasal discharge; no thrush  NECK: no masses, thyroid normal, trachea midline, no LAD/LN's, supple  CV: RRR with no murmur; normal pulse; normal S1 and S2; no pedal edema  CHEST: Normal respiratory effort; CTAB; normal breath sounds; no wheeze or crackles; portacath on right chest wall    ABDOM: nontender and nondistended; soft; normal bowel sounds; no rebound/guarding  MUSC/Skeletal: ROM normal; no crepitus; joints normal; no deformities or arthropathy  EXTREM: no clubbing, cyanosis, inflammation or swelling  SKIN: no rashes, lesions, ulcers, petechiae or subcutaneous nodules  : no lopez  NEURO: grossly intact; motor/sensory WNL; AAOx3; no tremors  PSYCH: normal mood,  affect and behavior  LYMPH: normal cervical, supraclavicular, axillary and groin LN's  Breast: s/p bilateral mastectomies       Labs:   4/1/2020  Lab Results   Component Value Date    WBC 5.00 04/01/2020    HGB 11.0 (L) 04/01/2020    HCT 33.2 (L) 04/01/2020    MCV 95 04/01/2020     04/01/2020     BMP  Lab Results   Component Value Date     04/01/2020    K 3.9 04/01/2020     04/01/2020    CO2 26 04/01/2020    BUN 14 04/01/2020    CREATININE 1.0 04/01/2020    CALCIUM 9.0 04/01/2020    ANIONGAP 8 04/01/2020    ESTGFRAFRICA >60.0 04/01/2020    EGFRNONAA >60.0 04/01/2020             Radiology/Diagnostic Studies:    MRI head  12/5/2019:    Impression       No evidence of intracranial metastatic disease.           MRI breast  8/7/2019:    1. Significant interval improvement of enhancing spiculated mass in left breast near 4:00 position, as well as more diffuse non mass like enhancement throughout the superior left breast since 04/26/2019, consistent with a favorable response to interval neoadjuvant treatment.  2. No significant change of lobular lower inner quadrant right breast mass consistent with biopsy-proven fibroadenoma.  3. Unchanged 7 mm right breast mass near 12:00 position felt to represent intramammary lymph node.      Nm Pet Ct Routine Skull To Mid Thigh    Result Date: 5/21/2019  INTEGRATED PET CT WITH IMAGE FUSION HISTORY:  Left breast cancer initial treatment evaluation TECHNIQUE: Following the injection of 12.7 mCi of F-18 labeled FDG into a right antecubital vein, PET CT was performed from the vertex of the skull through the proximal thighs with an integrated full ring PET CT scanner with image fusion. The patient's serum glucose at the time of the exam was 82 mg/dL. FINDINGS: There is diffuse FDG activity in the subareolar left breast with skin thickening and multiple hypermetabolic masses. There is a 19 mm spiculated hypermetabolic mass in the inferior lateral left breast with a max  SUV of 10.4. This was previously biopsied. There is a 2 cm hypermetabolic area in the lateral superior left breast with max SUV of 9.9. There is an 11 mm nodule within the inner right breast which was biopsied and shown to represent fibroadenoma. This shows no FDG activity. There is no axillary, mediastinal or hilar adenopathy. There are no pulmonary nodules, infiltrates or pleural effusions. CT of the head and neck show no intra-axial lesions or cervical adenopathy. CT of the abdomen and pelvis demonstrates physiologic activity in the GI tract and urinary system. The liver and adrenal glands are normal. There are mildly prominent lymph nodes within the right lower quadrant the largest measures 13 mm with a max SUV of 3.1. Findings are suspicious for mesenteric adenitis. There is no retroperitoneal adenopathy. There are no lytic or blastic lesions.     IMPRESSION: Diffuse FDG activity within the subareolar left breast with skin thickening and multiple hypermetabolic left breast masses consistent with patient's history of left breast cancer. No evidence of metastatic disease 11 mm nodule in the medial right breast which was shown to represent fibroadenoma Mildly prominent lymph nodes in the right lower quadrant with mild FDG activity suggestive of mesenteric adenitis. Follow-up CT scan in 3-6 months is recommended.     Read and electronically signed by: Radha Garnett MD on 5/21/2019 1:13 PM CDT RADHA GARNETT MD    [facility] Unknown Rad Eap    Result Date: 5/21/2019  Chest single view CLINICAL DATA: Port-A-Cath placement FINDINGS: AP view shows the heart to be within normal size limits. The mediastinum is unremarkable. Right subclavian Port-A-Cath tip is at superior vena cava. No pneumothorax or other placement related complication is identified. No infiltrates or effusions are demonstrated. No acute osseous abnormalities are demonstrated. IMPRESSION: 1. Right sided Port-A-Cath appears appropriately positioned,  with no pneumothorax or other placement related complication. No acute radiographic abnormalities. Read and electronically signed by: Teofilo Patterson MD on 5/21/2019 2:52 PM CDT TEOFILO PATTERSON MD      I have reviewed all available lab results and radiology reports.    Assessment/Plan:   (1) 39 y.o. female  with diagnosis of left breast cancer who has been referred by Dr Salvador Rey with [facility] for evaluation by medical oncology.      - She had presented with left breast pain which became progressive over a 4 month period.   - Radiology studies found a 2.5cm mass on the left breast along with two inflamed LN's on the right.   - She had left breast biopsy on 5/1/2019 which showed invasive ductal carcinoma grade 3. The right breast biopsy was negative for cancer.   - She is ER and HI positive. She is also Her2Nu +3.   - We discussed the latest data on Her2Nu positive breast cancers and the recommendation for neoadjuvant chemotherapy with a herceptin and perjeta.  - she will need echo, portacath, and PET scan  - will plan for herceptin, perjeta, carboplatin and taxotere regimen neoadjuvantly with 6 cycles  -  S/p set up chemotherapy school; discussed side-effect profile, provided literature on the regimen; obtained consents  - she is set up for May 27th to start  - PET scan and echo are on chart  - portacath placed on 5/21  - started chemotherapy with 1st cycle on 5/27/2019   - She had had a recent MRI of the breast again on 8/7  - she completed the neoadjuvant round and is having surgery next week with Dr Garcia at [facility] in [place]      - She saw Dr Howard Cheatham with plastics at [facility] and had bilateral mastectomies on Oct 8th 2019 at [facility].   - She requires herceptin maintenance regimen post-surgery recovery. - She will most likely also require a hysterectomy/oopherectomy and plans to see her GYN Dr Aly Contreras in the near future     - she saw Dr Garcia for follow-up on  11/16/2019. Dr Garcia still awaiting tumor board evaluation at [facility] but she may require XRT as well.       - She saw Dr Long on 11/15/2019 for radiation evaluation. He has recommended a daily regimen for 6 weeks. She is planning to start XRT in next week or two. She should be able to continue the herceptin every 3 weeks and we are looking into use of perjta but it may need to be held for the duration of the XRT if not.    - XRT completed was previously completed and she has since resumed therapy  -  she has had her hysterectomy/oopherectomy with Dr Contreras on March 3rd 2020; she is healed well from the surgery and has had no postop compliactions    - She has since resumed herceptin and perjeta post-op;   - we discussed starting her on an oral antihormone with arimidex and she has since started this past Monday    - I had long talk with her again about COVID-19 precautions      (2) Left parotid tumor - removed in 2011     (3) Sarcoidosis     (4) Interstitial cystitis     (5) Diet controlled gestational DM     (6) Fibromyalgia     (7) Hx/of cervical dysplasia as teenager s/p cryoablation    (8) Migraine - now on meds prn    (9)  Portacath tip has recently been found to have sheared off and she has seen Dr Rey. She is not having any problems at this time and is prophylactically on leiquis.   -  She has seen Dr Hilton and Dr Lester Jones with [facility] and they were able to successfully removed the portacath tip and she has also had a new port placed at the same time.          VISIT DIAGNOSES:      Malignant neoplasm of left female breast, unspecified estrogen receptor status, unspecified site of breast    S/P bilateral mastectomy    Chemotherapy induced neutropenia    HER2-positive carcinoma of left breast    Malignant neoplasm of overlapping sites of left breast in female, estrogen receptor positive          PLAN:  1.  continue current plans  2.  F/u with GYN as directed by Dr Contreras  3. Check labs  every 4 weeks at least  4.  F/u with Dr Rey as directed by him    5. Repeat echo as directed  6. continue oral antihormone therapy with arimidex      RTC  4 weeks    Fax note to Ben Rey De BoisBlanc, Marshall, Katira, Mackey, Mannina    Discussion:     Antihormone Therapy Discussion:    I discussed the advantages of antihormone therapy with the patient with regard to their particular neoplastic or carcinoma in situ condition. I went over the side-effect profile of the medication including risk for potential development of endometrial cancer and/or hyperplasia in women who still have a uterus and the need for yearly GYN evaluation and follow-up. I discussed the risks for thromboembolic events such as DVT's, pulmonary emboli, CVA's, retinal vascular clots, phlebitis, and TIA's. I discussed the potential risks for development of ocular disturbances, retinopathy, cataracts, corneal changes, flushing, hot flashes, amenorrhea, altered menses, fluid retention, weight changes, elevations in LFT's, liver damage, and mood disturbances. I discussed the potential risk of arthropathy and joint pains/aches which could be chronic and debilitating. I discussed potential adverse effects on bone mineralization and the risk of osteopenia and/or osteoporosis which could led to increase risk of fractures.   A consent form was obtained and a copy was provided to the patient.      COVID-19 Discussion:    I had long discussion with patient and any applicable family about the COVID-19 coronavirus epidemic and the recommended precautions with regard to cancer and/or hematology patients. I have re-iterated the CDC recommendations for adequate hand washing, use of hand -like products, and coughing into elbow, etc. In addition, especially for our patients who are on chemotherapy and/or our otherwise immunocompromised patients, I have recommended avoidance of crowds, including movie theaters, restaurants, churches, etc. I  have recommended avoidance of any sick or symptomatic family members and/or friends. I have also recommended avoidance of any raw and unwashed food products, and general avoidance of food items that have not been prepared by themselves. The patient has been asked to call us immediately with any symptom developments, issues, questions or other general concerns.         I spent over 25 mins of time with the patient. Reviewed results of the recently ordered labs, tests and studies; made directives with regards to the results. Over half of this time was spent couseling and coordinating care.    I have explained all of the above in detail and the patient understands all of the current recommendation(s). I have answered all of their questions to the best of my ability and to their complete satisfaction.   The patient is to continue with the current management plan.            Electronically signed by Abel Chambers MD

## 2020-04-06 ENCOUNTER — INFUSION (OUTPATIENT)
Dept: INFUSION THERAPY | Facility: HOSPITAL | Age: 40
End: 2020-04-06
Attending: INTERNAL MEDICINE
Payer: MEDICAID

## 2020-04-06 ENCOUNTER — OFFICE VISIT (OUTPATIENT)
Dept: HEMATOLOGY/ONCOLOGY | Facility: CLINIC | Age: 40
End: 2020-04-06
Payer: MEDICAID

## 2020-04-06 VITALS
WEIGHT: 188.25 LBS | HEART RATE: 70 BPM | TEMPERATURE: 98 F | DIASTOLIC BLOOD PRESSURE: 73 MMHG | RESPIRATION RATE: 18 BRPM | BODY MASS INDEX: 32.32 KG/M2 | SYSTOLIC BLOOD PRESSURE: 116 MMHG

## 2020-04-06 VITALS
RESPIRATION RATE: 18 BRPM | WEIGHT: 188.31 LBS | BODY MASS INDEX: 32.15 KG/M2 | DIASTOLIC BLOOD PRESSURE: 79 MMHG | HEIGHT: 64 IN | SYSTOLIC BLOOD PRESSURE: 125 MMHG | HEART RATE: 66 BPM | TEMPERATURE: 98 F

## 2020-04-06 DIAGNOSIS — T45.1X5A CHEMOTHERAPY INDUCED NEUTROPENIA: ICD-10-CM

## 2020-04-06 DIAGNOSIS — Z17.0 MALIGNANT NEOPLASM OF OVERLAPPING SITES OF LEFT BREAST IN FEMALE, ESTROGEN RECEPTOR POSITIVE: ICD-10-CM

## 2020-04-06 DIAGNOSIS — D70.1 CHEMOTHERAPY INDUCED NEUTROPENIA: ICD-10-CM

## 2020-04-06 DIAGNOSIS — C50.812 MALIGNANT NEOPLASM OF OVERLAPPING SITES OF LEFT BREAST IN FEMALE, ESTROGEN RECEPTOR POSITIVE: ICD-10-CM

## 2020-04-06 DIAGNOSIS — C50.912 HER2-POSITIVE CARCINOMA OF LEFT BREAST: ICD-10-CM

## 2020-04-06 DIAGNOSIS — E53.8 B12 DEFICIENCY: ICD-10-CM

## 2020-04-06 DIAGNOSIS — Z90.13 S/P BILATERAL MASTECTOMY: ICD-10-CM

## 2020-04-06 DIAGNOSIS — E86.0 DEHYDRATION: Primary | ICD-10-CM

## 2020-04-06 DIAGNOSIS — C50.912 MALIGNANT NEOPLASM OF LEFT FEMALE BREAST, UNSPECIFIED ESTROGEN RECEPTOR STATUS, UNSPECIFIED SITE OF BREAST: Primary | ICD-10-CM

## 2020-04-06 PROCEDURE — 25000003 PHARM REV CODE 250: Performed by: INTERNAL MEDICINE

## 2020-04-06 PROCEDURE — A4216 STERILE WATER/SALINE, 10 ML: HCPCS | Performed by: INTERNAL MEDICINE

## 2020-04-06 PROCEDURE — 63600175 PHARM REV CODE 636 W HCPCS: Mod: JG | Performed by: INTERNAL MEDICINE

## 2020-04-06 PROCEDURE — 99214 OFFICE O/P EST MOD 30 MIN: CPT | Mod: S$GLB,,, | Performed by: INTERNAL MEDICINE

## 2020-04-06 PROCEDURE — 96417 CHEMO IV INFUS EACH ADDL SEQ: CPT

## 2020-04-06 PROCEDURE — 99214 PR OFFICE/OUTPT VISIT, EST, LEVL IV, 30-39 MIN: ICD-10-PCS | Mod: S$GLB,,, | Performed by: INTERNAL MEDICINE

## 2020-04-06 PROCEDURE — 96413 CHEMO IV INFUSION 1 HR: CPT

## 2020-04-06 RX ORDER — SODIUM CHLORIDE 0.9 % (FLUSH) 0.9 %
10 SYRINGE (ML) INJECTION
Status: DISCONTINUED | OUTPATIENT
Start: 2020-04-06 | End: 2020-04-06 | Stop reason: HOSPADM

## 2020-04-06 RX ORDER — SODIUM CHLORIDE 0.9 % (FLUSH) 0.9 %
10 SYRINGE (ML) INJECTION
Status: CANCELLED | OUTPATIENT
Start: 2020-04-06

## 2020-04-06 RX ORDER — HEPARIN 100 UNIT/ML
500 SYRINGE INTRAVENOUS
Status: CANCELLED | OUTPATIENT
Start: 2020-04-06

## 2020-04-06 RX ORDER — HEPARIN 100 UNIT/ML
500 SYRINGE INTRAVENOUS
Status: DISCONTINUED | OUTPATIENT
Start: 2020-04-06 | End: 2020-04-06 | Stop reason: HOSPADM

## 2020-04-06 RX ADMIN — PERTUZUMAB 420 MG: 30 INJECTION, SOLUTION, CONCENTRATE INTRAVENOUS at 01:04

## 2020-04-06 RX ADMIN — HEPARIN 500 UNITS: 100 SYRINGE at 03:04

## 2020-04-06 RX ADMIN — TRASTUZUMAB 468 MG: 150 INJECTION, POWDER, LYOPHILIZED, FOR SOLUTION INTRAVENOUS at 02:04

## 2020-04-06 RX ADMIN — SODIUM CHLORIDE, PRESERVATIVE FREE 10 ML: 5 INJECTION INTRAVENOUS at 03:04

## 2020-04-08 ENCOUNTER — TELEPHONE (OUTPATIENT)
Dept: REHABILITATION | Facility: HOSPITAL | Age: 40
End: 2020-04-08

## 2020-04-08 ENCOUNTER — DOCUMENTATION ONLY (OUTPATIENT)
Dept: REHABILITATION | Facility: HOSPITAL | Age: 40
End: 2020-04-08

## 2020-04-08 NOTE — PROGRESS NOTES
Outpatient Therapy Discharge Summary     Name: Sheridan Todd  Clinic Number: [id number]    Therapy Diagnosis:   Limited range of motion of shoulder      Muscle weakness of left arm      Scar of breast        Physician: Rebecca Garcia MD  Physician Orders: PT Eval and Treat   Medical Diagnosis: Z85.3 (ICD-10-CM) - History of left breast cancer  Evaluation Date: 10/2/2019  Authorization period Expiration: 1/25/2020  Plan of Care Certification Period: New POC 1/24/2020 - 3/13/2020      Date of Last visit: 3/2/2020  Total Visits Received: 9  Cancelled Visits: 1  No Show Visits: 0    Assessment      Goals: Short Term goals: 2 weeks  1. Patient will demonstrate 100% understanding of lymphedema risk reduction practices to include self monitoring for lymphedema. (progressing 3/2/2020     2. Patient will demonstrate independence with Home Exercise program established. Met 12/16/19  3. Pt will increase AROM/PROM in shoulder ER ROM to 75 degrees on left to improve functional reach, carry, push, pull pain free. Met 12/2/2019  4. Strength will be assessed and appropriate goals set. Met - see LTG below   5. New STG: Pt will demonstrate ability to engage oblique muscles during forced exhalation to improve core stability.   Long Term Goals: 4 weeks   1.  Pt will increase AROM/PROM in shoulder flexion to WNL on left to improve functional reach, carry, push, pull pain free. Met 2/26/2020  2. Pt will increase strength to 4+/5 in gross UE musculature to improve tolerance to all functional activities pain free. Progressing 3/2/2020     3. Pt will demonstrate full/maximized tissue mobility to increase ROM and promote healthy tissue to be pain free at discharge. Progressing 3/2/2020     4. Pt will report decrease in overall worst pain to 2/10 at discharge. Progressing 3/2/2020     5. Patient will report compliance with walking program 5x week for 10 min each day to improve overall cardiovascular function and decrease cancer  related fatigue at discharge. not met)  Discharge reason: Other:  Patient is not able to attend PT in clinic due to COVID19 and her co-morbidities. Will re-evaluate patient once restrictions are lifted and she is able to return to the clinic. She has been emailed an updated HEP to continue until then. (See telephone encounter dated 4/8/2020.    Plan   This patient is discharged from Physical Therapy

## 2020-04-08 NOTE — TELEPHONE ENCOUNTER
Spoke with patient to follow up as she is not coming to PT in clinic due to COVID 19 restrictions. She states she is feeling some tightness in the chest and shoulder and has not been doing HEP consistently. She also asked if it was OK for her daughter to do some scar massage at home. Reviewed technique of scar massage with her over the phone. Pt to call with any concerns or questions.  Also informed pt we would discharge PT for now and re-evaluate her upon returning to clinic once COVID restrictions are lifted. Emailing updated HEP as follows:

## 2020-04-23 ENCOUNTER — LAB VISIT (OUTPATIENT)
Dept: LAB | Facility: HOSPITAL | Age: 40
End: 2020-04-23
Attending: INTERNAL MEDICINE
Payer: MEDICAID

## 2020-04-23 DIAGNOSIS — C50.912 MALIGNANT NEOPLASM OF LEFT FEMALE BREAST, UNSPECIFIED ESTROGEN RECEPTOR STATUS, UNSPECIFIED SITE OF BREAST: ICD-10-CM

## 2020-04-23 DIAGNOSIS — D64.9 FATIGUE ASSOCIATED WITH ANEMIA: ICD-10-CM

## 2020-04-23 DIAGNOSIS — C50.912 HER2-POSITIVE CARCINOMA OF LEFT BREAST: ICD-10-CM

## 2020-04-23 LAB
ALBUMIN SERPL BCP-MCNC: 3.7 G/DL (ref 3.5–5.2)
ALP SERPL-CCNC: 77 U/L (ref 55–135)
ALT SERPL W/O P-5'-P-CCNC: 22 U/L (ref 10–44)
ANION GAP SERPL CALC-SCNC: 7 MMOL/L (ref 8–16)
AST SERPL-CCNC: 22 U/L (ref 10–40)
BASOPHILS # BLD AUTO: 0.03 K/UL (ref 0–0.2)
BASOPHILS NFR BLD: 0.7 % (ref 0–1.9)
BILIRUB SERPL-MCNC: 0.6 MG/DL (ref 0.1–1)
BUN SERPL-MCNC: 16 MG/DL (ref 6–20)
CALCIUM SERPL-MCNC: 9.1 MG/DL (ref 8.7–10.5)
CHLORIDE SERPL-SCNC: 108 MMOL/L (ref 95–110)
CO2 SERPL-SCNC: 27 MMOL/L (ref 23–29)
CREAT SERPL-MCNC: 1 MG/DL (ref 0.5–1.4)
DIFFERENTIAL METHOD: ABNORMAL
EOSINOPHIL # BLD AUTO: 0.1 K/UL (ref 0–0.5)
EOSINOPHIL NFR BLD: 1.6 % (ref 0–8)
ERYTHROCYTE [DISTWIDTH] IN BLOOD BY AUTOMATED COUNT: 11.9 % (ref 11.5–14.5)
EST. GFR  (AFRICAN AMERICAN): >60 ML/MIN/1.73 M^2
EST. GFR  (NON AFRICAN AMERICAN): >60 ML/MIN/1.73 M^2
GLUCOSE SERPL-MCNC: 111 MG/DL (ref 70–110)
HCT VFR BLD AUTO: 34.7 % (ref 37–48.5)
HGB BLD-MCNC: 11.5 G/DL (ref 12–16)
IMM GRANULOCYTES # BLD AUTO: 0.01 K/UL (ref 0–0.04)
IMM GRANULOCYTES NFR BLD AUTO: 0.2 % (ref 0–0.5)
LYMPHOCYTES # BLD AUTO: 0.9 K/UL (ref 1–4.8)
LYMPHOCYTES NFR BLD: 21.4 % (ref 18–48)
MCH RBC QN AUTO: 30.7 PG (ref 27–31)
MCHC RBC AUTO-ENTMCNC: 33.1 G/DL (ref 32–36)
MCV RBC AUTO: 93 FL (ref 82–98)
MONOCYTES # BLD AUTO: 0.4 K/UL (ref 0.3–1)
MONOCYTES NFR BLD: 8.7 % (ref 4–15)
NEUTROPHILS # BLD AUTO: 2.9 K/UL (ref 1.8–7.7)
NEUTROPHILS NFR BLD: 67.4 % (ref 38–73)
NRBC BLD-RTO: 0 /100 WBC
PLATELET # BLD AUTO: 174 K/UL (ref 150–350)
PMV BLD AUTO: 8.9 FL (ref 9.2–12.9)
POTASSIUM SERPL-SCNC: 3.9 MMOL/L (ref 3.5–5.1)
PROT SERPL-MCNC: 7.4 G/DL (ref 6–8.4)
RBC # BLD AUTO: 3.74 M/UL (ref 4–5.4)
SODIUM SERPL-SCNC: 142 MMOL/L (ref 136–145)
WBC # BLD AUTO: 4.25 K/UL (ref 3.9–12.7)

## 2020-04-23 PROCEDURE — 80053 COMPREHEN METABOLIC PANEL: CPT

## 2020-04-23 PROCEDURE — 85025 COMPLETE CBC W/AUTO DIFF WBC: CPT

## 2020-04-23 PROCEDURE — 36415 COLL VENOUS BLD VENIPUNCTURE: CPT

## 2020-04-24 RX ORDER — SODIUM CHLORIDE 0.9 % (FLUSH) 0.9 %
10 SYRINGE (ML) INJECTION
Status: CANCELLED | OUTPATIENT
Start: 2020-04-27

## 2020-04-24 RX ORDER — HEPARIN 100 UNIT/ML
500 SYRINGE INTRAVENOUS
Status: CANCELLED | OUTPATIENT
Start: 2020-04-27

## 2020-04-27 ENCOUNTER — OFFICE VISIT (OUTPATIENT)
Dept: HEMATOLOGY/ONCOLOGY | Facility: CLINIC | Age: 40
End: 2020-04-27
Payer: MEDICAID

## 2020-04-27 ENCOUNTER — CLINICAL SUPPORT (OUTPATIENT)
Dept: HEMATOLOGY/ONCOLOGY | Facility: CLINIC | Age: 40
End: 2020-04-27
Payer: MEDICAID

## 2020-04-27 ENCOUNTER — INFUSION (OUTPATIENT)
Dept: INFUSION THERAPY | Facility: HOSPITAL | Age: 40
End: 2020-04-27
Attending: INTERNAL MEDICINE
Payer: MEDICAID

## 2020-04-27 VITALS
HEART RATE: 78 BPM | WEIGHT: 188.63 LBS | SYSTOLIC BLOOD PRESSURE: 116 MMHG | TEMPERATURE: 98 F | HEIGHT: 64 IN | BODY MASS INDEX: 32.2 KG/M2 | DIASTOLIC BLOOD PRESSURE: 76 MMHG | OXYGEN SATURATION: 100 % | RESPIRATION RATE: 18 BRPM

## 2020-04-27 VITALS
OXYGEN SATURATION: 100 % | WEIGHT: 188.88 LBS | SYSTOLIC BLOOD PRESSURE: 116 MMHG | BODY MASS INDEX: 32.24 KG/M2 | HEART RATE: 78 BPM | HEIGHT: 64 IN | TEMPERATURE: 98 F | DIASTOLIC BLOOD PRESSURE: 76 MMHG | RESPIRATION RATE: 18 BRPM

## 2020-04-27 DIAGNOSIS — Z17.0 MALIGNANT NEOPLASM OF OVERLAPPING SITES OF LEFT BREAST IN FEMALE, ESTROGEN RECEPTOR POSITIVE: ICD-10-CM

## 2020-04-27 DIAGNOSIS — C50.812 MALIGNANT NEOPLASM OF OVERLAPPING SITES OF LEFT BREAST IN FEMALE, ESTROGEN RECEPTOR POSITIVE: ICD-10-CM

## 2020-04-27 DIAGNOSIS — T45.1X5A CHEMOTHERAPY INDUCED NEUTROPENIA: ICD-10-CM

## 2020-04-27 DIAGNOSIS — D70.1 CHEMOTHERAPY INDUCED NEUTROPENIA: ICD-10-CM

## 2020-04-27 DIAGNOSIS — C50.912 HER2-POSITIVE CARCINOMA OF LEFT BREAST: ICD-10-CM

## 2020-04-27 DIAGNOSIS — Z90.710 H/O OF HYSTERECTOMY WITH BILATERAL OOPHORECTOMY: ICD-10-CM

## 2020-04-27 DIAGNOSIS — E86.0 DEHYDRATION: Primary | ICD-10-CM

## 2020-04-27 DIAGNOSIS — E53.8 B12 DEFICIENCY: Primary | ICD-10-CM

## 2020-04-27 DIAGNOSIS — Z90.722 H/O OF HYSTERECTOMY WITH BILATERAL OOPHORECTOMY: ICD-10-CM

## 2020-04-27 DIAGNOSIS — Z79.811 AROMATASE INHIBITOR USE: Primary | ICD-10-CM

## 2020-04-27 DIAGNOSIS — C50.912 MALIGNANT NEOPLASM OF LEFT FEMALE BREAST, UNSPECIFIED ESTROGEN RECEPTOR STATUS, UNSPECIFIED SITE OF BREAST: ICD-10-CM

## 2020-04-27 PROCEDURE — 25000003 PHARM REV CODE 250: Performed by: INTERNAL MEDICINE

## 2020-04-27 PROCEDURE — 96413 CHEMO IV INFUSION 1 HR: CPT

## 2020-04-27 PROCEDURE — A4216 STERILE WATER/SALINE, 10 ML: HCPCS | Performed by: INTERNAL MEDICINE

## 2020-04-27 PROCEDURE — 63600175 PHARM REV CODE 636 W HCPCS: Performed by: INTERNAL MEDICINE

## 2020-04-27 PROCEDURE — 96415 CHEMO IV INFUSION ADDL HR: CPT

## 2020-04-27 PROCEDURE — 99214 OFFICE O/P EST MOD 30 MIN: CPT | Mod: S$GLB,,, | Performed by: NURSE PRACTITIONER

## 2020-04-27 PROCEDURE — 96417 CHEMO IV INFUS EACH ADDL SEQ: CPT

## 2020-04-27 PROCEDURE — 99214 PR OFFICE/OUTPT VISIT, EST, LEVL IV, 30-39 MIN: ICD-10-PCS | Mod: S$GLB,,, | Performed by: NURSE PRACTITIONER

## 2020-04-27 RX ORDER — CYANOCOBALAMIN 1000 UG/ML
1000 INJECTION, SOLUTION INTRAMUSCULAR; SUBCUTANEOUS
Status: CANCELLED | OUTPATIENT
Start: 2020-04-28

## 2020-04-27 RX ORDER — SODIUM CHLORIDE 0.9 % (FLUSH) 0.9 %
10 SYRINGE (ML) INJECTION
Status: DISCONTINUED | OUTPATIENT
Start: 2020-04-27 | End: 2020-04-27 | Stop reason: HOSPADM

## 2020-04-27 RX ORDER — CYANOCOBALAMIN 1000 UG/ML
1000 INJECTION, SOLUTION INTRAMUSCULAR; SUBCUTANEOUS
Status: DISCONTINUED | OUTPATIENT
Start: 2020-04-27 | End: 2020-07-26 | Stop reason: HOSPADM

## 2020-04-27 RX ORDER — HEPARIN 100 UNIT/ML
500 SYRINGE INTRAVENOUS
Status: DISCONTINUED | OUTPATIENT
Start: 2020-04-27 | End: 2020-04-27 | Stop reason: HOSPADM

## 2020-04-27 RX ADMIN — TRASTUZUMAB 468 MG: 150 INJECTION, POWDER, LYOPHILIZED, FOR SOLUTION INTRAVENOUS at 02:04

## 2020-04-27 RX ADMIN — PERTUZUMAB 420 MG: 30 INJECTION, SOLUTION, CONCENTRATE INTRAVENOUS at 01:04

## 2020-04-27 RX ADMIN — SODIUM CHLORIDE, PRESERVATIVE FREE 10 ML: 5 INJECTION INTRAVENOUS at 03:04

## 2020-04-27 RX ADMIN — HEPARIN 500 UNITS: 100 SYRINGE at 03:04

## 2020-04-27 RX ADMIN — CYANOCOBALAMIN 1000 MCG: 1000 INJECTION, SOLUTION INTRAMUSCULAR; SUBCUTANEOUS at 04:04

## 2020-04-27 NOTE — PLAN OF CARE
Problem: Fatigue (Oncology Care)  Goal: Improved Activity Tolerance  Outcome: Ongoing, Progressing  Intervention: Promote Energy Conservation  Flowsheets (Taken 4/27/2020 5345)  Fatigue Management: frequent rest breaks encouraged; paced activity encouraged  Activity Management: activity encouraged

## 2020-04-27 NOTE — PROGRESS NOTES
[facility] Hematology/Oncology  PROGRESS NOTE -  Follow-up Visit      Subjective:       Patient ID:   NAME: Sheridan Todd : 1980     39 y.o. female    Referring Doc: Krissy  Other Physicians: Jose Rodriguez De Boisblanc, Marshall; Howard Cheatham ([facility])    Chief Complaint: left breast ca f/u     History of Present Illness:     Patient returns today for a regularly scheduled follow-up visit.  She has since completed the neoadjuvant chemotherapy on 2019 and has subsequently surgery next at [facility] with Dr Garcia.  She denies any CP, SOB, HA's or N/V. She is here by herself.    She has since resumed maintenance therapy with herceptin/perjeta every 3 weeks therapy and is here for cycle # 150 today.    XRT completed was previously completed and she has had her hysterectomy/oopherectomy with Dr bryant on 2020; she is healed well from the surgery and has had no postop compliactions    She has since resumed herceptin and perjeta; we discussed starting her on an oral antihormone with arimidex and she started on 3/16/2020 and is tolerating it well. She has had several hot flashes but not intolerable.    I had long talk again with her about COVID-19 precautions      ROS:   GEN: normal without any fever, night sweats or weight loss  HEENT: normal with no HA's, sore throat, stiff neck, changes in vision  CV: normal with no CP, SOB, PND, MENDIETA or orthopnea  PULM: normal with no SOB, cough, hemoptysis, sputum or pleuritic pain  GI: normal with no abdominal pain, nausea, vomiting, constipation, diarrhea, melanotic stools, BRBPR, or hematemesis  : normal with no hematuria, dysuria  BREAST: Bilateral Mastectomies  SKIN: normal with no rash, erythema, bruising, or swelling    Allergies:  Review of patient's allergies indicates:   Allergen Reactions    Shellfish containing products Anaphylaxis and Nausea And Vomiting     Only crabmeat      Codeine Nausea And Vomiting     Pt thinks it  was tylenol #3 with codeine  Can take hydrocodone & oxycodone  Other reaction(s): Nausea    Tegaderm ag mesh [silver] Blisters     Redness, itching and tears skin        Medications:    Current Outpatient Medications:     anastrozole (ARIMIDEX) 1 mg Tab, Take 1 tablet (1 mg total) by mouth once daily., Disp: 30 tablet, Rfl: 6    Current Facility-Administered Medications:     cyanocobalamin injection 1,000 mcg, 1,000 mcg, Subcutaneous, Q30 Days, Abel Chambers MD, 1,000 mcg at 03/16/20 1129    cyanocobalamin injection 1,000 mcg, 1,000 mcg, Subcutaneous, Q30 Days, Abel Chambers MD, 1,000 mcg at 04/27/20 1602    PMHx/PSHx Updates:  See patient's last visit with me on 2/17/2020.  See H&P on 5/16/2019        Pathology:  Cancer Staging      Breast biopsies: 5/1/2019:  Left breast: with invasive ductal carcinoma grade 3; ER positive at 95% and LA positive at 90%; Her2Nu was positive at +3; Her2/CEP 17 ratio was >10.6  - Ki67 was unfavorable at 69%  Right breast: negative for cancer      Bilateral Mastectomies 10/8/2019:    FINAL PATHOLOGIC DIAGNOSIS  1. BREAST (RIGHT MASTECTOMY): NO EVIDENCE OF MALIGNANCY IN SECTIONS OF SKIN, NIPPLE, AND  BREAST TISSUE.  2. BREAST (LEFT MASTECTOMY): INVASIVE DUCTAL CARCINOMA (2 SEPARATE RESIDUAL FOCI);  ASSOCIATED HIGH-GRADE DUCTAL CARCINOMA IN SITU; SECTIONS OF SKIN, NIPPLE, AND BREAST  MARGINS FREE OF MALIGNANCY.  Note: Two foci of residual ductal carcinoma are identified with differing histologies . The larger lesion consists of  small foci of invasive ductal carcinoma in a 19mm fibrotic nodule with suggestion of presurgical therapeutic  response. Vascular invasion cannot be demonstrated on CD31 stain. There is an additional separate  microinvasive focus of mucinous carcinoma. Foci of DCIS are present throughout the breast tissue.  HORMONE RECEPTOR ASSAYS:  ER-strongly positive (90%)  PgR-weakly positive (1-2%)  Her2-equivocal (1 2+); to be sent for FISH testing  Ki67  "proliferative activity-30% activity  Positive and negative controls reacted appropriately.  3. LYMPH NODE (SENTINEL LYMPH NODE, CLINICAL): NO EVIDENCE OF MALIGNANCY.  Note: The lymph node was examined at three levels with H&E and epithelial immunostains (AE1/AES, CAM5.2,  and WSK). Positive and negative controls reacted appropriately.  4. LYMPH NODES (22): NO EVIDENCE OF MALIGNANCY.         Objective:     Vitals:  Blood pressure 116/76, pulse 78, temperature 98 °F (36.7 °C), resp. rate 18, height 5' 4" (1.626 m), weight 85.7 kg (188 lb 14.4 oz), SpO2 100 %.     Physical Examination:   GEN: no apparent distress, comfortable; AAOx3  HEAD: atraumatic and normocephalic  EYES: no pallor, no icterus, PERRLA  ENT: OMM, no pharyngeal erythema, external ears WNL; no nasal discharge; no thrush  NECK: no masses, thyroid normal, trachea midline, no LAD/LN's, supple  CV: RRR with no murmur; normal pulse; normal S1 and S2; no pedal edema  CHEST: Normal respiratory effort; CTAB; normal breath sounds; no wheeze or crackles; portacath on right chest wall    ABDOM: nontender and nondistended; soft; normal bowel sounds; no rebound/guarding  MUSC/Skeletal: ROM normal; no crepitus; joints normal; no deformities or arthropathy  EXTREM: no clubbing, cyanosis, inflammation or swelling  SKIN: no rashes, lesions, ulcers, petechiae or subcutaneous nodules  : no lopez  NEURO: grossly intact; motor/sensory WNL; AAOx3; no tremors  PSYCH: normal mood, affect and behavior  LYMPH: normal cervical, supraclavicular, axillary and groin LN's  Breast: s/p bilateral mastectomies       Labs:   4/1/2020  Lab Results   Component Value Date    WBC 4.25 04/23/2020    HGB 11.5 (L) 04/23/2020    HCT 34.7 (L) 04/23/2020    MCV 93 04/23/2020     04/23/2020     BMP  Lab Results   Component Value Date     04/23/2020    K 3.9 04/23/2020     04/23/2020    CO2 27 04/23/2020    BUN 16 04/23/2020    CREATININE 1.0 04/23/2020    CALCIUM 9.1 " 04/23/2020    ANIONGAP 7 (L) 04/23/2020    ESTGFRAFRICA >60.0 04/23/2020    EGFRNONAA >60.0 04/23/2020             Radiology/Diagnostic Studies:    MRI head  12/5/2019:    Impression       No evidence of intracranial metastatic disease.           MRI breast  8/7/2019:    1. Significant interval improvement of enhancing spiculated mass in left breast near 4:00 position, as well as more diffuse non mass like enhancement throughout the superior left breast since 04/26/2019, consistent with a favorable response to interval neoadjuvant treatment.  2. No significant change of lobular lower inner quadrant right breast mass consistent with biopsy-proven fibroadenoma.  3. Unchanged 7 mm right breast mass near 12:00 position felt to represent intramammary lymph node.      Nm Pet Ct Routine Skull To Mid Thigh    Result Date: 5/21/2019  INTEGRATED PET CT WITH IMAGE FUSION HISTORY:  Left breast cancer initial treatment evaluation TECHNIQUE: Following the injection of 12.7 mCi of F-18 labeled FDG into a right antecubital vein, PET CT was performed from the vertex of the skull through the proximal thighs with an integrated full ring PET CT scanner with image fusion. The patient's serum glucose at the time of the exam was 82 mg/dL. FINDINGS: There is diffuse FDG activity in the subareolar left breast with skin thickening and multiple hypermetabolic masses. There is a 19 mm spiculated hypermetabolic mass in the inferior lateral left breast with a max SUV of 10.4. This was previously biopsied. There is a 2 cm hypermetabolic area in the lateral superior left breast with max SUV of 9.9. There is an 11 mm nodule within the inner right breast which was biopsied and shown to represent fibroadenoma. This shows no FDG activity. There is no axillary, mediastinal or hilar adenopathy. There are no pulmonary nodules, infiltrates or pleural effusions. CT of the head and neck show no intra-axial lesions or cervical adenopathy. CT of the abdomen  and pelvis demonstrates physiologic activity in the GI tract and urinary system. The liver and adrenal glands are normal. There are mildly prominent lymph nodes within the right lower quadrant the largest measures 13 mm with a max SUV of 3.1. Findings are suspicious for mesenteric adenitis. There is no retroperitoneal adenopathy. There are no lytic or blastic lesions.     IMPRESSION: Diffuse FDG activity within the subareolar left breast with skin thickening and multiple hypermetabolic left breast masses consistent with patient's history of left breast cancer. No evidence of metastatic disease 11 mm nodule in the medial right breast which was shown to represent fibroadenoma Mildly prominent lymph nodes in the right lower quadrant with mild FDG activity suggestive of mesenteric adenitis. Follow-up CT scan in 3-6 months is recommended.     Read and electronically signed by: Adriane Jacome MD on 5/21/2019 1:13 PM CDT ADRIANE JACOME MD    [facility] Unknown Rad Eap    Result Date: 5/21/2019  Chest single view CLINICAL DATA: Port-A-Cath placement FINDINGS: AP view shows the heart to be within normal size limits. The mediastinum is unremarkable. Right subclavian Port-A-Cath tip is at superior vena cava. No pneumothorax or other placement related complication is identified. No infiltrates or effusions are demonstrated. No acute osseous abnormalities are demonstrated. IMPRESSION: 1. Right sided Port-A-Cath appears appropriately positioned, with no pneumothorax or other placement related complication. No acute radiographic abnormalities. Read and electronically signed by: Teofilo Patterson MD on 5/21/2019 2:52 PM CDT TEOFILO PATTERSON MD      I have reviewed all available lab results and radiology reports.    Assessment/Plan:   (1) 39 y.o. female  with diagnosis of left breast cancer who has been referred by Dr Salvador Rey with Gen Surgery for evaluation by medical oncology.      - She had presented with left breast  pain which became progressive over a 4 month period.   - Radiology studies found a 2.5cm mass on the left breast along with two inflamed LN's on the right.   - She had left breast biopsy on 5/1/2019 which showed invasive ductal carcinoma grade 3. The right breast biopsy was negative for cancer.   - She is ER and IA positive. She is also Her2Nu +3.   - We discussed the latest data on Her2Nu positive breast cancers and the recommendation for neoadjuvant chemotherapy with a herceptin and perjeta.  - she will need echo, portacath, and PET scan  - will plan for herceptin, perjeta, carboplatin and taxotere regimen neoadjuvantly with 6 cycles  -  S/p set up chemotherapy school; discussed side-effect profile, provided literature on the regimen; obtained consents  - she is set up for May 27th to start  - PET scan and echo are on chart  - portacath placed on 5/21  - started chemotherapy with 1st cycle on 5/27/2019   - She had had a recent MRI of the breast again on 8/7  - she completed the neoadjuvant round and is having surgery next week with Dr Garcia at [facility] in [place]      - She saw Dr Howard Cheatham with plastics at [facility] and had bilateral mastectomies on Oct 8th 2019 at [facility].   - She requires herceptin maintenance regimen post-surgery recovery. - She will most likely also require a hysterectomy/oopherectomy and plans to see her GYN Dr Aly Contreras in the near future     - she saw Dr Garcia for follow-up on 11/16/2019. Dr Garcia still awaiting tumor board evaluation at [facility] but she may require XRT as well.       - She saw Dr Long on 11/15/2019 for radiation evaluation. He has recommended a daily regimen for 6 weeks. She is planning to start XRT in next week or two. She should be able to continue the herceptin every 3 weeks and we are looking into use of perjta but it may need to be held for the duration of the XRT if not.    - XRT completed was previously completed and she has  since resumed therapy  -  she has had her hysterectomy/oopherectomy with Dr Contreras on March 3rd 2020; she is healed well from the surgery and has had no postop compliactions    - She has since resumed herceptin and perjeta post-op;   - we discussed starting her on an oral antihormone with arimidex and she has since started this past Monday- Continue Arimidex    Hot Flashes- Continue Arimidex tolerable at this time  - I had long talk with her again about COVID-19 precautions  Continue Herceptin/Perjeta every 3 weeks. Echo every 3 months  -Baseline Bone Density    (2) Left parotid tumor - removed in 2011     (3) Sarcoidosis     (4) Interstitial cystitis     (5) Diet controlled DM     (6) Fibromyalgia     (7) Hx/of cervical dysplasia as teenager s/p cryoablation- S/p Hysterectomy    (8) Migraine - now on meds prn    (9)  Portacath tip has recently been found to have sheared off and she has seen Dr Rey. She is not having any problems at this time and is prophylactically on leiquis.   -  She has seen Dr Hilton and Dr Lester Jones with [facility] and they were able to successfully removed the portacath tip and she has also had a new port placed at the same time.          VISIT DIAGNOSES:      Aromatase inhibitor use  -     BONE MIN DENSITY; Future; Expected date: 04/27/2020    Malignant neoplasm of left female breast, unspecified estrogen receptor status, unspecified site of breast  -     BONE MIN DENSITY; Future; Expected date: 04/27/2020    H/O of hysterectomy with bilateral oophorectomy  -     BONE MIN DENSITY; Future; Expected date: 04/27/2020          PLAN:  1.  continue Every 3 week Herceptin  2.  F/u with GYN as directed by Dr Contreras  3. Check labs every 3 weeks at least  4.  F/u with gen/surg as directed by him    5. Repeat echo every 3 mths  6. continue oral antihormone therapy with arimidex      RTC  3 weeks to see me and in 6  Weeks for Dr. Chambers.    Fax note to Ben Rey, Gabe Lorenzo, Yobani,  Jose Cheatham Mannina    Discussion:     Antihormone Therapy Discussion:    I discussed the advantages of antihormone therapy with the patient with regard to their particular neoplastic or carcinoma in situ condition. I went over the side-effect profile of the medication including risk for potential development of endometrial cancer and/or hyperplasia in women who still have a uterus and the need for yearly GYN evaluation and follow-up. I discussed the risks for thromboembolic events such as DVT's, pulmonary emboli, CVA's, retinal vascular clots, phlebitis, and TIA's. I discussed the potential risks for development of ocular disturbances, retinopathy, cataracts, corneal changes, flushing, hot flashes, amenorrhea, altered menses, fluid retention, weight changes, elevations in LFT's, liver damage, and mood disturbances. I discussed the potential risk of arthropathy and joint pains/aches which could be chronic and debilitating. I discussed potential adverse effects on bone mineralization and the risk of osteopenia and/or osteoporosis which could led to increase risk of fractures.   A consent form was obtained and a copy was provided to the patient.      COVID-19 Discussion:    I had long discussion with patient and any applicable family about the COVID-19 coronavirus epidemic and the recommended precautions with regard to cancer and/or hematology patients. I have re-iterated the CDC recommendations for adequate hand washing, use of hand -like products, and coughing into elbow, etc. In addition, especially for our patients who are on chemotherapy and/or our otherwise immunocompromised patients, I have recommended avoidance of crowds, including movie theaters, restaurants, churches, etc. I have recommended avoidance of any sick or symptomatic family members and/or friends. I have also recommended avoidance of any raw and unwashed food products, and general avoidance of food items that have not been prepared by  themselves. The patient has been asked to call us immediately with any symptom developments, issues, questions or other general concerns.         I spent over 25 mins of time with the patient. Reviewed results of the recently ordered labs, tests and studies; made directives with regards to the results. Over half of this time was spent couseling and coordinating care.    I have explained all of the above in detail and the patient understands all of the current recommendation(s). I have answered all of their questions to the best of my ability and to their complete satisfaction.   The patient is to continue with the current management plan.            Electronically signed by Mulu Burns, MSN, APRN, AGNP-C, OCN

## 2020-05-04 ENCOUNTER — TELEPHONE (OUTPATIENT)
Dept: PLASTIC SURGERY | Facility: CLINIC | Age: 40
End: 2020-05-04

## 2020-05-04 NOTE — TELEPHONE ENCOUNTER
Called pt back and she stated that she wanted to touch base with us to see when we would be seeing patients again for consults. I told her that I would let her know as soon as we hear something. She verbalized understanding.           ----- Message from Arnold Urban sent at 5/4/2020  3:38 PM CDT -----  Contact: Pt      The Pt would like to see if she could start the process again for her consult to get the reconstruction done on her breast.  Please contact the Pt.    Phone # [phone]

## 2020-05-08 NOTE — PROGRESS NOTES
Added patients 18th cycle of Herceptin/Perjeta. She continues on Arimidex and may be moving to [place] in the next few months. Discussed with Dr. Chambers about follow up. Every 3-4 moths for 2 years then every 6 mths until 5 year saranya then yearly.

## 2020-05-13 DIAGNOSIS — Z03.818 ENCNTR FOR OBS FOR SUSP EXPSR TO OTH BIOLG AGENTS RULED OUT: Primary | ICD-10-CM

## 2020-05-14 ENCOUNTER — LAB VISIT (OUTPATIENT)
Dept: LAB | Facility: HOSPITAL | Age: 40
End: 2020-05-14
Attending: INTERNAL MEDICINE
Payer: MEDICAID

## 2020-05-14 DIAGNOSIS — C50.912 HER2-POSITIVE CARCINOMA OF LEFT BREAST: ICD-10-CM

## 2020-05-14 DIAGNOSIS — C50.912 MALIGNANT NEOPLASM OF LEFT FEMALE BREAST, UNSPECIFIED ESTROGEN RECEPTOR STATUS, UNSPECIFIED SITE OF BREAST: ICD-10-CM

## 2020-05-14 DIAGNOSIS — D64.9 FATIGUE ASSOCIATED WITH ANEMIA: ICD-10-CM

## 2020-05-14 LAB
ALBUMIN SERPL BCP-MCNC: 3.7 G/DL (ref 3.5–5.2)
ALP SERPL-CCNC: 77 U/L (ref 55–135)
ALT SERPL W/O P-5'-P-CCNC: 20 U/L (ref 10–44)
ANION GAP SERPL CALC-SCNC: 5 MMOL/L (ref 8–16)
AST SERPL-CCNC: 21 U/L (ref 10–40)
BASOPHILS # BLD AUTO: 0.04 K/UL (ref 0–0.2)
BASOPHILS NFR BLD: 0.9 % (ref 0–1.9)
BILIRUB SERPL-MCNC: 0.6 MG/DL (ref 0.1–1)
BUN SERPL-MCNC: 16 MG/DL (ref 6–20)
CALCIUM SERPL-MCNC: 9.5 MG/DL (ref 8.7–10.5)
CHLORIDE SERPL-SCNC: 108 MMOL/L (ref 95–110)
CO2 SERPL-SCNC: 29 MMOL/L (ref 23–29)
CREAT SERPL-MCNC: 0.9 MG/DL (ref 0.5–1.4)
DIFFERENTIAL METHOD: ABNORMAL
EOSINOPHIL # BLD AUTO: 0.1 K/UL (ref 0–0.5)
EOSINOPHIL NFR BLD: 1.4 % (ref 0–8)
ERYTHROCYTE [DISTWIDTH] IN BLOOD BY AUTOMATED COUNT: 12 % (ref 11.5–14.5)
EST. GFR  (AFRICAN AMERICAN): >60 ML/MIN/1.73 M^2
EST. GFR  (NON AFRICAN AMERICAN): >60 ML/MIN/1.73 M^2
GLUCOSE SERPL-MCNC: 87 MG/DL (ref 70–110)
HCT VFR BLD AUTO: 35.4 % (ref 37–48.5)
HGB BLD-MCNC: 11.8 G/DL (ref 12–16)
IMM GRANULOCYTES # BLD AUTO: 0.02 K/UL (ref 0–0.04)
IMM GRANULOCYTES NFR BLD AUTO: 0.5 % (ref 0–0.5)
LYMPHOCYTES # BLD AUTO: 0.9 K/UL (ref 1–4.8)
LYMPHOCYTES NFR BLD: 21.3 % (ref 18–48)
MCH RBC QN AUTO: 30.6 PG (ref 27–31)
MCHC RBC AUTO-ENTMCNC: 33.3 G/DL (ref 32–36)
MCV RBC AUTO: 92 FL (ref 82–98)
MONOCYTES # BLD AUTO: 0.4 K/UL (ref 0.3–1)
MONOCYTES NFR BLD: 8.2 % (ref 4–15)
NEUTROPHILS # BLD AUTO: 2.9 K/UL (ref 1.8–7.7)
NEUTROPHILS NFR BLD: 67.7 % (ref 38–73)
NRBC BLD-RTO: 0 /100 WBC
PLATELET # BLD AUTO: 174 K/UL (ref 150–350)
PMV BLD AUTO: 9.2 FL (ref 9.2–12.9)
POTASSIUM SERPL-SCNC: 4.6 MMOL/L (ref 3.5–5.1)
PROT SERPL-MCNC: 7.3 G/DL (ref 6–8.4)
RBC # BLD AUTO: 3.85 M/UL (ref 4–5.4)
SODIUM SERPL-SCNC: 142 MMOL/L (ref 136–145)
WBC # BLD AUTO: 4.28 K/UL (ref 3.9–12.7)

## 2020-05-14 PROCEDURE — 85025 COMPLETE CBC W/AUTO DIFF WBC: CPT

## 2020-05-14 PROCEDURE — 36415 COLL VENOUS BLD VENIPUNCTURE: CPT

## 2020-05-14 PROCEDURE — 80053 COMPREHEN METABOLIC PANEL: CPT

## 2020-05-15 DIAGNOSIS — Z79.811 AROMATASE INHIBITOR USE: Primary | ICD-10-CM

## 2020-05-18 ENCOUNTER — OFFICE VISIT (OUTPATIENT)
Dept: HEMATOLOGY/ONCOLOGY | Facility: CLINIC | Age: 40
End: 2020-05-18
Payer: MEDICAID

## 2020-05-18 ENCOUNTER — CLINICAL SUPPORT (OUTPATIENT)
Dept: HEMATOLOGY/ONCOLOGY | Facility: CLINIC | Age: 40
End: 2020-05-18
Payer: MEDICAID

## 2020-05-18 ENCOUNTER — INFUSION (OUTPATIENT)
Dept: INFUSION THERAPY | Facility: HOSPITAL | Age: 40
End: 2020-05-18
Attending: INTERNAL MEDICINE
Payer: MEDICAID

## 2020-05-18 VITALS
HEART RATE: 68 BPM | TEMPERATURE: 98 F | WEIGHT: 188.13 LBS | DIASTOLIC BLOOD PRESSURE: 79 MMHG | OXYGEN SATURATION: 100 % | BODY MASS INDEX: 32.12 KG/M2 | SYSTOLIC BLOOD PRESSURE: 126 MMHG | RESPIRATION RATE: 18 BRPM | HEIGHT: 64 IN

## 2020-05-18 VITALS
SYSTOLIC BLOOD PRESSURE: 130 MMHG | BODY MASS INDEX: 32.28 KG/M2 | TEMPERATURE: 98 F | DIASTOLIC BLOOD PRESSURE: 84 MMHG | OXYGEN SATURATION: 99 % | WEIGHT: 188.06 LBS | RESPIRATION RATE: 18 BRPM | HEART RATE: 81 BPM

## 2020-05-18 DIAGNOSIS — E53.8 B12 DEFICIENCY: ICD-10-CM

## 2020-05-18 DIAGNOSIS — C50.912 HER2-POSITIVE CARCINOMA OF LEFT BREAST: ICD-10-CM

## 2020-05-18 DIAGNOSIS — Z17.0 MALIGNANT NEOPLASM OF OVERLAPPING SITES OF LEFT BREAST IN FEMALE, ESTROGEN RECEPTOR POSITIVE: ICD-10-CM

## 2020-05-18 DIAGNOSIS — C50.812 MALIGNANT NEOPLASM OF OVERLAPPING SITES OF LEFT BREAST IN FEMALE, ESTROGEN RECEPTOR POSITIVE: ICD-10-CM

## 2020-05-18 DIAGNOSIS — Z90.13 S/P BILATERAL MASTECTOMY: ICD-10-CM

## 2020-05-18 DIAGNOSIS — Z79.811 USE OF AROMATASE INHIBITORS: ICD-10-CM

## 2020-05-18 DIAGNOSIS — C50.912 MALIGNANT NEOPLASM OF LEFT FEMALE BREAST, UNSPECIFIED ESTROGEN RECEPTOR STATUS, UNSPECIFIED SITE OF BREAST: ICD-10-CM

## 2020-05-18 DIAGNOSIS — T45.1X5A CHEMOTHERAPY INDUCED NEUTROPENIA: ICD-10-CM

## 2020-05-18 DIAGNOSIS — E86.0 DEHYDRATION: Primary | ICD-10-CM

## 2020-05-18 DIAGNOSIS — D70.1 CHEMOTHERAPY INDUCED NEUTROPENIA: ICD-10-CM

## 2020-05-18 PROCEDURE — 96417 CHEMO IV INFUS EACH ADDL SEQ: CPT

## 2020-05-18 PROCEDURE — 63600175 PHARM REV CODE 636 W HCPCS: Performed by: NURSE PRACTITIONER

## 2020-05-18 PROCEDURE — 99213 PR OFFICE/OUTPT VISIT, EST, LEVL III, 20-29 MIN: ICD-10-PCS | Mod: S$GLB,,, | Performed by: NURSE PRACTITIONER

## 2020-05-18 PROCEDURE — 99213 OFFICE O/P EST LOW 20 MIN: CPT | Mod: S$GLB,,, | Performed by: NURSE PRACTITIONER

## 2020-05-18 PROCEDURE — 96413 CHEMO IV INFUSION 1 HR: CPT

## 2020-05-18 PROCEDURE — A4216 STERILE WATER/SALINE, 10 ML: HCPCS | Performed by: NURSE PRACTITIONER

## 2020-05-18 PROCEDURE — 25000003 PHARM REV CODE 250: Performed by: NURSE PRACTITIONER

## 2020-05-18 RX ORDER — SODIUM CHLORIDE 0.9 % (FLUSH) 0.9 %
10 SYRINGE (ML) INJECTION
Status: DISCONTINUED | OUTPATIENT
Start: 2020-05-18 | End: 2020-05-18 | Stop reason: HOSPADM

## 2020-05-18 RX ORDER — HEPARIN 100 UNIT/ML
500 SYRINGE INTRAVENOUS
Status: CANCELLED | OUTPATIENT
Start: 2020-05-18

## 2020-05-18 RX ORDER — HEPARIN 100 UNIT/ML
500 SYRINGE INTRAVENOUS
Status: DISCONTINUED | OUTPATIENT
Start: 2020-05-18 | End: 2020-05-18 | Stop reason: HOSPADM

## 2020-05-18 RX ORDER — SODIUM CHLORIDE 0.9 % (FLUSH) 0.9 %
10 SYRINGE (ML) INJECTION
Status: CANCELLED | OUTPATIENT
Start: 2020-05-18

## 2020-05-18 RX ORDER — PHENAZOPYRIDINE HYDROCHLORIDE 200 MG/1
TABLET, FILM COATED ORAL
Qty: 20 TABLET | Refills: 0 | Status: SHIPPED | OUTPATIENT
Start: 2020-05-18

## 2020-05-18 RX ADMIN — SODIUM CHLORIDE, PRESERVATIVE FREE 10 ML: 5 INJECTION INTRAVENOUS at 03:05

## 2020-05-18 RX ADMIN — HEPARIN 500 UNITS: 100 SYRINGE at 03:05

## 2020-05-18 RX ADMIN — TRASTUZUMAB 468 MG: 150 INJECTION, POWDER, LYOPHILIZED, FOR SOLUTION INTRAVENOUS at 02:05

## 2020-05-18 RX ADMIN — CYANOCOBALAMIN 1000 MCG: 1000 INJECTION, SOLUTION INTRAMUSCULAR; SUBCUTANEOUS at 01:05

## 2020-05-18 RX ADMIN — PERTUZUMAB 420 MG: 30 INJECTION, SOLUTION, CONCENTRATE INTRAVENOUS at 02:05

## 2020-05-18 NOTE — PLAN OF CARE
Problem: Fatigue (Oncology Care)  Goal: Improved Activity Tolerance  Outcome: Ongoing, Progressing  Intervention: Promote Energy Conservation  Flowsheets (Taken 5/18/2020 1321)  Fatigue Management: frequent rest breaks encouraged; paced activity encouraged  Activity Management: activity encouraged

## 2020-05-19 NOTE — PROGRESS NOTES
[facility] Hematology/Oncology  PROGRESS NOTE -  Follow-up Visit      Subjective:       Patient ID:   NAME: Sheridan Todd : 1980     39 y.o. female    Referring Doc: Krissy  Other Physicians: Jose Rodriguez De Boisblanc, [place]; Howard Cheatham ([facility])    Chief Complaint: left breast ca f/u     History of Present Illness:   38 yo female with left breast cancer continues in Perjeta and Heceptin Cycle #16 today. She returns today for a regularly scheduled follow-up visit.  She states she is doing well. She continues in Arimidex daily, but complains of itching when she is in the sun for any period of time Offered to change her to Femara, but she declined at this time. She is in the process of tentatively moving to [place] to help her sister and will be commuting back and for for further treatments. We will move her treatments to Wednesday and COVID test on Monday. She will schedule her baseline bone density.    She completed the neoadjuvant chemotherapy on 2019 and has subsequently surgery next at [facility] with Dr Garcia.  She denies any CP, SOB, HA's or N/V. She is here by herself.      XRT was previously completed and she has had her hysterectomy/oopherectomy with Dr bryant on 2020; she is healed well from the surgery and has had no postop complications      I had long talk again with her about COVID-19 precautions      ROS:   GEN: normal without any fever, night sweats or weight loss  HEENT: normal with no HA's, sore throat, stiff neck, changes in vision  CV: normal with no CP, SOB, PND, MENDIETA or orthopnea  PULM: normal with no SOB, cough, hemoptysis, sputum or pleuritic pain  GI: normal with no abdominal pain, nausea, vomiting, constipation, diarrhea, melanotic stools, BRBPR, or hematemesis  : normal with no hematuria, dysuria  BREAST: Bilateral Mastectomies  SKIN: normal with no rash, erythema, bruising, or swelling    Allergies:  Review of patient's allergies  indicates:   Allergen Reactions    Shellfish containing products Anaphylaxis and Nausea And Vomiting     Only crabmeat      Codeine Nausea And Vomiting     Pt thinks it was tylenol #3 with codeine  Can take hydrocodone & oxycodone  Other reaction(s): Nausea    Tegaderm ag mesh [silver] Blisters     Redness, itching and tears skin        Medications:    Current Outpatient Medications:     anastrozole (ARIMIDEX) 1 mg Tab, Take 1 tablet (1 mg total) by mouth once daily., Disp: 30 tablet, Rfl: 6    phenazopyridine (PYRIDIUM) 200 MG tablet, TAKE 1 TABLET BY MOUTH EVERY 6 HOURS AS NEEDED FOR PAIN, Disp: 20 tablet, Rfl: 0    Current Facility-Administered Medications:     cyanocobalamin injection 1,000 mcg, 1,000 mcg, Subcutaneous, Q30 Days, Abel Chambers MD, 1,000 mcg at 03/16/20 1129    cyanocobalamin injection 1,000 mcg, 1,000 mcg, Subcutaneous, Q30 Days, Abel Chambers MD, 1,000 mcg at 05/18/20 1337    PMHx/PSHx Updates:  See patient's last visit with me on 2/17/2020.  See H&P on 5/16/2019        Pathology:  Cancer Staging      Breast biopsies: 5/1/2019:  Left breast: with invasive ductal carcinoma grade 3; ER positive at 95% and UT positive at 90%; Her2Nu was positive at +3; Her2/CEP 17 ratio was >10.6  - Ki67 was unfavorable at 69%  Right breast: negative for cancer      Bilateral Mastectomies 10/8/2019:    FINAL PATHOLOGIC DIAGNOSIS  1. BREAST (RIGHT MASTECTOMY): NO EVIDENCE OF MALIGNANCY IN SECTIONS OF SKIN, NIPPLE, AND  BREAST TISSUE.  2. BREAST (LEFT MASTECTOMY): INVASIVE DUCTAL CARCINOMA (2 SEPARATE RESIDUAL FOCI);  ASSOCIATED HIGH-GRADE DUCTAL CARCINOMA IN SITU; SECTIONS OF SKIN, NIPPLE, AND BREAST  MARGINS FREE OF MALIGNANCY.  Note: Two foci of residual ductal carcinoma are identified with differing histologies . The larger lesion consists of  small foci of invasive ductal carcinoma in a 19mm fibrotic nodule with suggestion of presurgical therapeutic  response. Vascular invasion cannot be  demonstrated on CD31 stain. There is an additional separate  microinvasive focus of mucinous carcinoma. Foci of DCIS are present throughout the breast tissue.  HORMONE RECEPTOR ASSAYS:  ER-strongly positive (90%)  PgR-weakly positive (1-2%)  Her2-equivocal (1 2+); to be sent for FISH testing  Ki67 proliferative activity-30% activity  Positive and negative controls reacted appropriately.  3. LYMPH NODE (SENTINEL LYMPH NODE, CLINICAL): NO EVIDENCE OF MALIGNANCY.  Note: The lymph node was examined at three levels with H&E and epithelial immunostains (AE1/AES, CAM5.2,  and WSK). Positive and negative controls reacted appropriately.  4. LYMPH NODES (22): NO EVIDENCE OF MALIGNANCY.         Objective:     Vitals:  Blood pressure 130/84, pulse 81, temperature 98.4 °F (36.9 °C), resp. rate 18, weight 85.3 kg (188 lb 0.8 oz), SpO2 99 %.     Physical Examination:   GEN: no apparent distress, comfortable; AAOx3  HEAD: atraumatic and normocephalic  EYES: no pallor, no icterus, PERRLA  ENT: OMM, no pharyngeal erythema, external ears WNL; no nasal discharge; no thrush  NECK: no masses, thyroid normal, trachea midline, no LAD/LN's, supple  CV: RRR with no murmur; normal pulse; normal S1 and S2; no pedal edema  CHEST: Normal respiratory effort; CTAB; normal breath sounds; no wheeze or crackles; portacath on right chest wall    ABDOM: nontender and nondistended; soft; normal bowel sounds; no rebound/guarding  MUSC/Skeletal: ROM normal; no crepitus; joints normal; no deformities or arthropathy  EXTREM: no clubbing, cyanosis, inflammation or swelling  SKIN: no rashes, lesions, ulcers, petechiae or subcutaneous nodules  : no lopez  NEURO: grossly intact; motor/sensory WNL; AAOx3; no tremors  PSYCH: normal mood, affect and behavior  LYMPH: normal cervical, supraclavicular, axillary and groin LN's  Breast: s/p bilateral mastectomies       Labs:   4/1/2020  Lab Results   Component Value Date    WBC 4.28 05/14/2020    HGB 11.8 (L)  05/14/2020    HCT 35.4 (L) 05/14/2020    MCV 92 05/14/2020     05/14/2020     BMP  Lab Results   Component Value Date     05/14/2020    K 4.6 05/14/2020     05/14/2020    CO2 29 05/14/2020    BUN 16 05/14/2020    CREATININE 0.9 05/14/2020    CALCIUM 9.5 05/14/2020    ANIONGAP 5 (L) 05/14/2020    ESTGFRAFRICA >60.0 05/14/2020    EGFRNONAA >60.0 05/14/2020             Radiology/Diagnostic Studies:    MRI head  12/5/2019:    Impression       No evidence of intracranial metastatic disease.           MRI breast  8/7/2019:    1. Significant interval improvement of enhancing spiculated mass in left breast near 4:00 position, as well as more diffuse non mass like enhancement throughout the superior left breast since 04/26/2019, consistent with a favorable response to interval neoadjuvant treatment.  2. No significant change of lobular lower inner quadrant right breast mass consistent with biopsy-proven fibroadenoma.  3. Unchanged 7 mm right breast mass near 12:00 position felt to represent intramammary lymph node.      Nm Pet Ct Routine Skull To Mid Thigh    Result Date: 5/21/2019  INTEGRATED PET CT WITH IMAGE FUSION HISTORY:  Left breast cancer initial treatment evaluation TECHNIQUE: Following the injection of 12.7 mCi of F-18 labeled FDG into a right antecubital vein, PET CT was performed from the vertex of the skull through the proximal thighs with an integrated full ring PET CT scanner with image fusion. The patient's serum glucose at the time of the exam was 82 mg/dL. FINDINGS: There is diffuse FDG activity in the subareolar left breast with skin thickening and multiple hypermetabolic masses. There is a 19 mm spiculated hypermetabolic mass in the inferior lateral left breast with a max SUV of 10.4. This was previously biopsied. There is a 2 cm hypermetabolic area in the lateral superior left breast with max SUV of 9.9. There is an 11 mm nodule within the inner right breast which was biopsied and  shown to represent fibroadenoma. This shows no FDG activity. There is no axillary, mediastinal or hilar adenopathy. There are no pulmonary nodules, infiltrates or pleural effusions. CT of the head and neck show no intra-axial lesions or cervical adenopathy. CT of the abdomen and pelvis demonstrates physiologic activity in the GI tract and urinary system. The liver and adrenal glands are normal. There are mildly prominent lymph nodes within the right lower quadrant the largest measures 13 mm with a max SUV of 3.1. Findings are suspicious for mesenteric adenitis. There is no retroperitoneal adenopathy. There are no lytic or blastic lesions.     IMPRESSION: Diffuse FDG activity within the subareolar left breast with skin thickening and multiple hypermetabolic left breast masses consistent with patient's history of left breast cancer. No evidence of metastatic disease 11 mm nodule in the medial right breast which was shown to represent fibroadenoma Mildly prominent lymph nodes in the right lower quadrant with mild FDG activity suggestive of mesenteric adenitis. Follow-up CT scan in 3-6 months is recommended.     Read and electronically signed by: Radha Garnett MD on 5/21/2019 1:13 PM CDT RADHA GARNETT MD    [facility] Unknown Rad Eap    Result Date: 5/21/2019  Chest single view CLINICAL DATA: Port-A-Cath placement FINDINGS: AP view shows the heart to be within normal size limits. The mediastinum is unremarkable. Right subclavian Port-A-Cath tip is at superior vena cava. No pneumothorax or other placement related complication is identified. No infiltrates or effusions are demonstrated. No acute osseous abnormalities are demonstrated. IMPRESSION: 1. Right sided Port-A-Cath appears appropriately positioned, with no pneumothorax or other placement related complication. No acute radiographic abnormalities. Read and electronically signed by: Teofilo Patterson MD on 5/21/2019 2:52 PM CDT TEOFILO PATTERSON MD      I  have reviewed all available lab results and radiology reports.    Assessment/Plan:   (1) 39 y.o. female  with diagnosis of left breast cancer who has been referred by Dr Salvador Rey with [facility] for evaluation by medical oncology.      - She had presented with left breast pain which became progressive over a 4 month period.   - Radiology studies found a 2.5cm mass on the left breast along with two inflamed LN's on the right.   - She had left breast biopsy on 5/1/2019 which showed invasive ductal carcinoma grade 3. The right breast biopsy was negative for cancer.   - She is ER and KS positive. She is also Her2Nu +3.   - We discussed the latest data on Her2Nu positive breast cancers and the recommendation for neoadjuvant chemotherapy with a herceptin and perjeta.  - she will need echo, portacath, and PET scan  - will plan for herceptin, perjeta, carboplatin and taxotere regimen neoadjuvantly with 6 cycles  -  S/p set up chemotherapy school; discussed side-effect profile, provided literature on the regimen; obtained consents  - she is set up for May 27th to start  - PET scan and echo are on chart  - portacath placed on 5/21  - started chemotherapy with 1st cycle on 5/27/2019   - She had had a recent MRI of the breast again on 8/7  - she completed the neoadjuvant round and is having surgery next week with Dr Garcia at [facility] in [place]      - She saw Dr Howard Cheatham with plastics at [facility] and had bilateral mastectomies on Oct 8th 2019 at [facility].   - She requires herceptin maintenance regimen post-surgery recovery. - She will most likely also require a hysterectomy/oopherectomy and plans to see her GYN Dr Aly Contreras in the near future     - she saw Dr Garcia for follow-up on 11/16/2019. Dr Garcia still awaiting tumor board evaluation at [facility] but she may require XRT as well.       - She saw Dr Long on 11/15/2019 for radiation evaluation. He has recommended a daily regimen  for 6 weeks. She is planning to start XRT in next week or two. She should be able to continue the herceptin every 3 weeks and we are looking into use of perjta but it may need to be held for the duration of the XRT if not.    - XRT completed was previously completed and she has since resumed therapy  -  she has had her hysterectomy/oopherectomy with Dr Contreras on March 3rd 2020; she is healed well from the surgery and has had no postop compliactions    - She has since resumed herceptin and perjeta post-op;   - we discussed starting her on an oral antihormone with arimidex and she has since started this past Monday- Continue Arimidex    Hot Flashes- Continue Arimidex tolerable at this time  - I had long talk with her again about COVID-19 precautions  Continue Herceptin/Perjeta every 3 weeks. Echo every 3 months  -Baseline Bone Density    -Continue with Cycle #16 Herceptin and Perjeta    Hold off on switching to Femara at this time per the patient wishes.    (2) Left parotid tumor - removed in 2011     (3) Sarcoidosis     (4) Interstitial cystitis     (5) Diet controlled DM     (6) Fibromyalgia     (7) Hx/of cervical dysplasia as teenager s/p cryoablation- S/p Hysterectomy    (8) Migraine - now on meds prn    (9)  Portacath tip has recently been found to have sheared off and she has seen Dr Rey. She is not having any problems at this time and is prophylactically on Eliquis.   -  She has seen Dr Hilton and Dr Lester Jones with [facility] and they were able to successfully removed the portacath tip and she has also had a new port placed at the same time.          VISIT DIAGNOSES:      Malignant neoplasm of overlapping sites of left breast in female, estrogen receptor positive    HER2-positive carcinoma of left breast    Malignant neoplasm of left female breast, unspecified estrogen receptor status, unspecified site of breast    Use of aromatase inhibitors    S/P bilateral mastectomy          PLAN:  1.  continue Every 3  week Herceptin/Perjeta  2.  F/u with GYN as directed by Dr Contreras  3. Check labs every 3 weeks at least  4.  F/u with gen/surg as directed by him    5. Repeat echo every 3 mths- Due end of June 6. continue oral antihormone therapy with arimidex      RTC  3 weeks to see Dr. Chambers and in 6  Weeks to see me.        Discussion:     Antihormone Therapy Discussion:    I discussed the advantages of antihormone therapy with the patient with regard to their particular neoplastic or carcinoma in situ condition. I went over the side-effect profile of the medication including risk for potential development of endometrial cancer and/or hyperplasia in women who still have a uterus and the need for yearly GYN evaluation and follow-up. I discussed the risks for thromboembolic events such as DVT's, pulmonary emboli, CVA's, retinal vascular clots, phlebitis, and TIA's. I discussed the potential risks for development of ocular disturbances, retinopathy, cataracts, corneal changes, flushing, hot flashes, amenorrhea, altered menses, fluid retention, weight changes, elevations in LFT's, liver damage, and mood disturbances. I discussed the potential risk of arthropathy and joint pains/aches which could be chronic and debilitating. I discussed potential adverse effects on bone mineralization and the risk of osteopenia and/or osteoporosis which could led to increase risk of fractures.   A consent form was obtained and a copy was provided to the patient.      COVID-19 Discussion:    I had long discussion with patient and any applicable family about the COVID-19 coronavirus epidemic and the recommended precautions with regard to cancer and/or hematology patients. I have re-iterated the CDC recommendations for adequate hand washing, use of hand -like products, and coughing into elbow, etc. In addition, especially for our patients who are on chemotherapy and/or our otherwise immunocompromised patients, I have recommended avoidance  of crowds, including movie theaters, restaurants, churches, etc. I have recommended avoidance of any sick or symptomatic family members and/or friends. I have also recommended avoidance of any raw and unwashed food products, and general avoidance of food items that have not been prepared by themselves. The patient has been asked to call us immediately with any symptom developments, issues, questions or other general concerns.         I spent over 25 mins of time with the patient. Reviewed results of the recently ordered labs, tests and studies; made directives with regards to the results. Over half of this time was spent couseling and coordinating care.    I have explained all of the above in detail and the patient understands all of the current recommendation(s). I have answered all of their questions to the best of my ability and to their complete satisfaction.   The patient is to continue with the current management plan.            Electronically signed by Mulu Burns, MSN, APRN, AGNP-C, OCN

## 2020-06-05 RX ORDER — HEPARIN 100 UNIT/ML
500 SYRINGE INTRAVENOUS
Status: CANCELLED | OUTPATIENT
Start: 2020-06-08

## 2020-06-05 RX ORDER — SODIUM CHLORIDE 0.9 % (FLUSH) 0.9 %
10 SYRINGE (ML) INJECTION
Status: CANCELLED | OUTPATIENT
Start: 2020-06-08

## 2020-06-07 DIAGNOSIS — Z03.818 ENCNTR FOR OBS FOR SUSP EXPSR TO OTH BIOLG AGENTS RULED OUT: Primary | ICD-10-CM

## 2020-06-08 ENCOUNTER — HOSPITAL ENCOUNTER (OUTPATIENT)
Dept: RADIOLOGY | Facility: HOSPITAL | Age: 40
Discharge: HOME | End: 2020-06-08
Attending: NURSE PRACTITIONER
Payer: MEDICAID

## 2020-06-08 DIAGNOSIS — Z90.722 H/O OF HYSTERECTOMY WITH BILATERAL OOPHORECTOMY: ICD-10-CM

## 2020-06-08 DIAGNOSIS — C50.912 MALIGNANT NEOPLASM OF LEFT FEMALE BREAST, UNSPECIFIED ESTROGEN RECEPTOR STATUS, UNSPECIFIED SITE OF BREAST: ICD-10-CM

## 2020-06-08 DIAGNOSIS — Z79.811 AROMATASE INHIBITOR USE: ICD-10-CM

## 2020-06-08 DIAGNOSIS — Z90.710 H/O OF HYSTERECTOMY WITH BILATERAL OOPHORECTOMY: ICD-10-CM

## 2020-06-08 PROCEDURE — 77080 DXA BONE DENSITY AXIAL: CPT | Mod: TC,PO

## 2020-06-10 ENCOUNTER — INFUSION (OUTPATIENT)
Dept: INFUSION THERAPY | Facility: HOSPITAL | Age: 40
End: 2020-06-10
Attending: INTERNAL MEDICINE
Payer: MEDICAID

## 2020-06-10 ENCOUNTER — OFFICE VISIT (OUTPATIENT)
Dept: HEMATOLOGY/ONCOLOGY | Facility: CLINIC | Age: 40
End: 2020-06-10
Payer: MEDICAID

## 2020-06-10 VITALS
HEART RATE: 73 BPM | DIASTOLIC BLOOD PRESSURE: 81 MMHG | BODY MASS INDEX: 32.27 KG/M2 | HEIGHT: 64 IN | SYSTOLIC BLOOD PRESSURE: 133 MMHG | TEMPERATURE: 98 F | RESPIRATION RATE: 18 BRPM | OXYGEN SATURATION: 100 % | WEIGHT: 189 LBS

## 2020-06-10 VITALS
SYSTOLIC BLOOD PRESSURE: 122 MMHG | WEIGHT: 188.94 LBS | BODY MASS INDEX: 32.43 KG/M2 | DIASTOLIC BLOOD PRESSURE: 77 MMHG | TEMPERATURE: 99 F | OXYGEN SATURATION: 100 % | HEART RATE: 62 BPM | RESPIRATION RATE: 18 BRPM

## 2020-06-10 DIAGNOSIS — Z79.811 USE OF AROMATASE INHIBITORS: ICD-10-CM

## 2020-06-10 DIAGNOSIS — E86.0 DEHYDRATION: Primary | ICD-10-CM

## 2020-06-10 DIAGNOSIS — D70.1 CHEMOTHERAPY INDUCED NEUTROPENIA: ICD-10-CM

## 2020-06-10 DIAGNOSIS — T45.1X5A CHEMOTHERAPY INDUCED NEUTROPENIA: ICD-10-CM

## 2020-06-10 DIAGNOSIS — C50.812 MALIGNANT NEOPLASM OF OVERLAPPING SITES OF LEFT BREAST IN FEMALE, ESTROGEN RECEPTOR POSITIVE: ICD-10-CM

## 2020-06-10 DIAGNOSIS — M85.80 OSTEOPENIA, UNSPECIFIED LOCATION: ICD-10-CM

## 2020-06-10 DIAGNOSIS — Z17.0 MALIGNANT NEOPLASM OF OVERLAPPING SITES OF LEFT BREAST IN FEMALE, ESTROGEN RECEPTOR POSITIVE: ICD-10-CM

## 2020-06-10 DIAGNOSIS — C50.912 MALIGNANT NEOPLASM OF LEFT FEMALE BREAST, UNSPECIFIED ESTROGEN RECEPTOR STATUS, UNSPECIFIED SITE OF BREAST: Primary | ICD-10-CM

## 2020-06-10 DIAGNOSIS — C50.912 HER2-POSITIVE CARCINOMA OF LEFT BREAST: ICD-10-CM

## 2020-06-10 DIAGNOSIS — E53.8 B12 DEFICIENCY: ICD-10-CM

## 2020-06-10 PROCEDURE — 96413 CHEMO IV INFUSION 1 HR: CPT

## 2020-06-10 PROCEDURE — 96417 CHEMO IV INFUS EACH ADDL SEQ: CPT

## 2020-06-10 PROCEDURE — 96415 CHEMO IV INFUSION ADDL HR: CPT

## 2020-06-10 PROCEDURE — 99214 OFFICE O/P EST MOD 30 MIN: CPT | Mod: S$GLB,,, | Performed by: NURSE PRACTITIONER

## 2020-06-10 PROCEDURE — 99214 PR OFFICE/OUTPT VISIT, EST, LEVL IV, 30-39 MIN: ICD-10-PCS | Mod: S$GLB,,, | Performed by: NURSE PRACTITIONER

## 2020-06-10 PROCEDURE — 63600175 PHARM REV CODE 636 W HCPCS: Mod: JG | Performed by: INTERNAL MEDICINE

## 2020-06-10 PROCEDURE — 25000003 PHARM REV CODE 250: Performed by: INTERNAL MEDICINE

## 2020-06-10 RX ORDER — HEPARIN 100 UNIT/ML
500 SYRINGE INTRAVENOUS
Status: DISCONTINUED | OUTPATIENT
Start: 2020-06-10 | End: 2020-06-10 | Stop reason: HOSPADM

## 2020-06-10 RX ORDER — FERROUS SULFATE, DRIED 160(50) MG
1 TABLET, EXTENDED RELEASE ORAL 2 TIMES DAILY
Qty: 180 TABLET | Refills: 3 | Status: SHIPPED | OUTPATIENT
Start: 2020-06-10 | End: 2020-07-17 | Stop reason: CLARIF

## 2020-06-10 RX ORDER — CYANOCOBALAMIN 1000 UG/ML
1000 INJECTION, SOLUTION INTRAMUSCULAR; SUBCUTANEOUS
Qty: 3 ML | Refills: 3 | Status: ON HOLD | OUTPATIENT
Start: 2020-06-10 | End: 2020-07-26 | Stop reason: HOSPADM

## 2020-06-10 RX ORDER — SODIUM CHLORIDE 0.9 % (FLUSH) 0.9 %
10 SYRINGE (ML) INJECTION
Status: DISCONTINUED | OUTPATIENT
Start: 2020-06-10 | End: 2020-06-10 | Stop reason: HOSPADM

## 2020-06-10 RX ADMIN — HEPARIN 500 UNITS: 100 SYRINGE at 04:06

## 2020-06-10 RX ADMIN — PERTUZUMAB 420 MG: 30 INJECTION, SOLUTION, CONCENTRATE INTRAVENOUS at 02:06

## 2020-06-10 RX ADMIN — CYANOCOBALAMIN 1000 MCG: 1000 INJECTION, SOLUTION INTRAMUSCULAR; SUBCUTANEOUS at 01:06

## 2020-06-10 RX ADMIN — TRASTUZUMAB 468 MG: 150 INJECTION, POWDER, LYOPHILIZED, FOR SOLUTION INTRAVENOUS at 02:06

## 2020-06-10 NOTE — PROGRESS NOTES
[facility] Hematology/Oncology  PROGRESS NOTE -  Follow-up Visit      Subjective:       Patient ID:   NAME: Sheridan Todd : 1980     39 y.o. female    Referring Doc: Krissy  Other Physicians: Jose Rodriguez De Boisblanc, [place]; Howard Cheatham ([facility])    Chief Complaint: left breast ca f/u     History of Present Illness:   40 yo female with left breast cancer continues in Perjeta and Heceptin Cycle #16 today. She returns today for a regularly scheduled follow-up visit.  She states she is doing well. She continues in Arimidex daily, but complains of itching when she is in the sun for any period of time Offered to change her to Femara, but she declined at this time. She is in the process of tentatively moving to [place] to help her sister and will be commuting back and for for further treatments. We will move her treatments to Wednesday and COVID test on Monday. She will schedule her baseline bone density.    She completed the neoadjuvant chemotherapy on 2019 and has subsequently surgery next at [facility] with Dr Garcia.  She denies any CP, SOB, HA's or N/V. She is here by herself.      XRT was previously completed and she has had her hysterectomy/oopherectomy with Dr bryant on 2020; she is healed well from the surgery and has had no postop complications      I had long talk again with her about COVID-19 precautions      ROS:   GEN: normal without any fever, night sweats or weight loss  HEENT: normal with no HA's, sore throat, stiff neck, changes in vision  CV: normal with no CP, SOB, PND, MENDIETA or orthopnea  PULM: normal with no SOB, cough, hemoptysis, sputum or pleuritic pain  GI: normal with no abdominal pain, nausea, vomiting, constipation, diarrhea, melanotic stools, BRBPR, or hematemesis  : normal with no hematuria, dysuria  BREAST: Bilateral Mastectomies  SKIN: normal with no rash, erythema, bruising, or swelling    Allergies:  Review of patient's allergies  "indicates:   Allergen Reactions    Shellfish containing products Anaphylaxis and Nausea And Vomiting     Only crabmeat      Codeine Nausea And Vomiting     Pt thinks it was tylenol #3 with codeine  Can take hydrocodone & oxycodone  Other reaction(s): Nausea    Tegaderm ag mesh [silver] Blisters     Redness, itching and tears skin        Medications:    Current Outpatient Medications:     anastrozole (ARIMIDEX) 1 mg Tab, Take 1 tablet (1 mg total) by mouth once daily., Disp: 30 tablet, Rfl: 6    cyanocobalamin 1,000 mcg/mL injection, Inject 1 mL (1,000 mcg total) into the skin every 28 days., Disp: 3 mL, Rfl: 3    phenazopyridine (PYRIDIUM) 200 MG tablet, TAKE 1 TABLET BY MOUTH EVERY 6 HOURS AS NEEDED FOR PAIN, Disp: 20 tablet, Rfl: 0    syringe with needle (SYRINGE 3CC/25GX1") 3 mL 25 gauge x 1" Syrg, 1 Syringe by Misc.(Non-Drug; Combo Route) route every 28 days., Disp: 3 Syringe, Rfl: 3    Current Facility-Administered Medications:     cyanocobalamin injection 1,000 mcg, 1,000 mcg, Subcutaneous, Q30 Days, Abel Chambers MD, 1,000 mcg at 03/16/20 1129    cyanocobalamin injection 1,000 mcg, 1,000 mcg, Subcutaneous, Q30 Days, Abel Chambers MD, 1,000 mcg at 06/10/20 1340    PMHx/PSHx Updates:  See patient's last visit with me on 2/17/2020.  See H&P on 5/16/2019        Pathology:  Cancer Staging      Breast biopsies: 5/1/2019:  Left breast: with invasive ductal carcinoma grade 3; ER positive at 95% and AL positive at 90%; Her2Nu was positive at +3; Her2/CEP 17 ratio was >10.6  - Ki67 was unfavorable at 69%  Right breast: negative for cancer      Bilateral Mastectomies 10/8/2019:    FINAL PATHOLOGIC DIAGNOSIS  1. BREAST (RIGHT MASTECTOMY): NO EVIDENCE OF MALIGNANCY IN SECTIONS OF SKIN, NIPPLE, AND  BREAST TISSUE.  2. BREAST (LEFT MASTECTOMY): INVASIVE DUCTAL CARCINOMA (2 SEPARATE RESIDUAL FOCI);  ASSOCIATED HIGH-GRADE DUCTAL CARCINOMA IN SITU; SECTIONS OF SKIN, NIPPLE, AND BREAST  MARGINS FREE OF " MALIGNANCY.  Note: Two foci of residual ductal carcinoma are identified with differing histologies . The larger lesion consists of  small foci of invasive ductal carcinoma in a 19mm fibrotic nodule with suggestion of presurgical therapeutic  response. Vascular invasion cannot be demonstrated on CD31 stain. There is an additional separate  microinvasive focus of mucinous carcinoma. Foci of DCIS are present throughout the breast tissue.  HORMONE RECEPTOR ASSAYS:  ER-strongly positive (90%)  PgR-weakly positive (1-2%)  Her2-equivocal (1 2+); to be sent for FISH testing  Ki67 proliferative activity-30% activity  Positive and negative controls reacted appropriately.  3. LYMPH NODE (SENTINEL LYMPH NODE, CLINICAL): NO EVIDENCE OF MALIGNANCY.  Note: The lymph node was examined at three levels with H&E and epithelial immunostains (AE1/AES, CAM5.2,  and WSK). Positive and negative controls reacted appropriately.  4. LYMPH NODES (22): NO EVIDENCE OF MALIGNANCY.         Objective:     Vitals:  Blood pressure 122/77, pulse 62, temperature 98.7 °F (37.1 °C), resp. rate 18, weight 85.7 kg (188 lb 15 oz), SpO2 100 %.     Physical Examination:   GEN: no apparent distress, comfortable; AAOx3  HEAD: atraumatic and normocephalic  EYES: no pallor, no icterus, PERRLA  ENT: OMM, no pharyngeal erythema, external ears WNL; no nasal discharge; no thrush  NECK: no masses, thyroid normal, trachea midline, no LAD/LN's, supple  CV: RRR with no murmur; normal pulse; normal S1 and S2; no pedal edema  CHEST: Normal respiratory effort; CTAB; normal breath sounds; no wheeze or crackles; portacath on right chest wall    ABDOM: nontender and nondistended; soft; normal bowel sounds; no rebound/guarding  MUSC/Skeletal: ROM normal; no crepitus; joints normal; no deformities or arthropathy  EXTREM: no clubbing, cyanosis, inflammation or swelling  SKIN: no rashes, lesions, ulcers, petechiae or subcutaneous nodules  : no lopez  NEURO: grossly intact;  motor/sensory WNL; AAOx3; no tremors  PSYCH: normal mood, affect and behavior  LYMPH: normal cervical, supraclavicular, axillary and groin LN's  Breast: s/p bilateral mastectomies       Labs:   4/1/2020  Lab Results   Component Value Date    WBC 3.92 06/08/2020    HGB 11.8 (L) 06/08/2020    HCT 36.3 (L) 06/08/2020    MCV 92 06/08/2020     06/08/2020     BMP  Lab Results   Component Value Date     06/08/2020    K 4.0 06/08/2020     06/08/2020    CO2 28 06/08/2020    BUN 18 06/08/2020    CREATININE 1.1 06/08/2020    CALCIUM 8.8 06/08/2020    ANIONGAP 8 06/08/2020    ESTGFRAFRICA >60.0 06/08/2020    EGFRNONAA >60.0 06/08/2020             Radiology/Diagnostic Studies:    Bone Density 6/8/2020:  COMPARISON:  LUMBAR SPINE:    Bone mineral density in the lumbar spine from L1 through L4 is 0.913 gm/cm2.    The T-score is -1.2 (standard deviations of Young Adult mean).    The Z-score is -1.0 (standard deviations of Age Matched mean).    The WHO classification is osteopenic, with risk of insufficiency spinal fracture increased when compared to Young Adults based on T-score.    LEFT HIP:    Bone mineral density in the left femoral neck is 0.740 gm/cm2.    The T-score is -1.0 (standard deviations of Young Adult mean).    The Z-score is -0.7 (standard deviations of Age Matched mean).    The WHO classification is normal, with risk of insufficiency hip fracture not increased when compared to Young Adults based on T-score.    MRI head  12/5/2019:    Impression       No evidence of intracranial metastatic disease.           MRI breast  8/7/2019:    1. Significant interval improvement of enhancing spiculated mass in left breast near 4:00 position, as well as more diffuse non mass like enhancement throughout the superior left breast since 04/26/2019, consistent with a favorable response to interval neoadjuvant treatment.  2. No significant change of lobular lower inner quadrant right breast mass consistent with  biopsy-proven fibroadenoma.  3. Unchanged 7 mm right breast mass near 12:00 position felt to represent intramammary lymph node.      Nm Pet Ct Routine Skull To Mid Thigh    Result Date: 5/21/2019  INTEGRATED PET CT WITH IMAGE FUSION HISTORY:  Left breast cancer initial treatment evaluation TECHNIQUE: Following the injection of 12.7 mCi of F-18 labeled FDG into a right antecubital vein, PET CT was performed from the vertex of the skull through the proximal thighs with an integrated full ring PET CT scanner with image fusion. The patient's serum glucose at the time of the exam was 82 mg/dL. FINDINGS: There is diffuse FDG activity in the subareolar left breast with skin thickening and multiple hypermetabolic masses. There is a 19 mm spiculated hypermetabolic mass in the inferior lateral left breast with a max SUV of 10.4. This was previously biopsied. There is a 2 cm hypermetabolic area in the lateral superior left breast with max SUV of 9.9. There is an 11 mm nodule within the inner right breast which was biopsied and shown to represent fibroadenoma. This shows no FDG activity. There is no axillary, mediastinal or hilar adenopathy. There are no pulmonary nodules, infiltrates or pleural effusions. CT of the head and neck show no intra-axial lesions or cervical adenopathy. CT of the abdomen and pelvis demonstrates physiologic activity in the GI tract and urinary system. The liver and adrenal glands are normal. There are mildly prominent lymph nodes within the right lower quadrant the largest measures 13 mm with a max SUV of 3.1. Findings are suspicious for mesenteric adenitis. There is no retroperitoneal adenopathy. There are no lytic or blastic lesions.     IMPRESSION: Diffuse FDG activity within the subareolar left breast with skin thickening and multiple hypermetabolic left breast masses consistent with patient's history of left breast cancer. No evidence of metastatic disease 11 mm nodule in the medial right breast  which was shown to represent fibroadenoma Mildly prominent lymph nodes in the right lower quadrant with mild FDG activity suggestive of mesenteric adenitis. Follow-up CT scan in 3-6 months is recommended.     Read and electronically signed by: Adriane Jacome MD on 5/21/2019 1:13 PM CDT ADRIANE JACOME MD    [facility] Unknown Rad Eap    Result Date: 5/21/2019  Chest single view CLINICAL DATA: Port-A-Cath placement FINDINGS: AP view shows the heart to be within normal size limits. The mediastinum is unremarkable. Right subclavian Port-A-Cath tip is at superior vena cava. No pneumothorax or other placement related complication is identified. No infiltrates or effusions are demonstrated. No acute osseous abnormalities are demonstrated. IMPRESSION: 1. Right sided Port-A-Cath appears appropriately positioned, with no pneumothorax or other placement related complication. No acute radiographic abnormalities. Read and electronically signed by: Teofilo Patterson MD on 5/21/2019 2:52 PM CDT TEOFILO PATTERSON MD      I have reviewed all available lab results and radiology reports.    Assessment/Plan:   (1) 39 y.o. female  with diagnosis of left breast cancer who has been referred by Dr Salvador Rey with [facility] for evaluation by medical oncology.      - She had presented with left breast pain which became progressive over a 4 month period.   - Radiology studies found a 2.5cm mass on the left breast along with two inflamed LN's on the right.   - She had left breast biopsy on 5/1/2019 which showed invasive ductal carcinoma grade 3. The right breast biopsy was negative for cancer.   - She is ER and WA positive. She is also Her2Nu +3.   - We discussed the latest data on Her2Nu positive breast cancers and the recommendation for neoadjuvant chemotherapy with a herceptin and perjeta.  - she will need echo, portacath, and PET scan  - will plan for herceptin, perjeta, carboplatin and taxotere regimen neoadjuvantly with 6  cycles  -  S/p set up chemotherapy school; discussed side-effect profile, provided literature on the regimen; obtained consents  - she is set up for May 27th to start  - PET scan and echo are on chart  - portacath placed on 5/21  - started chemotherapy with 1st cycle on 5/27/2019   - She had had a recent MRI of the breast again on 8/7  - she completed the neoadjuvant round and is having surgery next week with Dr Garcia at [facility] in [place]      - She saw Dr Howard Cheatham with plastics at [facility] and had bilateral mastectomies on Oct 8th 2019 at [facility].   - She requires herceptin maintenance regimen post-surgery recovery. - She will most likely also require a hysterectomy/oopherectomy and plans to see her GYN Dr Aly Contreras in the near future     - she saw Dr Garcia for follow-up on 11/16/2019. Dr Garcia still awaiting tumor board evaluation at [facility] but she may require XRT as well.       - She saw Dr Long on 11/15/2019 for radiation evaluation. He has recommended a daily regimen for 6 weeks. She is planning to start XRT in next week or two. She should be able to continue the herceptin every 3 weeks and we are looking into use of perjta but it may need to be held for the duration of the XRT if not.    - XRT completed was previously completed and she has since resumed therapy  -  she has had her hysterectomy/oopherectomy with Dr Contreras on March 3rd 2020; she is healed well from the surgery and has had no postop compliactions    - She has since resumed herceptin and perjeta post-op;   - we discussed starting her on an oral antihormone with arimidex and she has since started this past Monday- Continue Arimidex    Hot Flashes- Continue Arimidex tolerable at this time  - I had long talk with her again about COVID-19 precautions  Continue Herceptin/Perjeta every 3 weeks. Echo every 3 months  -Baseline Bone Density- Normal Bone Density continue tot check every 2 years.     -Continue  "with Cycle #17 Herceptin and Perjeta  -Continue Arimidex daily.    (2) Left parotid tumor - removed in 2011     (3) Sarcoidosis     (4) Interstitial cystitis     (5) Diet controlled DM     (6) Fibromyalgia     (7) Hx/of cervical dysplasia as teenager s/p cryoablation- S/p Hysterectomy    (8) Migraine - now on meds prn    (9)  Portacath tip has recently been found to have sheared off and she has seen Dr Rey. She is not having any problems at this time and is prophylactically on Eliquis.   -  She has seen Dr Hilton and Dr Lester Jones with [facility] and they were able to successfully removed the portacath tip and she has also had a new port placed at the same time.     (10) Osteopenia in lumbar spine- Start Oscal BID and Prolia Q6mths.    (11) B12 Deficiency- Patient will start self admin at home.         VISIT DIAGNOSES:      Malignant neoplasm of left female breast, unspecified estrogen receptor status, unspecified site of breast  -     Echo Color Flow Doppler? Yes; Future    Osteopenia, unspecified location    B12 deficiency  -     cyanocobalamin 1,000 mcg/mL injection; Inject 1 mL (1,000 mcg total) into the skin every 28 days.  Dispense: 3 mL; Refill: 3  -     syringe with needle (SYRINGE 3CC/25GX1") 3 mL 25 gauge x 1" Syrg; 1 Syringe by Misc.(Non-Drug; Combo Route) route every 28 days.  Dispense: 3 Syringe; Refill: 3          PLAN:  1. Continue Every 3 week Herceptin/Perjeta Cycle #17 today  2.  F/u with GYN as directed by Dr Contreras  3. Check labs every 3 weeks at least  4.  F/u with gen/surg as directed by him    5. Repeat echo every 3 mths- Due end of June 6. Continue oral antihormone therapy with arimidex    7. Will order B12 SQ for self admin at home  8. Follow up with Plastic Surgeon as scheduled for reconstruction.  9. Start Oscal BID and Prolia injections  RTC  3 weeks to see Dr. Chambers        Discussion:     Antihormone Therapy Discussion:    I discussed the advantages of antihormone therapy with " the patient with regard to their particular neoplastic or carcinoma in situ condition. I went over the side-effect profile of the medication including risk for potential development of endometrial cancer and/or hyperplasia in women who still have a uterus and the need for yearly GYN evaluation and follow-up. I discussed the risks for thromboembolic events such as DVT's, pulmonary emboli, CVA's, retinal vascular clots, phlebitis, and TIA's. I discussed the potential risks for development of ocular disturbances, retinopathy, cataracts, corneal changes, flushing, hot flashes, amenorrhea, altered menses, fluid retention, weight changes, elevations in LFT's, liver damage, and mood disturbances. I discussed the potential risk of arthropathy and joint pains/aches which could be chronic and debilitating. I discussed potential adverse effects on bone mineralization and the risk of osteopenia and/or osteoporosis which could led to increase risk of fractures.   A consent form was obtained and a copy was provided to the patient.      COVID-19 Discussion:    I had long discussion with patient and any applicable family about the COVID-19 coronavirus epidemic and the recommended precautions with regard to cancer and/or hematology patients. I have re-iterated the CDC recommendations for adequate hand washing, use of hand -like products, and coughing into elbow, etc. In addition, especially for our patients who are on chemotherapy and/or our otherwise immunocompromised patients, I have recommended avoidance of crowds, including movie theaters, restaurants, churches, etc. I have recommended avoidance of any sick or symptomatic family members and/or friends. I have also recommended avoidance of any raw and unwashed food products, and general avoidance of food items that have not been prepared by themselves. The patient has been asked to call us immediately with any symptom developments, issues, questions or other general  concerns.         I spent over 25 mins of time with the patient. Reviewed results of the recently ordered labs, tests and studies; made directives with regards to the results. Over half of this time was spent couseling and coordinating care.    I have explained all of the above in detail and the patient understands all of the current recommendation(s). I have answered all of their questions to the best of my ability and to their complete satisfaction.   The patient is to continue with the current management plan.      Electronically signed by Mulu Burns, MSN, APRN, AGNP-C, OCN

## 2020-06-10 NOTE — PLAN OF CARE
Patient given perjeta herceptin per MD orders. Tolerated well. Port remains free of signs of infection. katie genao NP had chairside appointment with patient. B12 injections also given.

## 2020-06-16 ENCOUNTER — TELEPHONE (OUTPATIENT)
Dept: HEMATOLOGY/ONCOLOGY | Facility: CLINIC | Age: 40
End: 2020-06-16

## 2020-06-16 NOTE — TELEPHONE ENCOUNTER
Called the patient and she instructed me that she had a bump on her forehead that looked like a pimple.  It popped, it was red and itching.  Then she got another one next to it and it did the same thing.  Then it combined with the other one and made a scab.  I instructed her to see if she can send a picture of it on the portal.

## 2020-06-16 NOTE — TELEPHONE ENCOUNTER
----- Message from Susanna Ortiz, Patient Care Assistant sent at 6/15/2020  1:51 PM CDT -----  Patient called in stating she need to speak to someone regarding a spot in her face that's sore and puffy and red and it itches and it has warmth to it . She can be reached at [phone]

## 2020-06-18 ENCOUNTER — TELEPHONE (OUTPATIENT)
Dept: HEMATOLOGY/ONCOLOGY | Facility: CLINIC | Age: 40
End: 2020-06-18

## 2020-06-18 NOTE — TELEPHONE ENCOUNTER
I called the patient and asked her how is the wound doing.  She said that it is feeling better.  I told her that Dr. Chambers would like her to go to and Urgent care if it don't get better.

## 2020-06-25 RX ORDER — HEPARIN 100 UNIT/ML
500 SYRINGE INTRAVENOUS
Status: CANCELLED | OUTPATIENT
Start: 2020-06-29

## 2020-06-25 RX ORDER — SODIUM CHLORIDE 0.9 % (FLUSH) 0.9 %
10 SYRINGE (ML) INJECTION
Status: CANCELLED | OUTPATIENT
Start: 2020-06-29

## 2020-06-29 ENCOUNTER — OFFICE VISIT (OUTPATIENT)
Dept: PLASTIC SURGERY | Facility: CLINIC | Age: 40
End: 2020-06-29
Payer: MEDICAID

## 2020-06-29 ENCOUNTER — HOSPITAL ENCOUNTER (OUTPATIENT)
Dept: RADIOLOGY | Facility: OTHER | Age: 40
Discharge: HOME | End: 2020-06-29
Attending: SURGERY
Payer: MEDICAID

## 2020-06-29 ENCOUNTER — OFFICE VISIT (OUTPATIENT)
Dept: SURGERY | Facility: CLINIC | Age: 40
End: 2020-06-29
Payer: MEDICAID

## 2020-06-29 VITALS
WEIGHT: 188 LBS | DIASTOLIC BLOOD PRESSURE: 85 MMHG | HEART RATE: 71 BPM | SYSTOLIC BLOOD PRESSURE: 142 MMHG | BODY MASS INDEX: 32.27 KG/M2

## 2020-06-29 VITALS
WEIGHT: 188.19 LBS | DIASTOLIC BLOOD PRESSURE: 85 MMHG | HEART RATE: 71 BPM | HEIGHT: 64 IN | BODY MASS INDEX: 32.13 KG/M2 | SYSTOLIC BLOOD PRESSURE: 142 MMHG

## 2020-06-29 DIAGNOSIS — Z17.0 MALIGNANT NEOPLASM OF OVERLAPPING SITES OF LEFT BREAST IN FEMALE, ESTROGEN RECEPTOR POSITIVE: Primary | ICD-10-CM

## 2020-06-29 DIAGNOSIS — Z90.13 S/P BILATERAL MASTECTOMY: Primary | ICD-10-CM

## 2020-06-29 DIAGNOSIS — C50.812 MALIGNANT NEOPLASM OF OVERLAPPING SITES OF LEFT BREAST IN FEMALE, ESTROGEN RECEPTOR POSITIVE: Primary | ICD-10-CM

## 2020-06-29 DIAGNOSIS — Z90.13 S/P BILATERAL MASTECTOMY: ICD-10-CM

## 2020-06-29 PROCEDURE — 99999 PR PBB SHADOW E&M-EST. PATIENT-LVL III: CPT | Mod: PBBFAC,,, | Performed by: SURGERY

## 2020-06-29 PROCEDURE — 73706 CT ANGIO LWR EXTR W/O&W/DYE: CPT | Mod: TC,50

## 2020-06-29 PROCEDURE — 74174 CTA ABD&PLVS W/CONTRAST: CPT | Mod: TC

## 2020-06-29 PROCEDURE — 99213 OFFICE O/P EST LOW 20 MIN: CPT | Mod: PBBFAC,25 | Performed by: SURGERY

## 2020-06-29 PROCEDURE — 25500020 PHARM REV CODE 255: Performed by: SURGERY

## 2020-06-29 PROCEDURE — 99213 PR OFFICE/OUTPT VISIT, EST, LEVL III, 20-29 MIN: ICD-10-PCS | Mod: S$PBB,,, | Performed by: SURGERY

## 2020-06-29 PROCEDURE — 74174 CTA ABD&PLVS W/CONTRAST: CPT | Mod: 26,,, | Performed by: RADIOLOGY

## 2020-06-29 PROCEDURE — 99999 PR PBB SHADOW E&M-EST. PATIENT-LVL III: ICD-10-PCS | Mod: PBBFAC,,, | Performed by: SURGERY

## 2020-06-29 PROCEDURE — 99215 PR OFFICE/OUTPT VISIT, EST, LEVL V, 40-54 MIN: ICD-10-PCS | Mod: S$PBB,,, | Performed by: SURGERY

## 2020-06-29 PROCEDURE — 99213 OFFICE O/P EST LOW 20 MIN: CPT | Mod: PBBFAC,25,27 | Performed by: SURGERY

## 2020-06-29 PROCEDURE — 99215 OFFICE O/P EST HI 40 MIN: CPT | Mod: S$PBB,,, | Performed by: SURGERY

## 2020-06-29 PROCEDURE — 99213 OFFICE O/P EST LOW 20 MIN: CPT | Mod: S$PBB,,, | Performed by: SURGERY

## 2020-06-29 PROCEDURE — 74174: ICD-10-PCS | Mod: 26,,, | Performed by: RADIOLOGY

## 2020-06-29 RX ORDER — LORATADINE 10 MG/1
10 TABLET ORAL DAILY
COMMUNITY

## 2020-06-29 RX ADMIN — IOHEXOL 100 ML: 350 INJECTION, SOLUTION INTRAVENOUS at 01:06

## 2020-06-29 NOTE — PROGRESS NOTES
"    [facility]       Post-Op        REFERRING PHYSICIAN:  No referring provider defined for this encounter.       Abel Chambers MD      DIAGNOSIS:    This is a 39 y.o. female with a stage ympT1 pN0 Mx grade 2 ER + TN + (1-2%) HER2 positive IDC of the left breast.    TREATMENT SUMMARY:  The patient is status post bilateral simple mastectomy and sentinel node biopsy on 10/8/2019.  Left axillary dissection.  Final pathology showed invasive ductal carcinoma of the left breast.  1.9cm in size.  Negative margins and no lymph node involvement.  XRT completed 1/14/20.   Continues on Herceptin/Perjeta, last cycle on 7/1/20  Continues on Arimidex    INTERVAL HISTORY:   Sheridan Todd presents for f/u. She is schedule to receive her last cycle of Herceptin/Perjeta this Wednesday 7/1/20. She will f/u with Dr Cheatham today for second stage recon (initially planning ROSI flaps).   She does note occasional tenderness to left breast UOQ and left axilla. She otherwise denies bilateral breast pain, lumps, discharge, discoloration, dimpling, or axillary lump.     Reports her sister is having some social issues and she is spending time in [place] to help her.      MEDICATIONS:  Current Outpatient Medications   Medication Sig Dispense Refill    anastrozole (ARIMIDEX) 1 mg Tab Take 1 tablet (1 mg total) by mouth once daily. 30 tablet 6    calcium-vitamin D3 (OYSTER SHELL CALCIUM-VIT D3) 500 mg(1,250mg) -200 unit per tablet Take 1 tablet by mouth 2 (two) times daily. 180 tablet 3    cyanocobalamin 1,000 mcg/mL injection Inject 1 mL (1,000 mcg total) into the skin every 28 days. 3 mL 3    phenazopyridine (PYRIDIUM) 200 MG tablet TAKE 1 TABLET BY MOUTH EVERY 6 HOURS AS NEEDED FOR PAIN 20 tablet 0    syringe with needle (SYRINGE 3CC/25GX1") 3 mL 25 gauge x 1" Syrg 1 Syringe by Misc.(Non-Drug; Combo Route) route every 28 days. 3 Syringe 3     Current Facility-Administered Medications   Medication Dose Route " Frequency Provider Last Rate Last Dose    cyanocobalamin injection 1,000 mcg  1,000 mcg Subcutaneous Q30 Days Abel Chambers MD   1,000 mcg at 03/16/20 1129    cyanocobalamin injection 1,000 mcg  1,000 mcg Subcutaneous Q30 Days Abel Chambers MD   1,000 mcg at 06/10/20 1340       ALLERGIES:   Review of patient's allergies indicates:   Allergen Reactions    Shellfish containing products Anaphylaxis and Nausea And Vomiting     Only crabmeat      Codeine Nausea And Vomiting     Pt thinks it was tylenol #3 with codeine  Can take hydrocodone & oxycodone  Other reaction(s): Nausea    Tegaderm ag mesh [silver] Blisters     Redness, itching and tears skin        PHYSICAL EXAMINATION:   General:  This is a well appearing female with appropriate speech, affect and gait.     Breast: no masses. No skin changes. No LAD.  Slight hyperpigmentation with XRT left chest wall.     IMPRESSION:   JOHN    PLAN:   1. return in 6 months for a follow up office visit and breast exam  2. The patient is advised in continued exam of the breast chest wall and to report to this office sooner should she note any areas of abnormality or concern.   3. Continue follow up with Med Onc  4. F/u with plastics for second stage recon

## 2020-06-30 ENCOUNTER — CLINICAL SUPPORT (OUTPATIENT)
Dept: CARDIOLOGY | Facility: HOSPITAL | Age: 40
End: 2020-06-30
Attending: NURSE PRACTITIONER
Payer: MEDICAID

## 2020-06-30 ENCOUNTER — LAB VISIT (OUTPATIENT)
Dept: LAB | Facility: HOSPITAL | Age: 40
End: 2020-06-30
Attending: INTERNAL MEDICINE
Payer: MEDICAID

## 2020-06-30 VITALS — WEIGHT: 188.06 LBS | BODY MASS INDEX: 32.11 KG/M2 | HEIGHT: 64 IN

## 2020-06-30 DIAGNOSIS — C50.912 HER2-POSITIVE CARCINOMA OF LEFT BREAST: ICD-10-CM

## 2020-06-30 DIAGNOSIS — D64.9 FATIGUE ASSOCIATED WITH ANEMIA: ICD-10-CM

## 2020-06-30 DIAGNOSIS — C50.912 MALIGNANT NEOPLASM OF LEFT FEMALE BREAST, UNSPECIFIED ESTROGEN RECEPTOR STATUS, UNSPECIFIED SITE OF BREAST: ICD-10-CM

## 2020-06-30 LAB
ALBUMIN SERPL BCP-MCNC: 3.8 G/DL (ref 3.5–5.2)
ALP SERPL-CCNC: 79 U/L (ref 55–135)
ALT SERPL W/O P-5'-P-CCNC: 20 U/L (ref 10–44)
ANION GAP SERPL CALC-SCNC: 12 MMOL/L (ref 8–16)
AORTIC ROOT ANNULUS: 2.53 CM
AORTIC VALVE CUSP SEPERATION: 1.93 CM
AST SERPL-CCNC: 20 U/L (ref 10–40)
AV INDEX (PROSTH): 0.85
AV MEAN GRADIENT: 4 MMHG
AV PEAK GRADIENT: 7 MMHG
AV VALVE AREA: 2.93 CM2
AV VELOCITY RATIO: 88.07
BASOPHILS # BLD AUTO: 0.03 K/UL (ref 0–0.2)
BASOPHILS NFR BLD: 0.7 % (ref 0–1.9)
BILIRUB SERPL-MCNC: 0.7 MG/DL (ref 0.1–1)
BSA FOR ECHO PROCEDURE: 1.96 M2
BUN SERPL-MCNC: 13 MG/DL (ref 6–20)
CALCIUM SERPL-MCNC: 9.2 MG/DL (ref 8.7–10.5)
CHLORIDE SERPL-SCNC: 106 MMOL/L (ref 95–110)
CO2 SERPL-SCNC: 26 MMOL/L (ref 23–29)
CREAT SERPL-MCNC: 1 MG/DL (ref 0.5–1.4)
CV ECHO LV RWT: 0.53 CM
DIFFERENTIAL METHOD: ABNORMAL
DOP CALC AO PEAK VEL: 1.36 M/S
DOP CALC AO VTI: 26.33 CM
DOP CALC LVOT AREA: 3.4 CM2
DOP CALC LVOT DIAMETER: 2.09 CM
DOP CALC LVOT PEAK VEL: 119.78 M/S
DOP CALC LVOT STROKE VOLUME: 77.12 CM3
DOP CALCLVOT PEAK VEL VTI: 22.49 CM
E WAVE DECELERATION TIME: 327.37 MSEC
E/A RATIO: 0.83
E/E' RATIO: 3.84 M/S
ECHO LV POSTERIOR WALL: 1.09 CM (ref 0.6–1.1)
EOSINOPHIL # BLD AUTO: 0.1 K/UL (ref 0–0.5)
EOSINOPHIL NFR BLD: 1.8 % (ref 0–8)
ERYTHROCYTE [DISTWIDTH] IN BLOOD BY AUTOMATED COUNT: 13.1 % (ref 11.5–14.5)
EST. GFR  (AFRICAN AMERICAN): >60 ML/MIN/1.73 M^2
EST. GFR  (NON AFRICAN AMERICAN): >60 ML/MIN/1.73 M^2
FRACTIONAL SHORTENING: 27 % (ref 28–44)
GLUCOSE SERPL-MCNC: 85 MG/DL (ref 70–110)
HCT VFR BLD AUTO: 37.1 % (ref 37–48.5)
HGB BLD-MCNC: 12.4 G/DL (ref 12–16)
IMM GRANULOCYTES # BLD AUTO: 0.01 K/UL (ref 0–0.04)
IMM GRANULOCYTES NFR BLD AUTO: 0.2 % (ref 0–0.5)
INTERVENTRICULAR SEPTUM: 1.09 CM (ref 0.6–1.1)
IVRT: 82.88 MSEC
LEFT ATRIUM SIZE: 3.47 CM
LEFT INTERNAL DIMENSION IN SYSTOLE: 2.99 CM (ref 2.1–4)
LEFT VENTRICLE MASS INDEX: 78 G/M2
LEFT VENTRICULAR INTERNAL DIMENSION IN DIASTOLE: 4.09 CM (ref 3.5–6)
LEFT VENTRICULAR MASS: 148.76 G
LV LATERAL E/E' RATIO: 3.2 M/S
LV SEPTAL E/E' RATIO: 4.8 M/S
LYMPHOCYTES # BLD AUTO: 1.1 K/UL (ref 1–4.8)
LYMPHOCYTES NFR BLD: 23.9 % (ref 18–48)
MCH RBC QN AUTO: 29.9 PG (ref 27–31)
MCHC RBC AUTO-ENTMCNC: 33.4 G/DL (ref 32–36)
MCV RBC AUTO: 89 FL (ref 82–98)
MONOCYTES # BLD AUTO: 0.4 K/UL (ref 0.3–1)
MONOCYTES NFR BLD: 7.9 % (ref 4–15)
MV PEAK A VEL: 0.58 M/S
MV PEAK E VEL: 0.48 M/S
NEUTROPHILS # BLD AUTO: 3 K/UL (ref 1.8–7.7)
NEUTROPHILS NFR BLD: 65.5 % (ref 38–73)
NRBC BLD-RTO: 0 /100 WBC
PISA TR MAX VEL: 2.43 M/S
PLATELET # BLD AUTO: 184 K/UL (ref 150–350)
PMV BLD AUTO: 8.9 FL (ref 9.2–12.9)
POTASSIUM SERPL-SCNC: 4 MMOL/L (ref 3.5–5.1)
PROT SERPL-MCNC: 7.2 G/DL (ref 6–8.4)
PV PEAK VELOCITY: 110.49 CM/S
RA PRESSURE: 3 MMHG
RBC # BLD AUTO: 4.15 M/UL (ref 4–5.4)
RIGHT VENTRICULAR END-DIASTOLIC DIMENSION: 227 CM
SODIUM SERPL-SCNC: 144 MMOL/L (ref 136–145)
TDI LATERAL: 0.15 M/S
TDI SEPTAL: 0.1 M/S
TDI: 0.13 M/S
TR MAX PG: 24 MMHG
TV REST PULMONARY ARTERY PRESSURE: 27 MMHG
WBC # BLD AUTO: 4.57 K/UL (ref 3.9–12.7)

## 2020-06-30 PROCEDURE — 85025 COMPLETE CBC W/AUTO DIFF WBC: CPT

## 2020-06-30 PROCEDURE — 36415 COLL VENOUS BLD VENIPUNCTURE: CPT

## 2020-06-30 PROCEDURE — 93306 TTE W/DOPPLER COMPLETE: CPT

## 2020-06-30 PROCEDURE — 80053 COMPREHEN METABOLIC PANEL: CPT

## 2020-06-30 NOTE — PROGRESS NOTES
[facility] Hematology/Oncology  PROGRESS NOTE -  Follow-up Visit      Subjective:       Patient ID:   NAME: Sheridan Todd : 1980     39 y.o. female    Referring Doc: Krissy  Other Physicians: Jose Rodriguez De Boisblanc, [place]; Howard Cheatham ([facility])    Chief Complaint: left breast ca f/u     History of Present Illness:     Patient returns today for a regularly scheduled follow-up visit.  She is getting her last therapy today. [place] has [place] out [place] but plans to follow-up with us through Oct 2020 at least. She is having reconstructive surgery in about 1 1/2 months      She previously completed the neoadjuvant chemotherapy on 2019 and has subsequently surgery next at [facility] with Dr Garcia.  She had XRT and has since been on maintenance therapy with herceptin/perjeta every 3 weeks therapy.        She denies any CP, SOB, HA's or N/V. She is here by herself.       Discussed continued COVID-19 precautions    She will need to establish with oncology in her new hometown       ROS:   GEN: normal without any fever, night sweats or weight loss  HEENT: normal with no HA's, sore throat, stiff neck, changes in vision  CV: normal with no CP, SOB, PND, MENDIETA or orthopnea  PULM: normal with no SOB, cough, hemoptysis, sputum or pleuritic pain  GI: normal with no abdominal pain, nausea, vomiting, constipation, diarrhea, melanotic stools, BRBPR, or hematemesis  : normal with no hematuria, dysuria  BREAST: no identifiable abnormality at this time  SKIN: normal with no rash, erythema, bruising, or swelling    Allergies:  Review of patient's allergies indicates:   Allergen Reactions    Shellfish containing products Anaphylaxis and Nausea And Vomiting     Only crabmeat      Codeine Nausea And Vomiting     Pt thinks it was tylenol #3 with codeine  Can take hydrocodone & oxycodone  Other reaction(s): Nausea    Tegaderm ag mesh [silver] Blisters     Redness, itching and tears skin   "      Medications:    Current Outpatient Medications:     anastrozole (ARIMIDEX) 1 mg Tab, Take 1 tablet (1 mg total) by mouth once daily., Disp: 30 tablet, Rfl: 6    calcium-vitamin D3 (OYSTER SHELL CALCIUM-VIT D3) 500 mg(1,250mg) -200 unit per tablet, Take 1 tablet by mouth 2 (two) times daily., Disp: 180 tablet, Rfl: 3    cyanocobalamin 1,000 mcg/mL injection, Inject 1 mL (1,000 mcg total) into the skin every 28 days., Disp: 3 mL, Rfl: 3    loratadine (CLARITIN) 10 mg tablet, Take 10 mg by mouth once daily., Disp: , Rfl:     phenazopyridine (PYRIDIUM) 200 MG tablet, TAKE 1 TABLET BY MOUTH EVERY 6 HOURS AS NEEDED FOR PAIN, Disp: 20 tablet, Rfl: 0    syringe with needle (SYRINGE 3CC/25GX1") 3 mL 25 gauge x 1" Syrg, 1 Syringe by Misc.(Non-Drug; Combo Route) route every 28 days., Disp: 3 Syringe, Rfl: 3    Current Facility-Administered Medications:     cyanocobalamin injection 1,000 mcg, 1,000 mcg, Subcutaneous, Q30 Days, Abel Chambers MD, 1,000 mcg at 03/16/20 1129    cyanocobalamin injection 1,000 mcg, 1,000 mcg, Subcutaneous, Q30 Days, Abel Chambers MD, 1,000 mcg at 06/10/20 1340    Facility-Administered Medications Ordered in Other Visits:     alteplase injection 2 mg, 2 mg, Intra-Catheter, PRN, Abel Chambers MD    heparin, porcine (PF) 100 unit/mL injection flush 500 Units, 500 Units, Intravenous, PRN, Abel Chambers MD    pertuzumab (PERJETA) 420 mg in sodium chloride 0.9% 264 mL infusion, 420 mg, Intravenous, 1 time in Clinic/HOD, Abel Chambers MD    sodium chloride 0.9% 100 mL flush bag, , Intravenous, 1 time in Clinic/HOD, Abel Chambers MD    sodium chloride 0.9% flush 10 mL, 10 mL, Intravenous, PRN, Abel Chambers MD    trastuzumab 468 mg in sodium chloride 0.9% 250 mL chemo infusion, 6 mg/kg (Treatment Plan Recorded), Intravenous, 1 time in Clinic/HOD, Abel Chambers MD    PMHx/PSHx Updates:  See patient's last visit with me on 4/6/2020.  See H&P " on 5/16/2019        Pathology:  Cancer Staging      Breast biopsies: 5/1/2019:  Left breast: with invasive ductal carcinoma grade 3; ER positive at 95% and WA positive at 90%; Her2Nu was positive at +3; Her2/CEP 17 ratio was >10.6  - Ki67 was unfavorable at 69%  Right breast: negative for cancer      Bilateral Mastectomies 10/8/2019:    FINAL PATHOLOGIC DIAGNOSIS  1. BREAST (RIGHT MASTECTOMY): NO EVIDENCE OF MALIGNANCY IN SECTIONS OF SKIN, NIPPLE, AND  BREAST TISSUE.  2. BREAST (LEFT MASTECTOMY): INVASIVE DUCTAL CARCINOMA (2 SEPARATE RESIDUAL FOCI);  ASSOCIATED HIGH-GRADE DUCTAL CARCINOMA IN SITU; SECTIONS OF SKIN, NIPPLE, AND BREAST  MARGINS FREE OF MALIGNANCY.  Note: Two foci of residual ductal carcinoma are identified with differing histologies . The larger lesion consists of  small foci of invasive ductal carcinoma in a 19mm fibrotic nodule with suggestion of presurgical therapeutic  response. Vascular invasion cannot be demonstrated on CD31 stain. There is an additional separate  microinvasive focus of mucinous carcinoma. Foci of DCIS are present throughout the breast tissue.  HORMONE RECEPTOR ASSAYS:  ER-strongly positive (90%)  PgR-weakly positive (1-2%)  Her2-equivocal (1 2+); to be sent for FISH testing  Ki67 proliferative activity-30% activity  Positive and negative controls reacted appropriately.  3. LYMPH NODE (SENTINEL LYMPH NODE, CLINICAL): NO EVIDENCE OF MALIGNANCY.  Note: The lymph node was examined at three levels with H&E and epithelial immunostains (AE1/AES, CAM5.2,  and WSK). Positive and negative controls reacted appropriately.  4. LYMPH NODES (22): NO EVIDENCE OF MALIGNANCY.         Objective:     Vitals:  Blood pressure 139/87, pulse 94, temperature 97.3 °F (36.3 °C), resp. rate 20, weight 86.6 kg (190 lb 14.4 oz).     Physical Examination:   GEN: no apparent distress, comfortable; AAOx3  HEAD: atraumatic and normocephalic  EYES: no pallor, no icterus, PERRLA  ENT: OMM, no pharyngeal erythema,  external ears WNL; no nasal discharge; no thrush  NECK: no masses, thyroid normal, trachea midline, no LAD/LN's, supple  CV: RRR with no murmur; normal pulse; normal S1 and S2; no pedal edema  CHEST: Normal respiratory effort; CTAB; normal breath sounds; no wheeze or crackles; portacath on right chest wall    ABDOM: nontender and nondistended; soft; normal bowel sounds; no rebound/guarding  MUSC/Skeletal: ROM normal; no crepitus; joints normal; no deformities or arthropathy  EXTREM: no clubbing, cyanosis, inflammation or swelling  SKIN: no rashes, lesions, ulcers, petechiae or subcutaneous nodules  : no lopez  NEURO: grossly intact; motor/sensory WNL; AAOx3; no tremors  PSYCH: normal mood, affect and behavior  LYMPH: normal cervical, supraclavicular, axillary and groin LN's  Breast: s/p bilateral mastectomies       Labs:     Lab Results   Component Value Date    WBC 4.57 06/30/2020    HGB 12.4 06/30/2020    HCT 37.1 06/30/2020    MCV 89 06/30/2020     06/30/2020     BMP  Lab Results   Component Value Date     06/30/2020    K 4.0 06/30/2020     06/30/2020    CO2 26 06/30/2020    BUN 13 06/30/2020    CREATININE 1.0 06/30/2020    CALCIUM 9.2 06/30/2020    ANIONGAP 12 06/30/2020    ESTGFRAFRICA >60.0 06/30/2020    EGFRNONAA >60.0 06/30/2020             Radiology/Diagnostic Studies:    MRI head  12/5/2019:    Impression       No evidence of intracranial metastatic disease.           MRI breast  8/7/2019:    1. Significant interval improvement of enhancing spiculated mass in left breast near 4:00 position, as well as more diffuse non mass like enhancement throughout the superior left breast since 04/26/2019, consistent with a favorable response to interval neoadjuvant treatment.  2. No significant change of lobular lower inner quadrant right breast mass consistent with biopsy-proven fibroadenoma.  3. Unchanged 7 mm right breast mass near 12:00 position felt to represent intramammary lymph  node.      Nm Pet Ct Routine Skull To Mid Thigh    Result Date: 5/21/2019  INTEGRATED PET CT WITH IMAGE FUSION HISTORY:  Left breast cancer initial treatment evaluation TECHNIQUE: Following the injection of 12.7 mCi of F-18 labeled FDG into a right antecubital vein, PET CT was performed from the vertex of the skull through the proximal thighs with an integrated full ring PET CT scanner with image fusion. The patient's serum glucose at the time of the exam was 82 mg/dL. FINDINGS: There is diffuse FDG activity in the subareolar left breast with skin thickening and multiple hypermetabolic masses. There is a 19 mm spiculated hypermetabolic mass in the inferior lateral left breast with a max SUV of 10.4. This was previously biopsied. There is a 2 cm hypermetabolic area in the lateral superior left breast with max SUV of 9.9. There is an 11 mm nodule within the inner right breast which was biopsied and shown to represent fibroadenoma. This shows no FDG activity. There is no axillary, mediastinal or hilar adenopathy. There are no pulmonary nodules, infiltrates or pleural effusions. CT of the head and neck show no intra-axial lesions or cervical adenopathy. CT of the abdomen and pelvis demonstrates physiologic activity in the GI tract and urinary system. The liver and adrenal glands are normal. There are mildly prominent lymph nodes within the right lower quadrant the largest measures 13 mm with a max SUV of 3.1. Findings are suspicious for mesenteric adenitis. There is no retroperitoneal adenopathy. There are no lytic or blastic lesions.     IMPRESSION: Diffuse FDG activity within the subareolar left breast with skin thickening and multiple hypermetabolic left breast masses consistent with patient's history of left breast cancer. No evidence of metastatic disease 11 mm nodule in the medial right breast which was shown to represent fibroadenoma Mildly prominent lymph nodes in the right lower quadrant with mild FDG activity  suggestive of mesenteric adenitis. Follow-up CT scan in 3-6 months is recommended.     Read and electronically signed by: Adriane Jacome MD on 5/21/2019 1:13 PM CDT ADRIANE JACOME MD    [facility] Unknown Rad Eap    Result Date: 5/21/2019  Chest single view CLINICAL DATA: Port-A-Cath placement FINDINGS: AP view shows the heart to be within normal size limits. The mediastinum is unremarkable. Right subclavian Port-A-Cath tip is at superior vena cava. No pneumothorax or other placement related complication is identified. No infiltrates or effusions are demonstrated. No acute osseous abnormalities are demonstrated. IMPRESSION: 1. Right sided Port-A-Cath appears appropriately positioned, with no pneumothorax or other placement related complication. No acute radiographic abnormalities. Read and electronically signed by: Teofilo Patterson MD on 5/21/2019 2:52 PM CDT TEOFILO PATTERSON MD      I have reviewed all available lab results and radiology reports.    Assessment/Plan:   (1) 39 y.o. female  with diagnosis of left breast cancer who has been referred by Dr Salvador Rey with [facility] for evaluation by medical oncology.      - She had presented with left breast pain which became progressive over a 4 month period.   - Radiology studies found a 2.5cm mass on the left breast along with two inflamed LN's on the right.   - She had left breast biopsy on 5/1/2019 which showed invasive ductal carcinoma grade 3. The right breast biopsy was negative for cancer.   - She is ER and UT positive. She is also Her2Nu +3.   - We discussed the latest data on Her2Nu positive breast cancers and the recommendation for neoadjuvant chemotherapy with a herceptin and perjeta.  - she will need echo, portacath, and PET scan  - will plan for herceptin, perjeta, carboplatin and taxotere regimen neoadjuvantly with 6 cycles  -  S/p set up chemotherapy school; discussed side-effect profile, provided literature on the regimen; obtained  consents  - she is set up for May 27th to start  - PET scan and echo are on chart  - portacath placed on 5/21  - started chemotherapy with 1st cycle on 5/27/2019   - She had had a recent MRI of the breast again on 8/7  - she completed the neoadjuvant round and is having surgery next week with Dr Garcia at [facility] in [place]      - She saw Dr Howard Cheatham with plastics at [facility] and had bilateral mastectomies on Oct 8th 2019 at [facility].   - She requires herceptin maintenance regimen post-surgery recovery. - She will most likely also require a hysterectomy/oopherectomy and plans to see her GYN Dr Aly Contreras in the near future     - she saw Dr Garcia for follow-up on 11/16/2019. Dr Garcia still awaiting tumor board evaluation at [facility] but she may require XRT as well.       - She saw Dr Long on 11/15/2019 for radiation evaluation. He has recommended a daily regimen for 6 weeks. She is planning to start XRT in next week or two. She should be able to continue the herceptin every 3 weeks and we are looking into use of perjta but it may need to be held for the duration of the XRT if not.    - XRT completed was previously completed and she has since resumed therapy  -  she has had her hysterectomy/oopherectomy with Dr Contreras on March 3rd 2020; she is healed well from the surgery and has had no postop compliactions    - She has since resumed herceptin and perjeta post-op;   - we previously discussed starting her on an oral antihormone with arimidex and she has been on this without difficulty     - I had long talk with her again about COVID-19 precautions    7/1/2020:  - she is getting her last maintenance therapy today  - she plans to have reconstructive therapy in about 1 1/2 months  - she is to continue arimidex for a total of 5-10 yrs  - she will need to establish with oncology in her new hometown/state    (2) Left parotid tumor - removed in 2011     (3) Sarcoidosis     (4)  Interstitial cystitis     (5) Diet controlled gestational DM     (6) Fibromyalgia     (7) Hx/of cervical dysplasia as teenager s/p cryoablation    (8) Migraine - now on meds prn    (9)  Portacath tip has recently been found to have sheared off and she has seen Dr Rey. She is not having any problems at this time and is prophylactically on leiquis.   -  She has seen Dr Hilton and Dr Lester Jones with [facility] and they were able to successfully removed the portacath tip and she has also had a new port placed at the same time.          VISIT DIAGNOSES:      Malignant neoplasm of left female breast, unspecified estrogen receptor status, unspecified site of breast    HER2-positive carcinoma of left breast    Chemotherapy induced neutropenia    Malignant neoplasm of overlapping sites of left breast in female, estrogen receptor positive    Sarcoidosis    S/P bilateral mastectomy    Use of aromatase inhibitors          PLAN:  1.  Completing last maintenance therapy today  2.  F/u with GYN as directed    3.  Planned reconstruction in 1 1/2 months  4.  She will need to establish with oncology in her new state/hometown  5.  Repeat echo as directed  6. continue oral antihormone therapy with arimidex for 5-10 yrs    RTC  3 months if possible and/or prn when she is here in [place]    Fax note to Ben Rey, Gabe Lorenzo, Chaparrita Hilton Mackey, Mannina    Discussion:     Antihormone Therapy Discussion:    I discussed the advantages of antihormone therapy with the patient with regard to their particular neoplastic or carcinoma in situ condition. I went over the side-effect profile of the medication including risk for potential development of endometrial cancer and/or hyperplasia in women who still have a uterus and the need for yearly GYN evaluation and follow-up. I discussed the risks for thromboembolic events such as DVT's, pulmonary emboli, CVA's, retinal vascular clots, phlebitis, and TIA's. I discussed the potential  risks for development of ocular disturbances, retinopathy, cataracts, corneal changes, flushing, hot flashes, amenorrhea, altered menses, fluid retention, weight changes, elevations in LFT's, liver damage, and mood disturbances. I discussed the potential risk of arthropathy and joint pains/aches which could be chronic and debilitating. I discussed potential adverse effects on bone mineralization and the risk of osteopenia and/or osteoporosis which could led to increase risk of fractures.   A consent form was obtained and a copy was provided to the patient.      COVID-19 Discussion:    I had long discussion with patient and any applicable family about the COVID-19 coronavirus epidemic and the recommended precautions with regard to cancer and/or hematology patients. I have re-iterated the CDC recommendations for adequate hand washing, use of hand -like products, and coughing into elbow, etc. In addition, especially for our patients who are on chemotherapy and/or our otherwise immunocompromised patients, I have recommended avoidance of crowds, including movie theaters, restaurants, churches, etc. I have recommended avoidance of any sick or symptomatic family members and/or friends. I have also recommended avoidance of any raw and unwashed food products, and general avoidance of food items that have not been prepared by themselves. The patient has been asked to call us immediately with any symptom developments, issues, questions or other general concerns.         I spent over 25 mins of time with the patient. Reviewed results of the recently ordered labs, tests and studies; made directives with regards to the results. Over half of this time was spent couseling and coordinating care.    I have explained all of the above in detail and the patient understands all of the current recommendation(s). I have answered all of their questions to the best of my ability and to their complete satisfaction.   The patient is  to continue with the current management plan.            Electronically signed by Abel Chambers MD

## 2020-06-30 NOTE — PROGRESS NOTES
"Plastic Update    From Hem Onc Note and Chart Review  Cancer Stage from Rad Onc Note [id number] converted to [id number] triple (+) g2 IDC L breast  She completed the neoadjuvant chemotherapy on 9/9/2019.  Dr. Chambers.  Follow up has been with Mulu Burns NP  Dr Garcia- bilateral mastectomy with skin reduction on 10/8/2019  Hysterectomy/oopherectomy with Dr contreras on March 3rd 2020  Patient completed adjuvant left chest wall and draining lymphatic radiotherapy to 5040 cGy with 1000 cGy boost to her mastectomy incision on January 14, 2020.  Dr. bravo    Reviewed latest Hem Onc Notes May 2020  "  1.  continue Every 3 week Herceptin/Perjeta  2.  F/u with GYN as directed by Dr Contreras  3. Check labs every 3 weeks at least  4.  F/u with gen/surg as directed by him    5. Repeat echo every 3 mths- Due end of June  6. continue oral antihormone therapy with arimidex    "    She is healed well from the surgery and has had no postop complications.  No evidence of fluid collection or infection.  No obvious radiation changes to skin.    INFORMED CONSENT  Procedure: ROSI flap free tissue transfer bilateral     The specific risks of reconstruction with free tissue transfer in detail as listed below in addition to the anticipated recovery time and in-hospital stay for flap monitoring was discussed.  Microsurgery, due to its dependence on the small vessel anatomy of each individual patient, has inherent risks of vessel thrombosis in the perioperative period and needs to be monitored -- this may result in emergent take back to the operating room for attempted salvage. If unsuccessful, another method of reconstruction will be offered.       The proposed procedure, any treatment options, expected and possible outcomes including complications, any necessary perioperative medicinal and activity restrictions, expected recovery time and potential disability were fully reviewed with the patient. Permission to obtain photographs before, " during, and after surgery was also granted. An opportunity to ask questions was given, and written informed consent was given to proceed. This Informed Consent discussion took place prior to the signing the consent form.  I explained that he may require a blood transfusion     Risks of the procedure:  Complete or partial flap loss from vascular thrombosis or infection  Poor cosmetic outcome  Asymmetry  Need for re-operation, possibly urgently  Infection at all operative locations  Bleeding, need for transfusion, transfusion reaction  Infection  Hernias  Abdominal wall necrosis, wound dehiscence  Umbilical necrosis or malposition- I discussed that he may lose his umbilicus and he is fine with this.  Numbness of all operative sites, groin and/or thigh  Abdominal bulge, possible unable to be corrected  Fat necrosis or breast flap lump  Wound separation, failure to heal  Hypertrophic/poor scarring  Fluid accumulation under operative sites, need for drainage  Blood clots, venous thromboembolism, pulmonary embolism  Organ system damage: respiratory, cardiovascular, liver, kidneys, brain, gastro-intestinal, immune  Donor site morbidity: Loss of normal function, poor cosmesis, wound healing problems, hernias or bulge  Heart attack, Stroke, Death     I reviewed the following    Lab Results   Component Value Date    WBC 3.92 06/08/2020    HGB 11.8 (L) 06/08/2020    HCT 36.3 (L) 06/08/2020    MCV 92 06/08/2020     06/08/2020       CMP  Sodium   Date Value Ref Range Status   06/08/2020 142 136 - 145 mmol/L Final   07/15/2019 135 134 - 144 mmol/L      Potassium   Date Value Ref Range Status   06/08/2020 4.0 3.5 - 5.1 mmol/L Final     Chloride   Date Value Ref Range Status   06/08/2020 106 95 - 110 mmol/L Final   07/15/2019 103 98 - 110 mmol/L      CO2   Date Value Ref Range Status   06/08/2020 28 23 - 29 mmol/L Final     Glucose   Date Value Ref Range Status   06/08/2020 85 70 - 110 mg/dL Final   07/15/2019 117 (H) 70 -  99 mg/dL      BUN, Bld   Date Value Ref Range Status   06/08/2020 18 6 - 20 mg/dL Final     Creatinine   Date Value Ref Range Status   06/08/2020 1.1 0.5 - 1.4 mg/dL Final   07/15/2019 0.74 0.60 - 1.40 mg/dL      Calcium   Date Value Ref Range Status   06/08/2020 8.8 8.7 - 10.5 mg/dL Final     Total Protein   Date Value Ref Range Status   06/08/2020 6.9 6.0 - 8.4 g/dL Final     Albumin   Date Value Ref Range Status   06/08/2020 3.6 3.5 - 5.2 g/dL Final   07/15/2019 4.0 3.1 - 4.7 g/dL      Total Bilirubin   Date Value Ref Range Status   06/08/2020 0.6 0.1 - 1.0 mg/dL Final     Comment:     For infants and newborns, interpretation of results should be based  on gestational age, weight and in agreement with clinical  observations.  Premature Infant recommended reference ranges:  Up to 24 hours.............<8.0 mg/dL  Up to 48 hours............<12.0 mg/dL  3-5 days..................<15.0 mg/dL  6-29 days.................<15.0 mg/dL       Alkaline Phosphatase   Date Value Ref Range Status   06/08/2020 70 55 - 135 U/L Final     AST   Date Value Ref Range Status   06/08/2020 20 10 - 40 U/L Final     ALT   Date Value Ref Range Status   06/08/2020 25 10 - 44 U/L Final     Anion Gap   Date Value Ref Range Status   06/08/2020 8 8 - 16 mmol/L Final     eGFR if    Date Value Ref Range Status   06/08/2020 >60.0 >60 mL/min/1.73 m^2 Final     eGFR if non    Date Value Ref Range Status   06/08/2020 >60.0 >60 mL/min/1.73 m^2 Final     Comment:     Calculation used to obtain the estimated glomerular filtration  rate (eGFR) is the CKD-EPI equation.        I also reviewed her ROSI scan- she has the vascular anatomy for a ROSI    She had a March ECHO    Medications reviewed, anticipated in-hospital course, and expected aftercare discussed    PLAN  Procedure booked  Location: [facility]  Anesthesia: General  Assistant: Dr. Amin  Time: 6 h  Labs: No further labs required from my  standpoint  Consults: none  Pre-op: needed  Imaging: Completed  DVT Prophylaxis: SC heparin 5000 U pre-op in holding, SCDs  Abx: IV Ancef in pre-op  Pre Op Labs: None needed from my standpoint  Pre Op Imaging: CXR, will determine with other providers  Meds: Arimidex will need to be held preop, 1 week before surgery  Will message Dr. Chambers and staff regarding need for repeat ECHO before surgery, clearance from surgery, herceptin plan post op  Will also message Dr. Aly Contreras to make sure she is clear for surgery from his standpoint    CPT Codes: Bilateral ROSI codes 67874, 89116, 96295       40 minutes was spend with patient, more than 50% was spent explaining the nature of the procedure, location of incisions, expected recovery.  We did discuss that planned revision should be expected, 3-6 months after tissue transfer.  At revision, aesthetic concerns, symmetry concerns, nipple reconstruction, abdominal contouring can be performed.  All of her questions were answered.        Plastic & Reconstructive Surgery  Microsurgery  [facility]  c/o Howard Cheatham M.D.  [facility]  [address] Floor [address]  [address] [place] [address]     Work [phone]  Toll free [phone]  If no answer [phone]

## 2020-07-01 ENCOUNTER — INFUSION (OUTPATIENT)
Dept: INFUSION THERAPY | Facility: HOSPITAL | Age: 40
End: 2020-07-01
Attending: INTERNAL MEDICINE
Payer: MEDICAID

## 2020-07-01 ENCOUNTER — OFFICE VISIT (OUTPATIENT)
Dept: HEMATOLOGY/ONCOLOGY | Facility: CLINIC | Age: 40
End: 2020-07-01
Payer: MEDICAID

## 2020-07-01 VITALS
TEMPERATURE: 97 F | BODY MASS INDEX: 32.59 KG/M2 | TEMPERATURE: 97 F | DIASTOLIC BLOOD PRESSURE: 87 MMHG | HEIGHT: 64 IN | SYSTOLIC BLOOD PRESSURE: 139 MMHG | WEIGHT: 190.88 LBS | RESPIRATION RATE: 20 BRPM | DIASTOLIC BLOOD PRESSURE: 87 MMHG | HEART RATE: 94 BPM | HEART RATE: 94 BPM | WEIGHT: 190.88 LBS | RESPIRATION RATE: 20 BRPM | SYSTOLIC BLOOD PRESSURE: 139 MMHG | BODY MASS INDEX: 32.77 KG/M2

## 2020-07-01 DIAGNOSIS — Z17.0 MALIGNANT NEOPLASM OF OVERLAPPING SITES OF LEFT BREAST IN FEMALE, ESTROGEN RECEPTOR POSITIVE: ICD-10-CM

## 2020-07-01 DIAGNOSIS — T45.1X5A CHEMOTHERAPY INDUCED NEUTROPENIA: ICD-10-CM

## 2020-07-01 DIAGNOSIS — C50.912 HER2-POSITIVE CARCINOMA OF LEFT BREAST: ICD-10-CM

## 2020-07-01 DIAGNOSIS — D70.1 CHEMOTHERAPY INDUCED NEUTROPENIA: ICD-10-CM

## 2020-07-01 DIAGNOSIS — D86.9 SARCOIDOSIS: ICD-10-CM

## 2020-07-01 DIAGNOSIS — E86.0 DEHYDRATION: Primary | ICD-10-CM

## 2020-07-01 DIAGNOSIS — C50.812 MALIGNANT NEOPLASM OF OVERLAPPING SITES OF LEFT BREAST IN FEMALE, ESTROGEN RECEPTOR POSITIVE: ICD-10-CM

## 2020-07-01 DIAGNOSIS — C50.912 MALIGNANT NEOPLASM OF LEFT FEMALE BREAST, UNSPECIFIED ESTROGEN RECEPTOR STATUS, UNSPECIFIED SITE OF BREAST: Primary | ICD-10-CM

## 2020-07-01 DIAGNOSIS — Z90.13 S/P BILATERAL MASTECTOMY: ICD-10-CM

## 2020-07-01 DIAGNOSIS — Z79.811 USE OF AROMATASE INHIBITORS: ICD-10-CM

## 2020-07-01 PROCEDURE — 63600175 PHARM REV CODE 636 W HCPCS: Mod: JG | Performed by: INTERNAL MEDICINE

## 2020-07-01 PROCEDURE — 99214 OFFICE O/P EST MOD 30 MIN: CPT | Mod: S$GLB,,, | Performed by: INTERNAL MEDICINE

## 2020-07-01 PROCEDURE — 99214 PR OFFICE/OUTPT VISIT, EST, LEVL IV, 30-39 MIN: ICD-10-PCS | Mod: S$GLB,,, | Performed by: INTERNAL MEDICINE

## 2020-07-01 PROCEDURE — 96417 CHEMO IV INFUS EACH ADDL SEQ: CPT

## 2020-07-01 PROCEDURE — 96413 CHEMO IV INFUSION 1 HR: CPT

## 2020-07-01 PROCEDURE — 25000003 PHARM REV CODE 250: Performed by: INTERNAL MEDICINE

## 2020-07-01 RX ORDER — SODIUM CHLORIDE 0.9 % (FLUSH) 0.9 %
10 SYRINGE (ML) INJECTION
Status: DISCONTINUED | OUTPATIENT
Start: 2020-07-01 | End: 2020-07-01 | Stop reason: HOSPADM

## 2020-07-01 RX ORDER — HEPARIN 100 UNIT/ML
500 SYRINGE INTRAVENOUS
Status: DISCONTINUED | OUTPATIENT
Start: 2020-07-01 | End: 2020-07-01 | Stop reason: HOSPADM

## 2020-07-01 RX ADMIN — HEPARIN 500 UNITS: 100 SYRINGE at 02:07

## 2020-07-01 RX ADMIN — CYANOCOBALAMIN 1000 MCG: 1000 INJECTION, SOLUTION INTRAMUSCULAR; SUBCUTANEOUS at 03:07

## 2020-07-01 RX ADMIN — TRASTUZUMAB 468 MG: 150 INJECTION, POWDER, LYOPHILIZED, FOR SOLUTION INTRAVENOUS at 02:07

## 2020-07-01 RX ADMIN — PERTUZUMAB 420 MG: 30 INJECTION, SOLUTION, CONCENTRATE INTRAVENOUS at 01:07

## 2020-07-01 NOTE — PLAN OF CARE
Problem: Fatigue  Goal: Improved Activity Tolerance  Outcome: Ongoing, Progressing  Intervention: Promote Energy Conservation  Flowsheets (Taken 7/1/2020 4424)  Fatigue Management: frequent rest breaks encouraged  Sleep/Rest Enhancement: regular sleep/rest pattern promoted  Activity Management:   ambulated - L4   activity encouraged

## 2020-07-06 ENCOUNTER — TELEPHONE (OUTPATIENT)
Dept: PLASTIC SURGERY | Facility: CLINIC | Age: 40
End: 2020-07-06

## 2020-07-06 NOTE — TELEPHONE ENCOUNTER
spoke with pt phone call follow up scheduled.                   ----- Message from Agnieszka Fagan sent at 7/6/2020  9:47 AM CDT -----  Regarding: CT Results  Contact: Patient Called [phone]  Calling to speak with Nurse of Doctor in regards to results from CT she had done on 6/29/20 and also to discuss surgery options.

## 2020-07-09 ENCOUNTER — OFFICE VISIT (OUTPATIENT)
Dept: PLASTIC SURGERY | Facility: CLINIC | Age: 40
End: 2020-07-09
Payer: MEDICAID

## 2020-07-09 DIAGNOSIS — C50.912 MALIGNANT NEOPLASM OF LEFT FEMALE BREAST, UNSPECIFIED ESTROGEN RECEPTOR STATUS, UNSPECIFIED SITE OF BREAST: ICD-10-CM

## 2020-07-09 DIAGNOSIS — Z79.811 USE OF AROMATASE INHIBITORS: Primary | ICD-10-CM

## 2020-07-09 PROCEDURE — 99499 UNLISTED E&M SERVICE: CPT | Mod: 95,,, | Performed by: SURGERY

## 2020-07-09 PROCEDURE — 99499 NO LOS: ICD-10-PCS | Mod: 95,,, | Performed by: SURGERY

## 2020-07-09 NOTE — PROGRESS NOTES
Established Patient - Audio Only Telehealth Visit     Discussed ROSI with patient.  Perforators are acceptable  Will proceed with surgery    CPT ROSI codes from email  [facility] July August  Refer to last note for further details.      Plastic & Reconstructive Surgery  [facility]  c/o Howard Cheatham M.D.  [facility]  [address] Floor [address]  [address] [place] [address]    Work [phone]  Toll free [phone]  If no answer [phone]

## 2020-07-09 NOTE — H&P (VIEW-ONLY)
Established Patient - Audio Only Telehealth Visit     Discussed ROSI with patient.  Perforators are acceptable  Will proceed with surgery    CPT ROSI codes from email  [facility] July August  Refer to last note for further details.      Plastic & Reconstructive Surgery  [facility]  c/o Howard Cheatham M.D.  [facility]  [address]  [address] [place] [address]    Work [phone]  Toll free [phone]  If no answer [phone]

## 2020-07-14 ENCOUNTER — TELEPHONE (OUTPATIENT)
Dept: SURGERY | Facility: CLINIC | Age: 40
End: 2020-07-14

## 2020-07-14 DIAGNOSIS — C50.812 MALIGNANT NEOPLASM OF OVERLAPPING SITES OF LEFT BREAST IN FEMALE, ESTROGEN RECEPTOR POSITIVE: Primary | ICD-10-CM

## 2020-07-14 DIAGNOSIS — Z17.0 MALIGNANT NEOPLASM OF OVERLAPPING SITES OF LEFT BREAST IN FEMALE, ESTROGEN RECEPTOR POSITIVE: Primary | ICD-10-CM

## 2020-07-14 NOTE — TELEPHONE ENCOUNTER
Called pt back to see what questions she had regarding her surgery. Pt stated that Dr Cheatham told her to call and see about dates. I reassured pt that we were working on looking for dates, I told her I would keep her updated. Pt verbalized understaning              ----- Message from Joshua Messina RN sent at 7/13/2020  5:00 PM CDT -----  Contact: Patient @[phone]  See comment below.   ----- Message -----  From: Chirag Espinoza  Sent: 7/13/2020   2:18 PM CDT  To: Chaparrit[facility] Lawrence Staff    Patient requesting a return call regarding a surgery update, pls call

## 2020-07-16 DIAGNOSIS — Z01.818 PRE-OP TESTING: Primary | ICD-10-CM

## 2020-07-17 ENCOUNTER — ANESTHESIA EVENT (OUTPATIENT)
Dept: SURGERY | Facility: OTHER | Age: 40
DRG: 585 | End: 2020-07-17
Payer: MEDICAID

## 2020-07-17 ENCOUNTER — HOSPITAL ENCOUNTER (OUTPATIENT)
Dept: PREADMISSION TESTING | Facility: OTHER | Age: 40
Discharge: HOME | End: 2020-07-17
Attending: SURGERY
Payer: MEDICAID

## 2020-07-17 VITALS
RESPIRATION RATE: 16 BRPM | DIASTOLIC BLOOD PRESSURE: 85 MMHG | WEIGHT: 189 LBS | OXYGEN SATURATION: 98 % | HEART RATE: 80 BPM | HEIGHT: 64 IN | TEMPERATURE: 98 F | BODY MASS INDEX: 32.27 KG/M2 | SYSTOLIC BLOOD PRESSURE: 131 MMHG

## 2020-07-17 RX ORDER — PREGABALIN 50 MG/1
50 CAPSULE ORAL
Status: CANCELLED | OUTPATIENT
Start: 2020-07-17 | End: 2020-07-17

## 2020-07-17 RX ORDER — ACETAMINOPHEN 500 MG
1000 TABLET ORAL
Status: CANCELLED | OUTPATIENT
Start: 2020-07-17 | End: 2020-07-17

## 2020-07-17 RX ORDER — LIDOCAINE HYDROCHLORIDE 10 MG/ML
0.5 INJECTION, SOLUTION EPIDURAL; INFILTRATION; INTRACAUDAL; PERINEURAL ONCE
Status: CANCELLED | OUTPATIENT
Start: 2020-07-17 | End: 2020-07-17

## 2020-07-17 RX ORDER — FAMOTIDINE 20 MG/1
20 TABLET, FILM COATED ORAL
Status: CANCELLED | OUTPATIENT
Start: 2020-07-17 | End: 2020-07-17

## 2020-07-17 RX ORDER — MIDAZOLAM HYDROCHLORIDE 1 MG/ML
2 INJECTION INTRAMUSCULAR; INTRAVENOUS ONCE AS NEEDED
Status: CANCELLED | OUTPATIENT
Start: 2020-07-17 | End: 2031-12-14

## 2020-07-17 RX ORDER — SODIUM CHLORIDE, SODIUM LACTATE, POTASSIUM CHLORIDE, CALCIUM CHLORIDE 600; 310; 30; 20 MG/100ML; MG/100ML; MG/100ML; MG/100ML
INJECTION, SOLUTION INTRAVENOUS CONTINUOUS
Status: CANCELLED | OUTPATIENT
Start: 2020-07-17

## 2020-07-17 NOTE — ANESTHESIA PREPROCEDURE EVALUATION
07/17/2020  Sheridan Todd is a 39 y.o., female.    Anesthesia Evaluation    I have reviewed the Patient Summary Reports.    I have reviewed the Nursing Notes. I have reviewed the NPO Status.      Review of Systems  Anesthesia Hx:  No problems with previous Anesthesia    Social:  Non-Smoker    Cardiovascular:   Exercise tolerance: good    Pulmonary:  Pulmonary Normal    Hepatic/GI:  Hepatic/GI Normal    Neurological:   Neuromuscular Disease,    Endocrine:  Endocrine Normal        Physical Exam  General:  Obesity    Airway/Jaw/Neck:  Airway Findings: Mouth Opening: Normal Tongue: Normal  General Airway Assessment: Adult  Mallampati: II  TM Distance: Normal, at least 6 cm  Jaw/Neck Findings:     Neck ROM: Normal ROM      Dental:  Dental Findings: In tact             Anesthesia Plan  Type of Anesthesia, risks & benefits discussed:  Anesthesia Type:  general  Patient's Preference:   Intra-op Monitoring Plan:   Intra-op Monitoring Plan Comments:   Post Op Pain Control Plan:   Post Op Pain Control Plan Comments:   Induction:   IV  Beta Blocker:         Informed Consent: Patient understands risks and agrees with Anesthesia plan.  Questions answered. Anesthesia consent signed with patient.  ASA Score: 2     Day of Surgery Review of History & Physical:    H&P update referred to the surgeon.     Anesthesia Plan Notes: Has recent labs.        Ready For Surgery From Anesthesia Perspective.

## 2020-07-17 NOTE — DISCHARGE INSTRUCTIONS
Information to Prepare you for your Surgery    PRE-ADMIT TESTING -  [phone]    [address]  [place]          Your surgery has been scheduled at [facility]. We are pleased to have the opportunity to serve you. For Further Information please call [phone].    On the day of surgery please report to the Information Desk on the 1st floor.    · CONTACT YOUR PHYSICIAN'S OFFICE THE DAY PRIOR TO YOUR SURGERY TO OBTAIN YOUR ARRIVAL TIME.     · The evening before surgery do not eat anything after 9 p.m. ( this includes hard candy, chewing gum and mints).  You may only have GATORADE, POWERADE AND WATER  from 9 p.m. until you leave your home.   DO NOT DRINK ANY LIQUIDS ON THE WAY TO THE HOSPITAL.      SPECIAL MEDICATION INSTRUCTIONS: TAKE medications checked off by the Anesthesiologist on your Medication List.    Angiogram Patients: Take medications as instructed by your physician, including aspirin.     Surgery Patients:    If you take ASPIRIN - Your PHYSICIAN/SURGEON will need to inform you IF/OR when you need to stop taking aspirin prior to your surgery.     Do Not take any medications containing IBUPROFEN.  Do Not Wear any make-up or dark nail polish   (especially eye make-up) to surgery. If you come to surgery with makeup on you will be required to remove the makeup or nail polish.    Do not shave your surgical area at least 5 days prior to your surgery. The surgical prep will be performed at the hospital according to Infection Control regulations.    Leave all valuables at home.   Do Not wear any jewelry or watches, including any metal in body piercings. Jewelry must be removed prior to coming to the hospital.  There is a possibility that rings that are unable to be removed may be cut off if they are on the surgical extremity.    Contact Lens must be removed before surgery. Either do not wear the contact lens or bring a case and solution for  storage.  Please bring a container for eyeglasses or dentures as required.  Bring any paperwork your physician has provided, such as consent forms,  history and physicals, doctor's orders, etc.   Bring comfortable clothes that are loose fitting to wear upon discharge. Take into consideration the type of surgery being performed.  Maintain your diet as advised per your physician the day prior to surgery.      Adequate rest the night before surgery is advised.   Park in the Parking lot behind the hospital or in the [facility] across the street from the parking lot. Parking is complimentary.  If you will be discharged the same day as your procedure, please arrange for a responsible adult to drive you home or to accompany you if traveling by taxi.   YOU WILL NOT BE PERMITTED TO DRIVE OR TO LEAVE THE HOSPITAL ALONE AFTER SURGERY.   If you are being discharged the same day, it is strongly recommended that you arrange for someone to remain with you for the first 24 hrs following your surgery.    The Surgeon will speak to your family/visitor after your surgery regarding the outcome of your surgery and post op care.  The Surgeon may speak to you after your surgery, but there is a possibility you may not remember the details.  Please check with your family members regarding the conversation with the Surgeon.    We strongly recommend whoever is bringing you home be present for discharge instructions.  This will ensure a thorough understanding for your post op home care.    ALL CHILDREN MUST ALWAYS BE ACCOMPANIED BY AN ADULT.    Visitors-Refer to current Visitor policy handouts.    Thank you for your cooperation.  The Staff of [facility].                Bathing Instructions with Hibiclens     Shower the evening before and morning of your procedure with Hibiclens:   Wash your face with water and your regular face wash/soap   Apply Hibiclens directly on your skin or on a wet washcloth and wash  gently. When showering: Move away from the shower stream when applying Hibiclens to avoid rinsing off too soon.   Rinse thoroughly with warm water   Do not dilute Hibiclens         Dry off as usual, do not use any deodorant, powder, body lotions, perfume, after shave or cologne.

## 2020-07-18 ENCOUNTER — LAB VISIT (OUTPATIENT)
Dept: SPORTS MEDICINE | Facility: CLINIC | Age: 40
End: 2020-07-18
Payer: MEDICAID

## 2020-07-18 DIAGNOSIS — Z01.818 PRE-OP TESTING: ICD-10-CM

## 2020-07-18 PROCEDURE — U0003 INFECTIOUS AGENT DETECTION BY NUCLEIC ACID (DNA OR RNA); SEVERE ACUTE RESPIRATORY SYNDROME CORONAVIRUS 2 (SARS-COV-2) (CORONAVIRUS DISEASE [COVID-19]), AMPLIFIED PROBE TECHNIQUE, MAKING USE OF HIGH THROUGHPUT TECHNOLOGIES AS DESCRIBED BY CMS-2020-01-R: HCPCS

## 2020-07-19 LAB — SARS-COV-2 RNA RESP QL NAA+PROBE: NOT DETECTED

## 2020-07-20 ENCOUNTER — OFFICE VISIT (OUTPATIENT)
Dept: PLASTIC SURGERY | Facility: CLINIC | Age: 40
DRG: 585 | End: 2020-07-20
Payer: MEDICAID

## 2020-07-20 VITALS — WEIGHT: 189 LBS | BODY MASS INDEX: 32.44 KG/M2

## 2020-07-20 DIAGNOSIS — C50.912 MALIGNANT NEOPLASM OF LEFT FEMALE BREAST, UNSPECIFIED ESTROGEN RECEPTOR STATUS, UNSPECIFIED SITE OF BREAST: Primary | ICD-10-CM

## 2020-07-20 PROCEDURE — 99999 PR PBB SHADOW E&M-EST. PATIENT-LVL III: ICD-10-PCS | Mod: PBBFAC,,, | Performed by: SURGERY

## 2020-07-20 PROCEDURE — 99999 PR PBB SHADOW E&M-EST. PATIENT-LVL III: CPT | Mod: PBBFAC,,, | Performed by: SURGERY

## 2020-07-20 PROCEDURE — 99499 NO LOS: ICD-10-PCS | Mod: S$PBB,,, | Performed by: SURGERY

## 2020-07-20 PROCEDURE — 99213 OFFICE O/P EST LOW 20 MIN: CPT | Mod: PBBFAC | Performed by: SURGERY

## 2020-07-20 PROCEDURE — 99499 UNLISTED E&M SERVICE: CPT | Mod: S$PBB,,, | Performed by: SURGERY

## 2020-07-21 ENCOUNTER — HOSPITAL ENCOUNTER (INPATIENT)
Facility: OTHER | Age: 40
LOS: 5 days | Discharge: HOME | DRG: 585 | End: 2020-07-26
Attending: SURGERY | Admitting: SURGERY
Payer: MEDICAID

## 2020-07-21 ENCOUNTER — ANESTHESIA (OUTPATIENT)
Dept: SURGERY | Facility: OTHER | Age: 40
DRG: 585 | End: 2020-07-21
Payer: MEDICAID

## 2020-07-21 DIAGNOSIS — Z17.0 MALIGNANT NEOPLASM OF OVERLAPPING SITES OF LEFT BREAST IN FEMALE, ESTROGEN RECEPTOR POSITIVE: ICD-10-CM

## 2020-07-21 DIAGNOSIS — Z85.3 HISTORY OF BREAST CANCER: Primary | ICD-10-CM

## 2020-07-21 DIAGNOSIS — C50.812 MALIGNANT NEOPLASM OF OVERLAPPING SITES OF LEFT BREAST IN FEMALE, ESTROGEN RECEPTOR POSITIVE: ICD-10-CM

## 2020-07-21 LAB
ABO + RH BLD: NORMAL
ANION GAP SERPL CALC-SCNC: 9 MMOL/L (ref 8–16)
BACTERIA #/AREA URNS HPF: ABNORMAL /HPF
BILIRUB UR QL STRIP: NEGATIVE
BLD GP AB SCN CELLS X3 SERPL QL: NORMAL
BUN SERPL-MCNC: 16 MG/DL (ref 6–20)
CALCIUM SERPL-MCNC: 8.7 MG/DL (ref 8.7–10.5)
CHLORIDE SERPL-SCNC: 107 MMOL/L (ref 95–110)
CLARITY UR: CLEAR
CO2 SERPL-SCNC: 23 MMOL/L (ref 23–29)
COLOR UR: YELLOW
CREAT SERPL-MCNC: 1 MG/DL (ref 0.5–1.4)
EST. GFR  (AFRICAN AMERICAN): >60 ML/MIN/1.73 M^2
EST. GFR  (NON AFRICAN AMERICAN): >60 ML/MIN/1.73 M^2
GLUCOSE SERPL-MCNC: 120 MG/DL (ref 70–110)
GLUCOSE UR QL STRIP: NEGATIVE
HGB UR QL STRIP: ABNORMAL
KETONES UR QL STRIP: NEGATIVE
LEUKOCYTE ESTERASE UR QL STRIP: ABNORMAL
MICROSCOPIC COMMENT: ABNORMAL
NITRITE UR QL STRIP: NEGATIVE
PH UR STRIP: 6 [PH] (ref 5–8)
POTASSIUM SERPL-SCNC: 4.3 MMOL/L (ref 3.5–5.1)
PROT UR QL STRIP: NEGATIVE
RBC #/AREA URNS HPF: 25 /HPF (ref 0–4)
SODIUM SERPL-SCNC: 139 MMOL/L (ref 136–145)
SP GR UR STRIP: >=1.03 (ref 1–1.03)
URN SPEC COLLECT METH UR: ABNORMAL
UROBILINOGEN UR STRIP-ACNC: NEGATIVE EU/DL
WBC #/AREA URNS HPF: 50 /HPF (ref 0–5)
WBC CLUMPS URNS QL MICRO: ABNORMAL

## 2020-07-21 PROCEDURE — 63600175 PHARM REV CODE 636 W HCPCS: Performed by: SURGERY

## 2020-07-21 PROCEDURE — 71000039 HC RECOVERY, EACH ADD'L HOUR: Performed by: SURGERY

## 2020-07-21 PROCEDURE — 63600175 PHARM REV CODE 636 W HCPCS: Performed by: ANESTHESIOLOGY

## 2020-07-21 PROCEDURE — 86850 RBC ANTIBODY SCREEN: CPT

## 2020-07-21 PROCEDURE — 36000709 HC OR TIME LEV III EA ADD 15 MIN: Performed by: SURGERY

## 2020-07-21 PROCEDURE — 11000001 HC ACUTE MED/SURG PRIVATE ROOM

## 2020-07-21 PROCEDURE — C1729 CATH, DRAINAGE: HCPCS | Performed by: SURGERY

## 2020-07-21 PROCEDURE — 49568 PR IMPLANT MESH HERNIA REPAIR/DEBRIDEMENT CLOSURE: ICD-10-PCS | Mod: ,,, | Performed by: SURGERY

## 2020-07-21 PROCEDURE — 49560 PR REPAIR INCISIONAL HERNIA,REDUCIBLE: CPT | Mod: 51,,, | Performed by: SURGERY

## 2020-07-21 PROCEDURE — 25000003 PHARM REV CODE 250: Performed by: NURSE ANESTHETIST, CERTIFIED REGISTERED

## 2020-07-21 PROCEDURE — 25000003 PHARM REV CODE 250: Performed by: ANESTHESIOLOGY

## 2020-07-21 PROCEDURE — 88304 PR  SURG PATH,LEVEL III: ICD-10-PCS | Mod: 26,,, | Performed by: PATHOLOGY

## 2020-07-21 PROCEDURE — 19364 PR BREAST RECONSTRUC W FREE FLAP: ICD-10-PCS | Mod: 50,,, | Performed by: SURGERY

## 2020-07-21 PROCEDURE — 87086 URINE CULTURE/COLONY COUNT: CPT

## 2020-07-21 PROCEDURE — 94761 N-INVAS EAR/PLS OXIMETRY MLT: CPT

## 2020-07-21 PROCEDURE — 88305 TISSUE EXAM BY PATHOLOGIST: CPT | Mod: 26,,, | Performed by: PATHOLOGY

## 2020-07-21 PROCEDURE — 27000221 HC OXYGEN, UP TO 24 HOURS

## 2020-07-21 PROCEDURE — P9045 ALBUMIN (HUMAN), 5%, 250 ML: HCPCS | Mod: JG | Performed by: NURSE ANESTHETIST, CERTIFIED REGISTERED

## 2020-07-21 PROCEDURE — 25000003 PHARM REV CODE 250: Performed by: SURGERY

## 2020-07-21 PROCEDURE — 88304 TISSUE EXAM BY PATHOLOGIST: CPT | Mod: 26,,, | Performed by: PATHOLOGY

## 2020-07-21 PROCEDURE — 88305 TISSUE EXAM BY PATHOLOGIST: ICD-10-PCS | Mod: 26,,, | Performed by: PATHOLOGY

## 2020-07-21 PROCEDURE — 71000033 HC RECOVERY, INTIAL HOUR: Performed by: SURGERY

## 2020-07-21 PROCEDURE — 27201423 OPTIME MED/SURG SUP & DEVICES STERILE SUPPLY: Performed by: SURGERY

## 2020-07-21 PROCEDURE — 36000708 HC OR TIME LEV III 1ST 15 MIN: Performed by: SURGERY

## 2020-07-21 PROCEDURE — 85027 COMPLETE CBC AUTOMATED: CPT

## 2020-07-21 PROCEDURE — 88304 TISSUE EXAM BY PATHOLOGIST: CPT | Performed by: PATHOLOGY

## 2020-07-21 PROCEDURE — 63600175 PHARM REV CODE 636 W HCPCS: Performed by: STUDENT IN AN ORGANIZED HEALTH CARE EDUCATION/TRAINING PROGRAM

## 2020-07-21 PROCEDURE — 19364 BRST RCNSTJ FREE FLAP: CPT | Mod: 50,,, | Performed by: SURGERY

## 2020-07-21 PROCEDURE — 37000008 HC ANESTHESIA 1ST 15 MINUTES: Performed by: SURGERY

## 2020-07-21 PROCEDURE — 49560 PR REPAIR INCISIONAL HERNIA,REDUCIBLE: ICD-10-PCS | Mod: 51,,, | Performed by: SURGERY

## 2020-07-21 PROCEDURE — 63600175 PHARM REV CODE 636 W HCPCS: Performed by: NURSE ANESTHETIST, CERTIFIED REGISTERED

## 2020-07-21 PROCEDURE — 94770 HC EXHALED C02 TEST: CPT

## 2020-07-21 PROCEDURE — 27800903 OPTIME MED/SURG SUP & DEVICES OTHER IMPLANTS: Performed by: SURGERY

## 2020-07-21 PROCEDURE — 81000 URINALYSIS NONAUTO W/SCOPE: CPT

## 2020-07-21 PROCEDURE — 80048 BASIC METABOLIC PNL TOTAL CA: CPT

## 2020-07-21 PROCEDURE — 37000009 HC ANESTHESIA EA ADD 15 MINS: Performed by: SURGERY

## 2020-07-21 PROCEDURE — 87088 URINE BACTERIA CULTURE: CPT

## 2020-07-21 PROCEDURE — 49568 PR IMPLANT MESH HERNIA REPAIR/DEBRIDEMENT CLOSURE: CPT | Mod: ,,, | Performed by: SURGERY

## 2020-07-21 PROCEDURE — C1781 MESH (IMPLANTABLE): HCPCS | Performed by: SURGERY

## 2020-07-21 PROCEDURE — 85007 BL SMEAR W/DIFF WBC COUNT: CPT

## 2020-07-21 PROCEDURE — 88305 TISSUE EXAM BY PATHOLOGIST: CPT | Performed by: PATHOLOGY

## 2020-07-21 PROCEDURE — C9290 INJ, BUPIVACAINE LIPOSOME: HCPCS | Performed by: SURGERY

## 2020-07-21 PROCEDURE — 99900035 HC TECH TIME PER 15 MIN (STAT)

## 2020-07-21 PROCEDURE — 36415 COLL VENOUS BLD VENIPUNCTURE: CPT

## 2020-07-21 DEVICE — IMPLANTABLE DEVICE: Type: IMPLANTABLE DEVICE | Site: ABDOMEN | Status: FUNCTIONAL

## 2020-07-21 DEVICE — COUPLER MICROVAS ANSTMS 2.5MM: Type: IMPLANTABLE DEVICE | Site: BREAST | Status: FUNCTIONAL

## 2020-07-21 RX ORDER — LIDOCAINE HYDROCHLORIDE 10 MG/ML
1 INJECTION, SOLUTION EPIDURAL; INFILTRATION; INTRACAUDAL; PERINEURAL ONCE
Status: DISCONTINUED | OUTPATIENT
Start: 2020-07-21 | End: 2020-07-21 | Stop reason: HOSPADM

## 2020-07-21 RX ORDER — FAMOTIDINE 20 MG/1
20 TABLET, FILM COATED ORAL
Status: COMPLETED | OUTPATIENT
Start: 2020-07-21 | End: 2020-07-21

## 2020-07-21 RX ORDER — NALOXONE HCL 0.4 MG/ML
0.02 VIAL (ML) INJECTION
Status: DISCONTINUED | OUTPATIENT
Start: 2020-07-21 | End: 2020-07-23

## 2020-07-21 RX ORDER — HYDROMORPHONE HCL IN 0.9% NACL 6 MG/30 ML
PATIENT CONTROLLED ANALGESIA SYRINGE INTRAVENOUS CONTINUOUS
Status: DISCONTINUED | OUTPATIENT
Start: 2020-07-21 | End: 2020-07-23

## 2020-07-21 RX ORDER — MEPERIDINE HYDROCHLORIDE 25 MG/ML
12.5 INJECTION INTRAMUSCULAR; INTRAVENOUS; SUBCUTANEOUS ONCE AS NEEDED
Status: DISCONTINUED | OUTPATIENT
Start: 2020-07-21 | End: 2020-07-21 | Stop reason: HOSPADM

## 2020-07-21 RX ORDER — HYDROMORPHONE HYDROCHLORIDE 2 MG/ML
0.4 INJECTION, SOLUTION INTRAMUSCULAR; INTRAVENOUS; SUBCUTANEOUS EVERY 5 MIN PRN
Status: DISCONTINUED | OUTPATIENT
Start: 2020-07-21 | End: 2020-07-21 | Stop reason: HOSPADM

## 2020-07-21 RX ORDER — PHENAZOPYRIDINE HYDROCHLORIDE 100 MG/1
200 TABLET, FILM COATED ORAL EVERY 6 HOURS PRN
Status: DISCONTINUED | OUTPATIENT
Start: 2020-07-21 | End: 2020-07-26 | Stop reason: HOSPADM

## 2020-07-21 RX ORDER — SODIUM CHLORIDE 0.9 % (FLUSH) 0.9 %
10 SYRINGE (ML) INJECTION
Status: DISCONTINUED | OUTPATIENT
Start: 2020-07-21 | End: 2020-07-21 | Stop reason: HOSPADM

## 2020-07-21 RX ORDER — ROCURONIUM BROMIDE 10 MG/ML
INJECTION, SOLUTION INTRAVENOUS
Status: DISCONTINUED | OUTPATIENT
Start: 2020-07-21 | End: 2020-07-21

## 2020-07-21 RX ORDER — ALBUMIN HUMAN 50 G/1000ML
SOLUTION INTRAVENOUS CONTINUOUS PRN
Status: DISCONTINUED | OUTPATIENT
Start: 2020-07-21 | End: 2020-07-21

## 2020-07-21 RX ORDER — DEXAMETHASONE SODIUM PHOSPHATE 4 MG/ML
INJECTION, SOLUTION INTRA-ARTICULAR; INTRALESIONAL; INTRAMUSCULAR; INTRAVENOUS; SOFT TISSUE
Status: DISCONTINUED | OUTPATIENT
Start: 2020-07-21 | End: 2020-07-21

## 2020-07-21 RX ORDER — MIDAZOLAM HYDROCHLORIDE 1 MG/ML
2 INJECTION INTRAMUSCULAR; INTRAVENOUS ONCE AS NEEDED
Status: COMPLETED | OUTPATIENT
Start: 2020-07-21 | End: 2020-07-21

## 2020-07-21 RX ORDER — LIDOCAINE HYDROCHLORIDE 10 MG/ML
0.5 INJECTION, SOLUTION EPIDURAL; INFILTRATION; INTRACAUDAL; PERINEURAL ONCE
Status: DISCONTINUED | OUTPATIENT
Start: 2020-07-21 | End: 2020-07-21 | Stop reason: HOSPADM

## 2020-07-21 RX ORDER — GABAPENTIN 300 MG/1
300 CAPSULE ORAL 3 TIMES DAILY
Status: DISCONTINUED | OUTPATIENT
Start: 2020-07-21 | End: 2020-07-26 | Stop reason: HOSPADM

## 2020-07-21 RX ORDER — LIDOCAINE HYDROCHLORIDE 20 MG/ML
INJECTION INTRAVENOUS
Status: DISCONTINUED | OUTPATIENT
Start: 2020-07-21 | End: 2020-07-21

## 2020-07-21 RX ORDER — HEPARIN SODIUM 5000 [USP'U]/ML
5000 INJECTION, SOLUTION INTRAVENOUS; SUBCUTANEOUS EVERY 8 HOURS
Status: DISCONTINUED | OUTPATIENT
Start: 2020-07-21 | End: 2020-07-22

## 2020-07-21 RX ORDER — HEPARIN SODIUM 5000 [USP'U]/ML
5000 INJECTION, SOLUTION INTRAVENOUS; SUBCUTANEOUS EVERY 8 HOURS
Status: DISCONTINUED | OUTPATIENT
Start: 2020-07-21 | End: 2020-07-21

## 2020-07-21 RX ORDER — ONDANSETRON 2 MG/ML
INJECTION INTRAMUSCULAR; INTRAVENOUS
Status: DISCONTINUED | OUTPATIENT
Start: 2020-07-21 | End: 2020-07-21

## 2020-07-21 RX ORDER — ONDANSETRON 2 MG/ML
4 INJECTION INTRAMUSCULAR; INTRAVENOUS DAILY PRN
Status: DISCONTINUED | OUTPATIENT
Start: 2020-07-21 | End: 2020-07-21 | Stop reason: HOSPADM

## 2020-07-21 RX ORDER — CETIRIZINE HYDROCHLORIDE 10 MG/1
10 TABLET ORAL DAILY
Status: DISCONTINUED | OUTPATIENT
Start: 2020-07-22 | End: 2020-07-26 | Stop reason: HOSPADM

## 2020-07-21 RX ORDER — OXYCODONE HYDROCHLORIDE 5 MG/1
5 TABLET ORAL
Status: DISCONTINUED | OUTPATIENT
Start: 2020-07-21 | End: 2020-07-21 | Stop reason: HOSPADM

## 2020-07-21 RX ORDER — ONDANSETRON 2 MG/ML
4 INJECTION INTRAMUSCULAR; INTRAVENOUS
Status: DISCONTINUED | OUTPATIENT
Start: 2020-07-21 | End: 2020-07-21 | Stop reason: HOSPADM

## 2020-07-21 RX ORDER — FENTANYL CITRATE 50 UG/ML
INJECTION, SOLUTION INTRAMUSCULAR; INTRAVENOUS
Status: DISCONTINUED | OUTPATIENT
Start: 2020-07-21 | End: 2020-07-21

## 2020-07-21 RX ORDER — PREGABALIN 50 MG/1
50 CAPSULE ORAL
Status: ACTIVE | OUTPATIENT
Start: 2020-07-21 | End: 2020-07-21

## 2020-07-21 RX ORDER — CEFAZOLIN SODIUM 2 G/50ML
2 SOLUTION INTRAVENOUS
Status: DISCONTINUED | OUTPATIENT
Start: 2020-07-21 | End: 2020-07-22

## 2020-07-21 RX ORDER — PROPOFOL 10 MG/ML
VIAL (ML) INTRAVENOUS
Status: DISCONTINUED | OUTPATIENT
Start: 2020-07-21 | End: 2020-07-21

## 2020-07-21 RX ORDER — NEOSTIGMINE METHYLSULFATE 1 MG/ML
INJECTION, SOLUTION INTRAVENOUS
Status: DISCONTINUED | OUTPATIENT
Start: 2020-07-21 | End: 2020-07-21

## 2020-07-21 RX ORDER — MUPIROCIN 20 MG/G
OINTMENT TOPICAL
Status: DISCONTINUED | OUTPATIENT
Start: 2020-07-21 | End: 2020-07-21 | Stop reason: HOSPADM

## 2020-07-21 RX ORDER — GLYCOPYRROLATE 0.2 MG/ML
INJECTION INTRAMUSCULAR; INTRAVENOUS
Status: DISCONTINUED | OUTPATIENT
Start: 2020-07-21 | End: 2020-07-21

## 2020-07-21 RX ORDER — SODIUM CHLORIDE, SODIUM LACTATE, POTASSIUM CHLORIDE, CALCIUM CHLORIDE 600; 310; 30; 20 MG/100ML; MG/100ML; MG/100ML; MG/100ML
INJECTION, SOLUTION INTRAVENOUS CONTINUOUS
Status: DISCONTINUED | OUTPATIENT
Start: 2020-07-21 | End: 2020-07-25

## 2020-07-21 RX ORDER — HEPARIN SODIUM 10000 [USP'U]/ML
INJECTION, SOLUTION INTRAVENOUS; SUBCUTANEOUS
Status: DISCONTINUED | OUTPATIENT
Start: 2020-07-21 | End: 2020-07-21 | Stop reason: HOSPADM

## 2020-07-21 RX ORDER — CEFAZOLIN SODIUM 1 G/3ML
2 INJECTION, POWDER, FOR SOLUTION INTRAMUSCULAR; INTRAVENOUS
Status: COMPLETED | OUTPATIENT
Start: 2020-07-21 | End: 2020-07-21

## 2020-07-21 RX ORDER — ACETAMINOPHEN 500 MG
1000 TABLET ORAL
Status: COMPLETED | OUTPATIENT
Start: 2020-07-21 | End: 2020-07-21

## 2020-07-21 RX ORDER — SODIUM CHLORIDE 0.9 % (FLUSH) 0.9 %
3 SYRINGE (ML) INJECTION
Status: DISCONTINUED | OUTPATIENT
Start: 2020-07-21 | End: 2020-07-26 | Stop reason: HOSPADM

## 2020-07-21 RX ADMIN — ALBUMIN (HUMAN): 2.5 SOLUTION INTRAVENOUS at 09:07

## 2020-07-21 RX ADMIN — CEFAZOLIN SODIUM 2 G: 2 SOLUTION INTRAVENOUS at 08:07

## 2020-07-21 RX ADMIN — HEPARIN SODIUM 5000 UNITS: 5000 INJECTION INTRAVENOUS; SUBCUTANEOUS at 10:07

## 2020-07-21 RX ADMIN — HYDROMORPHONE HYDROCHLORIDE 0.4 MG: 2 INJECTION, SOLUTION INTRAMUSCULAR; INTRAVENOUS; SUBCUTANEOUS at 04:07

## 2020-07-21 RX ADMIN — HEPARIN SODIUM 5000 UNITS: 5000 INJECTION, SOLUTION INTRAVENOUS; SUBCUTANEOUS at 05:07

## 2020-07-21 RX ADMIN — FENTANYL CITRATE 50 MCG: 50 INJECTION, SOLUTION INTRAMUSCULAR; INTRAVENOUS at 11:07

## 2020-07-21 RX ADMIN — ONDANSETRON 4 MG: 2 INJECTION INTRAMUSCULAR; INTRAVENOUS at 02:07

## 2020-07-21 RX ADMIN — SODIUM CHLORIDE, SODIUM LACTATE, POTASSIUM CHLORIDE, AND CALCIUM CHLORIDE: 600; 310; 30; 20 INJECTION, SOLUTION INTRAVENOUS at 11:07

## 2020-07-21 RX ADMIN — PROPOFOL 20 MG: 10 INJECTION, EMULSION INTRAVENOUS at 03:07

## 2020-07-21 RX ADMIN — ROCURONIUM BROMIDE 10 MG: 10 INJECTION, SOLUTION INTRAVENOUS at 01:07

## 2020-07-21 RX ADMIN — CARBOXYMETHYLCELLULOSE SODIUM 2 DROP: 2.5 SOLUTION/ DROPS OPHTHALMIC at 07:07

## 2020-07-21 RX ADMIN — CEFAZOLIN 2 G: 330 INJECTION, POWDER, FOR SOLUTION INTRAMUSCULAR; INTRAVENOUS at 11:07

## 2020-07-21 RX ADMIN — PROPOFOL 200 MG: 10 INJECTION, EMULSION INTRAVENOUS at 07:07

## 2020-07-21 RX ADMIN — FENTANYL CITRATE 100 MCG: 50 INJECTION, SOLUTION INTRAMUSCULAR; INTRAVENOUS at 07:07

## 2020-07-21 RX ADMIN — LIDOCAINE HYDROCHLORIDE 25 MG: 20 INJECTION, SOLUTION INTRAVENOUS at 03:07

## 2020-07-21 RX ADMIN — FAMOTIDINE 20 MG: 20 TABLET, FILM COATED ORAL at 05:07

## 2020-07-21 RX ADMIN — GLYCOPYRROLATE 0.8 MG: 0.2 INJECTION, SOLUTION INTRAMUSCULAR; INTRAVENOUS at 03:07

## 2020-07-21 RX ADMIN — ROCURONIUM BROMIDE 50 MG: 10 INJECTION, SOLUTION INTRAVENOUS at 07:07

## 2020-07-21 RX ADMIN — Medication: at 05:07

## 2020-07-21 RX ADMIN — FENTANYL CITRATE 50 MCG: 50 INJECTION, SOLUTION INTRAMUSCULAR; INTRAVENOUS at 02:07

## 2020-07-21 RX ADMIN — CEFAZOLIN 2 G: 330 INJECTION, POWDER, FOR SOLUTION INTRAMUSCULAR; INTRAVENOUS at 07:07

## 2020-07-21 RX ADMIN — ROCURONIUM BROMIDE 10 MG: 10 INJECTION, SOLUTION INTRAVENOUS at 12:07

## 2020-07-21 RX ADMIN — ROCURONIUM BROMIDE 10 MG: 10 INJECTION, SOLUTION INTRAVENOUS at 11:07

## 2020-07-21 RX ADMIN — FENTANYL CITRATE 50 MCG: 50 INJECTION, SOLUTION INTRAMUSCULAR; INTRAVENOUS at 03:07

## 2020-07-21 RX ADMIN — PROMETHAZINE HYDROCHLORIDE 6.25 MG: 25 INJECTION INTRAMUSCULAR; INTRAVENOUS at 03:07

## 2020-07-21 RX ADMIN — FENTANYL CITRATE 50 MCG: 50 INJECTION, SOLUTION INTRAMUSCULAR; INTRAVENOUS at 10:07

## 2020-07-21 RX ADMIN — NEOSTIGMINE METHYLSULFATE 5 MG: 1 INJECTION INTRAVENOUS at 03:07

## 2020-07-21 RX ADMIN — LIDOCAINE HYDROCHLORIDE 100 MG: 20 INJECTION, SOLUTION INTRAVENOUS at 07:07

## 2020-07-21 RX ADMIN — ROCURONIUM BROMIDE 10 MG: 10 INJECTION, SOLUTION INTRAVENOUS at 08:07

## 2020-07-21 RX ADMIN — MIDAZOLAM HYDROCHLORIDE 2 MG: 1 INJECTION, SOLUTION INTRAMUSCULAR; INTRAVENOUS at 07:07

## 2020-07-21 RX ADMIN — SODIUM CHLORIDE, SODIUM LACTATE, POTASSIUM CHLORIDE, AND CALCIUM CHLORIDE: 600; 310; 30; 20 INJECTION, SOLUTION INTRAVENOUS at 08:07

## 2020-07-21 RX ADMIN — SODIUM CHLORIDE, SODIUM LACTATE, POTASSIUM CHLORIDE, AND CALCIUM CHLORIDE: 600; 310; 30; 20 INJECTION, SOLUTION INTRAVENOUS at 02:07

## 2020-07-21 RX ADMIN — DEXAMETHASONE SODIUM PHOSPHATE 8 MG: 4 INJECTION, SOLUTION INTRAMUSCULAR; INTRAVENOUS at 07:07

## 2020-07-21 RX ADMIN — ACETAMINOPHEN 1000 MG: 500 TABLET, FILM COATED ORAL at 05:07

## 2020-07-21 RX ADMIN — FENTANYL CITRATE 50 MCG: 50 INJECTION, SOLUTION INTRAMUSCULAR; INTRAVENOUS at 07:07

## 2020-07-21 RX ADMIN — ROCURONIUM BROMIDE 20 MG: 10 INJECTION, SOLUTION INTRAVENOUS at 09:07

## 2020-07-21 RX ADMIN — ROCURONIUM BROMIDE 20 MG: 10 INJECTION, SOLUTION INTRAVENOUS at 10:07

## 2020-07-21 RX ADMIN — SODIUM CHLORIDE, SODIUM LACTATE, POTASSIUM CHLORIDE, AND CALCIUM CHLORIDE: 600; 310; 30; 20 INJECTION, SOLUTION INTRAVENOUS at 06:07

## 2020-07-21 RX ADMIN — MUPIROCIN: 20 OINTMENT TOPICAL at 05:07

## 2020-07-21 RX ADMIN — ROCURONIUM BROMIDE 20 MG: 10 INJECTION, SOLUTION INTRAVENOUS at 11:07

## 2020-07-21 RX ADMIN — GABAPENTIN 300 MG: 300 CAPSULE ORAL at 08:07

## 2020-07-21 RX ADMIN — PROMETHAZINE HYDROCHLORIDE 6.25 MG: 25 INJECTION INTRAMUSCULAR; INTRAVENOUS at 07:07

## 2020-07-21 NOTE — OR NURSING
The patient is lying on the stretcher in an out of a light sleep, awakens to voice, VSS, afebrile, Sats 95-97% on 2L of O2, patient has PCA pump in control in her hand and using it as needed. I tried to call report at 1810 to [address] and was told no nurse had been assigned to the patient. I am waiting on a return call from the charge nurse.

## 2020-07-21 NOTE — OP NOTE
[facility]  Plastic Surgery  Operative Note    SUMMARY     Date of Procedure: 7/21/2020     Procedure: Procedure(s) (LRB):  RECONSTRUCTION, BREAST, USING ROSI FREE FLAP (Bilateral)     1.  Right ROSI flap  2.  Left ROSI/SIEA flap- retrograde superficial system, antegrade deep system  3.  Onlay mesh for ventral hernia repair  4.  Abdominoplasty    Surgeon(s) and Role:     * Kris Encinas    Pre-Operative Diagnosis:   Malignant neoplasm of overlapping sites of left breast in female, estrogen receptor positive [C50.812, Z17.0]    Post-Operative Diagnosis:   Same    Anesthesia:   General    Indications:   39 year old female presents for definitive breast reconstruction after completing radiation.  Written consent was obtained.  Preoperative markings were reinforced.  Abdominal signals were confirmed.  All questions were answered.      Procedure:   The patient was taken to the OR and placed in a supine position, where general anesthesia was induced.  An appropriate time out was performed.  She was prepped and draped in standard fashion.      Ribs were removed bilaterally after reflecting pectoralis major muscle.  Abdominal flaps were raised on dominant perforators in each hemiabdomen.  Left superficial system was small.  Right deep system was dilated.  The left flap was harvested on a single dominant .  Antegrade anastomoses were performed to the IMV and to a  vein.  Antegrade anastomosis of deep artery to TANI was performed with 9-0 nylon.  There was good flow in the flap.  A transverse skin paddle was left at the IM fold.  The flap was tacked into position with 2-0 vicryl.  Skin signal was marked with 4-0 prolene.  2.5 venous couplers were used.      A similar procedure was performed with the right hemiabdominal flap.  Antegrade anastomosis to TANI via the dominant IDEA  was performed with 9-0 nylon.  Antegrade venous anastomosis to the IMV was performed.   Given that this was the radiated side a bipedicle flap was harvested, suturing the SIEA to the retrograde TANI limb and coupling the retrograde IMV to the SIEV comitans.  The health of the flap weas confirmed.  An internal doppler was placed.   2.5 venous couplers were used.    The abdomen was closed in standard fashion. Rectus plication was performed using 0 Strattafix.  Fascial incisions were closed with running 2-0 prolene.  An onlay Phasix mesh was tacked into place in a tension free manner, avoiding rippling with 0 PDS.      Drains were placed in the breasts and abdomen and secured.  The deep dermal layers were closed with 2-0 vicryl.  Running 2-0 monoderm was used to close the skin.  Dog ears were corrected at the time of closure.      Prineo and dermabdond were placed for dressing.  The umbilicus was dressed with xeroform and bacitracin.      An abdominal binder and bra were placed.      All needle and sponge counts were correct.  The helath of the flaps were confirmed at the time of closure.      Complications: No    Estimated Blood Loss (EBL): 100 cc           Implants:   Implant Name Type Inv. Item Serial No.  Lot No. LRB No. Used Action    MICROVAS ANSTMS 2.5MM - [id number]   MICROVAS ANSTMS 2.5MM  [employer] [id number] Right 1 Implanted    MICROVAS ANSTMS 2.5MM - [id number]   MICROVAS ANSTMS 2.5MM  [employer] [id number] Right 1 Implanted   MESH HERNIA PHASIX 6X8IN RND - [id number]  MESH HERNIA PHASIX 6X8IN RND  [employer] [id number] N/A 1 Implanted    MICROVAS ANSTMS 2.5MM - [id number]   MICROVAS ANSTMS 2.5MM  [employer] [id number] Left 1 Implanted    MICROVAS ANSTMS 2.5MM - [id number]   MICROVAS ANSTMS 2.5MM  [employer] [id number] Left 1 Implanted       Specimens:   Specimen (12h ago, onward)    None                  Condition: Good    Disposition: PACU    Attestation: I  was present during the critical protions of this procedure.

## 2020-07-21 NOTE — ANESTHESIA POSTPROCEDURE EVALUATION
Anesthesia Post Evaluation    Patient: Sheridan Todd    Procedure(s) Performed: Procedure(s) (LRB):  RECONSTRUCTION, BREAST, USING ROSI FREE FLAP (Bilateral)    Final Anesthesia Type: general    Patient location during evaluation: PACU  Patient participation: Yes- Able to Participate  Level of consciousness: awake and alert  Post-procedure vital signs: reviewed and stable  Pain management: adequate  Airway patency: patent    PONV status at discharge: No PONV  Anesthetic complications: no      Cardiovascular status: blood pressure returned to baseline  Respiratory status: unassisted, spontaneous ventilation and room air  Hydration status: euvolemic  Follow-up not needed.          Vitals Value Taken Time   /70 07/21/20 1733   Temp 37.2 °C (98.9 °F) 07/21/20 1530   Pulse 69 07/21/20 1745   Resp 18 07/21/20 1730   SpO2 96 % 07/21/20 1745   Vitals shown include unvalidated device data.      No case tracking events are documented in the log.      Pain/Wesley Score: Pain Rating Prior to Med Admin: 8 (7/21/2020  4:56 PM)  Wesley Score: 9 (7/21/2020  4:56 PM)

## 2020-07-21 NOTE — ANESTHESIA PROCEDURE NOTES
Intubation  Performed by: Wyatt Rinaldi CRNA  Authorized by: Evelyn Navarro MD     Intubation:     Induction:  Intravenous    Intubated:  Postinduction    Mask Ventilation:  Easy mask    Attempts:  1    Attempted By:  CRNA    Method of Intubation:  Video laryngoscopy    Blade:  Karma 3    Laryngeal View Grade: Grade I - full view of chords      Difficult Airway Encountered?: No      Complications:  None    Airway Device:  Oral endotracheal tube    Airway Device Size:  7.5    Style/Cuff Inflation:  Cuffed    Inflation Amount (mL):  4    Tube secured:  21    Secured at:  The lips    Placement Verified By:  Capnometry    Complicating Factors:  Short neck, obesity and narrow palate    Findings Post-Intubation:  BS equal bilateral

## 2020-07-21 NOTE — TRANSFER OF CARE
"Anesthesia Transfer of Care Note    Patient: Sheridan Todd    Procedure(s) Performed: Procedure(s) (LRB):  RECONSTRUCTION, BREAST, USING ROSI FREE FLAP (Bilateral)    Patient location: PACU    Anesthesia Type: general    Transport from OR: Transported from OR on 2-3 L/min O2 by NC with adequate spontaneous ventilation    Post pain: adequate analgesia    Post assessment: no apparent anesthetic complications    Post vital signs: stable    Level of consciousness: awake    Nausea/Vomiting: no nausea/vomiting    Complications: none    Transfer of care protocol was followed      Last vitals:   Visit Vitals  /66   Pulse 77   Temp 36.6 °C (97.9 °F) (Oral)   Resp 18   Ht 5' 4" (1.626 m)   Wt 85.7 kg (189 lb)   SpO2 99%   Breastfeeding No   BMI 32.44 kg/m²     "

## 2020-07-22 LAB
ANISOCYTOSIS BLD QL SMEAR: SLIGHT
BACTERIA UR CULT: ABNORMAL
BASOPHILS NFR BLD: 0 % (ref 0–1.9)
DIFFERENTIAL METHOD: ABNORMAL
EOSINOPHIL NFR BLD: 0 % (ref 0–8)
ERYTHROCYTE [DISTWIDTH] IN BLOOD BY AUTOMATED COUNT: 13.4 % (ref 11.5–14.5)
HCT VFR BLD AUTO: 32.5 % (ref 37–48.5)
HGB BLD-MCNC: 11 G/DL (ref 12–16)
IMM GRANULOCYTES # BLD AUTO: ABNORMAL K/UL (ref 0–0.04)
IMM GRANULOCYTES NFR BLD AUTO: ABNORMAL % (ref 0–0.5)
LYMPHOCYTES NFR BLD: 5 % (ref 18–48)
MCH RBC QN AUTO: 30.3 PG (ref 27–31)
MCHC RBC AUTO-ENTMCNC: 33.8 G/DL (ref 32–36)
MCV RBC AUTO: 90 FL (ref 82–98)
MONOCYTES NFR BLD: 3 % (ref 4–15)
NEUTROPHILS NFR BLD: 89 % (ref 38–73)
NEUTS BAND NFR BLD MANUAL: 3 %
NRBC BLD-RTO: 0 /100 WBC
PLATELET # BLD AUTO: 146 K/UL (ref 150–350)
PLATELET BLD QL SMEAR: ABNORMAL
PMV BLD AUTO: 9.5 FL (ref 9.2–12.9)
RBC # BLD AUTO: 3.63 M/UL (ref 4–5.4)
WBC # BLD AUTO: 9.93 K/UL (ref 3.9–12.7)

## 2020-07-22 PROCEDURE — 63600175 PHARM REV CODE 636 W HCPCS: Performed by: STUDENT IN AN ORGANIZED HEALTH CARE EDUCATION/TRAINING PROGRAM

## 2020-07-22 PROCEDURE — 63600175 PHARM REV CODE 636 W HCPCS: Performed by: SURGERY

## 2020-07-22 PROCEDURE — 27000221 HC OXYGEN, UP TO 24 HOURS

## 2020-07-22 PROCEDURE — 11000001 HC ACUTE MED/SURG PRIVATE ROOM

## 2020-07-22 PROCEDURE — 25000003 PHARM REV CODE 250: Performed by: SURGERY

## 2020-07-22 PROCEDURE — 25000003 PHARM REV CODE 250: Performed by: STUDENT IN AN ORGANIZED HEALTH CARE EDUCATION/TRAINING PROGRAM

## 2020-07-22 PROCEDURE — 94761 N-INVAS EAR/PLS OXIMETRY MLT: CPT

## 2020-07-22 PROCEDURE — 94770 HC EXHALED C02 TEST: CPT

## 2020-07-22 PROCEDURE — 99900035 HC TECH TIME PER 15 MIN (STAT)

## 2020-07-22 RX ORDER — CEPHALEXIN 500 MG/1
500 CAPSULE ORAL EVERY 6 HOURS
Status: DISCONTINUED | OUTPATIENT
Start: 2020-07-22 | End: 2020-07-26

## 2020-07-22 RX ORDER — ENOXAPARIN SODIUM 100 MG/ML
40 INJECTION SUBCUTANEOUS EVERY 24 HOURS
Status: DISCONTINUED | OUTPATIENT
Start: 2020-07-22 | End: 2020-07-26 | Stop reason: HOSPADM

## 2020-07-22 RX ORDER — KETOROLAC TROMETHAMINE 30 MG/ML
30 INJECTION, SOLUTION INTRAMUSCULAR; INTRAVENOUS EVERY 6 HOURS
Status: DISCONTINUED | OUTPATIENT
Start: 2020-07-22 | End: 2020-07-23

## 2020-07-22 RX ORDER — NAPROXEN SODIUM 220 MG/1
81 TABLET, FILM COATED ORAL DAILY
Status: DISCONTINUED | OUTPATIENT
Start: 2020-07-22 | End: 2020-07-26 | Stop reason: HOSPADM

## 2020-07-22 RX ADMIN — Medication: at 09:07

## 2020-07-22 RX ADMIN — ASPIRIN 81 MG 81 MG: 81 TABLET ORAL at 08:07

## 2020-07-22 RX ADMIN — KETOROLAC TROMETHAMINE 30 MG: 30 INJECTION, SOLUTION INTRAMUSCULAR at 05:07

## 2020-07-22 RX ADMIN — CEFAZOLIN SODIUM 2 G: 2 SOLUTION INTRAVENOUS at 01:07

## 2020-07-22 RX ADMIN — CEPHALEXIN 500 MG: 500 CAPSULE ORAL at 05:07

## 2020-07-22 RX ADMIN — GABAPENTIN 300 MG: 300 CAPSULE ORAL at 03:07

## 2020-07-22 RX ADMIN — GABAPENTIN 300 MG: 300 CAPSULE ORAL at 08:07

## 2020-07-22 RX ADMIN — KETOROLAC TROMETHAMINE 30 MG: 30 INJECTION, SOLUTION INTRAMUSCULAR at 11:07

## 2020-07-22 RX ADMIN — KETOROLAC TROMETHAMINE 30 MG: 30 INJECTION, SOLUTION INTRAMUSCULAR at 02:07

## 2020-07-22 RX ADMIN — ENOXAPARIN SODIUM 40 MG: 100 INJECTION SUBCUTANEOUS at 05:07

## 2020-07-22 RX ADMIN — CEPHALEXIN 500 MG: 500 CAPSULE ORAL at 02:07

## 2020-07-22 RX ADMIN — CEPHALEXIN 500 MG: 500 CAPSULE ORAL at 11:07

## 2020-07-22 RX ADMIN — HEPARIN SODIUM 5000 UNITS: 5000 INJECTION INTRAVENOUS; SUBCUTANEOUS at 05:07

## 2020-07-22 RX ADMIN — CETIRIZINE HYDROCHLORIDE 10 MG: 10 TABLET, FILM COATED ORAL at 08:07

## 2020-07-22 NOTE — NURSING
AAOX4. VSS. Pt free of trauma, falls, injury and skin breakdown. See flowsheet for flap checks. Pt. up to chair and ambulated to restroom. Leonardo davey'mohan. Oral fluids encouraged.  Pt denies pain at this time. Pt has been tolerating diet throughout shift. Pt ambulates and repositions self with stanby assist. Purposeful hourly rounding. Pt has call light in reach, side rails up X2, bed in low position and nonskid socks on. Pt lying in bed in no distress.Will cont. to monitor.

## 2020-07-22 NOTE — PROGRESS NOTES
Plastic Update    Pain controlled  Drains serosang  Minimal bruising on left breast  Flap skin paddles healthy  Audible right doppler on skin paddle  Audible internal doppler    Advance diet this am.  Please serve breakfast  Aspirin, lovenox  Toradol  Incentive spieometer  Drain care  Out of bed today, tamica lopez once out  Beach position  Bra binder on, especially when out of bed

## 2020-07-22 NOTE — NURSING
PCA syringe change. No (pink drug administration record in chart) Wasted 6.6mL (1.32mg) from previous PCA syringe with Charge nurse Anh KIM

## 2020-07-22 NOTE — PLAN OF CARE
SW met with pt at bedside to complete discharge assessment, verified PCP and uses [facility] on [facility] in [place] and would like bedside delivery.  No POA or LW.  Pt will discharge cousin's home and mother will be caregiver.  Mother will provide transportation home. No needs identified at this time.     07/22/20 1521   Discharge Assessment   Assessment Type Discharge Planning Assessment   Confirmed/corrected address and phone number on facesheet? Yes   Assessment information obtained from? Patient   Communicated expected length of stay with patient/caregiver no   Prior to hospitilization cognitive status: Alert/Oriented   Prior to hospitalization functional status: Independent   Current cognitive status: Alert/Oriented   Current Functional Status: Needs Assistance   Lives With child(francia), dependent;spouse   Able to Return to Prior Arrangements yes   Is patient able to care for self after discharge? Unable to determine at this time (comments)   Who are your caregiver(s) and their phone number(s)? Mother   Patient currently being followed by outpatient case management? No   Patient currently receives any other outside agency services? No   Equipment Currently Used at Home none   Do you have any problems affording any of your prescribed medications? No   Is the patient taking medications as prescribed? yes   Does the patient have transportation home? Yes   Transportation Anticipated family or friend will provide   Does the patient receive services at the [facility]? No   Discharge Plan A Home with family   DME Needed Upon Discharge  none   Patient/Family in Agreement with Plan yes

## 2020-07-22 NOTE — PLAN OF CARE
Patient remained free from falls/injury throughout shift.  No acute changes in status.  Pain managed on PCA pump.  Flap checks completed per order and WNL.  PHAN drains x4 to bulb suction and drained per order.  Patterson intact draining clear yellow urine.  No nausea/vomiting throughout shift.  Patient tolerated clear liquid diet.  Patient remained in beach position with open bra.  Internal doppler remained connected throughout shift.  Bed locked in lowest position.  Side rails up x2.  Call bell within reach.  Purposeful rounding maintained throughout shift.

## 2020-07-23 ENCOUNTER — TELEPHONE (OUTPATIENT)
Dept: INFUSION THERAPY | Facility: HOSPITAL | Age: 40
End: 2020-07-23

## 2020-07-23 LAB
FINAL PATHOLOGIC DIAGNOSIS: NORMAL
GROSS: NORMAL
MICROSCOPIC EXAM: NORMAL

## 2020-07-23 PROCEDURE — 99900035 HC TECH TIME PER 15 MIN (STAT)

## 2020-07-23 PROCEDURE — 25000003 PHARM REV CODE 250: Performed by: SURGERY

## 2020-07-23 PROCEDURE — 25000003 PHARM REV CODE 250: Performed by: STUDENT IN AN ORGANIZED HEALTH CARE EDUCATION/TRAINING PROGRAM

## 2020-07-23 PROCEDURE — 94664 DEMO&/EVAL PT USE INHALER: CPT

## 2020-07-23 PROCEDURE — 94799 UNLISTED PULMONARY SVC/PX: CPT

## 2020-07-23 PROCEDURE — 27000646 HC AEROBIKA DEVICE

## 2020-07-23 PROCEDURE — 63600175 PHARM REV CODE 636 W HCPCS: Performed by: STUDENT IN AN ORGANIZED HEALTH CARE EDUCATION/TRAINING PROGRAM

## 2020-07-23 PROCEDURE — 94761 N-INVAS EAR/PLS OXIMETRY MLT: CPT

## 2020-07-23 PROCEDURE — 11000001 HC ACUTE MED/SURG PRIVATE ROOM

## 2020-07-23 PROCEDURE — 63600175 PHARM REV CODE 636 W HCPCS: Performed by: SURGERY

## 2020-07-23 RX ORDER — OXYCODONE AND ACETAMINOPHEN 10; 325 MG/1; MG/1
1 TABLET ORAL EVERY 4 HOURS PRN
Status: DISCONTINUED | OUTPATIENT
Start: 2020-07-23 | End: 2020-07-26 | Stop reason: HOSPADM

## 2020-07-23 RX ORDER — OXYCODONE AND ACETAMINOPHEN 5; 325 MG/1; MG/1
1 TABLET ORAL EVERY 4 HOURS PRN
Status: DISCONTINUED | OUTPATIENT
Start: 2020-07-23 | End: 2020-07-26 | Stop reason: HOSPADM

## 2020-07-23 RX ORDER — KETOROLAC TROMETHAMINE 10 MG/1
10 TABLET, FILM COATED ORAL EVERY 6 HOURS
Status: DISCONTINUED | OUTPATIENT
Start: 2020-07-23 | End: 2020-07-25

## 2020-07-23 RX ADMIN — OXYCODONE HYDROCHLORIDE AND ACETAMINOPHEN 1 TABLET: 5; 325 TABLET ORAL at 04:07

## 2020-07-23 RX ADMIN — CETIRIZINE HYDROCHLORIDE 10 MG: 10 TABLET, FILM COATED ORAL at 09:07

## 2020-07-23 RX ADMIN — GABAPENTIN 300 MG: 300 CAPSULE ORAL at 02:07

## 2020-07-23 RX ADMIN — ASPIRIN 81 MG 81 MG: 81 TABLET ORAL at 09:07

## 2020-07-23 RX ADMIN — KETOROLAC TROMETHAMINE 10 MG: 10 TABLET, FILM COATED ORAL at 02:07

## 2020-07-23 RX ADMIN — CEPHALEXIN 500 MG: 500 CAPSULE ORAL at 06:07

## 2020-07-23 RX ADMIN — CEPHALEXIN 500 MG: 500 CAPSULE ORAL at 11:07

## 2020-07-23 RX ADMIN — GABAPENTIN 300 MG: 300 CAPSULE ORAL at 08:07

## 2020-07-23 RX ADMIN — CEPHALEXIN 500 MG: 500 CAPSULE ORAL at 12:07

## 2020-07-23 RX ADMIN — ENOXAPARIN SODIUM 40 MG: 100 INJECTION SUBCUTANEOUS at 04:07

## 2020-07-23 RX ADMIN — KETOROLAC TROMETHAMINE 10 MG: 10 TABLET, FILM COATED ORAL at 11:07

## 2020-07-23 RX ADMIN — GABAPENTIN 300 MG: 300 CAPSULE ORAL at 09:07

## 2020-07-23 RX ADMIN — KETOROLAC TROMETHAMINE 30 MG: 30 INJECTION, SOLUTION INTRAMUSCULAR at 06:07

## 2020-07-23 RX ADMIN — KETOROLAC TROMETHAMINE 10 MG: 10 TABLET, FILM COATED ORAL at 06:07

## 2020-07-23 NOTE — PROGRESS NOTES
Pt seen and examined at bedside. She is POD#2 s/p B/L ROSI flap breast reconstruction, resting comfortably and in no acute distress. Pt remains afebrile and hemodynamically stable, pain controlled w/ PCA and toradol, tolerating reg diet without nausea/vomiting.    Vitals: wnl  Gen: awake, alert, no distress  Rt Breast: wound c/d/i, no signs of erythema or cellulitis, audible arterial doppler signal, drain intact with serosang output  Lt Breast: wound c/d/i, no signs of erythema or cellulitis, audible arterial doppler signal, drain intact with serosang output  Abdomen: wound c/d/i, no signs of erythema or cellulitis, drains intact with serosang output      Plan  - c/w reg diet  - SCD, DVT ppx  - incentive spirometer  - will wean PCA today  - monitor PHAN drains  - oobtc

## 2020-07-23 NOTE — NURSING
AAOX4. MD notified of Temp, awaiting response. Oral fluids and IS encouraged. Pt free of trauma, falls, injury and skin breakdown. See flowsheet for flap checks. Pt. up to chair and ambulated in room. Pt denies pain at this time. Pt has been tolerating diet throughout shift. Pt ambulates and repositions self with stanby assist. Purposeful hourly rounding. Pt has call light in reach, side rails up X2, bed in low position and SCD's/nonskid socks on. Pt lying in bed in no distress.Will cont. to monitor.

## 2020-07-24 PROCEDURE — 11000001 HC ACUTE MED/SURG PRIVATE ROOM

## 2020-07-24 PROCEDURE — 25000003 PHARM REV CODE 250: Performed by: STUDENT IN AN ORGANIZED HEALTH CARE EDUCATION/TRAINING PROGRAM

## 2020-07-24 PROCEDURE — 25000003 PHARM REV CODE 250: Performed by: SURGERY

## 2020-07-24 PROCEDURE — 99900035 HC TECH TIME PER 15 MIN (STAT)

## 2020-07-24 PROCEDURE — 94761 N-INVAS EAR/PLS OXIMETRY MLT: CPT

## 2020-07-24 PROCEDURE — 63600175 PHARM REV CODE 636 W HCPCS: Performed by: STUDENT IN AN ORGANIZED HEALTH CARE EDUCATION/TRAINING PROGRAM

## 2020-07-24 PROCEDURE — 94664 DEMO&/EVAL PT USE INHALER: CPT

## 2020-07-24 RX ADMIN — CEPHALEXIN 500 MG: 500 CAPSULE ORAL at 11:07

## 2020-07-24 RX ADMIN — CEPHALEXIN 500 MG: 500 CAPSULE ORAL at 05:07

## 2020-07-24 RX ADMIN — KETOROLAC TROMETHAMINE 10 MG: 10 TABLET, FILM COATED ORAL at 11:07

## 2020-07-24 RX ADMIN — OXYCODONE HYDROCHLORIDE AND ACETAMINOPHEN 1 TABLET: 5; 325 TABLET ORAL at 06:07

## 2020-07-24 RX ADMIN — OXYCODONE HYDROCHLORIDE AND ACETAMINOPHEN 1 TABLET: 10; 325 TABLET ORAL at 11:07

## 2020-07-24 RX ADMIN — GABAPENTIN 300 MG: 300 CAPSULE ORAL at 02:07

## 2020-07-24 RX ADMIN — GABAPENTIN 300 MG: 300 CAPSULE ORAL at 09:07

## 2020-07-24 RX ADMIN — ENOXAPARIN SODIUM 40 MG: 100 INJECTION SUBCUTANEOUS at 05:07

## 2020-07-24 RX ADMIN — ASPIRIN 81 MG 81 MG: 81 TABLET ORAL at 09:07

## 2020-07-24 RX ADMIN — KETOROLAC TROMETHAMINE 10 MG: 10 TABLET, FILM COATED ORAL at 06:07

## 2020-07-24 RX ADMIN — CETIRIZINE HYDROCHLORIDE 10 MG: 10 TABLET, FILM COATED ORAL at 09:07

## 2020-07-24 RX ADMIN — KETOROLAC TROMETHAMINE 10 MG: 10 TABLET, FILM COATED ORAL at 05:07

## 2020-07-24 RX ADMIN — CEPHALEXIN 500 MG: 500 CAPSULE ORAL at 06:07

## 2020-07-24 NOTE — PROGRESS NOTES
Plastic Update    Pain controlled  Using a capella  Showered today  Drains serosqng  Strong skin signal bilaterally  Viable skin paddles, no bruisint    Continue current care  Continue pulm exercise  Will transition to ibu once toradol expires  Possible dc sun

## 2020-07-24 NOTE — PLAN OF CARE
Patient in no apparent distress. Sat's  95 % on room air. IS done. Aerobika done and patient to preform on own . Will continue to monitor.

## 2020-07-24 NOTE — PLAN OF CARE
Plan of care reviewed with patient. Pt remains free from fall, injury, and skin breakdown. Positions self independently with standby assist. Pt pain controlled with current pain regimen. Patient ambulating, and voiding spontaneously. Flap checks well perfused. Purposeful hourly rounding done. Safety maintained. Patient lying in bed in no distress. Will continue to monitor.

## 2020-07-25 PROCEDURE — 25000003 PHARM REV CODE 250: Performed by: STUDENT IN AN ORGANIZED HEALTH CARE EDUCATION/TRAINING PROGRAM

## 2020-07-25 PROCEDURE — 94761 N-INVAS EAR/PLS OXIMETRY MLT: CPT

## 2020-07-25 PROCEDURE — 94799 UNLISTED PULMONARY SVC/PX: CPT

## 2020-07-25 PROCEDURE — 99900035 HC TECH TIME PER 15 MIN (STAT)

## 2020-07-25 PROCEDURE — 94664 DEMO&/EVAL PT USE INHALER: CPT

## 2020-07-25 PROCEDURE — 63600175 PHARM REV CODE 636 W HCPCS: Performed by: STUDENT IN AN ORGANIZED HEALTH CARE EDUCATION/TRAINING PROGRAM

## 2020-07-25 PROCEDURE — 11000001 HC ACUTE MED/SURG PRIVATE ROOM

## 2020-07-25 PROCEDURE — 25000003 PHARM REV CODE 250: Performed by: SURGERY

## 2020-07-25 RX ORDER — IBUPROFEN 400 MG/1
400 TABLET ORAL EVERY 4 HOURS PRN
Status: DISCONTINUED | OUTPATIENT
Start: 2020-07-25 | End: 2020-07-26 | Stop reason: HOSPADM

## 2020-07-25 RX ADMIN — KETOROLAC TROMETHAMINE 10 MG: 10 TABLET, FILM COATED ORAL at 12:07

## 2020-07-25 RX ADMIN — OXYCODONE HYDROCHLORIDE AND ACETAMINOPHEN 1 TABLET: 5; 325 TABLET ORAL at 12:07

## 2020-07-25 RX ADMIN — IBUPROFEN 400 MG: 400 TABLET, FILM COATED ORAL at 05:07

## 2020-07-25 RX ADMIN — ENOXAPARIN SODIUM 40 MG: 100 INJECTION SUBCUTANEOUS at 04:07

## 2020-07-25 RX ADMIN — GABAPENTIN 300 MG: 300 CAPSULE ORAL at 08:07

## 2020-07-25 RX ADMIN — OXYCODONE HYDROCHLORIDE AND ACETAMINOPHEN 1 TABLET: 5; 325 TABLET ORAL at 09:07

## 2020-07-25 RX ADMIN — ASPIRIN 81 MG 81 MG: 81 TABLET ORAL at 08:07

## 2020-07-25 RX ADMIN — CEPHALEXIN 500 MG: 500 CAPSULE ORAL at 05:07

## 2020-07-25 RX ADMIN — GABAPENTIN 300 MG: 300 CAPSULE ORAL at 04:07

## 2020-07-25 RX ADMIN — IBUPROFEN 400 MG: 400 TABLET, FILM COATED ORAL at 09:07

## 2020-07-25 RX ADMIN — CEPHALEXIN 500 MG: 500 CAPSULE ORAL at 12:07

## 2020-07-25 RX ADMIN — KETOROLAC TROMETHAMINE 10 MG: 10 TABLET, FILM COATED ORAL at 05:07

## 2020-07-25 RX ADMIN — GABAPENTIN 300 MG: 300 CAPSULE ORAL at 09:07

## 2020-07-25 RX ADMIN — CETIRIZINE HYDROCHLORIDE 10 MG: 10 TABLET, FILM COATED ORAL at 08:07

## 2020-07-25 NOTE — PLAN OF CARE
Pt AAO x 4. Resp. Even and unlabored. Bilateral breast incision with dermabond and sutures CDI, pink with some bruising, soft, warm with strong doppler pulses bilateral. Abd pads in place bilateral. Dermabond CDI to abdomen with Abd pads and abd binder in place. PHAN drains x 4 with serosanguineous drainage noted. Pain controlled with po pain medications as ordered. Pt free from falls or injury.Voiding without difficulty noted yellow urine.  Purposeful rounding done. Safety maintained. Bed in lowest position and locked. Call light in reach.

## 2020-07-25 NOTE — PROGRESS NOTES
Pain controlled.  Has not started ibu yet.  Says she will be ready for dc kalani.  Flaps viable.  Healthy skin paddles, no bruising.  Audible doppler signals on skin paddle.  Audible internal doppler  Drains serosanguinous.    Drain teaching  Discharge kalani after lunch  Prescriptions will be written to her pharmacy  Start ibu.  Dc toradol  Can dc iv

## 2020-07-25 NOTE — PLAN OF CARE
Patient on RA saturation adequate,AerBika and incentive spirometry done .Will continue to monitor.

## 2020-07-25 NOTE — PLAN OF CARE
Pt lying in bed in supine position, skin warm and dry to touch, resp even and unlabored.  Pt free of trauma, falls, and injury. Pt VSS and afebrile throughout shift. Pt free of skin breakdown. Pt dressings to flaps and abdomen are clean, dry, and intact. Pt pain has been controlled by PO pain meds and tolerated well. Pt has been voiding adequately throughout shift.  Plan of care reviewed.  Questions answered.   Pt has call light in reach, bed in low locked position, SCDs on, and nonskid socks on. Pt lying in bed in no distress. Will continue to monitor.

## 2020-07-25 NOTE — PLAN OF CARE
Patient on RA with sats as documented.  Patient instructed on flutter valve.  Pt doing IS @ 2250  L . Will cont to encourage deep breathing and coughing. No apparent respiratory distress noted and will cont to monitor

## 2020-07-25 NOTE — PROGRESS NOTES
Pt seen and examined at bedside. She is resting comfortably and in no acute distress. Pt remains afebrile and hemodynamically stable, pain controlled tolerating reg diet.     Vitals: wnl  Gen: awake, alert, no distress  Rt Breast: wound c/d/i, no signs of erythema or cellulitis, audible arterial doppler signal, drain intact with serosang output  Lt Breast: wound c/d/i, no signs of erythema or cellulitis, audible arterial doppler signal, drain intact with serosang output  Abdomen: wound c/d/i, no signs of erythema or cellulitis, drains intact with serosang output        Plan  - c/w reg diet  - SCD, DVT ppx  - incentive spirometer  - monitor PHAN drains  - oobtc  - likely dc tomorrow

## 2020-07-26 VITALS
DIASTOLIC BLOOD PRESSURE: 63 MMHG | BODY MASS INDEX: 32.27 KG/M2 | RESPIRATION RATE: 17 BRPM | WEIGHT: 189 LBS | HEIGHT: 64 IN | OXYGEN SATURATION: 95 % | TEMPERATURE: 98 F | SYSTOLIC BLOOD PRESSURE: 134 MMHG | HEART RATE: 80 BPM

## 2020-07-26 PROCEDURE — 94761 N-INVAS EAR/PLS OXIMETRY MLT: CPT

## 2020-07-26 PROCEDURE — 25000003 PHARM REV CODE 250: Performed by: SURGERY

## 2020-07-26 PROCEDURE — 94664 DEMO&/EVAL PT USE INHALER: CPT

## 2020-07-26 PROCEDURE — 99900035 HC TECH TIME PER 15 MIN (STAT)

## 2020-07-26 PROCEDURE — 25000003 PHARM REV CODE 250: Performed by: STUDENT IN AN ORGANIZED HEALTH CARE EDUCATION/TRAINING PROGRAM

## 2020-07-26 RX ORDER — ENOXAPARIN SODIUM 100 MG/ML
40 INJECTION SUBCUTANEOUS DAILY
Qty: 5.6 ML | Refills: 0 | Status: SHIPPED | OUTPATIENT
Start: 2020-07-26 | End: 2020-07-26

## 2020-07-26 RX ORDER — DOXYCYCLINE HYCLATE 100 MG
100 TABLET ORAL EVERY 12 HOURS
Status: DISCONTINUED | OUTPATIENT
Start: 2020-07-26 | End: 2020-07-26 | Stop reason: HOSPADM

## 2020-07-26 RX ORDER — DOXYCYCLINE HYCLATE 100 MG
100 TABLET ORAL EVERY 12 HOURS
Qty: 28 TABLET | Refills: 0 | Status: SHIPPED | OUTPATIENT
Start: 2020-07-26 | End: 2020-07-26

## 2020-07-26 RX ORDER — OXYCODONE AND ACETAMINOPHEN 5; 325 MG/1; MG/1
1 TABLET ORAL EVERY 4 HOURS PRN
Qty: 50 TABLET | Refills: 0 | Status: SHIPPED | OUTPATIENT
Start: 2020-07-26

## 2020-07-26 RX ORDER — IBUPROFEN 400 MG/1
400 TABLET ORAL EVERY 4 HOURS PRN
Qty: 90 TABLET | Refills: 0 | Status: SHIPPED | OUTPATIENT
Start: 2020-07-26 | End: 2020-07-26

## 2020-07-26 RX ORDER — IBUPROFEN 400 MG/1
400 TABLET ORAL EVERY 4 HOURS PRN
Qty: 90 TABLET | Refills: 0 | Status: SHIPPED | OUTPATIENT
Start: 2020-07-26

## 2020-07-26 RX ORDER — NAPROXEN SODIUM 220 MG/1
81 TABLET, FILM COATED ORAL DAILY
Qty: 30 TABLET | Refills: 0 | Status: SHIPPED | OUTPATIENT
Start: 2020-07-26 | End: 2021-07-26

## 2020-07-26 RX ORDER — DOXYCYCLINE HYCLATE 100 MG
100 TABLET ORAL EVERY 12 HOURS
Qty: 28 TABLET | Refills: 0 | Status: SHIPPED | OUTPATIENT
Start: 2020-07-26

## 2020-07-26 RX ORDER — ENOXAPARIN SODIUM 100 MG/ML
40 INJECTION SUBCUTANEOUS DAILY
Qty: 5.6 ML | Refills: 0 | Status: SHIPPED | OUTPATIENT
Start: 2020-07-26 | End: 2020-08-09

## 2020-07-26 RX ORDER — POLYETHYLENE GLYCOL 3350 17 G/17G
17 POWDER, FOR SOLUTION ORAL DAILY
Qty: 30 PACKET | Refills: 0 | Status: SHIPPED | OUTPATIENT
Start: 2020-07-26

## 2020-07-26 RX ORDER — NAPROXEN SODIUM 220 MG/1
81 TABLET, FILM COATED ORAL DAILY
Qty: 30 TABLET | Refills: 0 | Status: SHIPPED | OUTPATIENT
Start: 2020-07-26 | End: 2020-07-26

## 2020-07-26 RX ORDER — OXYCODONE AND ACETAMINOPHEN 5; 325 MG/1; MG/1
1 TABLET ORAL EVERY 4 HOURS PRN
Qty: 50 TABLET | Refills: 0 | Status: SHIPPED | OUTPATIENT
Start: 2020-07-26 | End: 2020-07-26

## 2020-07-26 RX ORDER — GABAPENTIN 300 MG/1
300 CAPSULE ORAL 3 TIMES DAILY
Qty: 90 CAPSULE | Refills: 1 | Status: SHIPPED | OUTPATIENT
Start: 2020-07-26 | End: 2021-07-26

## 2020-07-26 RX ORDER — GABAPENTIN 300 MG/1
300 CAPSULE ORAL 3 TIMES DAILY
Qty: 90 CAPSULE | Refills: 1 | Status: SHIPPED | OUTPATIENT
Start: 2020-07-26 | End: 2020-07-26

## 2020-07-26 RX ADMIN — OXYCODONE HYDROCHLORIDE AND ACETAMINOPHEN 1 TABLET: 10; 325 TABLET ORAL at 12:07

## 2020-07-26 RX ADMIN — IBUPROFEN 400 MG: 400 TABLET, FILM COATED ORAL at 01:07

## 2020-07-26 RX ADMIN — OXYCODONE HYDROCHLORIDE AND ACETAMINOPHEN 1 TABLET: 10; 325 TABLET ORAL at 02:07

## 2020-07-26 RX ADMIN — GABAPENTIN 300 MG: 300 CAPSULE ORAL at 09:07

## 2020-07-26 RX ADMIN — OXYCODONE HYDROCHLORIDE AND ACETAMINOPHEN 1 TABLET: 5; 325 TABLET ORAL at 06:07

## 2020-07-26 RX ADMIN — DOXYCYCLINE HYCLATE 100 MG: 100 TABLET, COATED ORAL at 09:07

## 2020-07-26 RX ADMIN — IBUPROFEN 400 MG: 400 TABLET, FILM COATED ORAL at 02:07

## 2020-07-26 RX ADMIN — OXYCODONE HYDROCHLORIDE AND ACETAMINOPHEN 1 TABLET: 5; 325 TABLET ORAL at 09:07

## 2020-07-26 RX ADMIN — CEPHALEXIN 500 MG: 500 CAPSULE ORAL at 12:07

## 2020-07-26 RX ADMIN — CETIRIZINE HYDROCHLORIDE 10 MG: 10 TABLET, FILM COATED ORAL at 09:07

## 2020-07-26 RX ADMIN — IBUPROFEN 400 MG: 400 TABLET, FILM COATED ORAL at 09:07

## 2020-07-26 RX ADMIN — CEPHALEXIN 500 MG: 500 CAPSULE ORAL at 06:07

## 2020-07-26 RX ADMIN — ASPIRIN 81 MG 81 MG: 81 TABLET ORAL at 09:07

## 2020-07-26 NOTE — DISCHARGE SUMMARY
Patient underwent scheduled elective bilateral ROSI flap reconstruction of both breasts on 7/21/20. Surgery went uncomplicated and patient was admitted to floor post-op. Patient remained hemodynamically stable and diet was advanced as tolerated. Flap checks were performed routinely and bilateral signal were present throughout entire hospital course. Initially, pain was controlled with PCA which eventually was weaned to oral pain meds. Patient continued to remain stable, ambulating, voiding, and pain controlled with oral pain meds. Patient was given prescriptions and specific post-op instructions for follow-up care.

## 2020-07-26 NOTE — NURSING
Eager & in agreement w/ DC. VU of DC instructions- paperwork passed & explained, scripts called to pharm per MD. Bilateral breast incision with dermabond and sutures CDI, pink with some bruising, semi firm, warm with strong doppler pulses bilateral. Abd pads and surgical bra in place bilateral. Dermabond CDI to abdomen with Abd pads and abd binder in place. PHAN drains x 4 with serosanguineous drainage noted.   To be DCd home w/ mother, VU of education on drain care. will be escorted downstairs via  transport team once dressed, ready & ride arrives. Free from falls, injury, or skin breakdown this hospital admission. Purposeful rounding completed this shift.

## 2020-07-26 NOTE — PLAN OF CARE
Patient is awake, alert, and oriented x 4. Vitals stable. Patient is ambulatory and voids spontaneously. Pain reported during the night. Medications administered per orders. Bed locked in lowest position with side rails up x 2. Call light is within reach of patient. Purposeful rounding maintained throughout shift. Will continue to monitor.

## 2020-07-26 NOTE — DISCHARGE SUMMARY
[facility]  Discharge Summary  Plastic Surgery      Admit Date: 7/21/2020    Discharge Date and Time: No discharge date for patient encounter.    Attending Physician: Howard Cheatham MD     Discharge Provider: Howard Cheatham    Reason for Admission: rosi flap breast reconstruction    Procedures Performed: Procedure(s) (LRB):  RECONSTRUCTION, BREAST, USING ROSI FREE FLAP (Bilateral)    Hospital Course (synopsis of major diagnoses, care, treatment, and services provided during the course of the hospital stay): admitted after bilateral rosi, discharged home     Consults: none    Significant Diagnostic Studies: none    Final Diagnoses:    Principal Problem: History of breast cancer   Secondary Diagnoses:   Active Hospital Problems    Diagnosis  POA    *History of breast cancer [Z85.3]  Not Applicable      Resolved Hospital Problems   No resolved problems to display.       Discharged Condition: good    Disposition: Home or Self Care    Follow Up/Patient Instructions:     Medications:  Reconciled Home Medications:      Medication List      START taking these medications    aspirin 81 MG Chew  Take 1 tablet (81 mg total) by mouth once daily.     doxycycline 100 MG tablet  Commonly known as: VIBRA-TABS  Take 1 tablet (100 mg total) by mouth every 12 (twelve) hours.     enoxaparin 40 mg/0.4 mL Syrg  Commonly known as: LOVENOX  Inject 0.4 mLs (40 mg total) into the skin once daily. for 14 days     gabapentin 300 MG capsule  Commonly known as: NEURONTIN  Take 1 capsule (300 mg total) by mouth 3 (three) times daily.     ibuprofen 400 MG tablet  Commonly known as: ADVIL,MOTRIN  Take 1 tablet (400 mg total) by mouth every 4 (four) hours as needed (mild pain).     oxyCODONE-acetaminophen 5-325 mg per tablet  Commonly known as: PERCOCET  Take 1 tablet by mouth every 4 (four) hours as needed (severe pain).     polyethylene glycol 17 gram Pwpk  Commonly known as: GLYCOLAX  Take 17 g by mouth once daily.    "     CONTINUE taking these medications    loratadine 10 mg tablet  Commonly known as: CLARITIN  Take 10 mg by mouth once daily.     phenazopyridine 200 MG tablet  Commonly known as: PYRIDIUM  TAKE 1 TABLET BY MOUTH EVERY 6 HOURS AS NEEDED FOR PAIN        STOP taking these medications    anastrozole 1 mg Tab  Commonly known as: ARIMIDEX     cyanocobalamin 1,000 mcg/mL injection     syringe with needle 3 mL 25 gauge x 1" Syrg  Commonly known as: SYRINGE 3CC/25GX1"          Discharge Procedure Orders   Call MD for:  temperature >100.4     Call MD for:  persistent nausea and vomiting     Call MD for:  severe uncontrolled pain     Call MD for:  difficulty breathing, headache or visual disturbances     Call MD for:  redness, tenderness, or signs of infection (pain, swelling, redness, odor or green/yellow discharge around incision site)     Call MD for:  hives     Call MD for:  persistent dizziness or light-headedness     Call MD for:  extreme fatigue     Discharge instructions (Specify)   Order Comments: Patient given specific wound care instructions by Dr. Cheatham       "

## 2020-07-26 NOTE — PLAN OF CARE
Final Note  Transportation: Mother to provide transportation home.   No further DC needs from CM perspective.       07/26/20 1527   Final Note   Assessment Type Final Discharge Note   Anticipated Discharge Disposition Home   Hospital Follow Up  Appt(s) scheduled? Yes  (Call for f/u appointment)   Discharge plans and expectations educations in teach back method with documentation complete? Yes   Right Care Referral Info   Post Acute Recommendation No Care   Post-Acute Status   Discharge Delays None known at this time

## 2020-07-26 NOTE — PROGRESS NOTES
Pt seen and examined at bedside. She is resting comfortably and in no acute distress. Pt remains afebrile and hemodynamically stable, pain controlled tolerating reg diet.     Vitals: wnl  Gen: awake, alert, no distress  Rt Breast: wound c/d/i, no signs of erythema or cellulitis, audible arterial doppler signal, drain intact with serosang output  Lt Breast: wound c/d/i, no signs of erythema or cellulitis, audible arterial doppler signal, drain intact with serosang output  Abdomen: wound c/d/i, no signs of erythema or cellulitis, drains intact with serosang output        Plan  - c/w reg diet  - SCD, DVT ppx  - incentive spirometer  - monitor PHAN drains  - oobtc  - d/c today

## 2020-07-26 NOTE — PROGRESS NOTES
Plastic Surgery Discharge Instructions     CONTACT NUMBERS:   [facility]   [address] [place] [address]   [phone]   Plastic & Reconstructive Surgery   Microsurgery   [facility]   c/o Howard Cheatham M.D.     [facility]   [address] [facility] [address]   [address] [place] [address]]   Work [phone]   Toll free [phone]     Please call the office on Monday for follow up time. I should see you this week!   To schedule appointment:     Call [phone] during business hours.     For all life-threatening emergencies, please call [phone]     For all other concerns regarding your plastic surgery, you may call the office at: [phone], toll free [phone]. This is the same number that can direct you to non emergent after hour concerns.     BATHING     No tub soaking, lotions of creams to surgical sites until cleared by your doctor.   Ok to shower. No scrubbing or soaking of incisions. Pat wounds dry.          WOUND/FLAP CARE   The tapes over your breast and buttock incisions will remain on until they fall off. You can trim the loose ends of the tapes as they become loosened to avoid accidentally pulling them off or to avoid having them collect debris.     Do not remove any sutures, tubes, drains or wires. They will be removed in the office.     Wear the surgical bra for comfort while out of bed (can unfasten while in bed for comfort occasionally ), but you should not wear a bra with underwires.     Record the drain outputs as instructed. Not the color and transparency of the fluid.     Use your drain log to record the output from your drain, which you can use to keep track of each of your drains. There are further instructions for drain care at the bottom of this page.     No pressure over your chest. Do not sleep on chest or abdomen.     Sleeping in a recliner or in bed with head propped up is ideal as it will allow for drainage of your  chest.     It is normal to have slight oozing or drainage along the incisions for a few days to 2 weeks. Pad your bra and abdominal binder with gauze or pads as needed to areas of greater seepage/drainage.     SUTURES   Do not remove any sutures. These will be removed in the office.     DIET   Encourage by mouth fluid intake   Eat bananas for low potassium   After surgery eat and drink lightly: crackers, plain toast, clear soups, water and sport drinks are preferable   Avoid food high in protein and fat until you are tolerating starches and fluids without problem   Resume your normal diabetic diet when you feel well. If your stomach is upset, try bland, low-fat foods like plain rice, broiled chicken, toast, and yogurt. Continue to drink plenty of fluids.   Many people are constipated after surgery. This can be due to the pain medicine and a lack of activity. Be sure you get plenty of fluids, and eat high fiber cereal, prunes, or take a fiber supplement such as Citrucel or Metamucil, or a stool softener like Miralax or Colace.     ACTIVITY:   Avoid:   - Lifting more than 2 lbs   - Reaching above head   - Pullover garments (T-shirt, crew neck sweater or shirt)   - Sitting for prolonged periods of time during daytime hours.   - Bending over, twisting, pushing, pulling, stooping   - Driving until instructed this is safe   - heavy lifting, running, swimming, strenuous activity for 6 weeks   - long-distance travel for at least 6 weeks. If you have long car rides, hydrate yourself well.     Recommended:   - Walk around your house or apartment occasionally during the day   - Breathe deeply and cough frequently to clear your lungs after anesthesia   - Sleep with your head propped up on several pillows for the first few weeks after surgery   - You can wear compressive stockings to avoid the blood in your legs from sitting still. Perform ankle circles while awake to prevent stasis in your legs.   Sleeping:   - Keep your back  elevated at 30 to 45 degrees with several pillows; place pillows under your knees to keep them bent   - DO NOT REST ON THE BREAST FLAP; SLEEP ON YOUR BACK ONLY   - PLACE PILLOWS ON EACH SIDE TO PREVENT TURNING AND RESTING ON THE FLAPS   THINGS TO WATCH FOR:     Observe for and report immediately:   - foul smelling drainage from wound,   - color of drainage is bright red,   - sudden dramatic increase in size of breast,   - red and hot skin (mild swelling and bruises are normal)   - chills and fever over 102 degrees.     If there are issues with your surgical sites, we would rather know about them early, so that an appropriate plan of action can be followed. If notified in a timely manner, this kind of post op care should be coordinated by Dr. Cheatham rather than a surgeon or emergency person you are not familiar with.     If you are short of breath or have new leg pain and/or having difficulty breathing, please go to your nearest emergency room.     MEDICATIONS      Pain Medications   Take pain medicine as needed, following the directions carefully. Plan to take your pain medicine 30 minutes before exercises or therapy. Do not wait until you are in severe pain. You will get better results if you take it sooner. To avoid an upset stomach, take your pain pills with food.     Do not take plain acetominophen (Tylenol) within 6 hrs of any prescribed narcotic: most narcotic preparations already contain acetominophen and you may receive a dangerous overdose     Tylenol (acetominophen) 650 mg every 4 hours is recommended for mild pain.   If you are taking a narcotic mixed with acetaminophen, wait at least 4 HOURS after taking other acetaminophen-containing preparations (ie. Tylenol)     DO NOT exceed 4 grams of Tylenol in a 24 h period     Take aspirin and lovenox as prescribed. Inject into anterior thigh rather than abdomen or buttocks.     Hold your hormone blockers for 3 weeks post operatively.     Take antibiotics while  "drains are in place over your abdomen       Medication List      START taking these medications    aspirin 81 MG Chew  Take 1 tablet (81 mg total) by mouth once daily.     doxycycline 100 MG tablet  Commonly known as: VIBRA-TABS  Take 1 tablet (100 mg total) by mouth every 12 (twelve) hours.     enoxaparin 40 mg/0.4 mL Syrg  Commonly known as: LOVENOX  Inject 0.4 mLs (40 mg total) into the skin once daily. for 14 days     gabapentin 300 MG capsule  Commonly known as: NEURONTIN  Take 1 capsule (300 mg total) by mouth 3 (three) times daily.     ibuprofen 400 MG tablet  Commonly known as: ADVIL,MOTRIN  Take 1 tablet (400 mg total) by mouth every 4 (four) hours as needed (mild pain).     oxyCODONE-acetaminophen 5-325 mg per tablet  Commonly known as: PERCOCET  Take 1 tablet by mouth every 4 (four) hours as needed (severe pain).     polyethylene glycol 17 gram Pwpk  Commonly known as: GLYCOLAX  Take 17 g by mouth once daily.        CONTINUE taking these medications    loratadine 10 mg tablet  Commonly known as: CLARITIN     phenazopyridine 200 MG tablet  Commonly known as: PYRIDIUM  TAKE 1 TABLET BY MOUTH EVERY 6 HOURS AS NEEDED FOR PAIN        STOP taking these medications    anastrozole 1 mg Tab  Commonly known as: ARIMIDEX     cyanocobalamin 1,000 mcg/mL injection     syringe with needle 3 mL 25 gauge x 1" Syrg  Commonly known as: SYRINGE 3CC/25GX1"           Where to Get Your Medications      These medications were sent to [id number]  [place] [address] [place] [address]    Phone: [phone] ·   aspirin 81 MG Chew  · doxycycline 100 MG tablet  · enoxaparin 40 mg/0.4 mL Syrg  · gabapentin 300 MG capsule  · ibuprofen 400 MG tablet  · oxyCODONE-acetaminophen 5-325 mg per tablet  · polyethylene glycol 17 gram Pwpk         Drain Care   Dont sleep on the same side as the tube.   Secure the tube and bag inside your clothing with a safety pin. This helps keep the tube " from being pulled out.   Empty your drain at least three times a day. Regularly strip the tubing from your   drain stitch to the bulb to prevent clots from accumulating. Empty it when you notice it is half full with fluid. When it gets beyond half way full, the suction mechanism does not work as well and the fluid collections in your wounds. Wash and dry your hands before emptying the drain. How to use the PHAN bulb:   ?Lift the opening on the drain.   ?Drain the fluid into a measuring cup.   ?Record the amount of fluid each time you empty the drain. Include the date and time it was emptied. Share this information with your doctor on your next visit.   ?Squeeze the bulb with your hands until you hear air coming out of the bulb if your doctor has instructed you to do so (sometimes the bulb is used as a reservoir without suction). Check with your doctor about specific drain instructions.   ?Close the opening.   Change the dressing around the tube every day.   ?Wash your hands.   ?Remove the old bandage.   ?Wash your hands again.   ?Wet a cotton swab and clean the skin around the incision and tube site. Use normal saline solution (salt and water). Or, you can use warm, soapy water.   ?Put a new bandage on the incision and tube site. Make the bandage large enough to cover the whole incision area.   ?Tape the bandage in place.   Keep the bandage and tube site dry when you shower. Ask your healthcare provider about the best way to do this.   ?Stripping the tube helps keep blood clots from blocking the tube.   ?Hold the tubing where it leaves the skin, with one hand. This keeps it from pulling on the skin.   ?Pinch the tubing with the thumb and first finger of your other hand.   ?Slowly and firmly pull your thumb and first finger down the tubing. You may find it helpful to hold an alcohol swab between your fingers and the tube to lubricate the tubing.   ?If the pulling hurts or feels like its coming out of the skin,  stop. Begin again more gently.     CLOSED SUCTION DRAIN INSTRUCTIONS   1. Care of this bulb is very simple. However, it is important that neither the drain site nor bulb be submerged in water; do not take a tub bath, use a hot tub or go swimming. You may shower with your doctor's approval.   2. You will need to empty the bulb itself 2 or 3 times per day or whenever it is 1/3 full.   Measure and record the amount of fluid drained from the bulb if you have been instructed to do so by your doctor.   3. To empty your drain, you will need to:   a. Wash your hands thoroughly.   b. Open the cap on the bulb.   c. Empty the contents into the measuring cup provided.   d. Restore suction by squeezing the bulb and replacing the cap while the bulb is depressed between your fingers.   e. Secure the bulb with tape or pin to your clothing.   4. What to do if your drain is not emptying.   Pinch the tubing with 2 fingers as close as possible to your skin and hold it tightly to prevent pulling on the skin. With your other hand pinch the tube and gently slide your finder toward the bulb. By doing that once a day you will prevent blood clots from obstructing possible drainage. If the tubing remains flat, gently pinch the tube at the flat edges until it is round again.       Right Breast     Date Emptied Time Emptied Amount in [place]                                                                                                                                    Left Breast     Date Emptied Time Emptied Amount in [place]                                                                                                                                    Right Abdomen     Date Emptied Time Emptied Amount in [place]                                                                                                                                     Left Abdomen    Date Emptied Time Emptied Amount in [place]                                                                                                                                     DRIVING   It may be 2-4 weeks after surgery before it is safe for you to drive. Ask your doctor or his staff when this will be OK. For your safety, you must not drive until you are no longer taking narcotic pain medicines and you can move and react easily.       SCAR MANAGEMENT   Scars may take over 1 year to mature.   Some scars will remain pink, dark purple, and possibly raised for 6-9 months after surgery.   After one year, scars often become flatter, smoother, and may change color.   After removal of the tape, suture removal, or when glue was used, apply a thin layer of Aquaphor (available at any drugstore) or antibiotic ointment to the scars for another 2 weeks.   Begin silicone when scars smooth, generally starting about 6 weeks after surgery.   Medical grade silicone gel is available on companies' web sites or on amazon.com   Brands recommended:   - Scarfade (scarfade.com)   - NewGel (newgelplus.com)   - Kelocote (kelocote.com)   Massage the scar twice daily for about 30 seconds     Sun Screen   The best sunscreens contain zinc oxide and SPF 50+   Use at all times, even when overcast or raining while scar is pink

## 2020-07-29 ENCOUNTER — TELEPHONE (OUTPATIENT)
Dept: INFUSION THERAPY | Facility: HOSPITAL | Age: 40
End: 2020-07-29

## 2020-07-29 NOTE — TELEPHONE ENCOUNTER
----- Message from LUCY Wellington sent at 7/29/2020  3:20 PM CDT -----  Yes skip you can proceed. When is she scheduled.    Mulu   ----- Message -----  From: Stefanikerry Garcia  Sent: 7/29/2020   3:06 PM CDT  To: LUCY Wellington, #    Patient was just discharged late last week.     Last calcium was 8.7 on 7/21.  Please advise if still ok to proceed with Prolia at this time and I will call her to schedule for it.          ----- Message -----  From: Miles Ambriz  Sent: 7/23/2020  10:17 AM CDT  To: Reyes Roman Staff, #    Ready to schedule, auth on file. Prolia

## 2020-07-30 ENCOUNTER — OFFICE VISIT (OUTPATIENT)
Dept: PLASTIC SURGERY | Facility: CLINIC | Age: 40
End: 2020-07-30
Payer: MEDICAID

## 2020-07-30 VITALS
HEART RATE: 79 BPM | SYSTOLIC BLOOD PRESSURE: 133 MMHG | BODY MASS INDEX: 32.44 KG/M2 | WEIGHT: 189 LBS | DIASTOLIC BLOOD PRESSURE: 77 MMHG

## 2020-07-30 DIAGNOSIS — Z85.3 HISTORY OF BREAST CANCER: Primary | ICD-10-CM

## 2020-07-30 PROCEDURE — 99213 OFFICE O/P EST LOW 20 MIN: CPT | Mod: PBBFAC | Performed by: SURGERY

## 2020-07-30 PROCEDURE — 99024 PR POST-OP FOLLOW-UP VISIT: ICD-10-PCS | Mod: ,,, | Performed by: SURGERY

## 2020-07-30 PROCEDURE — 99024 POSTOP FOLLOW-UP VISIT: CPT | Mod: ,,, | Performed by: SURGERY

## 2020-07-30 PROCEDURE — 99999 PR PBB SHADOW E&M-EST. PATIENT-LVL III: ICD-10-PCS | Mod: PBBFAC,,, | Performed by: SURGERY

## 2020-07-30 PROCEDURE — 99999 PR PBB SHADOW E&M-EST. PATIENT-LVL III: CPT | Mod: PBBFAC,,, | Performed by: SURGERY

## 2020-08-03 ENCOUNTER — OFFICE VISIT (OUTPATIENT)
Dept: PLASTIC SURGERY | Facility: CLINIC | Age: 40
End: 2020-08-03
Payer: MEDICAID

## 2020-08-03 VITALS
DIASTOLIC BLOOD PRESSURE: 74 MMHG | SYSTOLIC BLOOD PRESSURE: 133 MMHG | WEIGHT: 194.88 LBS | HEART RATE: 74 BPM | BODY MASS INDEX: 33.45 KG/M2

## 2020-08-03 DIAGNOSIS — Z01.818 PRE-OP TESTING: Primary | ICD-10-CM

## 2020-08-03 DIAGNOSIS — Z85.3 HISTORY OF BREAST CANCER: Primary | ICD-10-CM

## 2020-08-03 PROCEDURE — 99024 POSTOP FOLLOW-UP VISIT: CPT | Mod: ,,, | Performed by: SURGERY

## 2020-08-03 PROCEDURE — 99024 PR POST-OP FOLLOW-UP VISIT: ICD-10-PCS | Mod: ,,, | Performed by: SURGERY

## 2020-08-03 PROCEDURE — 99999 PR PBB SHADOW E&M-EST. PATIENT-LVL III: ICD-10-PCS | Mod: PBBFAC,,, | Performed by: SURGERY

## 2020-08-03 PROCEDURE — 99213 OFFICE O/P EST LOW 20 MIN: CPT | Mod: PBBFAC | Performed by: SURGERY

## 2020-08-03 PROCEDURE — 99999 PR PBB SHADOW E&M-EST. PATIENT-LVL III: CPT | Mod: PBBFAC,,, | Performed by: SURGERY

## 2020-08-03 NOTE — PROGRESS NOTES
She is pleased with appearance of her breasts and abdomen.  Asking whether she should pursue revision of left abdominal scar  Her breast are soft and symmetric.  Skin paddles are well perfused  Her umbilicus is viable with some stable superficial skin slough  Left abdominal draining less than 20 cc per day for last 2 days, output serous  She has been intermittently taking lovenox.  No shortness of breath  She stopped taking aspirin- I recommended that she take it for at least 1 month  I explained to her that she would be cleared to return home next Thursday if there is no abdominal collection  We could pursue covid testing and scar revision next Tues  Removed left abdominal drain, she can complete antibiotics today and not take it any further  We discussed refraining from heavy lifting for 6 weeks.  She could be cleared of any other activity restrictions for 6 weeks  I recommended against sleeping anywhere on her left flank/side to avoid pressure on her radiated left breast  She can drive when she is no longer taking antibiotics from my standpoint  If no abdominal collection can return home next week  All questions answered.  I spent 20 additional minutes on the phone with her after the visit and she wishes to proceed with left abdominal scar revision next week.  She will book her flight home the following Thursday    Plastic & Reconstructive Surgery  [facility]  c/o Howard Cheatham M.D.  [facility]  [address] [facility] [address]  [address] [facility] [place] [address]    Work [phone]  Toll free [phone]  If no answer [phone]

## 2020-08-04 NOTE — PROGRESS NOTES
Pain controlled.  Denies chest pain, abdominal pain.  Denies shortness of breath  Reviewed drain ouptuts- right and left breast drains removed.  One abdominal drain removed  No evidence of infection or fluid collection.  No dehiscence  Stable umbilical scabbing  Recommend ongoing discharge precautions.  Wean narcotic medications.  Recommend follow up next week  She informed us that she would like to return home before her children's birthday, which is weekend after next in [place]  Will continue abdominal drain.  Continue antibiotics while drain is in over mesh  All questions answered.    Plastic & Reconstructive Surgery  [facility]  c/o Howard Cheatham M.D.  [facility]  [address]  [address] [place] [address]    Work [phone]  Toll free [phone]  If no answer [phone]

## 2020-08-05 ENCOUNTER — TELEPHONE (OUTPATIENT)
Dept: OBSTETRICS AND GYNECOLOGY | Facility: CLINIC | Age: 40
End: 2020-08-05

## 2020-08-06 ENCOUNTER — OFFICE VISIT (OUTPATIENT)
Dept: OBSTETRICS AND GYNECOLOGY | Facility: CLINIC | Age: 40
End: 2020-08-06
Payer: MEDICAID

## 2020-08-06 ENCOUNTER — PROCEDURE VISIT (OUTPATIENT)
Dept: PLASTIC SURGERY | Facility: CLINIC | Age: 40
End: 2020-08-06
Payer: MEDICAID

## 2020-08-06 VITALS
DIASTOLIC BLOOD PRESSURE: 76 MMHG | SYSTOLIC BLOOD PRESSURE: 123 MMHG | BODY MASS INDEX: 33.99 KG/M2 | HEART RATE: 72 BPM | WEIGHT: 198 LBS

## 2020-08-06 VITALS
WEIGHT: 198.88 LBS | HEIGHT: 64 IN | BODY MASS INDEX: 33.95 KG/M2 | SYSTOLIC BLOOD PRESSURE: 116 MMHG | DIASTOLIC BLOOD PRESSURE: 82 MMHG

## 2020-08-06 DIAGNOSIS — C50.912 MALIGNANT NEOPLASM OF LEFT FEMALE BREAST, UNSPECIFIED ESTROGEN RECEPTOR STATUS, UNSPECIFIED SITE OF BREAST: Primary | ICD-10-CM

## 2020-08-06 DIAGNOSIS — Z85.3 HISTORY OF BREAST CANCER: Primary | ICD-10-CM

## 2020-08-06 PROCEDURE — 99204 PR OFFICE/OUTPT VISIT, NEW, LEVL IV, 45-59 MIN: ICD-10-PCS | Mod: S$GLB,,, | Performed by: PHYSICIAN ASSISTANT

## 2020-08-06 PROCEDURE — 99204 OFFICE O/P NEW MOD 45 MIN: CPT | Mod: S$GLB,,, | Performed by: PHYSICIAN ASSISTANT

## 2020-08-06 RX ORDER — GABAPENTIN 100 MG/1
100 CAPSULE ORAL 3 TIMES DAILY
Qty: 90 CAPSULE | Refills: 0 | Status: SHIPPED | OUTPATIENT
Start: 2020-08-06 | End: 2021-08-06

## 2020-08-06 NOTE — PROGRESS NOTES
PATIENT: Sheridan Todd  MRN: [id number]  DATE: 8/6/2020        Chief Complaint: Advice Only (surviviorship )         Subjective:   Oncology History: Ms. Todd is a 39 y.o. female  with history of breast cancer who presents for survivorship clinic visit. History of Triple positive breast cancer. Completed treatment and reconstruction. Has moved with  and 4 kids to [place], [place]. Plans to establish care with providers there. Has over all been doing very well. Very positive outlook and glad to be done with treatment. Hx of fibromyalgia and has had some skin sensitives post-op. Taking gabapentin and helping.     Persistent Side Effects:  1. Cardiac Toxicity- Denies shortness of breath or fatigue. Most recent ECHO normal.   2. Cognitive function- Able to focus and accomplish tasks. 4 children at home are keeping her busy.   3. Fatigue- Some. Doing Vit b12 injections from oncology which is helping.   4. Lymphedema- None. Has sleeve that she wears if she is going to fly to prevent.  5. Sexual Function/Hormone related symptoms- Denies vaginal dryness or dyspareunia. Had hyst/BSO in 3/2020 and thinks her libido has increased since.  Some hot flases with on and off of AI. However, is taking gabapentin as well and may be helping.   6. Pain- Some discomfort from most recent reconstruction but healing well. No longer needing pain medication.    Late Psychosocial Effects (Sleep, PTSD, Depression/Anxiety): Mood is good and denies increased anxiety or PTSD. Very busy with her children and focusing on them. Has good support at home with  and sister. Sleeping well.    Exercise/Dietary Assessment: Walking everyday for exercise. Still having food aversions, but improving slowly. Cooking meals at home that have a wide variety of fruits in vegetables. Does like to consume red meat.      Past Medical History:   Past Medical History:   Diagnosis Date    Abnormal Pap smear of cervix     Breast cancer, left  10/8/2019    Chemotherapy induced neutropenia 2019    Fibromyalgia     Gestational diabetes 2016    HER2-positive carcinoma of left breast 2019    lt    IC (interstitial cystitis)     Malignant neoplasm of overlapping sites of left breast in female, estrogen receptor positive 2019    Malignant neoplasm of overlapping sites of left female breast 2019    Overweight and obesity(278.0)     S/P bilateral mastectomy 2020    Sarcoidosis     Sinusitis        Past Surgical HIstory:   Past Surgical History:   Procedure Laterality Date    BREAST RECONSTRUCTION Bilateral 10/8/2019    Procedure: RECONSTRUCTION, BREAST skin reduction after mastectomy;  Surgeon: Howard Cheatham MD;  Location: [facility];  Service: General;  Laterality: Bilateral;     SECTION  08/15/2016    Twins     CRYOTHERAPY      CYSTOSCOPY WITH BIOPSY OF BLADDER      x 2    HYSTERECTOMY      PAROTID TUMOR      LEFT    PORTACATH PLACEMENT  2019    , removed bc cathetar severed from port, removed & relpaced with new powerport    PORTACATH PLACEMENT Right 2019    Power port by Dr. Hilton    RECONSTRUCTION OF BREAST WITH DEEP INFERIOR EPIGASTRIC ARTERY  (ROSI) FREE FLAP Bilateral 2020    Procedure: RECONSTRUCTION, BREAST, USING ROSI FREE FLAP;  Surgeon: Howard Cheatham MD;  Location: [facility];  Service: Plastics;  Laterality: Bilateral;    SENTINEL LYMPH NODE BIOPSY Left 10/8/2019    Procedure: BIOPSY, LYMPH NODE, SENTINEL LEFT;  Surgeon: Rebecca Garcia MD;  Location: [facility];  Service: General;  Laterality: Left;    SIMPLE MASTECTOMY Bilateral 10/8/2019    Procedure: MASTECTOMY, SIMPLE BILATERAL (CONSENT AM OF) 4.0 hr case;  Surgeon: Rebecca Garcia MD;  Location: [facility];  Service: General;  Laterality: Bilateral;    TOTAL ABDOMINAL HYSTERECTOMY W/ BILATERAL SALPINGOOPHORECTOMY N/A 3/3/2020    Procedure: HYSTERECTOMY, TOTAL, ABDOMINAL, WITH BILATERAL  SALPINGO-OOPHORECTOMY;  Surgeon: Aly Contreras MD;  Location: [facility];  Service: OB/GYN;  Laterality: N/A;    VAGINAL DELIVERY       x2 PROM. Preclampsia/ gestational diabetes    WISDOM TOOTH EXTRACTION         Family History:   Family History   Problem Relation Age of Onset    Hypertension Mother     Hyperlipidemia Mother     Cancer Father         liver    Cancer Maternal Grandfather         throat    Kidney disease Maternal Grandfather     Ovarian cancer Other     Lung cancer Paternal Aunt     Breast cancer Neg Hx        Social History:  reports that she quit smoking about 9 years ago. Her smoking use included cigarettes. She has a 6.50 pack-year smoking history. She has never used smokeless tobacco. She reports previous alcohol use. She reports that she does not use drugs.    Allergies:  Review of patient's allergies indicates:   Allergen Reactions    Shellfish containing products Anaphylaxis and Nausea And Vomiting     Only crabmeat      Codeine Nausea And Vomiting     Pt thinks it was tylenol #3 with codeine  Can take hydrocodone & oxycodone  Other reaction(s): Nausea    Tegaderm ag mesh [silver] Blisters     Redness, itching and tears skin        Medications:  Current Outpatient Medications   Medication Sig Dispense Refill    aspirin 81 MG Chew Take 1 tablet (81 mg total) by mouth once daily. 30 tablet 0    doxycycline (VIBRA-TABS) 100 MG tablet Take 1 tablet (100 mg total) by mouth every 12 (twelve) hours. 28 tablet 0    enoxaparin (LOVENOX) 40 mg/0.4 mL Syrg Inject 0.4 mLs (40 mg total) into the skin once daily. for 14 days 5.6 mL 0    gabapentin (NEURONTIN) 300 MG capsule Take 1 capsule (300 mg total) by mouth 3 (three) times daily. 90 capsule 1    ibuprofen (ADVIL,MOTRIN) 400 MG tablet Take 1 tablet (400 mg total) by mouth every 4 (four) hours as needed (mild pain). 90 tablet 0    loratadine (CLARITIN) 10 mg tablet Take 10 mg by mouth once daily.      oxyCODONE-acetaminophen  "(PERCOCET) 5-325 mg per tablet Take 1 tablet by mouth every 4 (four) hours as needed (severe pain). 50 tablet 0    phenazopyridine (PYRIDIUM) 200 MG tablet TAKE 1 TABLET BY MOUTH EVERY 6 HOURS AS NEEDED FOR PAIN 20 tablet 0    polyethylene glycol (GLYCOLAX) 17 gram PwPk Take 17 g by mouth once daily. 30 packet 0     Current Facility-Administered Medications   Medication Dose Route Frequency Provider Last Rate Last Dose    cyanocobalamin injection 1,000 mcg  1,000 mcg Subcutaneous Q30 Days Abel Chambers MD   1,000 mcg at 07/01/20 1511       Review of Systems:  General: No fever, chills, or weight loss.  Chest: No chest pain, shortness of breath, or palpitations.  Breast: No pain, masses, or nipple discharge.  Vulva: No pain, lesions, or itching.  Vagina: No relaxation, itching, discharge, or lesions.  Abdomen: No pain, nausea, vomiting, diarrhea, or constipation.  Urinary: No incontinence, nocturia, frequency, or dysuria.  Extremities:  No leg cramps, edema, or calf pain.  Neurologic: No headaches, dizziness, or visual changes.  Psychiatric: No anxiety, depression, or sleep issues.      Objective:      Vitals:   Vitals:    08/06/20 1355   BP: 116/82   Weight: 90.2 kg (198 lb 13.7 oz)   Height: 5' 4" (1.626 m)     BMI: Body mass index is 34.13 kg/m².    Physical Exam: Deferred exam      Assessment:     1. History of breast cancer           Plan:   Reviewed Cancer Treatment Summary with patient. Care Plan was given to the patient and all questions were answered. Survivorship Clinic handout given to patient.   Counseled on healthy lifestyle and behavior modifications that could reduce risk of recurrence.   Continue diet with wide variety of fruits and vegetables.  Limit red meat to 2-3 meals per week.  Continue to stay active. Encouraged 30 minutes 5x a week.  Will look into at home yoga.   Discussed options for vaginal dryness and hot flashes if needed in the future.  Will establish care with new oncology " providers in [place].  She is following up with Med Onc and Surgery about obtaining records for new providers.  :  I spent 30 minutes with this patient today, >50% counseling.

## 2020-08-09 NOTE — PROCEDURES
OFFICE PROCEDURE    Name: Sheridan Todd  MRN: [id number]  Date: 8/9/2020    PRE-OP DIAGNOSIS:  Left abdominal dog ear  History of breast cancer  S/p bilateral rosi flap reconstruction    POST-OP DIAGNOSIS:  Same    PROCEDURES:  EXCISION, BENIGN lesion abdomen 10 cm  Excision, benign lesion breast 10 cm    ANESTHESIA:  Lidocaine 1% with epinephrine 1:100,000 60 cc    FINDINGS: excised left abdominal dog ear, left breast dog ear.    SURGEON: Kris Cheatham MD  EBL: minimal  COMPLICATIONS: none  SPECIMENS: breast and abdomen  DISPOSITION: good condition, discharged to home.  The patient tolerated the procedure without adverse consequences    INDICATIONS:  The risks, benefits, alternatives, and expected outcomes were discussed with the patient's parents; the risks including but not limited to poor scarring, wound healing problem, infection, bleeding, keloid, hypertrophic scarring, incomplete removal, recurrence of mass, sensory nerve injury. Informed consent was obtained.  All questions were answered.      PROCEDURE:  The patient was brought to the procedure room and placed in a supine position.  Time out and verification procedure were performed. Above cc 1% lidocaine with 1:100,000 epinephrine solution was injected locally. The patient was prepped and draped in sterile fashion. Abdominal dog ear and breast dog ear was excised using scalpel.  Bovie hemostasis was obtained.  Layered closure was performed with 3-0 monocryl and 3-0 vicryl.  Hemostasis was achieved. The patient tolerating the procedure well without complication.    AFTERCARE  Refer tp previously given ROSI flap recovery instructions

## 2020-08-10 ENCOUNTER — OFFICE VISIT (OUTPATIENT)
Dept: PLASTIC SURGERY | Facility: CLINIC | Age: 40
End: 2020-08-10
Payer: MEDICAID

## 2020-08-10 ENCOUNTER — OFFICE VISIT (OUTPATIENT)
Dept: RADIATION ONCOLOGY | Facility: CLINIC | Age: 40
End: 2020-08-10
Payer: MEDICAID

## 2020-08-10 ENCOUNTER — INFUSION (OUTPATIENT)
Dept: INFUSION THERAPY | Facility: HOSPITAL | Age: 40
End: 2020-08-10
Attending: INTERNAL MEDICINE
Payer: MEDICAID

## 2020-08-10 VITALS
WEIGHT: 198 LBS | DIASTOLIC BLOOD PRESSURE: 81 MMHG | SYSTOLIC BLOOD PRESSURE: 127 MMHG | HEART RATE: 76 BPM | BODY MASS INDEX: 33.99 KG/M2

## 2020-08-10 VITALS
HEIGHT: 64 IN | HEART RATE: 96 BPM | TEMPERATURE: 98 F | DIASTOLIC BLOOD PRESSURE: 83 MMHG | BODY MASS INDEX: 33.99 KG/M2 | RESPIRATION RATE: 20 BRPM | WEIGHT: 198 LBS | BODY MASS INDEX: 33.8 KG/M2 | SYSTOLIC BLOOD PRESSURE: 138 MMHG | WEIGHT: 198 LBS

## 2020-08-10 DIAGNOSIS — M85.80 OSTEOPENIA, UNSPECIFIED LOCATION: Primary | ICD-10-CM

## 2020-08-10 DIAGNOSIS — C50.812 MALIGNANT NEOPLASM OF OVERLAPPING SITES OF LEFT BREAST IN FEMALE, ESTROGEN RECEPTOR POSITIVE: Primary | ICD-10-CM

## 2020-08-10 DIAGNOSIS — C50.912 MALIGNANT NEOPLASM OF LEFT FEMALE BREAST, UNSPECIFIED ESTROGEN RECEPTOR STATUS, UNSPECIFIED SITE OF BREAST: ICD-10-CM

## 2020-08-10 DIAGNOSIS — C50.912 HER2-POSITIVE CARCINOMA OF LEFT BREAST: ICD-10-CM

## 2020-08-10 DIAGNOSIS — M85.80 OSTEOPENIA, UNSPECIFIED LOCATION: ICD-10-CM

## 2020-08-10 DIAGNOSIS — E53.8 B12 DEFICIENCY: ICD-10-CM

## 2020-08-10 DIAGNOSIS — Z79.811 USE OF AROMATASE INHIBITORS: ICD-10-CM

## 2020-08-10 DIAGNOSIS — Z17.0 MALIGNANT NEOPLASM OF OVERLAPPING SITES OF LEFT BREAST IN FEMALE, ESTROGEN RECEPTOR POSITIVE: Primary | ICD-10-CM

## 2020-08-10 DIAGNOSIS — Z17.0 MALIGNANT NEOPLASM OF OVERLAPPING SITES OF LEFT BREAST IN FEMALE, ESTROGEN RECEPTOR POSITIVE: ICD-10-CM

## 2020-08-10 DIAGNOSIS — C50.812 MALIGNANT NEOPLASM OF OVERLAPPING SITES OF LEFT BREAST IN FEMALE, ESTROGEN RECEPTOR POSITIVE: ICD-10-CM

## 2020-08-10 DIAGNOSIS — Z85.3 HISTORY OF BREAST CANCER: Primary | ICD-10-CM

## 2020-08-10 PROCEDURE — 63600175 PHARM REV CODE 636 W HCPCS: Mod: JG | Performed by: NURSE PRACTITIONER

## 2020-08-10 PROCEDURE — 99024 PR POST-OP FOLLOW-UP VISIT: ICD-10-PCS | Mod: S$GLB,,, | Performed by: SURGERY

## 2020-08-10 PROCEDURE — 96372 THER/PROPH/DIAG INJ SC/IM: CPT

## 2020-08-10 PROCEDURE — 99214 PR OFFICE/OUTPT VISIT, EST, LEVL IV, 30-39 MIN: ICD-10-PCS | Mod: S$GLB,,, | Performed by: RADIOLOGY

## 2020-08-10 PROCEDURE — 99024 POSTOP FOLLOW-UP VISIT: CPT | Mod: S$GLB,,, | Performed by: SURGERY

## 2020-08-10 PROCEDURE — 99214 OFFICE O/P EST MOD 30 MIN: CPT | Mod: S$GLB,,, | Performed by: RADIOLOGY

## 2020-08-10 RX ORDER — ANASTROZOLE 1 MG/1
1 TABLET ORAL DAILY
Qty: 90 TABLET | Refills: 3 | Status: SHIPPED | OUTPATIENT
Start: 2020-08-10 | End: 2021-08-10

## 2020-08-10 RX ORDER — CYANOCOBALAMIN 1000 UG/ML
1000 INJECTION, SOLUTION INTRAMUSCULAR; SUBCUTANEOUS
Qty: 1 ML | Refills: 3 | Status: SHIPPED | OUTPATIENT
Start: 2020-08-10

## 2020-08-10 RX ORDER — ANASTROZOLE 1 MG/1
1 TABLET ORAL DAILY
Qty: 30 TABLET | Refills: 6 | Status: CANCELLED | OUTPATIENT
Start: 2020-08-10 | End: 2021-08-10

## 2020-08-10 RX ORDER — CYANOCOBALAMIN 1000 UG/ML
1000 INJECTION, SOLUTION INTRAMUSCULAR; SUBCUTANEOUS
Qty: 3 ML | Refills: 3 | Status: CANCELLED | OUTPATIENT
Start: 2020-08-10

## 2020-08-10 RX ADMIN — DENOSUMAB 60 MG: 60 INJECTION SUBCUTANEOUS at 10:08

## 2020-08-10 NOTE — PROGRESS NOTES
Sheridan Todd  [id number]  1980  8/10/2020  Rebecca Garcia Md  [address] [facility] [place] [address]    DIAGNOSIS: Cancer Staging  Malignant neoplasm of overlapping sites of left breast in female, estrogen receptor positive  Staging form: Breast, AJCC 8th Edition  - Clinical: No stage assigned - Unsigned  - Pathologic: No Stage Recommended (ypT1c(2), pN0, cM0, G2, ER+, OR+, HER2+) - Signed by Darwin Long Jr., MD on 11/15/2019    REASON FOR VISIT: Routine scheduled follow-up.    HISTORY OF PRESENT ILLNESS:   [id number] converted to [id number] triple (+) g2 IDC L breast    TREATMENT GOAL: adjuvant    HISTORY OF PRESENT ILLNESS:   39F presented with left breast pain with nipple inversion, no rapid swelling , warmth or redness with diagnostic mammogram and ultrasound revealing a 2.8 cm irregular hypoechoic mass at the 1 o'clock position left breast, 10 cm from the nipple with additional asymmetries and calcifications and left axillary subcentimeter lymphadenopathy.  Skin thickening and nipple inversion also appreciated.  There were also suspicious findings in the right breast including a 1.8 cm hypoechoic mass 7 cm from the nipple at the 5 o'clock position with a normal appearing right axillary lymph node.  MRI confirmed extensive mass and non masslike enhancement a left breast with nipple retraction and multiple foci of malignant calcifications as well as confirming 2 small masses in the right breast.    Biopsy of the right breast was negative with left breast biopsy revealing grade 3 invasive ductal carcinoma, Juan 8/9 with associated high-grade DCIS with comedo necrosis. Tumor was ER+ @ 95%, OR+ % 90%, her2+ @ 3+.  PET-CT confirmed diffuse FDG activity in the subareolar left breast with skin thickening and multiple hypermetabolic left breast mass is with no evidence of distant metastases or axillary lymphadenopathy.    She tested BRCA (-).    She  completed neoadjuvant TCHP x 6c with Dr. Chambers.  Interim MRI demonstrated significant improvement.    Patient underwent bilateral mastectomy with left axillary lymph node dissection with Dr. Garcia that revealed:   - R breast: david   - L breast:  2 foci; 1.9 cm g2 IDC at LOQ, <1 mm mucinous adenoca at UOQ margin (-)   - Juan 6/9; multifocal high-grade DCIS; no skin or nipple involvement   - ER+, NH weakly +; her2 equivocal   - L axilla: 0/24LN    Her case was reviewed by Multidisciplinary Tumor Board the recommended adjuvant radiotherapy followed by Kadcyla and endocrine therapy then ROSI reconstruction.  She is also planning for hysterectomy at completion of treatment.    Patient completed adjuvant left chest wall and draining lymphatic radiotherapy to 5040 cGy with 1000 cGy boost to her mastectomy incision on January 14, 2020.  Treatment was well tolerated.    Completed adjuvant Herceptin + Perjeta 7/20.    INTERVAL HISTORY:   Patient presents having completed adjuvant systemic therapy.  She has undergone bilateral ROSI 3 weeks ago.  She reports feeling well.  She denies fever, chills, chest pain, shortness of breath, cough, hemoptysis.  She denies bone pain.  She denies appetite, energy or weight changes.  She has follow-up with her surgeon planned and is moving to [place].    Review of Systems   Constitutional: Negative for appetite change, chills, fever and unexpected weight change.   HENT:   Negative for lump/mass, mouth sores, sore throat, trouble swallowing and voice change.    Eyes: Negative for eye problems and icterus.   Respiratory: Negative for cough, hemoptysis and shortness of breath.    Cardiovascular: Negative for chest pain and leg swelling.   Gastrointestinal: Negative for abdominal pain, constipation, diarrhea, nausea and vomiting.   Genitourinary: Negative for dysuria, frequency, hematuria, nocturia and vaginal bleeding.    Musculoskeletal: Negative for back pain, gait problem, neck  pain and neck stiffness.   Skin: Positive for wound.   Neurological: Negative for extremity weakness, gait problem, headaches, numbness and seizures.   Hematological: Negative for adenopathy.     Past Medical History:   Diagnosis Date    Abnormal Pap smear of cervix     Breast cancer, left 10/8/2019    Chemotherapy induced neutropenia 2019    Fibromyalgia     Gestational diabetes 2016    HER2-positive carcinoma of left breast 2019    lt    IC (interstitial cystitis)     Malignant neoplasm of overlapping sites of left breast in female, estrogen receptor positive 2019    Malignant neoplasm of overlapping sites of left female breast 2019    Overweight and obesity(278.0)     S/P bilateral mastectomy 2020    Sarcoidosis     Sinusitis      Past Surgical History:   Procedure Laterality Date    BREAST RECONSTRUCTION Bilateral 10/8/2019    Procedure: RECONSTRUCTION, BREAST skin reduction after mastectomy;  Surgeon: Howard Cheatham MD;  Location: [facility];  Service: General;  Laterality: Bilateral;     SECTION  08/15/2016    Twins     CRYOTHERAPY      CYSTOSCOPY WITH BIOPSY OF BLADDER      x 2    HYSTERECTOMY      PAROTID TUMOR      LEFT    PORTACATH PLACEMENT  2019    , removed bc cathetar severed from port, removed & relpaced with new powerport    PORTACATH PLACEMENT Right 2019    Power port by Dr. Hilton    RECONSTRUCTION OF BREAST WITH DEEP INFERIOR EPIGASTRIC ARTERY  (ROSI) FREE FLAP Bilateral 2020    Procedure: RECONSTRUCTION, BREAST, USING ROSI FREE FLAP;  Surgeon: Howard Cheatham MD;  Location: [facility];  Service: Plastics;  Laterality: Bilateral;    SENTINEL LYMPH NODE BIOPSY Left 10/8/2019    Procedure: BIOPSY, LYMPH NODE, SENTINEL LEFT;  Surgeon: Rebecca Garcia MD;  Location: [facility];  Service: General;  Laterality: Left;    SIMPLE MASTECTOMY Bilateral 10/8/2019    Procedure: MASTECTOMY, SIMPLE BILATERAL (CONSENT AM  OF) 4.0 hr case;  Surgeon: Rebecca Garcia MD;  Location: [facility];  Service: General;  Laterality: Bilateral;    TOTAL ABDOMINAL HYSTERECTOMY W/ BILATERAL SALPINGOOPHORECTOMY N/A 3/3/2020    Procedure: HYSTERECTOMY, TOTAL, ABDOMINAL, WITH BILATERAL SALPINGO-OOPHORECTOMY;  Surgeon: Aly Contreras MD;  Location: [facility];  Service: OB/GYN;  Laterality: N/A;    VAGINAL DELIVERY       x2 PROM. Preclampsia/ gestational diabetes    WISDOM TOOTH EXTRACTION       Social History     Socioeconomic History    Marital status:      Spouse name: Not on file    Number of children: Not on file    Years of education: Not on file    Highest education level: Not on file   Occupational History     Employer: [employer]   Social Needs    Financial resource strain: Not on file    Food insecurity     Worry: Not on file     Inability: Not on file    Transportation needs     Medical: Not on file     Non-medical: Not on file   Tobacco Use    Smoking status: Former Smoker     Packs/day: 1.00     Years: 6.50     Pack years: 6.50     Types: Cigarettes     Quit date: 2011     Years since quittin.6    Smokeless tobacco: Never Used   Substance and Sexual Activity    Alcohol use: Not Currently    Drug use: No    Sexual activity: Yes     Partners: Male   Lifestyle    Physical activity     Days per week: Not on file     Minutes per session: Not on file    Stress: Not on file   Relationships    Social connections     Talks on phone: Not on file     Gets together: Not on file     Attends Restorationism service: Not on file     Active member of club or organization: Not on file     Attends meetings of clubs or organizations: Not on file     Relationship status: Not on file   Other Topics Concern    Not on file   Social History Narrative    Not on file     Family History   Problem Relation Age of Onset    Hypertension Mother     Hyperlipidemia Mother     Cancer Father         liver    Cancer Maternal Grandfather          throat    Kidney disease Maternal Grandfather     Ovarian cancer Other     Lung cancer Paternal Aunt     Breast cancer Neg Hx      Medication List with Changes/Refills   Current Medications    ASPIRIN 81 MG CHEW    Take 1 tablet (81 mg total) by mouth once daily.    DOXYCYCLINE (VIBRA-TABS) 100 MG TABLET    Take 1 tablet (100 mg total) by mouth every 12 (twelve) hours.    GABAPENTIN (NEURONTIN) 100 MG CAPSULE    Take 1 capsule (100 mg total) by mouth 3 (three) times daily.    GABAPENTIN (NEURONTIN) 300 MG CAPSULE    Take 1 capsule (300 mg total) by mouth 3 (three) times daily.    IBUPROFEN (ADVIL,MOTRIN) 400 MG TABLET    Take 1 tablet (400 mg total) by mouth every 4 (four) hours as needed (mild pain).    LORATADINE (CLARITIN) 10 MG TABLET    Take 10 mg by mouth once daily.    OXYCODONE-ACETAMINOPHEN (PERCOCET) 5-325 MG PER TABLET    Take 1 tablet by mouth every 4 (four) hours as needed (severe pain).    PHENAZOPYRIDINE (PYRIDIUM) 200 MG TABLET    TAKE 1 TABLET BY MOUTH EVERY 6 HOURS AS NEEDED FOR PAIN    POLYETHYLENE GLYCOL (GLYCOLAX) 17 GRAM PWPK    Take 17 g by mouth once daily.     Review of patient's allergies indicates:   Allergen Reactions    Shellfish containing products Anaphylaxis and Nausea And Vomiting     Only crabmeat      Codeine Nausea And Vomiting     Pt thinks it was tylenol #3 with codeine  Can take hydrocodone & oxycodone  Other reaction(s): Nausea    Tegaderm ag mesh [silver] Blisters     Redness, itching and tears skin        QUALITY OF LIFE: 90%- Able to Carry on Normal Activity: Minor Symptoms of Disease    Vitals:    08/10/20 1057   Weight: 89.8 kg (198 lb)   PainSc: 0-No pain     Body mass index is 33.99 kg/m².    PHYSICAL EXAM:   GENERAL: alert; in no apparent distress.   HEAD: normocephalic, atraumatic.  Alopecia corrected  EYES: pupils are equal, round, reactive to light and accommodation. Sclera anicteric. Conjunctiva not injected.   NECK: no cervical motion rigidity; supple with  no masses.  CHEST: Patient is speaking comfortably on room air with normal work of breathing without using accessory muscles of respiration.  ABDOMEN: soft, nontender, nondistended.   MUSCULOSKELETAL: no tenderness to palpation along the spine or scapulae. Normal range of motion.  NEUROLOGIC: cranial nerves II-XII intact bilaterally. Strength 5/5 in bilateral upper and lower extremities. No sensory deficits appreciated. Normal gait.  LYMPHATIC: no axillary adenopathy appreciated bilaterally.   EXTREMITIES: no clubbing, cyanosis, edema.  SKIN: no erythema, rashes, ulcerations noted.   CW:  B ROSI flaps with active scabbing; no cellulitis or fluctuance; no nodularity    ANCILLARY DATA: none    ASSESSMENT: 39 y.o. female with stage [id number] converted to [id number] triple (+) g2 IDC L breast after TCHP x 6c s/p B mastectomies and L ALND; planned for HP x 1yr (Perjeta held during RT) + antiestrogen therapy s/p adjuvant radiotherapy, 5040 cGy to left chest wall and draining lymphatics followed by 1000 cGy boost to mastectomy incision ending January 14, 2020 s/p herceptin+perjecta adjuvant and recent ROSI flaps.  PLAN:   Sheridan Todd did very well throughout treatment and today presents 3 weeks out from bilateral ROSI flaps.  She reports good tolerance of surgery and continues to follow with plastic surgery with likely planned revision at a later date.  She is without evidence of disease at today's exam and plans to transition her care to [place] where she is moving with her family.  She reports Dr. Garcia has reached out to a colleague to arrange follow-up and I directed her to follow-up with radiation oncology as well as Medical Oncology.  She will contact me if she has any questions.    All questions answered and contact information provided. Patient understands free to call us anytime with any questions or concerns regarding radiation therapy.    I have personally seen and evaluated this patient. Greater  than 50% of this time was spent discussing coordination of care and/or counseling.    PHYSICIAN: Darwin Long Jr, MD

## 2020-08-10 NOTE — PLAN OF CARE
Problem: Fatigue  Goal: Improved Activity Tolerance  Outcome: Ongoing, Progressing  Intervention: Promote Energy Conservation  Flowsheets (Taken 8/10/2020 1036)  Fatigue Management: frequent rest breaks encouraged  Sleep/Rest Enhancement: regular sleep/rest pattern promoted  Activity Management:   ambulated - L4   activity encouraged

## 2020-08-11 ENCOUNTER — TELEPHONE (OUTPATIENT)
Dept: INFUSION THERAPY | Facility: HOSPITAL | Age: 40
End: 2020-08-11

## 2020-08-11 NOTE — PROGRESS NOTES
Plastic Update    Patient is s/p ROSI flap reconstruction.  She is pleased with her breasts  She has a firm medial pole of her left breast  Her abdominal incision is in tact after dog ear correction  There is new 1 mm eschar over her central and right paramedian abdominal incision with scant drainage.    Her umbilicus is viable and healing from some desquamation  No evidence of fluid collection or infection  She is scheduled to go out of town for an important family function  I urged her to take aspirin, hydrate, do ankle circles and wear her stockings if possible  We did discuss possibly excising the eschar and we agreed to monitor it at this time  We discussed the possibility of her having to return for future abdominal wound care if there is increased drainage, separation of the edges  She will send our office pictures of her wounds in the coming days through the chart.

## 2020-08-12 ENCOUNTER — TELEPHONE (OUTPATIENT)
Dept: HEMATOLOGY/ONCOLOGY | Facility: CLINIC | Age: 40
End: 2020-08-12

## 2020-09-30 ENCOUNTER — TELEPHONE (OUTPATIENT)
Dept: HEMATOLOGY/ONCOLOGY | Facility: CLINIC | Age: 40
End: 2020-09-30

## 2020-09-30 PROBLEM — Z85.3 HISTORY OF BREAST CANCER: Status: RESOLVED | Noted: 2020-07-21 | Resolved: 2020-09-30

## 2020-09-30 NOTE — TELEPHONE ENCOUNTER
Called the patient and instructed her that it is a can cancel appointment.  She was just wondering does she need to keep it or can she do a video or phone visit.  I instructed her that he cannot do a visit out of state.  I instructed her that I will ask him in the morning and if he needs her for anything then I will call her.

## 2020-09-30 NOTE — TELEPHONE ENCOUNTER
----- Message from Emmy Godinez sent at 9/30/2020  3:30 PM CDT -----  The patient wants to know if she needs to keep her appointment or is it just a regular follow up that she can cancel. [phone]

## 2020-10-07 ENCOUNTER — PATIENT MESSAGE (OUTPATIENT)
Dept: PLASTIC SURGERY | Facility: CLINIC | Age: 40
End: 2020-10-07

## 2020-10-07 ENCOUNTER — PATIENT MESSAGE (OUTPATIENT)
Dept: HEMATOLOGY/ONCOLOGY | Facility: CLINIC | Age: 40
End: 2020-10-07

## 2020-10-08 ENCOUNTER — PATIENT MESSAGE (OUTPATIENT)
Dept: HEMATOLOGY/ONCOLOGY | Facility: CLINIC | Age: 40
End: 2020-10-08

## 2020-10-08 ENCOUNTER — TELEPHONE (OUTPATIENT)
Dept: SURGERY | Facility: CLINIC | Age: 40
End: 2020-10-08

## 2020-10-08 RX ORDER — MELOXICAM 7.5 MG/1
7.5 TABLET ORAL DAILY
Qty: 30 TABLET | Refills: 1 | Status: SHIPPED | OUTPATIENT
Start: 2020-10-08

## 2020-10-08 RX ORDER — PHENAZOPYRIDINE HYDROCHLORIDE 200 MG/1
200 TABLET, FILM COATED ORAL 3 TIMES DAILY PRN
Qty: 90 TABLET | Refills: 0 | Status: SHIPPED | OUTPATIENT
Start: 2020-10-08 | End: 2020-10-18

## 2020-10-20 ENCOUNTER — PATIENT MESSAGE (OUTPATIENT)
Dept: HEMATOLOGY/ONCOLOGY | Facility: CLINIC | Age: 40
End: 2020-10-20

## 2020-10-21 ENCOUNTER — PATIENT MESSAGE (OUTPATIENT)
Dept: FAMILY MEDICINE | Facility: CLINIC | Age: 40
End: 2020-10-21

## 2020-10-26 ENCOUNTER — PATIENT MESSAGE (OUTPATIENT)
Dept: PLASTIC SURGERY | Facility: CLINIC | Age: 40
End: 2020-10-26

## 2020-10-27 ENCOUNTER — TELEPHONE (OUTPATIENT)
Dept: SURGERY | Facility: CLINIC | Age: 40
End: 2020-10-27

## 2020-10-27 RX ORDER — GABAPENTIN 300 MG/1
300 CAPSULE ORAL 3 TIMES DAILY
Qty: 90 CAPSULE | Refills: 5 | Status: SHIPPED | OUTPATIENT
Start: 2020-10-27 | End: 2021-04-25

## 2020-10-30 ENCOUNTER — TELEPHONE (OUTPATIENT)
Dept: SURGERY | Facility: CLINIC | Age: 40
End: 2020-10-30

## 2020-10-30 NOTE — TELEPHONE ENCOUNTER
Returned patient call regarding the message below.  Answered the patient questions.  Stated to the patient to to watch for swelling, any redness to the arm.  Also, told patient that if she has any problems over the weekend to contact our office to get an appointment.  Patient voiced understanding.        ----- Message from Hang Pretty sent at 10/30/2020  8:18 AM CDT -----  Regarding: ADVISE  Contact: self  Pt states she just received the flu shot in her left arm Pt is really worried about getting Lymphedema Pt is tearful and need to speak to someone right away.      Contact info  [phone]

## 2021-01-23 NOTE — PROGRESS NOTES
Hematology/Oncology Physician Note:    I spoke to Dr Rey by phone today about the patient portacath that was found recently to have sheered. He has discussed with Dr Crow Montez with vascular surgery but Dr Tinoco does not see medicaid patients. Dr Rey plans to take the port out and replace it with a new one in a different location. He plans to start her empirically on eliquis. He is also looking for a vascular surgeon who takes medicaid to see her but he suspects that she would not be a good candidate for removal of the sheared piece. She is due for chemotherapy again on 7/29. We can delay the next chemotherapy by one week if need be.    FAMILY HISTORY:  FH: hypertension

## 2021-01-29 ENCOUNTER — TELEPHONE (OUTPATIENT)
Dept: INFUSION THERAPY | Facility: HOSPITAL | Age: 41
End: 2021-01-29

## 2021-05-06 ENCOUNTER — PATIENT MESSAGE (OUTPATIENT)
Dept: RESEARCH | Facility: HOSPITAL | Age: 41
End: 2021-05-06

## 2024-01-02 NOTE — PROGRESS NOTES
Plastic Update    Audible arterial doppler right breast  Audible internal doppler left breast  Healthy skin paddles  Abdominal incision in tact.    Drains to bulb suction    Flap checks on floor  [place]   Bedrest  Clears  Pca  Abx while drains in place over mesh  Ok for sq helarin for dvt chemoppx  Labs in am  Beach chair position    no

## 2025-04-16 NOTE — PROGRESS NOTES
Plastic Update    Pain controlled, toradol iv burns  Drains serosang  Audible akin signals bilaterally  Strong internal doppler on left    Encourage po fluids  Out of bed tid  Hold arimidex  Switch to po toradol  Shower kalani  Continue IS  Possible dc pod 4  Spoke with pt about vaccine

## (undated) DEVICE — SUT VICRYL PLUS 2-0 CT1 18

## (undated) DEVICE — SPONGE LAP 18X18 PREWASHED

## (undated) DEVICE — POSITIONER HEAD DONUT 9IN FOAM

## (undated) DEVICE — SUT ETHILON 3-0 FS-1 30

## (undated) DEVICE — APPLIER LIGACLIP MED 11IN

## (undated) DEVICE — SUT 3-0 SILK 18IN FS-1

## (undated) DEVICE — SUT MONOCRYL 3-0 PS-2 UND

## (undated) DEVICE — SYS PRINEO SKIN CLOSURE

## (undated) DEVICE — DRAPE LAPAROTOMY TRANSVERSE 89281

## (undated) DEVICE — SEE MEDLINE ITEM 157110

## (undated) DEVICE — NDL 18GA X1 1/2 REG BEVEL

## (undated) DEVICE — SPONGE PEANUT 3/8 7103

## (undated) DEVICE — SOLUTION SCRUB IODINE 4OZ

## (undated) DEVICE — CLIP LIGATING MEDIUM

## (undated) DEVICE — SEE MEDLINE ITEM 152742

## (undated) DEVICE — COVER PROBE SHEATH SURG 6X3IN

## (undated) DEVICE — NDL HYPO REG 25G X 1 1/2

## (undated) DEVICE — SUT 2/0 27IN PDS II VIO MO

## (undated) DEVICE — STERISTRIP 1/2 R1547

## (undated) DEVICE — GLOVE BIOGEL SKINSENSE PI 8.0

## (undated) DEVICE — CORD BIPOLAR 12 FOOT

## (undated) DEVICE — PAD MATERNITY

## (undated) DEVICE — SUTURE 684H SILK 3-0

## (undated) DEVICE — SEE MEDLINE ITEM 157131

## (undated) DEVICE — SYR 10CC LUER LOCK

## (undated) DEVICE — ELECTRODE REM PLYHSV RETURN 9

## (undated) DEVICE — ELECTRODE BLD EXT INSUL 1

## (undated) DEVICE — SUT 9/0 5IN ETHILON BLK MON

## (undated) DEVICE — GLOVE BIOGEL PI ULTRA TOUCH GRAY SZ 8.5

## (undated) DEVICE — GLOVE BIOGEL SKINSENSE PI 6.5

## (undated) DEVICE — PAD ABD 8X10 STERILE

## (undated) DEVICE — Device

## (undated) DEVICE — DRESSING TELFA 3X8  1238

## (undated) DEVICE — LIGASURE IMPACT INSTR 18CM  LF4418

## (undated) DEVICE — SUT MCRYL PLUS 4-0 PS2 27IN

## (undated) DEVICE — WARMER DRAPE STERILE LF

## (undated) DEVICE — GOWN SURG. AERO CHROME XXL

## (undated) DEVICE — SUT VICRYL 2-0 36 CT-1

## (undated) DEVICE — SUTURE VICRYL #0 36 VCP946H

## (undated) DEVICE — ELECTRODE BLADE INSULATED 1 IN

## (undated) DEVICE — GLOVE BIOGEL SKINSENSE PI 7.0

## (undated) DEVICE — POSITIONER IV ARMBOARD FOAM

## (undated) DEVICE — BINDER ABDOMINAL 9 46-62

## (undated) DEVICE — SPEARS EYE 10/PK

## (undated) DEVICE — BLADE PEAK PLASMA

## (undated) DEVICE — SEE MEDLINE ITEM 146313

## (undated) DEVICE — BRA CLASSIC COMFORM BLK SZ 38

## (undated) DEVICE — SPONGE NEURO 1/2 X 2

## (undated) DEVICE — CONTAINER SPECIMEN STRL 4OZ

## (undated) DEVICE — DRAIN CHANNEL ROUND 15FR

## (undated) DEVICE — NDL SAFETY 22G X 1.5 ECLIPSE

## (undated) DEVICE — SUT STRATAFIX PGAPCL 3 FS-1

## (undated) DEVICE — BLANKET HYPER ADULT 24X60IN

## (undated) DEVICE — SKIN MARKER DEVON 160

## (undated) DEVICE — GLOVE BIOGEL SKINSENSE PI 7.5

## (undated) DEVICE — SUTURE D SPECIAL MONC 4-0 KS   D9600

## (undated) DEVICE — CLIPPER BLADE MOD 4406 (CAREF)

## (undated) DEVICE — SOLUTION PREP IODINE 4OZ

## (undated) DEVICE — HOLDER DRAIN POUCH PINK

## (undated) DEVICE — BINDER 12 4-PANEL 45-62

## (undated) DEVICE — SUT 0 36IN PDS II VIO MONO

## (undated) DEVICE — COVER LIGHT HANDLE LB53

## (undated) DEVICE — MATRIX HEMOSTATIC FLOSEAL 5ML

## (undated) DEVICE — CLIP LIGATING HEMOCLP SMALL

## (undated) DEVICE — TAPE CLOTH 3 MEDIPORE 2863

## (undated) DEVICE — DRAPE WARMER ORS-100

## (undated) DEVICE — SPONGE COTTON TRAY 4X4IN

## (undated) DEVICE — TUBE FEEDING PURPLE 5FRX40CM

## (undated) DEVICE — SOLUTION IRRI NS BOTTLE 1000ML R5200-01

## (undated) DEVICE — PAD ABD 5X9   7196D

## (undated) DEVICE — SPONGE GAUZE 4X8

## (undated) DEVICE — PAD ABD COMB CELOS 8X10 STRL

## (undated) DEVICE — CLIP DOUBLE MICRO.

## (undated) DEVICE — SWABSTICK BENZOIN S42450

## (undated) DEVICE — DRESSING XEROFORM FOIL PK 1X8

## (undated) DEVICE — ADHESIVE DERMABOND ADVANCED

## (undated) DEVICE — CLAMP SINGLE MICRO.

## (undated) DEVICE — TRAY GENERAL SURGERY

## (undated) DEVICE — SUTURE VICRYL 0 CT-1 8-27 JJ31G

## (undated) DEVICE — SUT PDS II 2-0 CT1

## (undated) DEVICE — EVACUATOR WOUND BULB 100CC

## (undated) DEVICE — BRA CLASSIC COMFORT 40 BLACK

## (undated) DEVICE — SODIUM CHLORIDE 0.9% 1000ML

## (undated) DEVICE — STAPLER SKIN ROTATING HEAD

## (undated) DEVICE — SUT PDS II 0 CT VIL MONO 36

## (undated) DEVICE — SUT VICRYL 3-0 27 SH

## (undated) DEVICE — SEE MEDLINE ITEM 152186

## (undated) DEVICE — SUTURE MAXON 0 GS-21 30 8886626761

## (undated) DEVICE — PAD BOVIE ADULT

## (undated) DEVICE — SCRUB 10% POVIDONE IODINE 4OZ

## (undated) DEVICE — SUT PROLENE 4-0 PS2 18 BLUE

## (undated) DEVICE — SEE MEDLINE ITEM 152622

## (undated) DEVICE — UNDERGLOVES BIOGEL PI SZ 7 LF

## (undated) DEVICE — DRAPE VS3 IRIDIUM MICROSCOPE

## (undated) DEVICE — AGENT HEMOSTATIC 3GM OXIDIZED REGENERATED CELLULOSE

## (undated) DEVICE — PACK UNIV PROCEDURE

## (undated) DEVICE — DRAIN CHANNEL ROUND 19FR

## (undated) DEVICE — HEMOSTAT SURGICEL 4X8IN

## (undated) DEVICE — SPONGE LAP 18X18

## (undated) DEVICE — SOL NS 1000CC

## (undated) DEVICE — BRA CLASSIC COMFORT 42BLACK

## (undated) DEVICE — TRAY SKIN PREP DRY

## (undated) DEVICE — SUT 2-0 ETHILON 18 FS